# Patient Record
Sex: FEMALE | Race: WHITE | Employment: FULL TIME | URBAN - NONMETROPOLITAN AREA
[De-identification: names, ages, dates, MRNs, and addresses within clinical notes are randomized per-mention and may not be internally consistent; named-entity substitution may affect disease eponyms.]

---

## 2019-10-10 ENCOUNTER — TELEPHONE (OUTPATIENT)
Dept: VASCULAR SURGERY | Age: 79
End: 2019-10-10

## 2019-10-11 DIAGNOSIS — M79.672 LEFT FOOT PAIN: ICD-10-CM

## 2019-10-11 DIAGNOSIS — M19.90 CHRONIC OSTEOARTHRITIS: ICD-10-CM

## 2019-10-11 DIAGNOSIS — R09.89 DECREASED PEDAL PULSES: ICD-10-CM

## 2019-10-11 DIAGNOSIS — H92.02 LEFT EAR PAIN: ICD-10-CM

## 2019-10-11 RX ORDER — GABAPENTIN 300 MG/1
CAPSULE ORAL
COMMUNITY
Start: 2019-09-27 | End: 2019-11-27

## 2019-10-11 RX ORDER — NEOMYCIN SULFATE, POLYMYXIN B SULFATE AND HYDROCORTISONE 10; 3.5; 1 MG/ML; MG/ML; [USP'U]/ML
3 SUSPENSION/ DROPS AURICULAR (OTIC) PRN
COMMUNITY
Start: 2019-09-27

## 2019-10-11 RX ORDER — ATORVASTATIN CALCIUM 20 MG/1
20 TABLET, FILM COATED ORAL NIGHTLY
COMMUNITY
Start: 2019-09-06

## 2019-10-11 RX ORDER — LISINOPRIL 20 MG/1
20 TABLET ORAL DAILY
COMMUNITY
Start: 2019-09-06

## 2019-10-11 RX ORDER — MELOXICAM 15 MG/1
15 TABLET ORAL DAILY
COMMUNITY
Start: 2019-09-06

## 2019-10-11 RX ORDER — HYDROCODONE BITARTRATE AND ACETAMINOPHEN 10; 325 MG/1; MG/1
1 TABLET ORAL 2 TIMES DAILY
Status: ON HOLD | COMMUNITY
Start: 2019-09-27 | End: 2019-12-09 | Stop reason: HOSPADM

## 2019-10-15 ENCOUNTER — OFFICE VISIT (OUTPATIENT)
Dept: VASCULAR SURGERY | Age: 79
End: 2019-10-15
Payer: COMMERCIAL

## 2019-10-15 ENCOUNTER — HOSPITAL ENCOUNTER (OUTPATIENT)
Dept: VASCULAR LAB | Age: 79
Discharge: HOME | End: 2019-10-15
Payer: COMMERCIAL

## 2019-10-15 VITALS
DIASTOLIC BLOOD PRESSURE: 70 MMHG | SYSTOLIC BLOOD PRESSURE: 185 MMHG | HEART RATE: 74 BPM | OXYGEN SATURATION: 97 % | RESPIRATION RATE: 18 BRPM | TEMPERATURE: 97 F

## 2019-10-15 DIAGNOSIS — Z01.818 PRE-OP TESTING: Primary | ICD-10-CM

## 2019-10-15 DIAGNOSIS — I70.213 ATHEROSCLEROSIS OF NATIVE ARTERY OF BOTH LOWER EXTREMITIES WITH INTERMITTENT CLAUDICATION (HCC): ICD-10-CM

## 2019-10-15 DIAGNOSIS — R09.89 BILATERAL CAROTID BRUITS: ICD-10-CM

## 2019-10-15 DIAGNOSIS — R30.0 DYSURIA: ICD-10-CM

## 2019-10-15 DIAGNOSIS — Z01.818 PRE-OP TESTING: ICD-10-CM

## 2019-10-15 DIAGNOSIS — R09.89 BILATERAL CAROTID BRUITS: Primary | ICD-10-CM

## 2019-10-15 LAB
BACTERIA: ABNORMAL /HPF
BILIRUBIN URINE: NEGATIVE
BLOOD, URINE: ABNORMAL
CLARITY: ABNORMAL
COLOR: YELLOW
EPITHELIAL CELLS, UA: 0 /HPF (ref 0–5)
GLUCOSE URINE: NEGATIVE MG/DL
HCT VFR BLD CALC: 32.2 % (ref 37–47)
HEMOGLOBIN: 9.8 G/DL (ref 12–16)
HYALINE CASTS: 1 /HPF (ref 0–8)
KETONES, URINE: NEGATIVE MG/DL
LEUKOCYTE ESTERASE, URINE: ABNORMAL
MCH RBC QN AUTO: 31.3 PG (ref 27–31)
MCHC RBC AUTO-ENTMCNC: 30.4 G/DL (ref 33–37)
MCV RBC AUTO: 102.9 FL (ref 81–99)
NITRITE, URINE: POSITIVE
PDW BLD-RTO: 13.7 % (ref 11.5–14.5)
PH UA: 6 (ref 5–8)
PLATELET # BLD: 373 K/UL (ref 130–400)
PMV BLD AUTO: 9.8 FL (ref 9.4–12.3)
PROTEIN UA: ABNORMAL MG/DL
RBC # BLD: 3.13 M/UL (ref 4.2–5.4)
RBC UA: 33 /HPF (ref 0–4)
REASON FOR REJECTION: NORMAL
REJECTED TEST: NORMAL
SPECIFIC GRAVITY UA: 1.03 (ref 1–1.03)
UROBILINOGEN, URINE: 0.2 E.U./DL
WBC # BLD: 7.6 K/UL (ref 4.8–10.8)
WBC UA: 515 /HPF (ref 0–5)

## 2019-10-15 PROCEDURE — 99203 OFFICE O/P NEW LOW 30 MIN: CPT | Performed by: NURSE PRACTITIONER

## 2019-10-15 PROCEDURE — 93923 UPR/LXTR ART STDY 3+ LVLS: CPT

## 2019-10-15 PROCEDURE — 93880 EXTRACRANIAL BILAT STUDY: CPT

## 2019-10-17 ENCOUNTER — TELEPHONE (OUTPATIENT)
Dept: VASCULAR SURGERY | Age: 79
End: 2019-10-17

## 2019-10-17 LAB
ORGANISM: ABNORMAL
URINE CULTURE, ROUTINE: ABNORMAL
URINE CULTURE, ROUTINE: ABNORMAL

## 2019-10-31 ENCOUNTER — TELEPHONE (OUTPATIENT)
Dept: VASCULAR SURGERY | Age: 79
End: 2019-10-31

## 2019-11-04 ENCOUNTER — TELEPHONE (OUTPATIENT)
Dept: VASCULAR SURGERY | Age: 79
End: 2019-11-04

## 2019-11-07 ENCOUNTER — TELEPHONE (OUTPATIENT)
Dept: VASCULAR SURGERY | Age: 79
End: 2019-11-07

## 2019-11-08 ENCOUNTER — TELEPHONE (OUTPATIENT)
Dept: VASCULAR SURGERY | Age: 79
End: 2019-11-08

## 2019-11-08 DIAGNOSIS — Z01.818 PRE-OP TESTING: Primary | ICD-10-CM

## 2019-11-08 DIAGNOSIS — I70.213 ATHEROSCLEROSIS OF NATIVE ARTERY OF BOTH LOWER EXTREMITIES WITH INTERMITTENT CLAUDICATION (HCC): ICD-10-CM

## 2019-11-13 ENCOUNTER — TELEPHONE (OUTPATIENT)
Dept: VASCULAR SURGERY | Age: 79
End: 2019-11-13

## 2019-11-14 ENCOUNTER — PREP FOR PROCEDURE (OUTPATIENT)
Dept: VASCULAR SURGERY | Age: 79
End: 2019-11-14

## 2019-11-14 RX ORDER — SODIUM CHLORIDE 9 MG/ML
INJECTION, SOLUTION INTRAVENOUS CONTINUOUS
Status: CANCELLED | OUTPATIENT
Start: 2019-11-14

## 2019-11-14 RX ORDER — CLONIDINE HYDROCHLORIDE 0.1 MG/1
0.1 TABLET ORAL PRN
Status: CANCELLED | OUTPATIENT
Start: 2019-11-14

## 2019-11-14 RX ORDER — ASPIRIN 81 MG/1
81 TABLET ORAL ONCE
Status: CANCELLED | OUTPATIENT
Start: 2019-11-14 | End: 2019-11-14

## 2019-11-14 RX ORDER — SODIUM CHLORIDE 0.9 % (FLUSH) 0.9 %
10 SYRINGE (ML) INJECTION PRN
Status: CANCELLED | OUTPATIENT
Start: 2019-11-14

## 2019-11-15 ENCOUNTER — HOSPITAL ENCOUNTER (OUTPATIENT)
Dept: INTERVENTIONAL RADIOLOGY/VASCULAR | Age: 79
Discharge: HOME | End: 2019-11-15
Payer: COMMERCIAL

## 2019-11-15 VITALS
DIASTOLIC BLOOD PRESSURE: 67 MMHG | HEIGHT: 61 IN | WEIGHT: 152 LBS | OXYGEN SATURATION: 100 % | TEMPERATURE: 97.5 F | BODY MASS INDEX: 28.7 KG/M2 | SYSTOLIC BLOOD PRESSURE: 193 MMHG | HEART RATE: 77 BPM | RESPIRATION RATE: 17 BRPM

## 2019-11-15 DIAGNOSIS — I70.213 ATHEROSCLEROSIS OF NATIVE ARTERY OF BOTH LOWER EXTREMITIES WITH INTERMITTENT CLAUDICATION (HCC): ICD-10-CM

## 2019-11-15 PROCEDURE — 6360000002 HC RX W HCPCS: Performed by: SURGERY

## 2019-11-15 PROCEDURE — 37224 PR REVSC OPN/PRG FEM/POP W/ANGIOPLASTY UNI: CPT | Performed by: SURGERY

## 2019-11-15 PROCEDURE — 2500000003 HC RX 250 WO HCPCS: Performed by: SURGERY

## 2019-11-15 PROCEDURE — 37224 HC PLASTY UNI FEMPOP: CPT

## 2019-11-15 PROCEDURE — 6370000000 HC RX 637 (ALT 250 FOR IP): Performed by: NURSE PRACTITIONER

## 2019-11-15 PROCEDURE — 99152 MOD SED SAME PHYS/QHP 5/>YRS: CPT

## 2019-11-15 PROCEDURE — 6370000000 HC RX 637 (ALT 250 FOR IP): Performed by: SURGERY

## 2019-11-15 PROCEDURE — 6360000004 HC RX CONTRAST MEDICATION: Performed by: SURGERY

## 2019-11-15 PROCEDURE — C1894 INTRO/SHEATH, NON-LASER: HCPCS

## 2019-11-15 PROCEDURE — 75716 ARTERY X-RAYS ARMS/LEGS: CPT

## 2019-11-15 PROCEDURE — 6360000002 HC RX W HCPCS: Performed by: NURSE PRACTITIONER

## 2019-11-15 PROCEDURE — 2580000003 HC RX 258: Performed by: NURSE PRACTITIONER

## 2019-11-15 PROCEDURE — 99153 MOD SED SAME PHYS/QHP EA: CPT

## 2019-11-15 PROCEDURE — 75630 X-RAY AORTA LEG ARTERIES: CPT | Performed by: SURGERY

## 2019-11-15 PROCEDURE — 75625 CONTRAST EXAM ABDOMINL AORTA: CPT

## 2019-11-15 RX ORDER — CLONIDINE HYDROCHLORIDE 0.1 MG/1
0.1 TABLET ORAL PRN
Status: DISCONTINUED | OUTPATIENT
Start: 2019-11-15 | End: 2019-11-17 | Stop reason: HOSPADM

## 2019-11-15 RX ORDER — ONDANSETRON 2 MG/ML
4 INJECTION INTRAMUSCULAR; INTRAVENOUS EVERY 6 HOURS PRN
Status: DISCONTINUED | OUTPATIENT
Start: 2019-11-15 | End: 2019-11-17 | Stop reason: HOSPADM

## 2019-11-15 RX ORDER — ASPIRIN 81 MG/1
81 TABLET ORAL ONCE
Status: COMPLETED | OUTPATIENT
Start: 2019-11-15 | End: 2019-11-15

## 2019-11-15 RX ORDER — MIDAZOLAM HYDROCHLORIDE 1 MG/ML
INJECTION INTRAMUSCULAR; INTRAVENOUS
Status: COMPLETED | OUTPATIENT
Start: 2019-11-15 | End: 2019-11-15

## 2019-11-15 RX ORDER — CLONIDINE HYDROCHLORIDE 0.1 MG/1
0.1 TABLET ORAL
Status: DISCONTINUED | OUTPATIENT
Start: 2019-11-15 | End: 2019-11-17 | Stop reason: HOSPADM

## 2019-11-15 RX ORDER — FENTANYL CITRATE 50 UG/ML
INJECTION, SOLUTION INTRAMUSCULAR; INTRAVENOUS
Status: COMPLETED | OUTPATIENT
Start: 2019-11-15 | End: 2019-11-15

## 2019-11-15 RX ORDER — SODIUM CHLORIDE 9 MG/ML
INJECTION, SOLUTION INTRAVENOUS CONTINUOUS
Status: ACTIVE | OUTPATIENT
Start: 2019-11-15 | End: 2019-11-15

## 2019-11-15 RX ORDER — SODIUM CHLORIDE 0.9 % (FLUSH) 0.9 %
10 SYRINGE (ML) INJECTION PRN
Status: DISCONTINUED | OUTPATIENT
Start: 2019-11-15 | End: 2019-11-17 | Stop reason: HOSPADM

## 2019-11-15 RX ORDER — ACETAMINOPHEN 325 MG/1
650 TABLET ORAL EVERY 4 HOURS PRN
Status: DISCONTINUED | OUTPATIENT
Start: 2019-11-15 | End: 2019-11-17 | Stop reason: HOSPADM

## 2019-11-15 RX ORDER — SODIUM CHLORIDE 9 MG/ML
INJECTION, SOLUTION INTRAVENOUS CONTINUOUS
Status: DISCONTINUED | OUTPATIENT
Start: 2019-11-15 | End: 2019-11-17 | Stop reason: HOSPADM

## 2019-11-15 RX ORDER — HEPARIN SODIUM 5000 [USP'U]/ML
INJECTION, SOLUTION INTRAVENOUS; SUBCUTANEOUS
Status: COMPLETED | OUTPATIENT
Start: 2019-11-15 | End: 2019-11-15

## 2019-11-15 RX ORDER — IODIXANOL 320 MG/ML
INJECTION, SOLUTION INTRAVASCULAR
Status: COMPLETED | OUTPATIENT
Start: 2019-11-15 | End: 2019-11-15

## 2019-11-15 RX ORDER — METOPROLOL TARTRATE 50 MG/1
25 TABLET, FILM COATED ORAL EVERY 12 HOURS PRN
Status: DISCONTINUED | OUTPATIENT
Start: 2019-11-15 | End: 2019-11-17 | Stop reason: HOSPADM

## 2019-11-15 RX ORDER — CLOPIDOGREL BISULFATE 75 MG/1
75 TABLET ORAL DAILY
Qty: 30 TABLET | Refills: 5 | Status: SHIPPED | OUTPATIENT
Start: 2019-11-15

## 2019-11-15 RX ORDER — ASPIRIN 81 MG/1
81 TABLET ORAL DAILY
Qty: 90 TABLET | Refills: 1 | Status: SHIPPED | OUTPATIENT
Start: 2019-11-15

## 2019-11-15 RX ORDER — CLOPIDOGREL 300 MG/1
300 TABLET, FILM COATED ORAL ONCE
Status: COMPLETED | OUTPATIENT
Start: 2019-11-15 | End: 2019-11-15

## 2019-11-15 RX ORDER — SODIUM CHLORIDE 0.9 % (FLUSH) 0.9 %
10 SYRINGE (ML) INJECTION EVERY 12 HOURS SCHEDULED
Status: DISCONTINUED | OUTPATIENT
Start: 2019-11-15 | End: 2019-11-17 | Stop reason: HOSPADM

## 2019-11-15 RX ORDER — LIDOCAINE HYDROCHLORIDE 20 MG/ML
INJECTION, SOLUTION INFILTRATION; PERINEURAL
Status: COMPLETED | OUTPATIENT
Start: 2019-11-15 | End: 2019-11-15

## 2019-11-15 RX ADMIN — Medication 2 G: at 07:55

## 2019-11-15 RX ADMIN — MIDAZOLAM 0.5 MG: 1 INJECTION INTRAMUSCULAR; INTRAVENOUS at 08:54

## 2019-11-15 RX ADMIN — LIDOCAINE HYDROCHLORIDE 10 ML: 20 INJECTION, SOLUTION INFILTRATION; PERINEURAL at 08:29

## 2019-11-15 RX ADMIN — ASPIRIN 81 MG: 81 TABLET, COATED ORAL at 07:21

## 2019-11-15 RX ADMIN — MIDAZOLAM 0.5 MG: 1 INJECTION INTRAMUSCULAR; INTRAVENOUS at 09:14

## 2019-11-15 RX ADMIN — FENTANYL CITRATE 25 MCG: 50 INJECTION INTRAMUSCULAR; INTRAVENOUS at 08:39

## 2019-11-15 RX ADMIN — MIDAZOLAM 0.5 MG: 1 INJECTION INTRAMUSCULAR; INTRAVENOUS at 08:39

## 2019-11-15 RX ADMIN — HEPARIN SODIUM 5000 UNITS: 5000 INJECTION, SOLUTION INTRAVENOUS; SUBCUTANEOUS at 08:39

## 2019-11-15 RX ADMIN — MIDAZOLAM 0.5 MG: 1 INJECTION INTRAMUSCULAR; INTRAVENOUS at 08:22

## 2019-11-15 RX ADMIN — CLOPIDOGREL BISULFATE 300 MG: 300 TABLET, FILM COATED ORAL at 11:50

## 2019-11-15 RX ADMIN — FENTANYL CITRATE 25 MCG: 50 INJECTION INTRAMUSCULAR; INTRAVENOUS at 09:14

## 2019-11-15 RX ADMIN — FENTANYL CITRATE 25 MCG: 50 INJECTION INTRAMUSCULAR; INTRAVENOUS at 08:22

## 2019-11-15 RX ADMIN — SODIUM CHLORIDE: 9 INJECTION, SOLUTION INTRAVENOUS at 07:32

## 2019-11-15 RX ADMIN — HEPARIN SODIUM 5000 UNITS: 5000 INJECTION, SOLUTION INTRAVENOUS; SUBCUTANEOUS at 08:19

## 2019-11-15 RX ADMIN — FENTANYL CITRATE 25 MCG: 50 INJECTION INTRAMUSCULAR; INTRAVENOUS at 08:54

## 2019-11-15 RX ADMIN — IODIXANOL 160 ML: 320 INJECTION, SOLUTION INTRAVASCULAR at 09:24

## 2019-11-27 ENCOUNTER — OFFICE VISIT (OUTPATIENT)
Dept: VASCULAR SURGERY | Age: 79
End: 2019-11-27

## 2019-11-27 VITALS
DIASTOLIC BLOOD PRESSURE: 88 MMHG | OXYGEN SATURATION: 98 % | RESPIRATION RATE: 18 BRPM | SYSTOLIC BLOOD PRESSURE: 180 MMHG | HEART RATE: 72 BPM | TEMPERATURE: 98.6 F

## 2019-11-27 DIAGNOSIS — I70.213 ATHEROSCLEROSIS OF NATIVE ARTERY OF BOTH LOWER EXTREMITIES WITH INTERMITTENT CLAUDICATION (HCC): Primary | ICD-10-CM

## 2019-11-27 PROCEDURE — 99024 POSTOP FOLLOW-UP VISIT: CPT | Performed by: PHYSICIAN ASSISTANT

## 2019-11-27 RX ORDER — GABAPENTIN 300 MG/1
300 CAPSULE ORAL 3 TIMES DAILY
Qty: 90 CAPSULE | Refills: 2 | OUTPATIENT
Start: 2019-11-27 | End: 2020-12-16

## 2019-12-03 ENCOUNTER — HOSPITAL ENCOUNTER (OUTPATIENT)
Dept: NON INVASIVE DIAGNOSTICS | Age: 79
Discharge: HOME | End: 2019-12-03
Payer: COMMERCIAL

## 2019-12-03 ENCOUNTER — OFFICE VISIT (OUTPATIENT)
Dept: VASCULAR SURGERY | Age: 79
End: 2019-12-03
Payer: COMMERCIAL

## 2019-12-03 VITALS
OXYGEN SATURATION: 98 % | HEART RATE: 77 BPM | SYSTOLIC BLOOD PRESSURE: 176 MMHG | DIASTOLIC BLOOD PRESSURE: 67 MMHG | RESPIRATION RATE: 18 BRPM | TEMPERATURE: 98 F

## 2019-12-03 DIAGNOSIS — Z09 FOLLOW UP: Primary | ICD-10-CM

## 2019-12-03 DIAGNOSIS — I70.213 ATHEROSCLEROSIS OF NATIVE ARTERY OF BOTH LOWER EXTREMITIES WITH INTERMITTENT CLAUDICATION (HCC): ICD-10-CM

## 2019-12-03 PROCEDURE — 93923 UPR/LXTR ART STDY 3+ LVLS: CPT

## 2019-12-03 PROCEDURE — 99213 OFFICE O/P EST LOW 20 MIN: CPT | Performed by: PHYSICIAN ASSISTANT

## 2019-12-04 ENCOUNTER — PREP FOR PROCEDURE (OUTPATIENT)
Dept: VASCULAR SURGERY | Age: 79
End: 2019-12-04

## 2019-12-04 ENCOUNTER — TELEPHONE (OUTPATIENT)
Dept: VASCULAR SURGERY | Age: 79
End: 2019-12-04

## 2019-12-04 RX ORDER — SODIUM CHLORIDE 0.9 % (FLUSH) 0.9 %
10 SYRINGE (ML) INJECTION PRN
Status: CANCELLED | OUTPATIENT
Start: 2019-12-04

## 2019-12-04 RX ORDER — SODIUM CHLORIDE 9 MG/ML
INJECTION, SOLUTION INTRAVENOUS CONTINUOUS
Status: CANCELLED | OUTPATIENT
Start: 2019-12-04

## 2019-12-04 RX ORDER — ASPIRIN 81 MG/1
81 TABLET ORAL ONCE
Status: CANCELLED | OUTPATIENT
Start: 2019-12-04 | End: 2019-12-04

## 2019-12-05 ENCOUNTER — PREP FOR PROCEDURE (OUTPATIENT)
Dept: VASCULAR SURGERY | Age: 79
End: 2019-12-05

## 2019-12-06 ENCOUNTER — HOSPITAL ENCOUNTER (INPATIENT)
Age: 79
LOS: 3 days | Discharge: HOME | DRG: 501 | End: 2019-12-09
Attending: SURGERY | Admitting: SURGERY
Payer: COMMERCIAL

## 2019-12-06 ENCOUNTER — ANESTHESIA EVENT (OUTPATIENT)
Dept: OPERATING ROOM | Age: 79
DRG: 501 | End: 2019-12-06
Payer: COMMERCIAL

## 2019-12-06 ENCOUNTER — ANESTHESIA (OUTPATIENT)
Dept: OPERATING ROOM | Age: 79
DRG: 501 | End: 2019-12-06
Payer: COMMERCIAL

## 2019-12-06 ENCOUNTER — HOSPITAL ENCOUNTER (OUTPATIENT)
Dept: INTERVENTIONAL RADIOLOGY/VASCULAR | Age: 79
Discharge: HOME | DRG: 501 | End: 2019-12-06
Payer: COMMERCIAL

## 2019-12-06 VITALS
SYSTOLIC BLOOD PRESSURE: 168 MMHG | OXYGEN SATURATION: 99 % | DIASTOLIC BLOOD PRESSURE: 72 MMHG | RESPIRATION RATE: 1 BRPM

## 2019-12-06 VITALS
HEART RATE: 71 BPM | SYSTOLIC BLOOD PRESSURE: 140 MMHG | RESPIRATION RATE: 18 BRPM | OXYGEN SATURATION: 96 % | WEIGHT: 151 LBS | TEMPERATURE: 98.5 F | HEIGHT: 61 IN | DIASTOLIC BLOOD PRESSURE: 50 MMHG | BODY MASS INDEX: 28.51 KG/M2

## 2019-12-06 DIAGNOSIS — T79.A29A COMPARTMENT SYNDROME OF LOWER LEG (HCC): Primary | ICD-10-CM

## 2019-12-06 DIAGNOSIS — I70.213 ATHEROSCLEROSIS OF NATIVE ARTERY OF BOTH LOWER EXTREMITIES WITH INTERMITTENT CLAUDICATION (HCC): ICD-10-CM

## 2019-12-06 LAB
BASE EXCESS VENOUS: -10
CALCIUM IONIZED: 1.25 MMOL/L (ref 1.1–1.3)
CO2: 15 MEQ/L (ref 21–32)
GFR NON-AFRICAN AMERICAN: >60
GLUCOSE BLD-MCNC: 122 MG/DL (ref 70–99)
GLUCOSE BLD-MCNC: 135 MG/DL (ref 70–99)
GLUCOSE BLD-MCNC: 154 MG/DL (ref 70–99)
HCT VFR BLD CALC: 36.5 % (ref 37–47)
HEMOGLOBIN: 10.9 G/DL (ref 12–16)
HEMOGLOBIN: 9.1 GM/DL (ref 12–18)
MCH RBC QN AUTO: 31.2 PG (ref 27–31)
MCHC RBC AUTO-ENTMCNC: 29.9 G/DL (ref 33–37)
MCV RBC AUTO: 104.6 FL (ref 81–99)
O2 SAT, VEN: 99 %
PCO2, VEN: 29 MM HG (ref 40–50)
PDW BLD-RTO: 12.4 % (ref 11.5–14.5)
PERFORMED ON: ABNORMAL
PH VENOUS: 7.32
PLATELET # BLD: 409 K/UL (ref 130–400)
PMV BLD AUTO: 9.5 FL (ref 9.4–12.3)
PO2, VEN: 153.5 MM HG
POC ANION GAP: 11
POC CHLORIDE: 116 MEQ/L (ref 99–110)
POC CREATININE: 0.9 MG/DL (ref 0.3–1.3)
POC HEMATOCRIT: 27 % (ref 37–52)
POC PATIENT TEMP: 37
POC POTASSIUM: 5 MEQ/L (ref 3.5–5.1)
POC SAMPLE TYPE: ABNORMAL
POC SODIUM: 142 MEQ/L (ref 136–145)
RBC # BLD: 3.49 M/UL (ref 4.2–5.4)
REASON FOR REJECTION: NORMAL
REJECTED TEST: NORMAL
TCO2 CALC VENOUS: 16 MMOL/L
WBC # BLD: 8.1 K/UL (ref 4.8–10.8)

## 2019-12-06 PROCEDURE — 82330 ASSAY OF CALCIUM: CPT

## 2019-12-06 PROCEDURE — 82810 BLOOD GASES O2 SAT ONLY: CPT

## 2019-12-06 PROCEDURE — 3700000000 HC ANESTHESIA ATTENDED CARE: Performed by: SURGERY

## 2019-12-06 PROCEDURE — 047M3Z1 DILATION OF RIGHT POPLITEAL ARTERY USING DRUG-COATED BALLOON, PERCUTANEOUS APPROACH: ICD-10-PCS | Performed by: SURGERY

## 2019-12-06 PROCEDURE — 3600000004 HC SURGERY LEVEL 4 BASE: Performed by: SURGERY

## 2019-12-06 PROCEDURE — 99153 MOD SED SAME PHYS/QHP EA: CPT

## 2019-12-06 PROCEDURE — 047K3Z1 DILATION OF RIGHT FEMORAL ARTERY USING DRUG-COATED BALLOON, PERCUTANEOUS APPROACH: ICD-10-PCS | Performed by: SURGERY

## 2019-12-06 PROCEDURE — 82948 REAGENT STRIP/BLOOD GLUCOSE: CPT

## 2019-12-06 PROCEDURE — 36415 COLL VENOUS BLD VENIPUNCTURE: CPT

## 2019-12-06 PROCEDURE — 6370000000 HC RX 637 (ALT 250 FOR IP): Performed by: SURGERY

## 2019-12-06 PROCEDURE — 6360000002 HC RX W HCPCS: Performed by: NURSE ANESTHETIST, CERTIFIED REGISTERED

## 2019-12-06 PROCEDURE — 75716 ARTERY X-RAYS ARMS/LEGS: CPT

## 2019-12-06 PROCEDURE — 2709999900 IR AORTAGRAM ABDOMINAL SERIALOGRAM

## 2019-12-06 PROCEDURE — 6360000002 HC RX W HCPCS: Performed by: NURSE PRACTITIONER

## 2019-12-06 PROCEDURE — B41GYZZ FLUOROSCOPY OF LEFT LOWER EXTREMITY ARTERIES USING OTHER CONTRAST: ICD-10-PCS | Performed by: SURGERY

## 2019-12-06 PROCEDURE — 82565 ASSAY OF CREATININE: CPT

## 2019-12-06 PROCEDURE — 2500000003 HC RX 250 WO HCPCS: Performed by: SURGERY

## 2019-12-06 PROCEDURE — 0KNS0ZZ RELEASE RIGHT LOWER LEG MUSCLE, OPEN APPROACH: ICD-10-PCS | Performed by: SURGERY

## 2019-12-06 PROCEDURE — 3700000001 HC ADD 15 MINUTES (ANESTHESIA): Performed by: SURGERY

## 2019-12-06 PROCEDURE — 82800 BLOOD PH: CPT

## 2019-12-06 PROCEDURE — 6360000002 HC RX W HCPCS: Performed by: SURGERY

## 2019-12-06 PROCEDURE — 2500000003 HC RX 250 WO HCPCS: Performed by: NURSE ANESTHETIST, CERTIFIED REGISTERED

## 2019-12-06 PROCEDURE — 0Y393ZZ CONTROL BLEEDING IN RIGHT LOWER EXTREMITY, PERCUTANEOUS APPROACH: ICD-10-PCS | Performed by: SURGERY

## 2019-12-06 PROCEDURE — 99152 MOD SED SAME PHYS/QHP 5/>YRS: CPT

## 2019-12-06 PROCEDURE — 2580000003 HC RX 258: Performed by: PHYSICIAN ASSISTANT

## 2019-12-06 PROCEDURE — 82435 ASSAY OF BLOOD CHLORIDE: CPT

## 2019-12-06 PROCEDURE — 7100000000 HC PACU RECOVERY - FIRST 15 MIN: Performed by: SURGERY

## 2019-12-06 PROCEDURE — 84295 ASSAY OF SERUM SODIUM: CPT

## 2019-12-06 PROCEDURE — 2500000003 HC RX 250 WO HCPCS

## 2019-12-06 PROCEDURE — 2580000003 HC RX 258: Performed by: NURSE ANESTHETIST, CERTIFIED REGISTERED

## 2019-12-06 PROCEDURE — 85014 HEMATOCRIT: CPT

## 2019-12-06 PROCEDURE — 27601 DECOMPRESSION OF LOWER LEG: CPT | Performed by: SURGERY

## 2019-12-06 PROCEDURE — 7100000001 HC PACU RECOVERY - ADDTL 15 MIN: Performed by: SURGERY

## 2019-12-06 PROCEDURE — 37224 HC PLASTY UNI FEMPOP: CPT

## 2019-12-06 PROCEDURE — 84132 ASSAY OF SERUM POTASSIUM: CPT

## 2019-12-06 PROCEDURE — 93005 ELECTROCARDIOGRAM TRACING: CPT | Performed by: NURSE ANESTHETIST, CERTIFIED REGISTERED

## 2019-12-06 PROCEDURE — B41FYZZ FLUOROSCOPY OF RIGHT LOWER EXTREMITY ARTERIES USING OTHER CONTRAST: ICD-10-PCS | Performed by: SURGERY

## 2019-12-06 PROCEDURE — 99152 MOD SED SAME PHYS/QHP 5/>YRS: CPT | Performed by: SURGERY

## 2019-12-06 PROCEDURE — 2709999900 HC NON-CHARGEABLE SUPPLY: Performed by: SURGERY

## 2019-12-06 PROCEDURE — 37224 PR REVSC OPN/PRG FEM/POP W/ANGIOPLASTY UNI: CPT | Performed by: SURGERY

## 2019-12-06 PROCEDURE — 2580000003 HC RX 258: Performed by: SURGERY

## 2019-12-06 PROCEDURE — 6360000002 HC RX W HCPCS: Performed by: PHYSICIAN ASSISTANT

## 2019-12-06 PROCEDURE — 85027 COMPLETE CBC AUTOMATED: CPT

## 2019-12-06 PROCEDURE — 37228 PR REVSC OPN/PRQ TIB/PERO W/ANGIOPLASTY UNI: CPT | Performed by: SURGERY

## 2019-12-06 PROCEDURE — 3600000014 HC SURGERY LEVEL 4 ADDTL 15MIN: Performed by: SURGERY

## 2019-12-06 PROCEDURE — 75716 ARTERY X-RAYS ARMS/LEGS: CPT | Performed by: SURGERY

## 2019-12-06 PROCEDURE — 37228 HC PLASTY UNI TIB/PER INITIAL: CPT

## 2019-12-06 PROCEDURE — 82374 ASSAY BLOOD CARBON DIOXIDE: CPT

## 2019-12-06 PROCEDURE — 1210000000 HC MED SURG R&B

## 2019-12-06 RX ORDER — FENTANYL CITRATE 50 UG/ML
INJECTION, SOLUTION INTRAMUSCULAR; INTRAVENOUS
Status: COMPLETED | OUTPATIENT
Start: 2019-12-06 | End: 2019-12-06

## 2019-12-06 RX ORDER — HYDROCODONE BITARTRATE AND ACETAMINOPHEN 5; 325 MG/1; MG/1
2 TABLET ORAL EVERY 4 HOURS PRN
Status: DISCONTINUED | OUTPATIENT
Start: 2019-12-06 | End: 2019-12-09 | Stop reason: HOSPADM

## 2019-12-06 RX ORDER — HEPARIN SODIUM 5000 [USP'U]/ML
INJECTION, SOLUTION INTRAVENOUS; SUBCUTANEOUS
Status: COMPLETED | OUTPATIENT
Start: 2019-12-06 | End: 2019-12-06

## 2019-12-06 RX ORDER — SODIUM CHLORIDE 0.9 % (FLUSH) 0.9 %
10 SYRINGE (ML) INJECTION PRN
Status: DISCONTINUED | OUTPATIENT
Start: 2019-12-06 | End: 2019-12-07 | Stop reason: HOSPADM

## 2019-12-06 RX ORDER — GABAPENTIN 300 MG/1
300 CAPSULE ORAL 3 TIMES DAILY
Status: DISCONTINUED | OUTPATIENT
Start: 2019-12-06 | End: 2019-12-09 | Stop reason: HOSPADM

## 2019-12-06 RX ORDER — SODIUM CHLORIDE, SODIUM LACTATE, POTASSIUM CHLORIDE, CALCIUM CHLORIDE 600; 310; 30; 20 MG/100ML; MG/100ML; MG/100ML; MG/100ML
INJECTION, SOLUTION INTRAVENOUS CONTINUOUS PRN
Status: DISCONTINUED | OUTPATIENT
Start: 2019-12-06 | End: 2019-12-06 | Stop reason: SDUPTHER

## 2019-12-06 RX ORDER — LABETALOL HYDROCHLORIDE 5 MG/ML
INJECTION, SOLUTION INTRAVENOUS
Status: COMPLETED | OUTPATIENT
Start: 2019-12-06 | End: 2019-12-06

## 2019-12-06 RX ORDER — ATORVASTATIN CALCIUM 20 MG/1
20 TABLET, FILM COATED ORAL DAILY
Status: DISCONTINUED | OUTPATIENT
Start: 2019-12-06 | End: 2019-12-09 | Stop reason: HOSPADM

## 2019-12-06 RX ORDER — SODIUM CHLORIDE 0.9 % (FLUSH) 0.9 %
10 SYRINGE (ML) INJECTION EVERY 12 HOURS SCHEDULED
Status: DISCONTINUED | OUTPATIENT
Start: 2019-12-06 | End: 2019-12-09 | Stop reason: HOSPADM

## 2019-12-06 RX ORDER — EPHEDRINE SULFATE 50 MG/ML
INJECTION, SOLUTION INTRAVENOUS PRN
Status: DISCONTINUED | OUTPATIENT
Start: 2019-12-06 | End: 2019-12-06 | Stop reason: SDUPTHER

## 2019-12-06 RX ORDER — HYDROCODONE BITARTRATE AND ACETAMINOPHEN 5; 325 MG/1; MG/1
1 TABLET ORAL EVERY 4 HOURS PRN
Status: DISCONTINUED | OUTPATIENT
Start: 2019-12-06 | End: 2019-12-09 | Stop reason: HOSPADM

## 2019-12-06 RX ORDER — LIDOCAINE HYDROCHLORIDE 10 MG/ML
INJECTION, SOLUTION INFILTRATION; PERINEURAL PRN
Status: DISCONTINUED | OUTPATIENT
Start: 2019-12-06 | End: 2019-12-06 | Stop reason: SDUPTHER

## 2019-12-06 RX ORDER — ASPIRIN 81 MG/1
81 TABLET ORAL DAILY
Status: DISCONTINUED | OUTPATIENT
Start: 2019-12-06 | End: 2019-12-09 | Stop reason: HOSPADM

## 2019-12-06 RX ORDER — MIDAZOLAM HYDROCHLORIDE 1 MG/ML
INJECTION INTRAMUSCULAR; INTRAVENOUS
Status: COMPLETED | OUTPATIENT
Start: 2019-12-06 | End: 2019-12-06

## 2019-12-06 RX ORDER — HYDROCODONE BITARTRATE AND ACETAMINOPHEN 10; 325 MG/1; MG/1
1 TABLET ORAL EVERY 4 HOURS PRN
Status: DISCONTINUED | OUTPATIENT
Start: 2019-12-06 | End: 2019-12-09 | Stop reason: HOSPADM

## 2019-12-06 RX ORDER — GLYCOPYRROLATE 0.2 MG/ML
INJECTION INTRAMUSCULAR; INTRAVENOUS PRN
Status: DISCONTINUED | OUTPATIENT
Start: 2019-12-06 | End: 2019-12-06 | Stop reason: SDUPTHER

## 2019-12-06 RX ORDER — PROPOFOL 10 MG/ML
INJECTION, EMULSION INTRAVENOUS PRN
Status: DISCONTINUED | OUTPATIENT
Start: 2019-12-06 | End: 2019-12-06 | Stop reason: SDUPTHER

## 2019-12-06 RX ORDER — LABETALOL 20 MG/4 ML (5 MG/ML) INTRAVENOUS SYRINGE
Status: COMPLETED
Start: 2019-12-06 | End: 2019-12-06

## 2019-12-06 RX ORDER — LIDOCAINE HYDROCHLORIDE 20 MG/ML
INJECTION, SOLUTION INFILTRATION; PERINEURAL
Status: COMPLETED | OUTPATIENT
Start: 2019-12-06 | End: 2019-12-06

## 2019-12-06 RX ORDER — ONDANSETRON 2 MG/ML
4 INJECTION INTRAMUSCULAR; INTRAVENOUS EVERY 6 HOURS PRN
Status: DISCONTINUED | OUTPATIENT
Start: 2019-12-06 | End: 2019-12-09 | Stop reason: HOSPADM

## 2019-12-06 RX ORDER — CLOPIDOGREL BISULFATE 75 MG/1
75 TABLET ORAL DAILY
Status: DISCONTINUED | OUTPATIENT
Start: 2019-12-06 | End: 2019-12-09 | Stop reason: HOSPADM

## 2019-12-06 RX ORDER — MELOXICAM 7.5 MG/1
15 TABLET ORAL DAILY
Status: DISCONTINUED | OUTPATIENT
Start: 2019-12-06 | End: 2019-12-09 | Stop reason: HOSPADM

## 2019-12-06 RX ORDER — ASPIRIN 81 MG/1
81 TABLET ORAL ONCE
Status: DISCONTINUED | OUTPATIENT
Start: 2019-12-06 | End: 2019-12-07 | Stop reason: HOSPADM

## 2019-12-06 RX ORDER — ROCURONIUM BROMIDE 10 MG/ML
INJECTION, SOLUTION INTRAVENOUS PRN
Status: DISCONTINUED | OUTPATIENT
Start: 2019-12-06 | End: 2019-12-06 | Stop reason: SDUPTHER

## 2019-12-06 RX ORDER — SODIUM CHLORIDE 0.9 % (FLUSH) 0.9 %
10 SYRINGE (ML) INJECTION PRN
Status: DISCONTINUED | OUTPATIENT
Start: 2019-12-06 | End: 2019-12-09 | Stop reason: HOSPADM

## 2019-12-06 RX ORDER — LABETALOL 20 MG/4 ML (5 MG/ML) INTRAVENOUS SYRINGE
5 ONCE
Status: COMPLETED | OUTPATIENT
Start: 2019-12-06 | End: 2019-12-06

## 2019-12-06 RX ORDER — SODIUM CHLORIDE 9 MG/ML
INJECTION, SOLUTION INTRAVENOUS CONTINUOUS
Status: DISCONTINUED | OUTPATIENT
Start: 2019-12-06 | End: 2019-12-07 | Stop reason: HOSPADM

## 2019-12-06 RX ORDER — LISINOPRIL 20 MG/1
20 TABLET ORAL DAILY
Status: DISCONTINUED | OUTPATIENT
Start: 2019-12-06 | End: 2019-12-09 | Stop reason: HOSPADM

## 2019-12-06 RX ORDER — FENTANYL CITRATE 50 UG/ML
INJECTION, SOLUTION INTRAMUSCULAR; INTRAVENOUS PRN
Status: DISCONTINUED | OUTPATIENT
Start: 2019-12-06 | End: 2019-12-06 | Stop reason: SDUPTHER

## 2019-12-06 RX ORDER — ACETAMINOPHEN 325 MG/1
650 TABLET ORAL EVERY 4 HOURS PRN
Status: DISCONTINUED | OUTPATIENT
Start: 2019-12-06 | End: 2019-12-09 | Stop reason: HOSPADM

## 2019-12-06 RX ORDER — SODIUM CHLORIDE 9 MG/ML
INJECTION, SOLUTION INTRAVENOUS CONTINUOUS
Status: ACTIVE | OUTPATIENT
Start: 2019-12-06 | End: 2019-12-06

## 2019-12-06 RX ADMIN — ONDANSETRON 4 MG: 2 INJECTION INTRAMUSCULAR; INTRAVENOUS at 15:46

## 2019-12-06 RX ADMIN — FENTANYL CITRATE 50 MCG: 50 INJECTION INTRAMUSCULAR; INTRAVENOUS at 09:45

## 2019-12-06 RX ADMIN — MIDAZOLAM 0.5 MG: 1 INJECTION INTRAMUSCULAR; INTRAVENOUS at 08:59

## 2019-12-06 RX ADMIN — MIDAZOLAM 1 MG: 1 INJECTION INTRAMUSCULAR; INTRAVENOUS at 09:38

## 2019-12-06 RX ADMIN — LISINOPRIL 20 MG: 20 TABLET ORAL at 15:47

## 2019-12-06 RX ADMIN — LABETALOL 20 MG/4 ML (5 MG/ML) INTRAVENOUS SYRINGE 5 MG: at 13:01

## 2019-12-06 RX ADMIN — SODIUM CHLORIDE: 9 INJECTION, SOLUTION INTRAVENOUS at 07:09

## 2019-12-06 RX ADMIN — CLOPIDOGREL BISULFATE 75 MG: 75 TABLET ORAL at 15:46

## 2019-12-06 RX ADMIN — EPHEDRINE SULFATE 5 MG: 50 INJECTION INTRAMUSCULAR; INTRAVENOUS; SUBCUTANEOUS at 12:09

## 2019-12-06 RX ADMIN — LABETALOL HYDROCHLORIDE 10 MG: 5 INJECTION INTRAVENOUS at 09:54

## 2019-12-06 RX ADMIN — HYDROCODONE BITARTRATE AND ACETAMINOPHEN 2 TABLET: 5; 325 TABLET ORAL at 15:46

## 2019-12-06 RX ADMIN — MIDAZOLAM 1 MG: 1 INJECTION INTRAMUSCULAR; INTRAVENOUS at 09:44

## 2019-12-06 RX ADMIN — ASPIRIN 81 MG: 81 TABLET, COATED ORAL at 15:46

## 2019-12-06 RX ADMIN — ATORVASTATIN CALCIUM 20 MG: 20 TABLET, FILM COATED ORAL at 15:47

## 2019-12-06 RX ADMIN — HEPARIN SODIUM 5000 UNITS: 5000 INJECTION, SOLUTION INTRAVENOUS; SUBCUTANEOUS at 09:25

## 2019-12-06 RX ADMIN — ROCURONIUM BROMIDE 30 MG: 10 SOLUTION INTRAVENOUS at 11:42

## 2019-12-06 RX ADMIN — GABAPENTIN 300 MG: 300 CAPSULE ORAL at 21:12

## 2019-12-06 RX ADMIN — LABETALOL HYDROCHLORIDE 10 MG: 5 INJECTION INTRAVENOUS at 09:49

## 2019-12-06 RX ADMIN — GABAPENTIN 300 MG: 300 CAPSULE ORAL at 15:47

## 2019-12-06 RX ADMIN — FENTANYL CITRATE 50 MCG: 50 INJECTION INTRAMUSCULAR; INTRAVENOUS at 09:07

## 2019-12-06 RX ADMIN — FENTANYL CITRATE 50 MCG: 50 INJECTION INTRAMUSCULAR; INTRAVENOUS at 11:42

## 2019-12-06 RX ADMIN — MIDAZOLAM 0.5 MG: 1 INJECTION INTRAMUSCULAR; INTRAVENOUS at 08:51

## 2019-12-06 RX ADMIN — PROPOFOL 80 MG: 10 INJECTION, EMULSION INTRAVENOUS at 11:42

## 2019-12-06 RX ADMIN — GLYCOPYRROLATE 0.2 MG: 1 INJECTION INTRAMUSCULAR; INTRAVENOUS at 12:11

## 2019-12-06 RX ADMIN — SODIUM CHLORIDE, SODIUM LACTATE, POTASSIUM CHLORIDE, AND CALCIUM CHLORIDE: 600; 310; 30; 20 INJECTION, SOLUTION INTRAVENOUS at 11:41

## 2019-12-06 RX ADMIN — HYDROCODONE BITARTRATE AND ACETAMINOPHEN 2 TABLET: 5; 325 TABLET ORAL at 22:50

## 2019-12-06 RX ADMIN — Medication 2 G: at 08:34

## 2019-12-06 RX ADMIN — SUGAMMADEX 150 MG: 100 INJECTION, SOLUTION INTRAVENOUS at 12:13

## 2019-12-06 RX ADMIN — HEPARIN SODIUM 5000 UNITS: 5000 INJECTION, SOLUTION INTRAVENOUS; SUBCUTANEOUS at 08:53

## 2019-12-06 RX ADMIN — FENTANYL CITRATE 25 MCG: 50 INJECTION INTRAMUSCULAR; INTRAVENOUS at 08:51

## 2019-12-06 RX ADMIN — HYDROMORPHONE HYDROCHLORIDE 1 MG: 1 INJECTION, SOLUTION INTRAMUSCULAR; INTRAVENOUS; SUBCUTANEOUS at 13:56

## 2019-12-06 RX ADMIN — FENTANYL CITRATE 25 MCG: 50 INJECTION INTRAMUSCULAR; INTRAVENOUS at 09:38

## 2019-12-06 RX ADMIN — Medication 10 ML: at 21:12

## 2019-12-06 RX ADMIN — LIDOCAINE HYDROCHLORIDE 40 MG: 10 INJECTION, SOLUTION INFILTRATION; PERINEURAL at 11:42

## 2019-12-06 RX ADMIN — LIDOCAINE HYDROCHLORIDE 10 ML: 20 INJECTION, SOLUTION INFILTRATION; PERINEURAL at 08:53

## 2019-12-06 RX ADMIN — MELOXICAM 15 MG: 7.5 TABLET ORAL at 15:47

## 2019-12-06 ASSESSMENT — PAIN DESCRIPTION - PAIN TYPE
TYPE: SURGICAL PAIN
TYPE: SURGICAL PAIN

## 2019-12-06 ASSESSMENT — LIFESTYLE VARIABLES: SMOKING_STATUS: 0

## 2019-12-06 ASSESSMENT — PAIN SCALES - GENERAL
PAINLEVEL_OUTOF10: 0
PAINLEVEL_OUTOF10: 7
PAINLEVEL_OUTOF10: 0
PAINLEVEL_OUTOF10: 7
PAINLEVEL_OUTOF10: 8
PAINLEVEL_OUTOF10: 0

## 2019-12-06 ASSESSMENT — PAIN DESCRIPTION - ORIENTATION
ORIENTATION: RIGHT
ORIENTATION: RIGHT

## 2019-12-06 ASSESSMENT — PAIN - FUNCTIONAL ASSESSMENT
PAIN_FUNCTIONAL_ASSESSMENT: PREVENTS OR INTERFERES SOME ACTIVE ACTIVITIES AND ADLS
PAIN_FUNCTIONAL_ASSESSMENT: PREVENTS OR INTERFERES SOME ACTIVE ACTIVITIES AND ADLS

## 2019-12-06 ASSESSMENT — PAIN DESCRIPTION - PROGRESSION: CLINICAL_PROGRESSION: NOT CHANGED

## 2019-12-06 ASSESSMENT — PAIN DESCRIPTION - FREQUENCY: FREQUENCY: INTERMITTENT

## 2019-12-06 ASSESSMENT — PAIN DESCRIPTION - DESCRIPTORS: DESCRIPTORS: THROBBING

## 2019-12-06 ASSESSMENT — PAIN DESCRIPTION - LOCATION
LOCATION: LEG
LOCATION: LEG

## 2019-12-06 ASSESSMENT — PAIN DESCRIPTION - ONSET: ONSET: SUDDEN

## 2019-12-06 ASSESSMENT — PAIN SCALES - WONG BAKER: WONGBAKER_NUMERICALRESPONSE: 0

## 2019-12-07 LAB
ANION GAP SERPL CALCULATED.3IONS-SCNC: 12 MMOL/L (ref 7–19)
BUN BLDV-MCNC: 21 MG/DL (ref 8–23)
CALCIUM SERPL-MCNC: 8.8 MG/DL (ref 8.8–10.2)
CHLORIDE BLD-SCNC: 108 MMOL/L (ref 98–111)
CO2: 18 MMOL/L (ref 22–29)
CREAT SERPL-MCNC: 0.8 MG/DL (ref 0.5–0.9)
GFR NON-AFRICAN AMERICAN: >60
GLUCOSE BLD-MCNC: 128 MG/DL (ref 74–109)
GLUCOSE BLD-MCNC: 133 MG/DL (ref 70–99)
GLUCOSE BLD-MCNC: 172 MG/DL (ref 70–99)
GLUCOSE BLD-MCNC: 184 MG/DL (ref 70–99)
GLUCOSE BLD-MCNC: 224 MG/DL (ref 70–99)
HCT VFR BLD CALC: 29.4 % (ref 37–47)
HEMOGLOBIN: 8.8 G/DL (ref 12–16)
MCH RBC QN AUTO: 31.2 PG (ref 27–31)
MCHC RBC AUTO-ENTMCNC: 29.9 G/DL (ref 33–37)
MCV RBC AUTO: 104.3 FL (ref 81–99)
PDW BLD-RTO: 12.6 % (ref 11.5–14.5)
PERFORMED ON: ABNORMAL
PLATELET # BLD: 331 K/UL (ref 130–400)
PMV BLD AUTO: 9.6 FL (ref 9.4–12.3)
POTASSIUM SERPL-SCNC: 5.4 MMOL/L (ref 3.5–5)
RBC # BLD: 2.82 M/UL (ref 4.2–5.4)
SODIUM BLD-SCNC: 138 MMOL/L (ref 136–145)
WBC # BLD: 8.4 K/UL (ref 4.8–10.8)

## 2019-12-07 PROCEDURE — 82948 REAGENT STRIP/BLOOD GLUCOSE: CPT

## 2019-12-07 PROCEDURE — 99024 POSTOP FOLLOW-UP VISIT: CPT | Performed by: SURGERY

## 2019-12-07 PROCEDURE — 2580000003 HC RX 258: Performed by: SURGERY

## 2019-12-07 PROCEDURE — 6370000000 HC RX 637 (ALT 250 FOR IP): Performed by: SURGERY

## 2019-12-07 PROCEDURE — 6360000002 HC RX W HCPCS: Performed by: NURSE PRACTITIONER

## 2019-12-07 PROCEDURE — 80048 BASIC METABOLIC PNL TOTAL CA: CPT

## 2019-12-07 PROCEDURE — 85027 COMPLETE CBC AUTOMATED: CPT

## 2019-12-07 PROCEDURE — 1210000000 HC MED SURG R&B

## 2019-12-07 PROCEDURE — 6360000002 HC RX W HCPCS: Performed by: SURGERY

## 2019-12-07 PROCEDURE — 36415 COLL VENOUS BLD VENIPUNCTURE: CPT

## 2019-12-07 RX ADMIN — MELOXICAM 15 MG: 7.5 TABLET ORAL at 08:19

## 2019-12-07 RX ADMIN — Medication 10 ML: at 08:19

## 2019-12-07 RX ADMIN — ATORVASTATIN CALCIUM 20 MG: 20 TABLET, FILM COATED ORAL at 08:19

## 2019-12-07 RX ADMIN — Medication 10 ML: at 21:17

## 2019-12-07 RX ADMIN — GABAPENTIN 300 MG: 300 CAPSULE ORAL at 21:17

## 2019-12-07 RX ADMIN — HYDROMORPHONE HYDROCHLORIDE 1 MG: 1 INJECTION, SOLUTION INTRAMUSCULAR; INTRAVENOUS; SUBCUTANEOUS at 02:33

## 2019-12-07 RX ADMIN — ENOXAPARIN SODIUM 40 MG: 40 INJECTION SUBCUTANEOUS at 14:43

## 2019-12-07 RX ADMIN — GABAPENTIN 300 MG: 300 CAPSULE ORAL at 08:19

## 2019-12-07 RX ADMIN — HYDROCODONE BITARTRATE AND ACETAMINOPHEN 2 TABLET: 5; 325 TABLET ORAL at 06:48

## 2019-12-07 RX ADMIN — HYDROCODONE BITARTRATE AND ACETAMINOPHEN 2 TABLET: 5; 325 TABLET ORAL at 21:21

## 2019-12-07 RX ADMIN — ASPIRIN 81 MG: 81 TABLET, COATED ORAL at 08:19

## 2019-12-07 RX ADMIN — CLOPIDOGREL BISULFATE 75 MG: 75 TABLET ORAL at 08:19

## 2019-12-07 RX ADMIN — LISINOPRIL 20 MG: 20 TABLET ORAL at 08:19

## 2019-12-07 RX ADMIN — GABAPENTIN 300 MG: 300 CAPSULE ORAL at 14:44

## 2019-12-07 ASSESSMENT — PAIN SCALES - GENERAL
PAINLEVEL_OUTOF10: 7
PAINLEVEL_OUTOF10: 8
PAINLEVEL_OUTOF10: 0
PAINLEVEL_OUTOF10: 10
PAINLEVEL_OUTOF10: 10
PAINLEVEL_OUTOF10: 0
PAINLEVEL_OUTOF10: 4
PAINLEVEL_OUTOF10: 2

## 2019-12-07 ASSESSMENT — PAIN DESCRIPTION - ONSET
ONSET: OTHER (COMMENT)
ONSET: OTHER (COMMENT)

## 2019-12-07 ASSESSMENT — PAIN DESCRIPTION - FREQUENCY
FREQUENCY: CONTINUOUS
FREQUENCY: CONTINUOUS

## 2019-12-07 ASSESSMENT — PAIN DESCRIPTION - LOCATION
LOCATION: LEG;FOOT
LOCATION: LEG

## 2019-12-07 ASSESSMENT — PAIN DESCRIPTION - PROGRESSION
CLINICAL_PROGRESSION: NOT CHANGED
CLINICAL_PROGRESSION: NOT CHANGED

## 2019-12-07 ASSESSMENT — PAIN DESCRIPTION - PAIN TYPE: TYPE: SURGICAL PAIN

## 2019-12-07 ASSESSMENT — PAIN DESCRIPTION - ORIENTATION
ORIENTATION: RIGHT
ORIENTATION: RIGHT

## 2019-12-07 ASSESSMENT — PAIN DESCRIPTION - DESCRIPTORS: DESCRIPTORS: THROBBING

## 2019-12-08 LAB
ANION GAP SERPL CALCULATED.3IONS-SCNC: 12 MMOL/L (ref 7–19)
BUN BLDV-MCNC: 21 MG/DL (ref 8–23)
CALCIUM SERPL-MCNC: 8.7 MG/DL (ref 8.8–10.2)
CHLORIDE BLD-SCNC: 107 MMOL/L (ref 98–111)
CO2: 16 MMOL/L (ref 22–29)
CREAT SERPL-MCNC: 0.8 MG/DL (ref 0.5–0.9)
EKG P AXIS: 33 DEGREES
EKG P-R INTERVAL: 164 MS
EKG Q-T INTERVAL: 388 MS
EKG QRS DURATION: 92 MS
EKG QTC CALCULATION (BAZETT): 414 MS
EKG T AXIS: 50 DEGREES
GFR NON-AFRICAN AMERICAN: >60
GLUCOSE BLD-MCNC: 137 MG/DL (ref 70–99)
GLUCOSE BLD-MCNC: 143 MG/DL (ref 70–99)
GLUCOSE BLD-MCNC: 153 MG/DL (ref 70–99)
GLUCOSE BLD-MCNC: 156 MG/DL (ref 74–109)
GLUCOSE BLD-MCNC: 165 MG/DL (ref 70–99)
HCT VFR BLD CALC: 25.9 % (ref 37–47)
HEMOGLOBIN: 7.8 G/DL (ref 12–16)
MCH RBC QN AUTO: 31.3 PG (ref 27–31)
MCHC RBC AUTO-ENTMCNC: 30.1 G/DL (ref 33–37)
MCV RBC AUTO: 104 FL (ref 81–99)
PDW BLD-RTO: 12.2 % (ref 11.5–14.5)
PERFORMED ON: ABNORMAL
PLATELET # BLD: 289 K/UL (ref 130–400)
PMV BLD AUTO: 10 FL (ref 9.4–12.3)
POTASSIUM SERPL-SCNC: 5.3 MMOL/L (ref 3.5–5)
RBC # BLD: 2.49 M/UL (ref 4.2–5.4)
SODIUM BLD-SCNC: 135 MMOL/L (ref 136–145)
WBC # BLD: 7.8 K/UL (ref 4.8–10.8)

## 2019-12-08 PROCEDURE — 85027 COMPLETE CBC AUTOMATED: CPT

## 2019-12-08 PROCEDURE — 6360000002 HC RX W HCPCS: Performed by: SURGERY

## 2019-12-08 PROCEDURE — 36415 COLL VENOUS BLD VENIPUNCTURE: CPT

## 2019-12-08 PROCEDURE — 1210000000 HC MED SURG R&B

## 2019-12-08 PROCEDURE — 6370000000 HC RX 637 (ALT 250 FOR IP): Performed by: SURGERY

## 2019-12-08 PROCEDURE — 99024 POSTOP FOLLOW-UP VISIT: CPT | Performed by: SURGERY

## 2019-12-08 PROCEDURE — 6360000002 HC RX W HCPCS: Performed by: NURSE PRACTITIONER

## 2019-12-08 PROCEDURE — 2580000003 HC RX 258: Performed by: SURGERY

## 2019-12-08 PROCEDURE — 82948 REAGENT STRIP/BLOOD GLUCOSE: CPT

## 2019-12-08 PROCEDURE — 80048 BASIC METABOLIC PNL TOTAL CA: CPT

## 2019-12-08 PROCEDURE — 93010 ELECTROCARDIOGRAM REPORT: CPT | Performed by: INTERNAL MEDICINE

## 2019-12-08 RX ADMIN — ASPIRIN 81 MG: 81 TABLET, COATED ORAL at 09:10

## 2019-12-08 RX ADMIN — GABAPENTIN 300 MG: 300 CAPSULE ORAL at 14:41

## 2019-12-08 RX ADMIN — Medication 10 ML: at 09:14

## 2019-12-08 RX ADMIN — HYDROCODONE BITARTRATE AND ACETAMINOPHEN 1 TABLET: 5; 325 TABLET ORAL at 14:44

## 2019-12-08 RX ADMIN — ENOXAPARIN SODIUM 40 MG: 40 INJECTION SUBCUTANEOUS at 09:10

## 2019-12-08 RX ADMIN — ONDANSETRON 4 MG: 2 INJECTION INTRAMUSCULAR; INTRAVENOUS at 09:14

## 2019-12-08 RX ADMIN — GABAPENTIN 300 MG: 300 CAPSULE ORAL at 21:19

## 2019-12-08 RX ADMIN — LISINOPRIL 20 MG: 20 TABLET ORAL at 09:10

## 2019-12-08 RX ADMIN — CLOPIDOGREL BISULFATE 75 MG: 75 TABLET ORAL at 09:10

## 2019-12-08 RX ADMIN — HYDROCODONE BITARTRATE AND ACETAMINOPHEN 2 TABLET: 5; 325 TABLET ORAL at 19:15

## 2019-12-08 RX ADMIN — Medication 10 ML: at 21:19

## 2019-12-08 RX ADMIN — GABAPENTIN 300 MG: 300 CAPSULE ORAL at 09:10

## 2019-12-08 RX ADMIN — HYDROMORPHONE HYDROCHLORIDE 1 MG: 1 INJECTION, SOLUTION INTRAMUSCULAR; INTRAVENOUS; SUBCUTANEOUS at 09:14

## 2019-12-08 RX ADMIN — MELOXICAM 15 MG: 7.5 TABLET ORAL at 09:10

## 2019-12-08 RX ADMIN — ATORVASTATIN CALCIUM 20 MG: 20 TABLET, FILM COATED ORAL at 09:10

## 2019-12-08 ASSESSMENT — PAIN DESCRIPTION - PROGRESSION
CLINICAL_PROGRESSION: NOT CHANGED

## 2019-12-08 ASSESSMENT — PAIN SCALES - GENERAL
PAINLEVEL_OUTOF10: 4
PAINLEVEL_OUTOF10: 8
PAINLEVEL_OUTOF10: 4
PAINLEVEL_OUTOF10: 5
PAINLEVEL_OUTOF10: 8
PAINLEVEL_OUTOF10: 8
PAINLEVEL_OUTOF10: 4

## 2019-12-08 ASSESSMENT — PAIN SCALES - WONG BAKER
WONGBAKER_NUMERICALRESPONSE: 0

## 2019-12-08 ASSESSMENT — PAIN DESCRIPTION - ORIENTATION
ORIENTATION: RIGHT

## 2019-12-08 ASSESSMENT — PAIN DESCRIPTION - FREQUENCY
FREQUENCY: CONTINUOUS

## 2019-12-08 ASSESSMENT — PAIN DESCRIPTION - ONSET
ONSET: ON-GOING

## 2019-12-08 ASSESSMENT — PAIN DESCRIPTION - LOCATION
LOCATION: FOOT;LEG
LOCATION: LEG;FOOT
LOCATION: FOOT;LEG
LOCATION: FOOT

## 2019-12-08 ASSESSMENT — PAIN DESCRIPTION - DESCRIPTORS
DESCRIPTORS: THROBBING

## 2019-12-08 ASSESSMENT — PAIN DESCRIPTION - PAIN TYPE
TYPE: SURGICAL PAIN

## 2019-12-09 VITALS
TEMPERATURE: 98 F | WEIGHT: 154.38 LBS | DIASTOLIC BLOOD PRESSURE: 61 MMHG | BODY MASS INDEX: 29.15 KG/M2 | HEIGHT: 61 IN | SYSTOLIC BLOOD PRESSURE: 152 MMHG | OXYGEN SATURATION: 96 % | RESPIRATION RATE: 18 BRPM | HEART RATE: 85 BPM

## 2019-12-09 PROBLEM — I70.213 ATHEROSCLEROSIS OF NATIVE ARTERY OF BOTH LOWER EXTREMITIES WITH INTERMITTENT CLAUDICATION (HCC): Chronic | Status: ACTIVE | Noted: 2019-10-15

## 2019-12-09 LAB
GLUCOSE BLD-MCNC: 176 MG/DL (ref 70–99)
GLUCOSE BLD-MCNC: 348 MG/DL (ref 70–99)
HCT VFR BLD CALC: 25.6 % (ref 37–47)
HEMOGLOBIN: 7.7 G/DL (ref 12–16)
MCH RBC QN AUTO: 31 PG (ref 27–31)
MCHC RBC AUTO-ENTMCNC: 30.1 G/DL (ref 33–37)
MCV RBC AUTO: 103.2 FL (ref 81–99)
PDW BLD-RTO: 12.1 % (ref 11.5–14.5)
PERFORMED ON: ABNORMAL
PERFORMED ON: ABNORMAL
PLATELET # BLD: 257 K/UL (ref 130–400)
PMV BLD AUTO: 9.6 FL (ref 9.4–12.3)
RBC # BLD: 2.48 M/UL (ref 4.2–5.4)
WBC # BLD: 7.5 K/UL (ref 4.8–10.8)

## 2019-12-09 PROCEDURE — 6370000000 HC RX 637 (ALT 250 FOR IP): Performed by: SURGERY

## 2019-12-09 PROCEDURE — 85027 COMPLETE CBC AUTOMATED: CPT

## 2019-12-09 PROCEDURE — 0JQN0ZZ REPAIR RIGHT LOWER LEG SUBCUTANEOUS TISSUE AND FASCIA, OPEN APPROACH: ICD-10-PCS | Performed by: SURGERY

## 2019-12-09 PROCEDURE — 2500000003 HC RX 250 WO HCPCS: Performed by: SURGERY

## 2019-12-09 PROCEDURE — 6360000002 HC RX W HCPCS: Performed by: SURGERY

## 2019-12-09 PROCEDURE — 13160 SEC CLSR SURG WND/DEHSN XTN: CPT | Performed by: SURGERY

## 2019-12-09 PROCEDURE — 99152 MOD SED SAME PHYS/QHP 5/>YRS: CPT | Performed by: SURGERY

## 2019-12-09 PROCEDURE — 82948 REAGENT STRIP/BLOOD GLUCOSE: CPT

## 2019-12-09 PROCEDURE — 2580000003 HC RX 258: Performed by: SURGERY

## 2019-12-09 PROCEDURE — 36415 COLL VENOUS BLD VENIPUNCTURE: CPT

## 2019-12-09 RX ORDER — HYDROCODONE BITARTRATE AND ACETAMINOPHEN 10; 325 MG/1; MG/1
1 TABLET ORAL EVERY 8 HOURS PRN
Qty: 3 TABLET | Refills: 0 | Status: SHIPPED | OUTPATIENT
Start: 2019-12-09 | End: 2019-12-12

## 2019-12-09 RX ORDER — FENTANYL CITRATE 50 UG/ML
INJECTION, SOLUTION INTRAMUSCULAR; INTRAVENOUS
Status: COMPLETED | OUTPATIENT
Start: 2019-12-09 | End: 2019-12-09

## 2019-12-09 RX ORDER — MIDAZOLAM HYDROCHLORIDE 1 MG/ML
INJECTION INTRAMUSCULAR; INTRAVENOUS
Status: COMPLETED | OUTPATIENT
Start: 2019-12-09 | End: 2019-12-09

## 2019-12-09 RX ORDER — LIDOCAINE HYDROCHLORIDE 20 MG/ML
INJECTION, SOLUTION INFILTRATION; PERINEURAL
Status: COMPLETED | OUTPATIENT
Start: 2019-12-09 | End: 2019-12-09

## 2019-12-09 RX ADMIN — CLOPIDOGREL BISULFATE 75 MG: 75 TABLET ORAL at 08:35

## 2019-12-09 RX ADMIN — ASPIRIN 81 MG: 81 TABLET, COATED ORAL at 08:35

## 2019-12-09 RX ADMIN — FENTANYL CITRATE 25 MCG: 50 INJECTION INTRAMUSCULAR; INTRAVENOUS at 07:23

## 2019-12-09 RX ADMIN — GABAPENTIN 300 MG: 300 CAPSULE ORAL at 08:35

## 2019-12-09 RX ADMIN — LIDOCAINE HYDROCHLORIDE 20 ML: 20 INJECTION, SOLUTION INFILTRATION; PERINEURAL at 07:17

## 2019-12-09 RX ADMIN — VANCOMYCIN HYDROCHLORIDE 1000 MG: 10 INJECTION, POWDER, LYOPHILIZED, FOR SOLUTION INTRAVENOUS at 12:40

## 2019-12-09 RX ADMIN — FENTANYL CITRATE 25 MCG: 50 INJECTION INTRAMUSCULAR; INTRAVENOUS at 07:08

## 2019-12-09 RX ADMIN — ATORVASTATIN CALCIUM 20 MG: 20 TABLET, FILM COATED ORAL at 08:35

## 2019-12-09 RX ADMIN — MIDAZOLAM 0.5 MG: 1 INJECTION INTRAMUSCULAR; INTRAVENOUS at 07:23

## 2019-12-09 RX ADMIN — MIDAZOLAM 0.5 MG: 1 INJECTION INTRAMUSCULAR; INTRAVENOUS at 07:08

## 2019-12-09 RX ADMIN — LISINOPRIL 20 MG: 20 TABLET ORAL at 08:35

## 2019-12-09 RX ADMIN — MIDAZOLAM 0.5 MG: 1 INJECTION INTRAMUSCULAR; INTRAVENOUS at 07:18

## 2019-12-09 RX ADMIN — MELOXICAM 15 MG: 7.5 TABLET ORAL at 08:34

## 2019-12-09 RX ADMIN — FENTANYL CITRATE 25 MCG: 50 INJECTION INTRAMUSCULAR; INTRAVENOUS at 07:18

## 2019-12-09 RX ADMIN — Medication 10 ML: at 08:35

## 2019-12-09 ASSESSMENT — PAIN SCALES - GENERAL: PAINLEVEL_OUTOF10: 2

## 2019-12-19 ENCOUNTER — OFFICE VISIT (OUTPATIENT)
Dept: VASCULAR SURGERY | Age: 79
End: 2019-12-19

## 2019-12-19 VITALS
SYSTOLIC BLOOD PRESSURE: 160 MMHG | TEMPERATURE: 98.6 F | DIASTOLIC BLOOD PRESSURE: 70 MMHG | RESPIRATION RATE: 18 BRPM | HEART RATE: 79 BPM

## 2019-12-19 DIAGNOSIS — Z09 POSTOP CHECK: Primary | ICD-10-CM

## 2019-12-19 PROCEDURE — 99024 POSTOP FOLLOW-UP VISIT: CPT | Performed by: PHYSICIAN ASSISTANT

## 2019-12-26 ENCOUNTER — OFFICE VISIT (OUTPATIENT)
Dept: VASCULAR SURGERY | Age: 79
End: 2019-12-26

## 2019-12-26 ENCOUNTER — HOSPITAL ENCOUNTER (OUTPATIENT)
Dept: VASCULAR LAB | Age: 79
Discharge: HOME | End: 2019-12-26
Payer: COMMERCIAL

## 2019-12-26 VITALS — SYSTOLIC BLOOD PRESSURE: 144 MMHG | HEART RATE: 76 BPM | DIASTOLIC BLOOD PRESSURE: 62 MMHG | TEMPERATURE: 98.4 F

## 2019-12-26 DIAGNOSIS — I70.213 ATHEROSCLEROSIS OF NATIVE ARTERY OF BOTH LOWER EXTREMITIES WITH INTERMITTENT CLAUDICATION (HCC): ICD-10-CM

## 2019-12-26 DIAGNOSIS — I70.213 ATHEROSCLEROSIS OF NATIVE ARTERY OF BOTH LOWER EXTREMITIES WITH INTERMITTENT CLAUDICATION (HCC): Primary | ICD-10-CM

## 2019-12-26 PROCEDURE — 99024 POSTOP FOLLOW-UP VISIT: CPT | Performed by: NURSE PRACTITIONER

## 2019-12-26 PROCEDURE — 93922 UPR/L XTREMITY ART 2 LEVELS: CPT

## 2019-12-26 RX ORDER — HYDROCODONE BITARTRATE AND ACETAMINOPHEN 10; 325 MG/1; MG/1
1 TABLET ORAL EVERY 6 HOURS PRN
COMMUNITY

## 2019-12-27 ENCOUNTER — TELEPHONE (OUTPATIENT)
Dept: VASCULAR SURGERY | Age: 79
End: 2019-12-27

## 2020-01-15 ENCOUNTER — OFFICE VISIT (OUTPATIENT)
Dept: VASCULAR SURGERY | Age: 80
End: 2020-01-15

## 2020-01-15 VITALS
OXYGEN SATURATION: 98 % | HEART RATE: 75 BPM | DIASTOLIC BLOOD PRESSURE: 70 MMHG | RESPIRATION RATE: 18 BRPM | TEMPERATURE: 98 F | SYSTOLIC BLOOD PRESSURE: 170 MMHG

## 2020-01-15 PROCEDURE — 99024 POSTOP FOLLOW-UP VISIT: CPT | Performed by: PHYSICIAN ASSISTANT

## 2020-01-15 RX ORDER — CILOSTAZOL 50 MG/1
50 TABLET ORAL 2 TIMES DAILY
Qty: 60 TABLET | Refills: 3 | Status: SHIPPED | OUTPATIENT
Start: 2020-01-15 | End: 2020-08-17 | Stop reason: SDUPTHER

## 2020-01-16 ENCOUNTER — HOSPITAL ENCOUNTER (OUTPATIENT)
Dept: WOUND CARE | Age: 80
Discharge: HOME | End: 2020-01-16
Payer: COMMERCIAL

## 2020-01-16 VITALS
DIASTOLIC BLOOD PRESSURE: 63 MMHG | TEMPERATURE: 98.8 F | WEIGHT: 152 LBS | SYSTOLIC BLOOD PRESSURE: 182 MMHG | RESPIRATION RATE: 18 BRPM | HEART RATE: 88 BPM | HEIGHT: 61 IN | BODY MASS INDEX: 28.7 KG/M2

## 2020-01-16 PROBLEM — L97.912 NONHEALING ULCER OF RIGHT LOWER LEG WITH FAT LAYER EXPOSED (HCC): Status: ACTIVE | Noted: 2020-01-16

## 2020-01-16 PROCEDURE — 97597 DBRDMT OPN WND 1ST 20 CM/<: CPT

## 2020-01-16 PROCEDURE — 99214 OFFICE O/P EST MOD 30 MIN: CPT

## 2020-01-16 PROCEDURE — 97597 DBRDMT OPN WND 1ST 20 CM/<: CPT | Performed by: SURGERY

## 2020-01-16 ASSESSMENT — PAIN DESCRIPTION - ONSET: ONSET: ON-GOING

## 2020-01-16 ASSESSMENT — PAIN DESCRIPTION - PROGRESSION: CLINICAL_PROGRESSION: NOT CHANGED

## 2020-01-16 ASSESSMENT — PAIN DESCRIPTION - FREQUENCY: FREQUENCY: INTERMITTENT

## 2020-01-16 ASSESSMENT — PAIN DESCRIPTION - PAIN TYPE: TYPE: ACUTE PAIN

## 2020-01-16 ASSESSMENT — PAIN DESCRIPTION - LOCATION: LOCATION: LEG

## 2020-01-16 ASSESSMENT — PAIN SCALES - GENERAL: PAINLEVEL_OUTOF10: 8

## 2020-01-16 ASSESSMENT — PAIN DESCRIPTION - ORIENTATION: ORIENTATION: RIGHT

## 2020-01-16 NOTE — PLAN OF CARE
01/16/20 0914   Right Leg Edema Point of Measurement   Great toe to forefoot 10 cm   Heel to ankle 10 cm   Heel to calf 32   Leg circumference 36.2 cm   Ankle circumference 21.4 cm   Foot circumference 21 cm   Left Leg Edema Point of Measurement   Great toe to forefoot 10 cm   Heel to ankle 10 cm   Heel to calf 32   Leg circumference 36 cm   Ankle circumference 21 cm   Foot circumference 21 cm   Peripheral Vascular   Peripheral Vascular (WDL) X   Edema Right lower extremity   RLE Edema Trace   Sensation RLE Decreased   Sensation LLE Decreased   RLE Neurovascular Assessment   Capillary Refill Less than/equal to 3 seconds   Color Appropriate for ethnicity   Temperature Warm   R Popliteal Pulse Doppler   R Post Tibial Pulse Doppler   R Pedal Pulse Doppler   R Calf Tenderness  Negative   Claudication Assessment None   LLE Neurovascular Assessment   Capillary Refill Less than/equal to 3 seconds   Color Appropriate for ethnicity   Temperature Warm   L Popliteal Pulse Doppler   L Post Tibial Pulse Doppler   L Pedal Pulse Doppler   L Calf Tenderness Negative   Claudication Assessment None   Foot Assessment   Foot Assessment X   Left Foot Assessment   Other Deformity N   Prior Foot Ulcer N   Charcot Joint N   Prior Amputation N   Right Foot Assessment   Other Deformity N   Prior Foot Ulcer N   Charcot Joint N   Prior Amputation N   Toe Nail Assessment   Toe Nail Assessment X   Left Nail Assessment   Thick Y   Discolored Y   Deformed Y   Improper Length and Hygiene Y   Right Nail Assessment   Thick Y   Discolored Y   Deformed Y   Improper Length and Hygiene Y   Wound 01/16/20 Leg Right; Lower;Medial Wound 1. Right lower medial leg   Date First Assessed/Time First Assessed: 01/16/20 0922   Present on Hospital Admission: Yes  Wound Approximate Age at First Assessment (Weeks): 1 weeks  Primary Wound Type: Surgical Type  Location: Leg  Wound Location Orientation: Right; Lower;Medial  ...    Wound Image    Wound Non-Healing Surgical   Dressing Status Old drainage   Wound Cleansed Rinsed/Irrigated with saline   Wound Length (cm) 11.5 cm   Wound Width (cm) 0.5 cm   Wound Depth (cm) 0.8 cm   Wound Surface Area (cm^2) 5.75 cm^2   Wound Volume (cm^3) 4.6 cm^3   Wound Assessment Slough;Tan;Yellow   Drainage Amount Moderate   Drainage Description Serous; Yellow; Tan   Odor Mild   Yellow%Wound Bed 100

## 2020-01-16 NOTE — PROGRESS NOTES
Patient Care Team:  Mamta Boston as PCP - General ([address] [place] [address])  Melrose Felty, DO as Consulting Physician (Vascular Surgery)    TODAY'S DATE:  1/16/2020     HISTORY of PRESENT ILLNESS HPI   Ratna Mcallister is a 70 y.o. female who presents today for wound evaluation. Wound Type:non-healing surgical  Wound Location:right leg medial ulcer at the previous fasciotomy incision  Modifying factors:shear force and arterial insufficiency    Patient Active Problem List   Diagnosis Code    Left foot pain M79.672    Decreased pedal pulses R09.89    Chronic osteoarthritis M19.90    Left ear pain H92.02    Atherosclerosis of native artery of both lower extremities with intermittent claudication ([facility]) I70.213    Compartment syndrome of lower leg ([facility]) T79. A29A    Nonhealing ulcer of right lower leg with fat layer exposed ([address] L97.912       She reports she developed a wound on right leg. Patient had fasciotomy and then closure. She was healing well until recently noticed ulcerations. She believes this is not healing. She has been applying nothing. She has not had  fever or chills. She has a history of peripheral vascular disease. Ratna Mcallisetr is a 70 y.o. female with the following history reviewed and [facility] in [facility]:  Patient Active Problem List    Diagnosis Date Noted    Nonhealing ulcer of right lower leg with fat layer exposed ([address] 01/16/2020    Compartment syndrome of lower leg ([address] 12/06/2019    Atherosclerosis of native artery of both lower extremities with intermittent claudication ([address] 10/15/2019    Left foot pain 10/11/2019    Decreased pedal pulses 10/11/2019    Chronic osteoarthritis 10/11/2019    Left ear pain 10/11/2019     Current Outpatient Medications   Medication Sig Dispense Refill    collagenase (SANTYL) 250 UNIT/GM ointment Apply topically twice daily.  1 Tube 3    cilostazol (PLETAL) 50 MG tablet Take 1 tablet by mouth 2 times daily 60 tablet 3    HYDROcodone-acetaminophen (NORCO)  MG per tablet Take 1 tablet by mouth every 6 hours as needed for Pain.  gabapentin (NEURONTIN) 300 MG capsule Take 1 capsule by mouth 3 times daily for 30 days. Verbal order given to lashanda Carroll with pharmacist [facility]. OB 90 capsule 2    aspirin EC 81 MG EC tablet Take 1 tablet by mouth daily 90 tablet 1    clopidogrel (PLAVIX) 75 MG tablet Take 1 tablet by mouth daily 30 tablet 5    atorvastatin (LIPITOR) 20 MG tablet Take 20 mg by mouth nightly       lisinopril (PRINIVIL;ZESTRIL) 20 MG tablet Take 20 mg by mouth daily       meloxicam (MOBIC) 15 MG tablet Take 15 mg by mouth daily       metFORMIN (GLUCOPHAGE) 500 MG tablet daily (with breakfast)       neomycin-polymyxin-hydrocortisone (CORTISPORIN) 3.5-02492-2 otic suspension Place 3 drops into the left ear as needed       nitroglycerin (NITRO-BID) 2 % ointment Use 0.5 in daily to R dorsal foot 1 each 1     Current Facility-Administered Medications   Medication Dose Route Frequency Provider Last Rate Last Dose    lidocaine (XYLOCAINE) 2 % jelly 1 Dose  1 Dose Topical Once Nieves Becerril DO         Allergies: Codeine  Past Medical History:   Diagnosis Date    Atherosclerosis of both lower extremities with intermittent claudication (HCC)     Chronic osteoarthritis     DM2 (diabetes mellitus, type 2) (HCC)     Hyperlipidemia     Hypertension     Left ear pain     Left foot pain     Peripheral vascular disease (HCC)        Past Surgical History:   Procedure Laterality Date    ANTERIOR COMPARTMENT DECOMPRESSION N/A 12/6/2019    FASCIOTOMY LEG performed by Nieves Becerril DO at [facility] Bilateral     CHOLECYSTECTOMY      KNEE SURGERY Left     Age 8   [de-identified] CUFF REPAIR Right     VASCULAR SURGERY  11/15/2019    VI. Right common femoral us guided access, aortogram with bilateral lower extremity runoff. Left SFA angioplasty.     VASCULAR SURGERY  12/09/2019 external ear canal appears normal.  Left external ear canal appears normal.  Septum appears midline. Eyes - conjunctiva normal.  EOMS normal.  No exudate. No icterus. Neck- ROM appears normal, no tracheal deviation. Cardiovascular - Regular rate and rhythm. Heart sounds are normal.  No murmur, rub, or gallop. Carotid pulses are 2+ to palpation bilaterally without bruit. Extremities - Radial and brachial pulses are 2+ to palpation bilaterally. No cyanosis, clubbing, or significant edema. No signs atheroembolic event. Right foot sensitive to palpation. Pulmonary - effort appears normal.  No respiratory distress. Lungs - Breath sounds normal. No wheezes or rales. GI - Abdomen - soft, non tender, bowel sounds X 4 quadrants. No guarding or rebound tenderness. No distension or palpable mass. Genitourinary - deferred. Musculoskeletal - ROM appears normal.  No significant edema. Neurologic - alert and oriented X 3. Physiologic. Psychiatric - mood, affect, and behavior appear normal.  Judgment and thought processes appear normal.  Skin - medial calf ulcer    Incision 12/06/19 Pretibial Right; Inner (Active)   Number of days: 41     Post Debridement Measurements and Assessment:  Wound 01/16/20 Leg Right; Lower;Medial Wound 1.   Right lower medial leg (Active)   Wound Image   1/16/2020  9:14 AM   Wound Non-Healing Surgical 1/16/2020  9:14 AM   Dressing Status Old drainage 1/16/2020  9:14 AM   Dressing Changed Changed/New 1/16/2020 10:30 AM   Wound Cleansed Rinsed/Irrigated with saline 1/16/2020  9:14 AM   Wound Length (cm) 11.5 cm 1/16/2020  9:14 AM   Wound Width (cm) 0.5 cm 1/16/2020  9:14 AM   Wound Depth (cm) 0.8 cm 1/16/2020  9:14 AM   Wound Surface Area (cm^2) 5.75 cm^2 1/16/2020  9:14 AM   Wound Volume (cm^3) 4.6 cm^3 1/16/2020  9:14 AM   Post-Procedure Length (cm) 11.5 cm 1/16/2020  9:50 AM   Post-Procedure Width (cm) 0.5 cm 1/16/2020  9:50 AM   Post-Procedure Depth (cm) 0.8 cm 1/16/2020  9:50 AM follow-up in the wound care and vascular office.

## 2020-01-23 ENCOUNTER — HOSPITAL ENCOUNTER (OUTPATIENT)
Dept: WOUND CARE | Age: 80
Discharge: HOME | End: 2020-01-23
Payer: COMMERCIAL

## 2020-01-23 VITALS
HEIGHT: 61 IN | DIASTOLIC BLOOD PRESSURE: 70 MMHG | SYSTOLIC BLOOD PRESSURE: 196 MMHG | BODY MASS INDEX: 28.7 KG/M2 | WEIGHT: 152 LBS | RESPIRATION RATE: 16 BRPM | TEMPERATURE: 97.9 F | HEART RATE: 82 BPM

## 2020-01-23 PROCEDURE — 97597 DBRDMT OPN WND 1ST 20 CM/<: CPT | Performed by: SURGERY

## 2020-01-23 PROCEDURE — 97597 DBRDMT OPN WND 1ST 20 CM/<: CPT

## 2020-01-23 ASSESSMENT — PAIN DESCRIPTION - LOCATION: LOCATION: LEG

## 2020-01-23 ASSESSMENT — PAIN DESCRIPTION - DESCRIPTORS: DESCRIPTORS: SQUEEZING;TINGLING

## 2020-01-23 ASSESSMENT — PAIN SCALES - GENERAL: PAINLEVEL_OUTOF10: 10

## 2020-01-23 ASSESSMENT — PAIN DESCRIPTION - PAIN TYPE: TYPE: ACUTE PAIN

## 2020-01-23 ASSESSMENT — PAIN DESCRIPTION - PROGRESSION: CLINICAL_PROGRESSION: NOT CHANGED

## 2020-01-23 ASSESSMENT — PAIN DESCRIPTION - ORIENTATION: ORIENTATION: RIGHT

## 2020-01-23 ASSESSMENT — PAIN DESCRIPTION - ONSET: ONSET: ON-GOING

## 2020-01-23 ASSESSMENT — PAIN DESCRIPTION - FREQUENCY: FREQUENCY: INTERMITTENT

## 2020-01-23 NOTE — PLAN OF CARE
Problem: Wound:  Goal: Will show signs of wound healing; wound closure and no evidence of infection  Description  Will show signs of wound healing; wound closure and no evidence of infection  Outcome: Ongoing     Problem: Arterial:  Goal: Optimize blood flow for wound healing  Description  Optimize blood flow for wound healing  Outcome: Ongoing

## 2020-01-30 NOTE — PROGRESS NOTES
[address] [address]   Progress Note and Procedure Note      Zacarias Fitch  AGE: 70 y.o. GENDER: female  : 1940  TODAY'S DATE:  2020    Subjective:        HISTORY of PRESENT ILLNESS HPI   Zacarias Fitch is a 70 y.o. female who presents today for wound evaluation. Wound Type:non-healing surgical  Wound Location:right leg  Modifying factors:shear force    Patient Active Problem List   Diagnosis Code    Left foot pain M79.672    Decreased pedal pulses R09.89    Chronic osteoarthritis M19.90    Left ear pain H92.02    Atherosclerosis of native artery of both lower extremities with intermittent claudication (HCC) I70.213    Compartment syndrome of lower leg (HCC) T79. A29A    Nonhealing ulcer of right lower leg with fat layer exposed ([facility] L97.912       ALLERGIES    Allergies   Allergen Reactions    Codeine        Incision 19 Pretibial Right; Inner (Active)   Number of days: 55       Objective:      BP (!) 196/70   Pulse 82   Temp 97.9 °F (36.6 °C) (Temporal)   Resp 16   Ht 5' 1\" (1.549 m)   Wt 152 lb (68.9 kg)   BMI 28.72 kg/m²     Post Debridement Measurements and Assessment:    Wound 20 Leg Right; Lower;Medial Wound 1.   Right lower medial leg (Active)   Wound Image   2020 11:02 AM   Wound Non-Healing Surgical 2020 11:02 AM   Dressing Status Old drainage 2020 11:02 AM   Dressing Changed Changed/New 2020 11:30 AM   Wound Cleansed Rinsed/Irrigated with saline 2020 11:02 AM   Wound Length (cm) 11 cm 2020 11:02 AM   Wound Width (cm) 0.4 cm 2020 11:02 AM   Wound Depth (cm) 0.8 cm 2020 11:02 AM   Wound Surface Area (cm^2) 4.4 cm^2 2020 11:02 AM   Change in Wound Size % (l*w) 23.48 2020 11:02 AM   Wound Volume (cm^3) 3.52 cm^3 2020 11:02 AM   Wound Healing % 23 2020 11:02 AM   Post-Procedure Length (cm) 11.5 cm 2020  9:50 AM   Post-Procedure Width (cm) 0.5 cm 2020  9:50 AM   Post-Procedure Depth (cm) 0.8 cm 1/16/2020  9:50 AM   Post-Procedure Surface Area (cm^2) 5.75 cm^2 1/16/2020  9:50 AM   Post-Procedure Volume (cm^3) 4.6 cm^3 1/16/2020  9:50 AM   Distance Tunneling (cm) 0 cm 1/23/2020 11:02 AM   Tunneling Position ___ O'Clock 0 1/23/2020 11:02 AM   Undermining Starts ___ O'Clock 0 1/23/2020 11:02 AM   Undermining Ends___ O'Clock 0 1/23/2020 11:02 AM   Undermining Maxium Distance (cm) 0 1/23/2020 11:02 AM   Wound Assessment Yellow;Red;Slough; Swelling 1/23/2020 11:02 AM   Drainage Amount Small 1/23/2020 11:02 AM   Drainage Description Serous; Yellow; Tan 1/23/2020 11:02 AM   Odor None 1/23/2020 11:02 AM   Margins Undefined edges 1/23/2020 11:02 AM   Marielle-wound Assessment Pink 1/23/2020 11:02 AM   Non-staged Wound Description Full thickness 1/23/2020 11:02 AM   [place]%Wound Bed 0 1/23/2020 11:02 AM   Red%Wound Bed 0 1/23/2020 11:02 AM   Yellow%Wound Bed 100 1/23/2020 11:02 AM   Black%Wound Bed 0 1/23/2020 11:02 AM   Purple%Wound Bed 0 1/23/2020 11:02 AM   Other%Wound Bed 0 1/23/2020 11:02 AM   Number of days: 14           The patients pain isPain Level: 10 Pain Type: Acute pain. Wound(s) has improved. Please refer to nursing measurements and assessment regarding wound pre and post debridement. Procedure Note:    Wound #: 1     Debridement: Non-excisional Debridement    Anesthetic:    Using curette the wound was sharply debrided    down through and including the removal of fibrin. Total Surface Area Debrided:  4.4 sq cm   Devitalized Tissue Debrided:  fibrin    Percent of Wound Debrided: 100%      Bleeding: Minimal    Hemostasis:   not needed      Response to treatment:  Well tolerated by patient.       Assessment:      Patient Active Problem List   Diagnosis    Left foot pain    Decreased pedal pulses    Chronic osteoarthritis    Left ear pain    Atherosclerosis of native artery of both lower extremities with intermittent claudication (HCC)    Compartment syndrome of lower leg (HCC)    Nonhealing

## 2020-02-03 NOTE — PROGRESS NOTES
Patient Care Team:  Lit Pedroza as PCP - General (Family Medicine)  [facility],  as Consulting Physician (Vascular Surgery)    Ms. Argelia Astudillo is a 70 y.o. female who presents for post op evaluation. Patient had a right common femoral US guided access. Aortogram with bilateral lower extremity runoff. Left SFA angioplasty on 11/15/19, Left common femoral ultrasound-guided access. Bilateral lower extremity runoff. Right common femoral angioplasty with 5 x 150 balloon and followed by 6 x 220 drug-coated balloon. Balloon angioplasty of TPT trunk and posterior tibial extravasation site with 3 mm x 100 mm Austyn balloon. Balloon angioplasty of popliteal artery with 4 x 80 mm drug-coated balloon 12/6/19, fasciotomy closure measurement length 14x 3 width x 1 on 12/9/19 by Dr. Gabriel Silva. She has some mild right LE swelling. She reports that this is essentially unchanged. She reports that her right foot hurts, feels like it's asleep and tingles. She denies any problems with the left foot or leg. On evaluation today, incision is RLE is intact with several small scattered areas of scabbing noted. There is a small amount of serosanginous drainage noted from the  area. Bilateral DP, PT and peroneal with 2+ DS. Patient has sensation and mobility noted in feet. Her right toes are cooler than the left, remaing feet warm bilaterally. (patient was seen and evaluated by Dr. Gabriel Silva)    Today we have recommended she wash incision daily with soap and water and cover with dry gauze, we will have her use ace wrap with moderation compression. We have recommended continued ASA 81 mg po qd, clopidogrel 75 mg po qd daily. We will start Pletal 50 mg BID. We will have her follow up in Dr. Gabriel Silva wound care clinic tomorrow. This should bring you up to date on Brandi Bellog  As always we want to thank you for allowing us to participate in the care of your patients.     Sincerely,    Henry Reyes PA-C

## 2020-02-06 ENCOUNTER — HOSPITAL ENCOUNTER (OUTPATIENT)
Dept: WOUND CARE | Age: 80
Discharge: HOME | End: 2020-02-06
Payer: COMMERCIAL

## 2020-02-06 VITALS
TEMPERATURE: 98.2 F | RESPIRATION RATE: 20 BRPM | HEART RATE: 93 BPM | DIASTOLIC BLOOD PRESSURE: 69 MMHG | WEIGHT: 151 LBS | BODY MASS INDEX: 28.51 KG/M2 | HEIGHT: 61 IN | SYSTOLIC BLOOD PRESSURE: 183 MMHG

## 2020-02-06 PROCEDURE — 97597 DBRDMT OPN WND 1ST 20 CM/<: CPT | Performed by: SURGERY

## 2020-02-06 PROCEDURE — 97597 DBRDMT OPN WND 1ST 20 CM/<: CPT

## 2020-02-06 RX ORDER — ACETAMINOPHEN 325 MG/1
650 TABLET ORAL EVERY 6 HOURS PRN
COMMUNITY

## 2020-02-06 ASSESSMENT — PAIN DESCRIPTION - ORIENTATION: ORIENTATION: RIGHT;LEFT

## 2020-02-06 ASSESSMENT — PAIN DESCRIPTION - LOCATION: LOCATION: FOOT

## 2020-02-06 ASSESSMENT — PAIN DESCRIPTION - FREQUENCY: FREQUENCY: CONTINUOUS

## 2020-02-06 ASSESSMENT — PAIN DESCRIPTION - PAIN TYPE: TYPE: CHRONIC PAIN

## 2020-02-06 ASSESSMENT — PAIN DESCRIPTION - DESCRIPTORS: DESCRIPTORS: TINGLING;BURNING

## 2020-02-06 ASSESSMENT — PAIN SCALES - GENERAL: PAINLEVEL_OUTOF10: 6

## 2020-02-06 NOTE — PLAN OF CARE
Problem: Pain:  Goal: Pain level will decrease  Description  Pain level will decrease  Outcome: Ongoing  Goal: Control of acute pain  Description  Control of acute pain  Outcome: Ongoing  Goal: Control of chronic pain  Description  Control of chronic pain  Outcome: Ongoing     Problem: Wound:  Goal: Will show signs of wound healing; wound closure and no evidence of infection  Description  Will show signs of wound healing; wound closure and no evidence of infection  Outcome: Ongoing     Problem: Arterial:  Goal: Optimize blood flow for wound healing  Description  Optimize blood flow for wound healing  Outcome: Ongoing

## 2020-02-06 NOTE — PROGRESS NOTES
[facility] [address]   Progress Note and Procedure Note      Jarrett Rojas  AGE: 70 y.o. GENDER: female  : 1940  TODAY'S DATE:  2020    Subjective:        HISTORY of PRESENT ILLNESS HPI   Jarrett Rojas is a 70 y.o. female who presents today for wound evaluation. Wound Type:non-healing surgical  Wound Location:right leg  Modifying factors:lymph leak    Patient Active Problem List   Diagnosis Code    Left foot pain M79.672    Decreased pedal pulses R09.89    Chronic osteoarthritis M19.90    Left ear pain H92.02    Atherosclerosis of native artery of both lower extremities with intermittent claudication (HCC) I70.213    Compartment syndrome of lower leg (HCC) T79. A29A    Nonhealing ulcer of right lower leg with fat layer exposed ([facility] L97.912       ALLERGIES    Allergies   Allergen Reactions    Codeine        Incision 19 Pretibial Right; Inner (Active)   Number of days: 62       Objective:      BP (!) 183/69   Pulse 93   Temp 98.2 °F (36.8 °C) (Temporal)   Resp 20   Ht 5' 1\" (1.549 m)   Wt 151 lb (68.5 kg)   BMI 28.53 kg/m²     Post Debridement Measurements and Assessment:    Wound 20 Leg Right; Lower;Medial Wound 1.   Right lower medial leg (Active)   Wound Image    2020 11:06 AM   Wound Non-Healing Surgical 2020 11:06 AM   Dressing Status Old drainage 2020 11:06 AM   Dressing Changed Changed/New 2020 11:30 AM   Wound Cleansed Soap and water 2020 11:06 AM   Wound Length (cm) 6 cm 2020 11:06 AM   Wound Width (cm) 0.4 cm 2020 11:06 AM   Wound Depth (cm) 0.7 cm 2020 11:06 AM   Wound Surface Area (cm^2) 2.4 cm^2 2020 11:06 AM   Change in Wound Size % (l*w) 58.26 2020 11:06 AM   Wound Volume (cm^3) 1.68 cm^3 2020 11:06 AM   Wound Healing % 63 2020 11:06 AM   Post-Procedure Length (cm) 6 cm 2020 12:16 PM   Post-Procedure Width (cm) 0.4 cm 2020 12:16 PM   Post-Procedure Depth (cm) 0.7 cm 2020 12:16 PM Post-Procedure Surface Area (cm^2) 2.4 cm^2 2/6/2020 12:16 PM   Post-Procedure Volume (cm^3) 1.68 cm^3 2/6/2020 12:16 PM   Distance Tunneling (cm) 0 cm 2/6/2020 11:06 AM   Tunneling Position ___ O'Clock 0 2/6/2020 11:06 AM   Undermining Starts ___ O'Clock 0 2/6/2020 11:06 AM   Undermining Ends___ O'Clock 0 2/6/2020 11:06 AM   Undermining Maxium Distance (cm) 0 2/6/2020 11:06 AM   Wound Assessment Slough; Yellow;Pink;Drainage 2/6/2020 11:06 AM   Drainage Amount Small 2/6/2020 11:06 AM   Drainage Description Yellow 2/6/2020 11:06 AM   Odor None 2/6/2020 11:06 AM   Margins Attached edges 2/6/2020 11:06 AM   Marielle-wound Assessment Pink 2/6/2020 11:06 AM   Non-staged Wound Description Full thickness 2/6/2020 11:06 AM   Pink%Wound Bed 10 2/6/2020 11:06 AM   Red%Wound Bed 0 2/6/2020 11:06 AM   Yellow%Wound Bed 90 2/6/2020 11:06 AM   Black%Wound Bed 0 2/6/2020 11:06 AM   Purple%Wound Bed 0 2/6/2020 11:06 AM   Other%Wound Bed 0 2/6/2020 11:06 AM   Number of days: 21           The patients pain isPain Level: 6 Pain Type: Chronic pain. Wound(s) has improved. Please refer to nursing measurements and assessment regarding wound pre and post debridement. Procedure Note:    Wound #: 1     Debridement: Non-excisional Debridement    Anesthetic: Anesthetic: 2% Lidocaine Gel Topical  Using curette the wound was sharply debrided    down through and including the removal of fibrin. Total Surface Area Debrided:  2.4 sq cm   Devitalized Tissue Debrided:  fibrin    Percent of Wound Debrided: 90%      Bleeding: Minimal    Hemostasis:   not needed      Response to treatment:  Well tolerated by patient.       Assessment:      Patient Active Problem List   Diagnosis    Left foot pain    Decreased pedal pulses    Chronic osteoarthritis    Left ear pain    Atherosclerosis of native artery of both lower extremities with intermittent claudication (HCC)    Compartment syndrome of lower leg (HCC)    Nonhealing ulcer of right lower leg with fat layer exposed ([facility]          Plan:          Plan for wound - Dress per physician order  Treatment:     Compression : No   Offloading : No   Dressing : Santyl   Additional Therapy : none     1. Discussed appropriate home care of this wound. Wound redressed. 2. Patient instructions were given. 3. Follow up: 2 week(s).                            Electronically signed by aMya Mayfield DO on 2/6/2020 at 1:37 PM

## 2020-02-20 ENCOUNTER — TELEPHONE (OUTPATIENT)
Dept: VASCULAR SURGERY | Age: 80
End: 2020-02-20

## 2020-02-20 ENCOUNTER — HOSPITAL ENCOUNTER (OUTPATIENT)
Dept: WOUND CARE | Age: 80
Discharge: HOME | End: 2020-02-20
Payer: COMMERCIAL

## 2020-02-20 VITALS
WEIGHT: 151 LBS | RESPIRATION RATE: 18 BRPM | TEMPERATURE: 98.4 F | BODY MASS INDEX: 28.51 KG/M2 | DIASTOLIC BLOOD PRESSURE: 65 MMHG | SYSTOLIC BLOOD PRESSURE: 148 MMHG | HEART RATE: 96 BPM | HEIGHT: 61 IN

## 2020-02-20 PROBLEM — I70.219 ATHEROSCLEROSIS OF ARTERY OF EXTREMITY WITH INTERMITTENT CLAUDICATION (HCC): Chronic | Status: ACTIVE | Noted: 2019-10-15

## 2020-02-20 PROCEDURE — 97597 DBRDMT OPN WND 1ST 20 CM/<: CPT | Performed by: SURGERY

## 2020-02-20 PROCEDURE — 97597 DBRDMT OPN WND 1ST 20 CM/<: CPT

## 2020-02-20 ASSESSMENT — PAIN DESCRIPTION - ONSET: ONSET: ON-GOING

## 2020-02-20 ASSESSMENT — PAIN DESCRIPTION - FREQUENCY: FREQUENCY: CONTINUOUS

## 2020-02-20 ASSESSMENT — PAIN DESCRIPTION - ORIENTATION: ORIENTATION: RIGHT;LEFT

## 2020-02-20 ASSESSMENT — PAIN SCALES - GENERAL: PAINLEVEL_OUTOF10: 6

## 2020-02-20 ASSESSMENT — PAIN DESCRIPTION - LOCATION: LOCATION: FOOT

## 2020-02-20 ASSESSMENT — PAIN DESCRIPTION - DESCRIPTORS: DESCRIPTORS: ACHING;BURNING

## 2020-02-20 ASSESSMENT — PAIN DESCRIPTION - PAIN TYPE: TYPE: CHRONIC PAIN

## 2020-02-20 NOTE — PROGRESS NOTES
[facility] [address]   Progress Note and Procedure Note      Mehnza Conde  AGE: 70 y.o. GENDER: female  : 1940  TODAY'S DATE:  2020    Subjective:        HISTORY of PRESENT ILLNESS HPI   Mehnaz Conde is a 70 y.o. female who presents today for wound evaluation. Wound Type:non-healing surgical  Wound Location:right calf  Modifying factors:edema    Patient Active Problem List   Diagnosis Code    Left foot pain M79.672    Decreased pedal pulses R09.89    Chronic osteoarthritis M19.90    Left ear pain H92.02    Atherosclerosis of native artery of both lower extremities with intermittent claudication (HCC) I70.213    Compartment syndrome of lower leg (HCC) T79. A29A    Nonhealing ulcer of right lower leg with fat layer exposed ([facility] [address] L97.912       ALLERGIES    Allergies   Allergen Reactions    Codeine        Incision 19 Pretibial Right; Inner (Active)   Number of days: 76       Objective:      BP (!) 148/65   Pulse 96   Temp 98.4 °F (36.9 °C) (Temporal)   Resp 18   Ht 5' 1\" (1.549 m)   Wt 151 lb (68.5 kg)   BMI 28.53 kg/m²     Post Debridement Measurements and Assessment:    Wound 20 Leg Right; Lower;Medial Wound 1.   Right lower medial leg (Active)   Wound Image   2020 11:08 AM   Wound Non-Healing Surgical 2020 11:08 AM   Dressing Status Old drainage 2020 11:08 AM   Dressing Changed Changed/New 2020 11:30 AM   Wound Cleansed Soap and water 2020 11:08 AM   Wound Length (cm) 3.6 cm 2020 11:08 AM   Wound Width (cm) 0.3 cm 2020 11:08 AM   Wound Depth (cm) 0.2 cm 2020 11:08 AM   Wound Surface Area (cm^2) 1.08 cm^2 2020 11:08 AM   Change in Wound Size % (l*w) 81.22 2020 11:08 AM   Wound Volume (cm^3) 0.22 cm^3 2020 11:08 AM   Wound Healing % 95 2020 11:08 AM   Post-Procedure Length (cm) 3.6 cm 2020 11:33 AM   Post-Procedure Width (cm) 0.3 cm 2020 11:33 AM   Post-Procedure Depth (cm) 0.2 cm 2020

## 2020-03-05 ENCOUNTER — HOSPITAL ENCOUNTER (OUTPATIENT)
Dept: WOUND CARE | Age: 80
Discharge: HOME | End: 2020-03-05
Payer: COMMERCIAL

## 2020-03-05 ENCOUNTER — HOSPITAL ENCOUNTER (OUTPATIENT)
Dept: VASCULAR LAB | Age: 80
Discharge: HOME | End: 2020-03-05

## 2020-03-05 VITALS
HEART RATE: 75 BPM | HEIGHT: 61 IN | RESPIRATION RATE: 16 BRPM | SYSTOLIC BLOOD PRESSURE: 161 MMHG | WEIGHT: 151 LBS | BODY MASS INDEX: 28.51 KG/M2 | DIASTOLIC BLOOD PRESSURE: 66 MMHG | TEMPERATURE: 97 F

## 2020-03-05 PROCEDURE — 93923 UPR/LXTR ART STDY 3+ LVLS: CPT

## 2020-03-05 PROCEDURE — 97597 DBRDMT OPN WND 1ST 20 CM/<: CPT | Performed by: SURGERY

## 2020-03-05 PROCEDURE — 97597 DBRDMT OPN WND 1ST 20 CM/<: CPT

## 2020-03-05 ASSESSMENT — PAIN DESCRIPTION - FREQUENCY: FREQUENCY: CONTINUOUS

## 2020-03-05 ASSESSMENT — PAIN DESCRIPTION - DESCRIPTORS: DESCRIPTORS: ACHING;BURNING

## 2020-03-05 ASSESSMENT — PAIN DESCRIPTION - ORIENTATION: ORIENTATION: RIGHT;LEFT

## 2020-03-05 ASSESSMENT — PAIN DESCRIPTION - PAIN TYPE: TYPE: CHRONIC PAIN

## 2020-03-05 ASSESSMENT — PAIN DESCRIPTION - ONSET: ONSET: ON-GOING

## 2020-03-05 ASSESSMENT — PAIN SCALES - GENERAL: PAINLEVEL_OUTOF10: 5

## 2020-03-05 ASSESSMENT — PAIN DESCRIPTION - LOCATION: LOCATION: FOOT

## 2020-03-19 ENCOUNTER — HOSPITAL ENCOUNTER (OUTPATIENT)
Dept: WOUND CARE | Age: 80
Discharge: HOME | End: 2020-03-19
Payer: COMMERCIAL

## 2020-04-02 ENCOUNTER — HOSPITAL ENCOUNTER (OUTPATIENT)
Dept: WOUND CARE | Age: 80
Discharge: HOME | End: 2020-04-02
Payer: MEDICARE

## 2020-04-02 VITALS
HEART RATE: 70 BPM | WEIGHT: 151 LBS | RESPIRATION RATE: 18 BRPM | TEMPERATURE: 98.1 F | BODY MASS INDEX: 28.51 KG/M2 | HEIGHT: 61 IN

## 2020-04-02 PROCEDURE — 97597 DBRDMT OPN WND 1ST 20 CM/<: CPT | Performed by: SURGERY

## 2020-04-02 PROCEDURE — 97597 DBRDMT OPN WND 1ST 20 CM/<: CPT

## 2020-04-02 NOTE — PLAN OF CARE
Problem: Wound:  Goal: Will show signs of wound healing; wound closure and no evidence of infection  Description: Will show signs of wound healing; wound closure and no evidence of infection  Outcome: Ongoing     Problem: Arterial:  Goal: Optimize blood flow for wound healing  Description: Optimize blood flow for wound healing  Outcome: Ongoing

## 2020-04-02 NOTE — PROGRESS NOTES
[address]   Progress Note and Procedure Note      Dominique Fajardo  AGE: 70 y.o. GENDER: female  : 1940  TODAY'S DATE:  2020    Subjective:        HISTORY of PRESENT ILLNESS HPI   Dominique Fajardo is a 70 y.o. female who presents today for wound evaluation. Wound Type:non-healing surgical  Wound Location:right leg  Modifying factors:shear force    Patient Active Problem List   Diagnosis Code    Left foot pain M79.672    Decreased pedal pulses R09.89    Chronic osteoarthritis M19.90    Left ear pain H92.02    Atherosclerosis of artery of extremity with intermittent claudication (HCC) I70.219    Compartment syndrome of lower leg (HCC) T79. A29A    Nonhealing ulcer of right lower leg with fat layer exposed ([address] L97.912       ALLERGIES    Allergies   Allergen Reactions    Codeine        Incision 19 Pretibial Right; Inner (Active)   Number of days: 118       Objective:      Pulse 70   Temp 98.1 °F (36.7 °C) (Temporal)   Resp 18   Ht 5' 1\" (1.549 m)   Wt 151 lb (68.5 kg)   BMI 28.53 kg/m²     Post Debridement Measurements and Assessment:    Wound 20 Leg Right; Lower;Medial Wound 1.   Right lower medial leg (Active)   Wound Image   2020 10:50 AM   Wound Non-Healing Surgical 2020 10:50 AM   Dressing Status Old drainage 2020 10:50 AM   Dressing Changed Changed/New 3/5/2020 12:35 PM   Dressing/Treatment Moisten with saline;Moist to moist;4x4;Roll gauze;Medipore 3/5/2020 12:35 PM   Wound Cleansed Soap and water 2020 10:50 AM   Wound Length (cm) 1.2 cm 2020 10:50 AM   Wound Width (cm) 0.3 cm 2020 10:50 AM   Wound Depth (cm) 0.2 cm 2020 10:50 AM   Wound Surface Area (cm^2) 0.36 cm^2 2020 10:50 AM   Change in Wound Size % (l*w) 93.74 2020 10:50 AM   Wound Volume (cm^3) 0.07 cm^3 2020 10:50 AM   Wound Healing % 98 2020 10:50 AM   Post-Procedure Length (cm) 3.6 cm 3/5/2020 12:15 PM   Post-Procedure Width (cm) 0.3 cm 3/5/2020 12:15 PM   Post-Procedure Depth (cm) 0.3 cm 3/5/2020 12:15 PM   Post-Procedure Surface Area (cm^2) 1.08 cm^2 3/5/2020 12:15 PM   Post-Procedure Volume (cm^3) 0.32 cm^3 3/5/2020 12:15 PM   Distance Tunneling (cm) 0 cm 4/2/2020 10:50 AM   Tunneling Position ___ O'Clock 0 4/2/2020 10:50 AM   Undermining Starts ___ O'Clock 0 4/2/2020 10:50 AM   Undermining Ends___ O'Clock 0 4/2/2020 10:50 AM   Undermining Maxium Distance (cm) 0 4/2/2020 10:50 AM   Wound Assessment Slough; Yellow;Pink;Drainage 4/2/2020 10:50 AM   Drainage Amount Small 4/2/2020 10:50 AM   Drainage Description Yellow 4/2/2020 10:50 AM   Odor None 4/2/2020 10:50 AM   Margins Attached edges 4/2/2020 10:50 AM   Marielle-wound Assessment Pink 4/2/2020 10:50 AM   Non-staged Wound Description Full thickness 4/2/2020 10:50 AM   Pink%Wound Bed 0 4/2/2020 10:50 AM   Red%Wound Bed 0 4/2/2020 10:50 AM   Yellow%Wound Bed 100 4/2/2020 10:50 AM   Black%Wound Bed 0 4/2/2020 10:50 AM   Purple%Wound Bed 0 4/2/2020 10:50 AM   Other%Wound Bed 0 4/2/2020 10:50 AM   Number of days: 77           The patients pain isPain Level: 0  . Wound(s) has improved. Please refer to nursing measurements and assessment regarding wound pre and post debridement. Procedure Note:    Wound #: 1     Debridement: Non-excisional Debridement    Anesthetic:    Using curette the wound was sharply debrided    down through and including the removal of slough. Total Surface Area Debrided:  0.36 sq cm   Devitalized Tissue Debrided:  slough    Percent of Wound Debrided: 100%      Bleeding: Minimal    Hemostasis:   not needed      Response to treatment:  Well tolerated by patient.       Assessment:      Patient Active Problem List   Diagnosis    Left foot pain    Decreased pedal pulses    Chronic osteoarthritis    Left ear pain    Atherosclerosis of artery of extremity with intermittent claudication (HCC)    Compartment syndrome of lower leg (HCC)    Nonhealing ulcer of right lower leg with fat

## 2020-04-02 NOTE — LETTER
Wound Clinic Discharge Instructions  [facility] TIMOTHY HAMILTON  [address] [facility], [address] [place] [address]  Thomas Eric                                    Work Release    NAME:  [address] [place] [address] Road:  1940  MEDICAL RECORD NUMBER:  [id number]  DATE:  4/2/2020    Ms. Curry was seen in the [address] [place] [address] Road on 4/2/2020     Full weight bearing    [x] May return to work with no restrictions  [] May return to work with the following restrictions ___________________________  [] Must elevate lower extremity/extremities  [] Other     Please call Telephone: [phone]  or  [phone] 8:30 am-4:30 pm and Friday 8:30 am-12 pm with any question     Electronically signed by Jill Cohn MD 4/2/20 @11:23

## 2020-04-23 ENCOUNTER — HOSPITAL ENCOUNTER (OUTPATIENT)
Dept: WOUND CARE | Age: 80
Discharge: HOME | End: 2020-04-23
Payer: MEDICARE

## 2020-04-23 VITALS
HEIGHT: 61 IN | RESPIRATION RATE: 18 BRPM | WEIGHT: 151 LBS | BODY MASS INDEX: 28.51 KG/M2 | TEMPERATURE: 97.5 F | SYSTOLIC BLOOD PRESSURE: 177 MMHG | DIASTOLIC BLOOD PRESSURE: 76 MMHG | HEART RATE: 74 BPM

## 2020-04-23 PROCEDURE — 99212 OFFICE O/P EST SF 10 MIN: CPT | Performed by: SURGERY

## 2020-04-23 PROCEDURE — 99212 OFFICE O/P EST SF 10 MIN: CPT

## 2020-04-23 NOTE — PROGRESS NOTES
Width (cm) 0.3 cm 3/5/2020 12:15 PM   Post-Procedure Depth (cm) 0.3 cm 3/5/2020 12:15 PM   Post-Procedure Surface Area (cm^2) 1.08 cm^2 3/5/2020 12:15 PM   Post-Procedure Volume (cm^3) 0.32 cm^3 3/5/2020 12:15 PM   Distance Tunneling (cm) 0 cm 4/23/2020  8:45 AM   Tunneling Position ___ O'Clock 0 4/23/2020  8:45 AM   Undermining Starts ___ O'Clock 0 4/23/2020  8:45 AM   Undermining Ends___ O'Clock 0 4/23/2020  8:45 AM   Undermining Maxium Distance (cm) 0 4/23/2020  8:45 AM   Wound Assessment Slough;Blanding 4/23/2020  8:45 AM   Drainage Amount Small 4/23/2020  8:45 AM   Drainage Description Yellow 4/23/2020  8:45 AM   Odor None 4/23/2020  8:45 AM   Margins Attached edges 4/23/2020  8:45 AM   Marielle-wound Assessment Pink 4/23/2020  8:45 AM   Non-staged Wound Description Full thickness 4/23/2020  8:45 AM   Blanding%Wound Bed 0 4/23/2020  8:45 AM   Red%Wound Bed 0 4/23/2020  8:45 AM   Yellow%Wound Bed 100 4/23/2020  8:45 AM   Black%Wound Bed 0 4/23/2020  8:45 AM   Purple%Wound Bed 0 4/23/2020  8:45 AM   Other%Wound Bed 0 4/23/2020  8:45 AM   Number of days: 97           The patients pain isPain Level: 0  . Wound(s) healed. Please refer to nursing measurements and assessment regarding wound pre and post debridement. Assessment:      Patient Active Problem List   Diagnosis    Left foot pain    Decreased pedal pulses    Chronic osteoarthritis    Left ear pain    Atherosclerosis of artery of extremity with intermittent claudication (HCC)    Compartment syndrome of lower leg (HCC)    Nonhealing ulcer of right lower leg with fat layer exposed ([address]          Plan:          Plan for wound - Dress per physician order  Treatment:     Compression : No   Offloading : No   Dressing : none   Additional Therapy : f/u arterial flow in vascular office     1. Discussed appropriate home care of this wound. Wound redressed. 2. Patient instructions were given. 3. Follow up: prn.                            Electronically signed by Niranjan Wilkins,  on 4/23/2020 at 9:17 AM

## 2020-05-22 RX ORDER — CILOSTAZOL 50 MG/1
50 TABLET ORAL 2 TIMES DAILY
Qty: 60 TABLET | Refills: 3 | Status: SHIPPED | OUTPATIENT
Start: 2020-05-22 | End: 2020-08-17 | Stop reason: SDUPTHER

## 2020-05-22 RX ORDER — CLOPIDOGREL BISULFATE 75 MG/1
75 TABLET ORAL DAILY
Qty: 30 TABLET | Refills: 5 | Status: SHIPPED | OUTPATIENT
Start: 2020-05-22 | End: 2020-11-02

## 2020-08-17 RX ORDER — CILOSTAZOL 50 MG/1
50 TABLET ORAL 2 TIMES DAILY
Qty: 180 TABLET | Refills: 0 | Status: SHIPPED | OUTPATIENT
Start: 2020-08-17 | End: 2020-11-02

## 2020-11-02 RX ORDER — CILOSTAZOL 50 MG/1
TABLET ORAL
Qty: 180 TABLET | Refills: 0 | Status: SHIPPED | OUTPATIENT
Start: 2020-11-02

## 2020-11-02 RX ORDER — CLOPIDOGREL BISULFATE 75 MG/1
TABLET ORAL
Qty: 90 TABLET | Refills: 0 | Status: SHIPPED | OUTPATIENT
Start: 2020-11-02

## 2020-12-09 ENCOUNTER — TELEPHONE (OUTPATIENT)
Dept: VASCULAR SURGERY | Facility: CLINIC | Age: 80
End: 2020-12-09

## 2020-12-09 NOTE — TELEPHONE ENCOUNTER
Tried calling to remind Ms Ojeda of her appointment for Thursday, December 10th, 2020 at 10 am with Dr Owens. Tried calling to advise Ms Ojeda to please bring disc with her as well as if she has any questions, concerns, or needs to rescheduled please call the office at [phone].

## 2020-12-10 ENCOUNTER — OFFICE VISIT (OUTPATIENT)
Dept: VASCULAR SURGERY | Facility: CLINIC | Age: 80
End: 2020-12-10

## 2020-12-10 VITALS
OXYGEN SATURATION: 96 % | HEIGHT: 61 IN | SYSTOLIC BLOOD PRESSURE: 130 MMHG | WEIGHT: 175 LBS | DIASTOLIC BLOOD PRESSURE: 68 MMHG | HEART RATE: 85 BPM | BODY MASS INDEX: 33.04 KG/M2

## 2020-12-10 DIAGNOSIS — I73.9 PAD (PERIPHERAL ARTERY DISEASE) (HCC): ICD-10-CM

## 2020-12-10 DIAGNOSIS — I10 ESSENTIAL HYPERTENSION: ICD-10-CM

## 2020-12-10 DIAGNOSIS — I65.23 BILATERAL CAROTID ARTERY STENOSIS: ICD-10-CM

## 2020-12-10 DIAGNOSIS — E78.2 MIXED HYPERLIPIDEMIA: ICD-10-CM

## 2020-12-10 DIAGNOSIS — I82.4Z1 ACUTE DEEP VEIN THROMBOSIS (DVT) OF DISTAL VEIN OF RIGHT LOWER EXTREMITY (HCC): Primary | ICD-10-CM

## 2020-12-10 PROCEDURE — 99204 OFFICE O/P NEW MOD 45 MIN: CPT | Performed by: SURGERY

## 2020-12-10 RX ORDER — GABAPENTIN 600 MG/1
600 TABLET ORAL 3 TIMES DAILY
Status: ON HOLD | COMMUNITY
Start: 2020-12-03 | End: 2022-01-01 | Stop reason: SDUPTHER

## 2020-12-10 RX ORDER — ASPIRIN 81 MG/1
81 TABLET ORAL DAILY
COMMUNITY
End: 2022-01-01 | Stop reason: HOSPADM

## 2020-12-10 RX ORDER — LISINOPRIL 20 MG/1
20 TABLET ORAL DAILY
Status: ON HOLD | COMMUNITY
Start: 2020-12-03 | End: 2022-01-01 | Stop reason: SDUPTHER

## 2020-12-10 RX ORDER — ATORVASTATIN CALCIUM 20 MG/1
20 TABLET, FILM COATED ORAL DAILY
COMMUNITY
Start: 2020-12-03 | End: 2022-01-01 | Stop reason: HOSPADM

## 2020-12-10 RX ORDER — HYDROCODONE BITARTRATE AND ACETAMINOPHEN 10; 325 MG/1; MG/1
1 TABLET ORAL 2 TIMES DAILY
COMMUNITY
Start: 2020-12-03 | End: 2021-01-10 | Stop reason: HOSPADM

## 2020-12-10 RX ORDER — ACETAMINOPHEN 325 MG/1
650 TABLET ORAL AS NEEDED
COMMUNITY
End: 2021-01-10 | Stop reason: HOSPADM

## 2020-12-10 NOTE — PROGRESS NOTES
12/10/2020      Roberto Garcia MD  [address] [facility]  [place] [address]    Evelyn Ojeda  1940    Chief Complaint   Patient presents with   • Establish Care     Referred over by Dr Garcia Right Leg DVT. Patient denies any stroke like symptoms.    • other     Patient having problems with Right Ankle and Toes. Patient has Vascular Surgery 1-2 years ago over at [facility].    • Non-Smoker     Patient is a Non-Smoker   • Med Management     Verified medications from list patient brought in.        Dear Roberto Garcia,*    HPI  I had the pleasure of seeing your patient Evelyn Ojeda in the office today.  Thank you kindly for this consultation.  As you recall, Evelyn Ojeda is a 80 y.o.  female who you are currently following for routine health maintenance.  She was being referred here for a right leg DVT and right foot pain.  She was placed on Lovenox by Dr. Garcia.  She is established with [facility] vascular, who has performed surgery in the past.  I did carefully review her records from [facility].      History reviewed. No pertinent past medical history.    History reviewed. No pertinent surgical history.    History reviewed. No pertinent family history.    Social History     Socioeconomic History   • Marital status: Unknown     Spouse name: Not on file   • Number of children: Not on file   • Years of education: Not on file   • Highest education level: Not on file   Tobacco Use   • Smoking status: Never Smoker   • Smokeless tobacco: Never Used   Substance and Sexual Activity   • Alcohol use: Never     Frequency: Never   • Drug use: Never   • Sexual activity: Defer       Allergies   Allergen Reactions   • Codeine Other (See Comments)         Current Outpatient Medications:   •  acetaminophen (TYLENOL) 325 MG tablet, Take 650 mg by mouth As Needed., Disp: , Rfl:   •  aspirin 81 MG EC tablet, Take 81 mg by mouth Daily., Disp: , Rfl:   •  atorvastatin (LIPITOR) 20 MG tablet, Take 20 mg by mouth Daily.,  "Disp: , Rfl:   •  enoxaparin (LOVENOX) 80 MG/0.8ML solution syringe, Inject 80 mg under the skin into the appropriate area as directed Every 12 (Twelve) Hours., Disp: , Rfl:   •  gabapentin (NEURONTIN) 600 MG tablet, Take 600 mg by mouth Take As Directed., Disp: , Rfl:   •  HYDROcodone-acetaminophen (NORCO)  MG per tablet, Take 1 tablet by mouth 1 (One) Time Per Week., Disp: , Rfl:   •  lisinopril (PRINIVIL,ZESTRIL) 20 MG tablet, Take 20 mg by mouth Take As Directed., Disp: , Rfl:   •  metFORMIN (GLUCOPHAGE) 500 MG tablet, Take 500 mg by mouth Take As Directed., Disp: , Rfl:   •  Rivaroxaban (XARELTO) tablet therapy pack starter pack, Take one 15 mg tablet twice daily with food for 21 days.  Followed by one 20 mg tablet by mouth once daily with food. Take as directed, Disp: 1 each, Rfl: 0    Review of Systems   Constitutional: Negative.    HENT: Negative.    Eyes: Negative.    Respiratory: Negative.    Cardiovascular: Negative.    Gastrointestinal: Negative.    Endocrine: Negative.    Genitourinary: Negative.    Musculoskeletal: Negative.    Skin: Positive for color change.   Allergic/Immunologic: Negative.    Neurological: Negative.    Hematological: Negative.    Psychiatric/Behavioral: Negative.    All other systems reviewed and are negative.    /68 (BP Location: Left arm, Patient Position: Sitting, Cuff Size: Adult)   Pulse 85   Ht 154.9 cm (61\")   Wt 79.4 kg (175 lb)   SpO2 96%   BMI 33.07 kg/m²     Physical Exam  Vitals signs and nursing note reviewed.   Constitutional:       Appearance: Normal appearance. She is well-developed. She is obese.   HENT:      Head: Normocephalic and atraumatic.   Eyes:      General: No scleral icterus.     Pupils: Pupils are equal, round, and reactive to light.   Neck:      Musculoskeletal: Neck supple.      Thyroid: No thyromegaly.      Vascular: No carotid bruit or JVD.   Cardiovascular:      Rate and Rhythm: Normal rate and regular rhythm.      Pulses:           " Carotid pulses are 2+ on the right side and 2+ on the left side.       Femoral pulses are 2+ on the right side and 2+ on the left side.       Popliteal pulses are 0 on the right side and 0 on the left side.        Dorsalis pedis pulses are detected w/ Doppler on the right side and detected w/ Doppler on the left side.        Posterior tibial pulses are detected w/ Doppler on the right side and detected w/ Doppler on the left side.      Heart sounds: Normal heart sounds.      Comments: Right: doppler DP/PT/peroneal  Pulmonary:      Effort: Pulmonary effort is normal.      Breath sounds: Normal breath sounds.   Abdominal:      General: Bowel sounds are normal. There is no distension or abdominal bruit.      Palpations: Abdomen is soft. There is no mass.      Tenderness: There is no abdominal tenderness.   Musculoskeletal: Normal range of motion.   Lymphadenopathy:      Cervical: No cervical adenopathy.   Skin:     General: Skin is warm and dry.   Neurological:      Mental Status: She is alert and oriented to person, place, and time.      Cranial Nerves: No cranial nerve deficit.      Sensory: No sensory deficit.   Psychiatric:         Mood and Affect: Mood normal.         Behavior: Behavior normal.         Thought Content: Thought content normal.         Judgment: Judgment normal.     Diagnostic data:  Extremities Arteries:Lower Arterial Plethysmography, VASC LOWER EXTREMITY   ART SEGMENTAL PRESSURES W PPG.    Indications for Study:F/U PVD.    Risk Factors      - The patient's risk factor(s) include: diabetes mellitus, dyslipidemia      and arterial hypertension.     Impression     The patient has noncompressible bilateral tibial arteries. This would be   consistent with a history of diabetes and calcified tibial vessels.   Based on waveforms and toe pressures there is a mild to moderate decrease   in arterial blood flow to right leg, and a mild limitation in flow to the   left foot.    Patient Active Problem List    Diagnosis   • PAD (peripheral artery disease) (CMS/[facility])   • Essential hypertension   • Mixed hyperlipidemia   • Bilateral carotid artery stenosis   • Acute deep vein thrombosis (DVT) of distal vein of right lower extremity (CMS/[facility])        Diagnosis Plan   1. Acute deep vein thrombosis (DVT) of distal vein of right lower extremity (CMS/HCC)  Rivaroxaban (XARELTO) tablet therapy pack starter pack   2. PAD (peripheral artery disease) (CMS/[facility])     3. Essential hypertension     4. Mixed hyperlipidemia     5. Bilateral carotid artery stenosis         Plan: After thoroughly evaluating Evelyn Ojeda, I believe the best course of action is to remain conservative from a vascular surgery standpoint.  She does have right lower extremity DVT in the tibial veins.  We will stop the Lovenox and start her on Xarelto daily for 1 month.  With regards to her lower extremity PAD I will see her back in 3 months for repeat noninvasive testing which will include an arterial duplex of both legs and a carotid duplex for surveillance.  She no longer wishes to return to [facility] for vascular care.  I did discuss vascular risk factors as they pertain to the progression of vascular disease including controlling hypertension and hyperlipidemia.  These risk factors are currently stable.  The patient is to continue taking their medications as previously discussed.   This was all discussed in full with complete understanding.  Thank you for allowing me to participate in the care of your patient.  Please do not hesitate to call with any questions or concerns.  We will keep you aware of any further encounters with Evelyn Ojeda.        Sincerely yours,         DO Radha Hensley Jonathan Patmor, MD

## 2020-12-17 ENCOUNTER — TELEPHONE (OUTPATIENT)
Dept: VASCULAR SURGERY | Facility: CLINIC | Age: 80
End: 2020-12-17

## 2020-12-17 NOTE — TELEPHONE ENCOUNTER
Spoke with Ms Ojeda letting her know that I had discussed with Jossie HUA and Dr Owens that the swelling is not unexpected due to tibial vein DVT. Dr Owens recommends keeping it elevated, compression and continue on Xarelto. Ms Ojeda verbalized understanding.       ----- Message -----  From: Krupa Champagne  Sent: 12/17/2020   3:26 PM CST  To: JAVID Richard    Mrs Ojeda's daughter states they were seen last week in our office and her mother's foot is Red, Swollen and painful and asked if you could please call them or should they get an appt for tomorrow

## 2020-12-26 ENCOUNTER — APPOINTMENT (OUTPATIENT)
Dept: ULTRASOUND IMAGING | Facility: HOSPITAL | Age: 80
End: 2020-12-26

## 2020-12-26 ENCOUNTER — APPOINTMENT (OUTPATIENT)
Dept: GENERAL RADIOLOGY | Facility: HOSPITAL | Age: 80
End: 2020-12-26

## 2020-12-26 ENCOUNTER — HOSPITAL ENCOUNTER (EMERGENCY)
Facility: HOSPITAL | Age: 80
Discharge: HOME | End: 2020-12-26
Admitting: EMERGENCY MEDICINE

## 2020-12-26 VITALS
RESPIRATION RATE: 18 BRPM | HEART RATE: 85 BPM | HEIGHT: 61 IN | OXYGEN SATURATION: 99 % | TEMPERATURE: 98.2 F | SYSTOLIC BLOOD PRESSURE: 135 MMHG | WEIGHT: 175 LBS | BODY MASS INDEX: 33.04 KG/M2 | DIASTOLIC BLOOD PRESSURE: 70 MMHG

## 2020-12-26 DIAGNOSIS — I73.9 PAD (PERIPHERAL ARTERY DISEASE) (HCC): ICD-10-CM

## 2020-12-26 DIAGNOSIS — M79.671 RIGHT FOOT PAIN: Primary | ICD-10-CM

## 2020-12-26 LAB
ALBUMIN SERPL-MCNC: 3.5 G/DL (ref 3.5–5.2)
ALBUMIN/GLOB SERPL: 1 G/DL
ALP SERPL-CCNC: 104 U/L (ref 39–117)
ALT SERPL W P-5'-P-CCNC: 10 U/L (ref 1–33)
ANION GAP SERPL CALCULATED.3IONS-SCNC: 8 MMOL/L (ref 5–15)
APTT PPP: 44 SECONDS (ref 24.1–35)
AST SERPL-CCNC: 16 U/L (ref 1–32)
BASOPHILS # BLD AUTO: 0.05 10*3/MM3 (ref 0–0.2)
BASOPHILS NFR BLD AUTO: 0.6 % (ref 0–1.5)
BILIRUB SERPL-MCNC: 0.2 MG/DL (ref 0–1.2)
BUN SERPL-MCNC: 12 MG/DL (ref 8–23)
BUN/CREAT SERPL: 12.8 (ref 7–25)
CALCIUM SPEC-SCNC: 9.2 MG/DL (ref 8.6–10.5)
CHLORIDE SERPL-SCNC: 105 MMOL/L (ref 98–107)
CO2 SERPL-SCNC: 23 MMOL/L (ref 22–29)
CREAT SERPL-MCNC: 0.94 MG/DL (ref 0.57–1)
D-LACTATE SERPL-SCNC: 1.8 MMOL/L (ref 0.5–2)
DEPRECATED RDW RBC AUTO: 43.5 FL (ref 37–54)
EOSINOPHIL # BLD AUTO: 0.21 10*3/MM3 (ref 0–0.4)
EOSINOPHIL NFR BLD AUTO: 2.4 % (ref 0.3–6.2)
ERYTHROCYTE [DISTWIDTH] IN BLOOD BY AUTOMATED COUNT: 13.2 % (ref 12.3–15.4)
GFR SERPL CREATININE-BSD FRML MDRD: 58 ML/MIN/1.73
GLOBULIN UR ELPH-MCNC: 3.5 GM/DL
GLUCOSE SERPL-MCNC: 191 MG/DL (ref 65–99)
HCT VFR BLD AUTO: 28.8 % (ref 34–46.6)
HGB BLD-MCNC: 9.4 G/DL (ref 12–15.9)
IMM GRANULOCYTES # BLD AUTO: 0.05 10*3/MM3 (ref 0–0.05)
IMM GRANULOCYTES NFR BLD AUTO: 0.6 % (ref 0–0.5)
INR PPP: 1.95 (ref 0.91–1.09)
LYMPHOCYTES # BLD AUTO: 2.43 10*3/MM3 (ref 0.7–3.1)
LYMPHOCYTES NFR BLD AUTO: 28.2 % (ref 19.6–45.3)
MCH RBC QN AUTO: 29.6 PG (ref 26.6–33)
MCHC RBC AUTO-ENTMCNC: 32.6 G/DL (ref 31.5–35.7)
MCV RBC AUTO: 90.6 FL (ref 79–97)
MONOCYTES # BLD AUTO: 0.74 10*3/MM3 (ref 0.1–0.9)
MONOCYTES NFR BLD AUTO: 8.6 % (ref 5–12)
NEUTROPHILS NFR BLD AUTO: 5.15 10*3/MM3 (ref 1.7–7)
NEUTROPHILS NFR BLD AUTO: 59.6 % (ref 42.7–76)
NRBC BLD AUTO-RTO: 0 /100 WBC (ref 0–0.2)
PLATELET # BLD AUTO: 397 10*3/MM3 (ref 140–450)
PMV BLD AUTO: 9.7 FL (ref 6–12)
POTASSIUM SERPL-SCNC: 4.8 MMOL/L (ref 3.5–5.2)
PROT SERPL-MCNC: 7 G/DL (ref 6–8.5)
PROTHROMBIN TIME: 22 SECONDS (ref 11.9–14.6)
RBC # BLD AUTO: 3.18 10*6/MM3 (ref 3.77–5.28)
SODIUM SERPL-SCNC: 136 MMOL/L (ref 136–145)
WBC # BLD AUTO: 8.63 10*3/MM3 (ref 3.4–10.8)

## 2020-12-26 PROCEDURE — 85730 THROMBOPLASTIN TIME PARTIAL: CPT | Performed by: NURSE PRACTITIONER

## 2020-12-26 PROCEDURE — 25010000002 ONDANSETRON PER 1 MG: Performed by: NURSE PRACTITIONER

## 2020-12-26 PROCEDURE — 85610 PROTHROMBIN TIME: CPT | Performed by: NURSE PRACTITIONER

## 2020-12-26 PROCEDURE — 83605 ASSAY OF LACTIC ACID: CPT | Performed by: NURSE PRACTITIONER

## 2020-12-26 PROCEDURE — 96374 THER/PROPH/DIAG INJ IV PUSH: CPT

## 2020-12-26 PROCEDURE — 99283 EMERGENCY DEPT VISIT LOW MDM: CPT

## 2020-12-26 PROCEDURE — 93926 LOWER EXTREMITY STUDY: CPT

## 2020-12-26 PROCEDURE — 80053 COMPREHEN METABOLIC PANEL: CPT | Performed by: NURSE PRACTITIONER

## 2020-12-26 PROCEDURE — 36415 COLL VENOUS BLD VENIPUNCTURE: CPT

## 2020-12-26 PROCEDURE — 73630 X-RAY EXAM OF FOOT: CPT

## 2020-12-26 PROCEDURE — 85025 COMPLETE CBC W/AUTO DIFF WBC: CPT | Performed by: NURSE PRACTITIONER

## 2020-12-26 PROCEDURE — 25010000002 MORPHINE SULFATE (PF) 2 MG/ML SOLUTION: Performed by: NURSE PRACTITIONER

## 2020-12-26 PROCEDURE — 93926 LOWER EXTREMITY STUDY: CPT | Performed by: SURGERY

## 2020-12-26 PROCEDURE — 96375 TX/PRO/DX INJ NEW DRUG ADDON: CPT

## 2020-12-26 RX ORDER — SODIUM CHLORIDE 0.9 % (FLUSH) 0.9 %
10 SYRINGE (ML) INJECTION AS NEEDED
Status: DISCONTINUED | OUTPATIENT
Start: 2020-12-26 | End: 2020-12-26 | Stop reason: HOSPADM

## 2020-12-26 RX ORDER — ONDANSETRON 2 MG/ML
4 INJECTION INTRAMUSCULAR; INTRAVENOUS ONCE
Status: COMPLETED | OUTPATIENT
Start: 2020-12-26 | End: 2020-12-26

## 2020-12-26 RX ORDER — MORPHINE SULFATE 2 MG/ML
2 INJECTION, SOLUTION INTRAMUSCULAR; INTRAVENOUS ONCE
Status: COMPLETED | OUTPATIENT
Start: 2020-12-26 | End: 2020-12-26

## 2020-12-26 RX ADMIN — MORPHINE SULFATE 2 MG: 2 INJECTION, SOLUTION INTRAMUSCULAR; INTRAVENOUS at 12:07

## 2020-12-26 RX ADMIN — ONDANSETRON HYDROCHLORIDE 4 MG: 2 SOLUTION INTRAMUSCULAR; INTRAVENOUS at 12:07

## 2021-01-04 ENCOUNTER — HOSPITAL ENCOUNTER (INPATIENT)
Facility: HOSPITAL | Age: 81
LOS: 5 days | Discharge: HOME | End: 2021-01-10
Attending: EMERGENCY MEDICINE | Admitting: INTERNAL MEDICINE

## 2021-01-04 ENCOUNTER — APPOINTMENT (OUTPATIENT)
Dept: GENERAL RADIOLOGY | Facility: HOSPITAL | Age: 81
End: 2021-01-04

## 2021-01-04 ENCOUNTER — APPOINTMENT (OUTPATIENT)
Dept: ULTRASOUND IMAGING | Facility: HOSPITAL | Age: 81
End: 2021-01-04

## 2021-01-04 DIAGNOSIS — R26.9 GAIT ABNORMALITY: ICD-10-CM

## 2021-01-04 DIAGNOSIS — L03.90 CELLULITIS, UNSPECIFIED CELLULITIS SITE: ICD-10-CM

## 2021-01-04 DIAGNOSIS — I73.9 PAD (PERIPHERAL ARTERY DISEASE) (HCC): Primary | ICD-10-CM

## 2021-01-04 LAB
ANION GAP SERPL CALCULATED.3IONS-SCNC: 10 MMOL/L (ref 5–15)
APTT PPP: 44.4 SECONDS (ref 24.1–35)
BASOPHILS # BLD AUTO: 0.05 10*3/MM3 (ref 0–0.2)
BASOPHILS NFR BLD AUTO: 0.5 % (ref 0–1.5)
BUN SERPL-MCNC: 22 MG/DL (ref 8–23)
BUN/CREAT SERPL: 14.3 (ref 7–25)
CALCIUM SPEC-SCNC: 9.7 MG/DL (ref 8.6–10.5)
CHLORIDE SERPL-SCNC: 103 MMOL/L (ref 98–107)
CO2 SERPL-SCNC: 23 MMOL/L (ref 22–29)
CREAT SERPL-MCNC: 1.54 MG/DL (ref 0.57–1)
CRP SERPL-MCNC: 0.99 MG/DL (ref 0–0.5)
D-LACTATE SERPL-SCNC: 1.2 MMOL/L (ref 0.5–2)
DEPRECATED RDW RBC AUTO: 43.7 FL (ref 37–54)
EOSINOPHIL # BLD AUTO: 0.21 10*3/MM3 (ref 0–0.4)
EOSINOPHIL NFR BLD AUTO: 2.2 % (ref 0.3–6.2)
ERYTHROCYTE [DISTWIDTH] IN BLOOD BY AUTOMATED COUNT: 13.4 % (ref 12.3–15.4)
ERYTHROCYTE [SEDIMENTATION RATE] IN BLOOD: 53 MM/HR (ref 0–20)
GFR SERPL CREATININE-BSD FRML MDRD: 33 ML/MIN/1.73
GLUCOSE SERPL-MCNC: 184 MG/DL (ref 65–99)
HCT VFR BLD AUTO: 31.5 % (ref 34–46.6)
HGB BLD-MCNC: 10.3 G/DL (ref 12–15.9)
IMM GRANULOCYTES # BLD AUTO: 0.08 10*3/MM3 (ref 0–0.05)
IMM GRANULOCYTES NFR BLD AUTO: 0.9 % (ref 0–0.5)
INR PPP: 2.09 (ref 0.91–1.09)
LYMPHOCYTES # BLD AUTO: 2.43 10*3/MM3 (ref 0.7–3.1)
LYMPHOCYTES NFR BLD AUTO: 26 % (ref 19.6–45.3)
MCH RBC QN AUTO: 29.4 PG (ref 26.6–33)
MCHC RBC AUTO-ENTMCNC: 32.7 G/DL (ref 31.5–35.7)
MCV RBC AUTO: 90 FL (ref 79–97)
MONOCYTES # BLD AUTO: 0.81 10*3/MM3 (ref 0.1–0.9)
MONOCYTES NFR BLD AUTO: 8.7 % (ref 5–12)
NEUTROPHILS NFR BLD AUTO: 5.77 10*3/MM3 (ref 1.7–7)
NEUTROPHILS NFR BLD AUTO: 61.7 % (ref 42.7–76)
NRBC BLD AUTO-RTO: 0 /100 WBC (ref 0–0.2)
PLATELET # BLD AUTO: 434 10*3/MM3 (ref 140–450)
PMV BLD AUTO: 9.7 FL (ref 6–12)
POTASSIUM SERPL-SCNC: 4.8 MMOL/L (ref 3.5–5.2)
PROTHROMBIN TIME: 23.3 SECONDS (ref 11.9–14.6)
RBC # BLD AUTO: 3.5 10*6/MM3 (ref 3.77–5.28)
SARS-COV-2 RNA PNL SPEC NAA+PROBE: NOT DETECTED
SODIUM SERPL-SCNC: 136 MMOL/L (ref 136–145)
WBC # BLD AUTO: 9.35 10*3/MM3 (ref 3.4–10.8)

## 2021-01-04 PROCEDURE — 93926 LOWER EXTREMITY STUDY: CPT

## 2021-01-04 PROCEDURE — 93971 EXTREMITY STUDY: CPT | Performed by: SURGERY

## 2021-01-04 PROCEDURE — 83735 ASSAY OF MAGNESIUM: CPT | Performed by: INTERNAL MEDICINE

## 2021-01-04 PROCEDURE — 83605 ASSAY OF LACTIC ACID: CPT | Performed by: EMERGENCY MEDICINE

## 2021-01-04 PROCEDURE — 85730 THROMBOPLASTIN TIME PARTIAL: CPT | Performed by: EMERGENCY MEDICINE

## 2021-01-04 PROCEDURE — 25010000002 MORPHINE PER 10 MG: Performed by: EMERGENCY MEDICINE

## 2021-01-04 PROCEDURE — 93926 LOWER EXTREMITY STUDY: CPT | Performed by: SURGERY

## 2021-01-04 PROCEDURE — 80048 BASIC METABOLIC PNL TOTAL CA: CPT | Performed by: EMERGENCY MEDICINE

## 2021-01-04 PROCEDURE — 36415 COLL VENOUS BLD VENIPUNCTURE: CPT

## 2021-01-04 PROCEDURE — 73630 X-RAY EXAM OF FOOT: CPT

## 2021-01-04 PROCEDURE — 25010000002 ONDANSETRON PER 1 MG: Performed by: EMERGENCY MEDICINE

## 2021-01-04 PROCEDURE — 99284 EMERGENCY DEPT VISIT MOD MDM: CPT

## 2021-01-04 PROCEDURE — 84145 PROCALCITONIN (PCT): CPT | Performed by: INTERNAL MEDICINE

## 2021-01-04 PROCEDURE — 93971 EXTREMITY STUDY: CPT

## 2021-01-04 PROCEDURE — 87635 SARS-COV-2 COVID-19 AMP PRB: CPT | Performed by: EMERGENCY MEDICINE

## 2021-01-04 PROCEDURE — 25010000002 HEPARIN (PORCINE) PER 1000 UNITS: Performed by: EMERGENCY MEDICINE

## 2021-01-04 PROCEDURE — 85651 RBC SED RATE NONAUTOMATED: CPT | Performed by: EMERGENCY MEDICINE

## 2021-01-04 PROCEDURE — 85610 PROTHROMBIN TIME: CPT | Performed by: EMERGENCY MEDICINE

## 2021-01-04 PROCEDURE — 86140 C-REACTIVE PROTEIN: CPT | Performed by: EMERGENCY MEDICINE

## 2021-01-04 PROCEDURE — 87040 BLOOD CULTURE FOR BACTERIA: CPT | Performed by: EMERGENCY MEDICINE

## 2021-01-04 PROCEDURE — 85025 COMPLETE CBC W/AUTO DIFF WBC: CPT | Performed by: EMERGENCY MEDICINE

## 2021-01-04 PROCEDURE — 25010000002 VANCOMYCIN 10 G RECONSTITUTED SOLUTION: Performed by: EMERGENCY MEDICINE

## 2021-01-04 PROCEDURE — 25010000002 PIPERACILLIN SOD-TAZOBACTAM PER 1 G: Performed by: EMERGENCY MEDICINE

## 2021-01-04 PROCEDURE — 84439 ASSAY OF FREE THYROXINE: CPT | Performed by: INTERNAL MEDICINE

## 2021-01-04 RX ORDER — MORPHINE SULFATE 10 MG/ML
8 INJECTION INTRAMUSCULAR; INTRAVENOUS; SUBCUTANEOUS ONCE
Status: COMPLETED | OUTPATIENT
Start: 2021-01-04 | End: 2021-01-04

## 2021-01-04 RX ORDER — HEPARIN SODIUM 1000 [USP'U]/ML
5000 INJECTION, SOLUTION INTRAVENOUS; SUBCUTANEOUS ONCE
Status: COMPLETED | OUTPATIENT
Start: 2021-01-04 | End: 2021-01-04

## 2021-01-04 RX ORDER — ONDANSETRON 2 MG/ML
4 INJECTION INTRAMUSCULAR; INTRAVENOUS ONCE
Status: COMPLETED | OUTPATIENT
Start: 2021-01-04 | End: 2021-01-04

## 2021-01-04 RX ADMIN — PIPERACILLIN SODIUM AND TAZOBACTAM SODIUM 3.38 G: 3; .375 INJECTION, POWDER, LYOPHILIZED, FOR SOLUTION INTRAVENOUS at 21:50

## 2021-01-04 RX ADMIN — MORPHINE SULFATE 8 MG: 10 INJECTION, SOLUTION INTRAMUSCULAR; INTRAVENOUS at 21:31

## 2021-01-04 RX ADMIN — VANCOMYCIN HYDROCHLORIDE 1750 MG: 10 INJECTION, POWDER, LYOPHILIZED, FOR SOLUTION INTRAVENOUS at 23:00

## 2021-01-04 RX ADMIN — HEPARIN SODIUM 5000 UNITS: 1000 INJECTION, SOLUTION INTRAVENOUS; SUBCUTANEOUS at 21:48

## 2021-01-04 RX ADMIN — ONDANSETRON HYDROCHLORIDE 4 MG: 2 SOLUTION INTRAMUSCULAR; INTRAVENOUS at 21:32

## 2021-01-05 PROBLEM — R73.9 HYPERGLYCEMIA: Status: ACTIVE | Noted: 2021-01-05

## 2021-01-05 PROBLEM — I70.219 ATHEROSCLEROSIS OF ARTERY OF EXTREMITY WITH INTERMITTENT CLAUDICATION (HCC): Status: ACTIVE | Noted: 2019-10-15

## 2021-01-05 PROBLEM — L97.912 NONHEALING ULCER OF RIGHT LOWER LEG WITH FAT LAYER EXPOSED: Status: ACTIVE | Noted: 2020-01-16

## 2021-01-05 PROBLEM — R09.89 DECREASED PEDAL PULSES: Status: ACTIVE | Noted: 2019-10-11

## 2021-01-05 PROBLEM — M19.90 CHRONIC OSTEOARTHRITIS: Status: ACTIVE | Noted: 2019-10-11

## 2021-01-05 LAB
ABO GROUP BLD: NORMAL
APTT PPP: 29.2 SECONDS (ref 24.1–35)
APTT PPP: 36 SECONDS (ref 24.1–35)
APTT PPP: 47.9 SECONDS (ref 24.1–35)
APTT PPP: >200 SECONDS (ref 24.1–35)
BASOPHILS # BLD AUTO: 0.05 10*3/MM3 (ref 0–0.2)
BASOPHILS # BLD AUTO: 0.06 10*3/MM3 (ref 0–0.2)
BASOPHILS NFR BLD AUTO: 0.6 % (ref 0–1.5)
BASOPHILS NFR BLD AUTO: 0.8 % (ref 0–1.5)
BLD GP AB SCN SERPL QL: NEGATIVE
DEPRECATED RDW RBC AUTO: 44.3 FL (ref 37–54)
DEPRECATED RDW RBC AUTO: 45.3 FL (ref 37–54)
EOSINOPHIL # BLD AUTO: 0.16 10*3/MM3 (ref 0–0.4)
EOSINOPHIL # BLD AUTO: 0.22 10*3/MM3 (ref 0–0.4)
EOSINOPHIL NFR BLD AUTO: 2.2 % (ref 0.3–6.2)
EOSINOPHIL NFR BLD AUTO: 2.6 % (ref 0.3–6.2)
ERYTHROCYTE [DISTWIDTH] IN BLOOD BY AUTOMATED COUNT: 13.4 % (ref 12.3–15.4)
ERYTHROCYTE [DISTWIDTH] IN BLOOD BY AUTOMATED COUNT: 13.5 % (ref 12.3–15.4)
GLUCOSE BLDC GLUCOMTR-MCNC: 121 MG/DL (ref 70–130)
GLUCOSE BLDC GLUCOMTR-MCNC: 185 MG/DL (ref 70–130)
GLUCOSE BLDC GLUCOMTR-MCNC: 248 MG/DL (ref 70–130)
GLUCOSE BLDC GLUCOMTR-MCNC: 300 MG/DL (ref 70–130)
HBA1C MFR BLD: 8.1 % (ref 4.8–5.6)
HCT VFR BLD AUTO: 26.6 % (ref 34–46.6)
HCT VFR BLD AUTO: 27.6 % (ref 34–46.6)
HGB BLD-MCNC: 8.7 G/DL (ref 12–15.9)
HGB BLD-MCNC: 8.9 G/DL (ref 12–15.9)
IMM GRANULOCYTES # BLD AUTO: 0.07 10*3/MM3 (ref 0–0.05)
IMM GRANULOCYTES # BLD AUTO: 0.12 10*3/MM3 (ref 0–0.05)
IMM GRANULOCYTES NFR BLD AUTO: 1 % (ref 0–0.5)
IMM GRANULOCYTES NFR BLD AUTO: 1.4 % (ref 0–0.5)
INR PPP: 1.48 (ref 0.91–1.09)
LYMPHOCYTES # BLD AUTO: 2.31 10*3/MM3 (ref 0.7–3.1)
LYMPHOCYTES # BLD AUTO: 2.62 10*3/MM3 (ref 0.7–3.1)
LYMPHOCYTES NFR BLD AUTO: 31.5 % (ref 19.6–45.3)
LYMPHOCYTES NFR BLD AUTO: 32.4 % (ref 19.6–45.3)
MAGNESIUM SERPL-MCNC: 1.5 MG/DL (ref 1.6–2.4)
MCH RBC QN AUTO: 29.6 PG (ref 26.6–33)
MCH RBC QN AUTO: 29.7 PG (ref 26.6–33)
MCHC RBC AUTO-ENTMCNC: 32.2 G/DL (ref 31.5–35.7)
MCHC RBC AUTO-ENTMCNC: 32.7 G/DL (ref 31.5–35.7)
MCV RBC AUTO: 90.8 FL (ref 79–97)
MCV RBC AUTO: 91.7 FL (ref 79–97)
MONOCYTES # BLD AUTO: 0.74 10*3/MM3 (ref 0.1–0.9)
MONOCYTES # BLD AUTO: 0.86 10*3/MM3 (ref 0.1–0.9)
MONOCYTES NFR BLD AUTO: 10.3 % (ref 5–12)
MONOCYTES NFR BLD AUTO: 10.4 % (ref 5–12)
NEUTROPHILS NFR BLD AUTO: 3.8 10*3/MM3 (ref 1.7–7)
NEUTROPHILS NFR BLD AUTO: 4.44 10*3/MM3 (ref 1.7–7)
NEUTROPHILS NFR BLD AUTO: 53.2 % (ref 42.7–76)
NEUTROPHILS NFR BLD AUTO: 53.6 % (ref 42.7–76)
NRBC BLD AUTO-RTO: 0 /100 WBC (ref 0–0.2)
NRBC BLD AUTO-RTO: 0 /100 WBC (ref 0–0.2)
PLATELET # BLD AUTO: 339 10*3/MM3 (ref 140–450)
PLATELET # BLD AUTO: 348 10*3/MM3 (ref 140–450)
PMV BLD AUTO: 9.9 FL (ref 6–12)
PMV BLD AUTO: 9.9 FL (ref 6–12)
PROCALCITONIN SERPL-MCNC: 0.07 NG/ML (ref 0–0.25)
PROTHROMBIN TIME: 17.6 SECONDS (ref 11.9–14.6)
RBC # BLD AUTO: 2.93 10*6/MM3 (ref 3.77–5.28)
RBC # BLD AUTO: 3.01 10*6/MM3 (ref 3.77–5.28)
RH BLD: POSITIVE
T&S EXPIRATION DATE: NORMAL
T4 FREE SERPL-MCNC: 1.05 NG/DL (ref 0.93–1.7)
WBC # BLD AUTO: 7.14 10*3/MM3 (ref 3.4–10.8)
WBC # BLD AUTO: 8.31 10*3/MM3 (ref 3.4–10.8)

## 2021-01-05 PROCEDURE — 63710000001 INSULIN LISPRO (HUMAN) PER 5 UNITS: Performed by: INTERNAL MEDICINE

## 2021-01-05 PROCEDURE — 85730 THROMBOPLASTIN TIME PARTIAL: CPT | Performed by: INTERNAL MEDICINE

## 2021-01-05 PROCEDURE — 25010000002 PIPERACILLIN SOD-TAZOBACTAM PER 1 G: Performed by: INTERNAL MEDICINE

## 2021-01-05 PROCEDURE — 83036 HEMOGLOBIN GLYCOSYLATED A1C: CPT | Performed by: INTERNAL MEDICINE

## 2021-01-05 PROCEDURE — 82962 GLUCOSE BLOOD TEST: CPT

## 2021-01-05 PROCEDURE — 047M3Z1 DILATION OF RIGHT POPLITEAL ARTERY USING DRUG-COATED BALLOON, PERCUTANEOUS APPROACH: ICD-10-PCS | Performed by: SURGERY

## 2021-01-05 PROCEDURE — 86850 RBC ANTIBODY SCREEN: CPT | Performed by: NURSE PRACTITIONER

## 2021-01-05 PROCEDURE — 85610 PROTHROMBIN TIME: CPT | Performed by: INTERNAL MEDICINE

## 2021-01-05 PROCEDURE — 25010000002 VANCOMYCIN PER 500 MG: Performed by: INTERNAL MEDICINE

## 2021-01-05 PROCEDURE — 25010000003 PHYTONADIONE 10 MG/ML SOLUTION 1 ML AMPULE: Performed by: INTERNAL MEDICINE

## 2021-01-05 PROCEDURE — 25010000002 HEPARIN (PORCINE) PER 1000 UNITS: Performed by: INTERNAL MEDICINE

## 2021-01-05 PROCEDURE — 86901 BLOOD TYPING SEROLOGIC RH(D): CPT | Performed by: NURSE PRACTITIONER

## 2021-01-05 PROCEDURE — B41F1ZZ FLUOROSCOPY OF RIGHT LOWER EXTREMITY ARTERIES USING LOW OSMOLAR CONTRAST: ICD-10-PCS | Performed by: SURGERY

## 2021-01-05 PROCEDURE — 047M3ZZ DILATION OF RIGHT POPLITEAL ARTERY, PERCUTANEOUS APPROACH: ICD-10-PCS | Performed by: SURGERY

## 2021-01-05 PROCEDURE — 047V3ZZ DILATION OF RIGHT FOOT ARTERY, PERCUTANEOUS APPROACH: ICD-10-PCS | Performed by: SURGERY

## 2021-01-05 PROCEDURE — B41C1ZZ FLUOROSCOPY OF PELVIC ARTERIES USING LOW OSMOLAR CONTRAST: ICD-10-PCS | Performed by: SURGERY

## 2021-01-05 PROCEDURE — 99222 1ST HOSP IP/OBS MODERATE 55: CPT | Performed by: SURGERY

## 2021-01-05 PROCEDURE — 85025 COMPLETE CBC W/AUTO DIFF WBC: CPT | Performed by: INTERNAL MEDICINE

## 2021-01-05 PROCEDURE — 047K3Z1 DILATION OF RIGHT FEMORAL ARTERY USING DRUG-COATED BALLOON, PERCUTANEOUS APPROACH: ICD-10-PCS | Performed by: SURGERY

## 2021-01-05 PROCEDURE — B4101ZZ FLUOROSCOPY OF ABDOMINAL AORTA USING LOW OSMOLAR CONTRAST: ICD-10-PCS | Performed by: SURGERY

## 2021-01-05 PROCEDURE — 047P3ZZ DILATION OF RIGHT ANTERIOR TIBIAL ARTERY, PERCUTANEOUS APPROACH: ICD-10-PCS | Performed by: SURGERY

## 2021-01-05 PROCEDURE — 047T3ZZ DILATION OF RIGHT PERONEAL ARTERY, PERCUTANEOUS APPROACH: ICD-10-PCS | Performed by: SURGERY

## 2021-01-05 PROCEDURE — 25010000002 MORPHINE PER 10 MG: Performed by: INTERNAL MEDICINE

## 2021-01-05 PROCEDURE — 85025 COMPLETE CBC W/AUTO DIFF WBC: CPT | Performed by: EMERGENCY MEDICINE

## 2021-01-05 PROCEDURE — 86900 BLOOD TYPING SEROLOGIC ABO: CPT | Performed by: NURSE PRACTITIONER

## 2021-01-05 PROCEDURE — 04CM3ZZ EXTIRPATION OF MATTER FROM RIGHT POPLITEAL ARTERY, PERCUTANEOUS APPROACH: ICD-10-PCS | Performed by: SURGERY

## 2021-01-05 PROCEDURE — 04CK3ZZ EXTIRPATION OF MATTER FROM RIGHT FEMORAL ARTERY, PERCUTANEOUS APPROACH: ICD-10-PCS | Performed by: SURGERY

## 2021-01-05 PROCEDURE — 36415 COLL VENOUS BLD VENIPUNCTURE: CPT | Performed by: EMERGENCY MEDICINE

## 2021-01-05 RX ORDER — HEPARIN SODIUM 10000 [USP'U]/100ML
12 INJECTION, SOLUTION INTRAVENOUS
Status: DISCONTINUED | OUTPATIENT
Start: 2021-01-05 | End: 2021-01-07

## 2021-01-05 RX ORDER — ACETAMINOPHEN 650 MG/1
650 SUPPOSITORY RECTAL EVERY 4 HOURS PRN
Status: DISCONTINUED | OUTPATIENT
Start: 2021-01-05 | End: 2021-01-08

## 2021-01-05 RX ORDER — NALOXONE HCL 0.4 MG/ML
0.4 VIAL (ML) INJECTION
Status: DISCONTINUED | OUTPATIENT
Start: 2021-01-05 | End: 2021-01-08

## 2021-01-05 RX ORDER — VANCOMYCIN HYDROCHLORIDE 1 G/200ML
1000 INJECTION, SOLUTION INTRAVENOUS EVERY 24 HOURS
Status: DISCONTINUED | OUTPATIENT
Start: 2021-01-05 | End: 2021-01-06

## 2021-01-05 RX ORDER — ASPIRIN 81 MG/1
81 TABLET ORAL DAILY
Status: DISCONTINUED | OUTPATIENT
Start: 2021-01-05 | End: 2021-01-10 | Stop reason: HOSPADM

## 2021-01-05 RX ORDER — NICOTINE POLACRILEX 4 MG
15 LOZENGE BUCCAL
Status: DISCONTINUED | OUTPATIENT
Start: 2021-01-05 | End: 2021-01-10 | Stop reason: HOSPADM

## 2021-01-05 RX ORDER — SODIUM CHLORIDE 9 MG/ML
85 INJECTION, SOLUTION INTRAVENOUS CONTINUOUS
Status: DISCONTINUED | OUTPATIENT
Start: 2021-01-05 | End: 2021-01-07

## 2021-01-05 RX ORDER — SODIUM CHLORIDE 0.9 % (FLUSH) 0.9 %
10 SYRINGE (ML) INJECTION AS NEEDED
Status: DISCONTINUED | OUTPATIENT
Start: 2021-01-05 | End: 2021-01-10 | Stop reason: HOSPADM

## 2021-01-05 RX ORDER — HEPARIN SODIUM 1000 [USP'U]/ML
4000 INJECTION, SOLUTION INTRAVENOUS; SUBCUTANEOUS ONCE
Status: COMPLETED | OUTPATIENT
Start: 2021-01-05 | End: 2021-01-05

## 2021-01-05 RX ORDER — HYDROCODONE BITARTRATE AND ACETAMINOPHEN 10; 325 MG/1; MG/1
1 TABLET ORAL EVERY 6 HOURS PRN
Status: DISCONTINUED | OUTPATIENT
Start: 2021-01-05 | End: 2021-01-10 | Stop reason: HOSPADM

## 2021-01-05 RX ORDER — HEPARIN SODIUM 1000 [USP'U]/ML
4000 INJECTION, SOLUTION INTRAVENOUS; SUBCUTANEOUS AS NEEDED
Status: DISCONTINUED | OUTPATIENT
Start: 2021-01-05 | End: 2021-01-07

## 2021-01-05 RX ORDER — ACETAMINOPHEN 160 MG/5ML
650 SOLUTION ORAL EVERY 4 HOURS PRN
Status: DISCONTINUED | OUTPATIENT
Start: 2021-01-05 | End: 2021-01-08

## 2021-01-05 RX ORDER — MORPHINE SULFATE 10 MG/ML
6 INJECTION INTRAMUSCULAR; INTRAVENOUS; SUBCUTANEOUS EVERY 4 HOURS PRN
Status: DISCONTINUED | OUTPATIENT
Start: 2021-01-05 | End: 2021-01-08

## 2021-01-05 RX ORDER — HEPARIN SODIUM 1000 [USP'U]/ML
2000 INJECTION, SOLUTION INTRAVENOUS; SUBCUTANEOUS AS NEEDED
Status: DISCONTINUED | OUTPATIENT
Start: 2021-01-05 | End: 2021-01-07

## 2021-01-05 RX ORDER — LISINOPRIL 20 MG/1
20 TABLET ORAL TAKE AS DIRECTED
Status: DISCONTINUED | OUTPATIENT
Start: 2021-01-05 | End: 2021-01-06

## 2021-01-05 RX ORDER — HYDROCODONE BITARTRATE AND ACETAMINOPHEN 10; 325 MG/1; MG/1
1 TABLET ORAL WEEKLY
Status: DISCONTINUED | OUTPATIENT
Start: 2021-01-05 | End: 2021-01-05

## 2021-01-05 RX ORDER — ONDANSETRON 4 MG/1
4 TABLET, FILM COATED ORAL EVERY 6 HOURS PRN
Status: DISCONTINUED | OUTPATIENT
Start: 2021-01-05 | End: 2021-01-06 | Stop reason: SDUPTHER

## 2021-01-05 RX ORDER — ACETAMINOPHEN 325 MG/1
650 TABLET ORAL EVERY 4 HOURS PRN
Status: DISCONTINUED | OUTPATIENT
Start: 2021-01-05 | End: 2021-01-10 | Stop reason: HOSPADM

## 2021-01-05 RX ORDER — ONDANSETRON 2 MG/ML
4 INJECTION INTRAMUSCULAR; INTRAVENOUS EVERY 6 HOURS PRN
Status: DISCONTINUED | OUTPATIENT
Start: 2021-01-05 | End: 2021-01-06 | Stop reason: SDUPTHER

## 2021-01-05 RX ORDER — ACETAMINOPHEN 325 MG/1
650 TABLET ORAL AS NEEDED
Status: DISCONTINUED | OUTPATIENT
Start: 2021-01-05 | End: 2021-01-05 | Stop reason: SDUPTHER

## 2021-01-05 RX ORDER — ATORVASTATIN CALCIUM 10 MG/1
20 TABLET, FILM COATED ORAL DAILY
Status: DISCONTINUED | OUTPATIENT
Start: 2021-01-05 | End: 2021-01-10 | Stop reason: HOSPADM

## 2021-01-05 RX ORDER — DEXTROSE MONOHYDRATE 25 G/50ML
25 INJECTION, SOLUTION INTRAVENOUS
Status: DISCONTINUED | OUTPATIENT
Start: 2021-01-05 | End: 2021-01-10 | Stop reason: HOSPADM

## 2021-01-05 RX ORDER — GABAPENTIN 300 MG/1
600 CAPSULE ORAL EVERY 8 HOURS SCHEDULED
Status: DISCONTINUED | OUTPATIENT
Start: 2021-01-05 | End: 2021-01-10 | Stop reason: HOSPADM

## 2021-01-05 RX ORDER — SODIUM CHLORIDE 0.9 % (FLUSH) 0.9 %
10 SYRINGE (ML) INJECTION EVERY 12 HOURS SCHEDULED
Status: DISCONTINUED | OUTPATIENT
Start: 2021-01-05 | End: 2021-01-10 | Stop reason: HOSPADM

## 2021-01-05 RX ADMIN — VANCOMYCIN HYDROCHLORIDE 1000 MG: 1 INJECTION, SOLUTION INTRAVENOUS at 22:43

## 2021-01-05 RX ADMIN — TAZOBACTAM SODIUM AND PIPERACILLIN SODIUM 3.38 G: 375; 3 INJECTION, SOLUTION INTRAVENOUS at 10:47

## 2021-01-05 RX ADMIN — TAZOBACTAM SODIUM AND PIPERACILLIN SODIUM 3.38 G: 375; 3 INJECTION, SOLUTION INTRAVENOUS at 14:39

## 2021-01-05 RX ADMIN — HYDROCODONE BITARTRATE AND ACETAMINOPHEN 1 TABLET: 10; 325 TABLET ORAL at 20:21

## 2021-01-05 RX ADMIN — ATORVASTATIN CALCIUM 20 MG: 10 TABLET, FILM COATED ORAL at 10:14

## 2021-01-05 RX ADMIN — PHYTONADIONE 0.5 MG: 10 INJECTION, EMULSION INTRAMUSCULAR; INTRAVENOUS; SUBCUTANEOUS at 10:02

## 2021-01-05 RX ADMIN — ASPIRIN 81 MG: 81 TABLET, FILM COATED ORAL at 10:14

## 2021-01-05 RX ADMIN — INSULIN LISPRO 5 UNITS: 100 INJECTION, SOLUTION INTRAVENOUS; SUBCUTANEOUS at 13:06

## 2021-01-05 RX ADMIN — TAZOBACTAM SODIUM AND PIPERACILLIN SODIUM 3.38 G: 375; 3 INJECTION, SOLUTION INTRAVENOUS at 22:43

## 2021-01-05 RX ADMIN — GABAPENTIN 600 MG: 300 CAPSULE ORAL at 22:43

## 2021-01-05 RX ADMIN — SODIUM CHLORIDE 85 ML/HR: 9 INJECTION, SOLUTION INTRAVENOUS at 10:29

## 2021-01-05 RX ADMIN — HEPARIN SODIUM 12 UNITS/KG/HR: 10000 INJECTION, SOLUTION INTRAVENOUS at 10:31

## 2021-01-05 RX ADMIN — GABAPENTIN 600 MG: 300 CAPSULE ORAL at 14:39

## 2021-01-05 RX ADMIN — MORPHINE SULFATE 6 MG: 10 INJECTION, SOLUTION INTRAMUSCULAR; INTRAVENOUS at 09:16

## 2021-01-05 RX ADMIN — MORPHINE SULFATE 6 MG: 10 INJECTION, SOLUTION INTRAMUSCULAR; INTRAVENOUS at 15:50

## 2021-01-05 RX ADMIN — HEPARIN SODIUM 4000 UNITS: 1000 INJECTION, SOLUTION INTRAVENOUS; SUBCUTANEOUS at 10:01

## 2021-01-05 RX ADMIN — HYDROCODONE BITARTRATE AND ACETAMINOPHEN 1 TABLET: 10; 325 TABLET ORAL at 10:46

## 2021-01-05 NOTE — PAYOR COMM NOTE
"Evelyn Dukes (80 y.o. Female) [id number]   Admit  1/4   [facility]    leigh phone   Fax        Date of Birth Social Security Number Address Home Phone MRN    [id number]  [id number]  [place] [address] [phone] [id number]    [facility] Mar[facility] Status          Other Unknown       Admission Date Admission Type Admitting Provider Attending Provider Department, Room/Bed    1/4/21 Emergency Jorge Riley MD Thompson, Benjamin H, MD [facility] 3C, 390/1    Discharge Date Discharge Disposition Discharge Destination                       Attending Provider: Jorge Riley MD    Allergies: Codeine    Isolation: None   Infection: None   Code Status: No CPR    Ht: 154.9 cm (61\")   Wt: 80.6 kg (177 lb 12.8 oz)    Admission Cmt: None   Principal Problem: None                Active Insurance as of 1/4/2021     Primary Coverage     Payor Plan Insurance Group Employer/Plan Group    HUMANA MEDICARE REPLACEMENT HUMANA MEDICARE REPLACEMENT [id number]     Payor Plan Address Payor Plan Phone Number Payor Plan Fax Number Effective Dates    [address] [address] [phone]  9/1/2020 - None Entered    [facility] [id number]       Subscriber Name Subscriber Birth Date Member ID       EVELYN DUKES 1940 [id number]                 Emergency Contacts      (Rel.) Home Phone Work Phone Mobile Phone    Pauline Raya (Daughter) -- -- [phone]               History & Physical      Abner Castro MD at 01/05/21 0020                [facility] Medicine Services  HISTORY AND PHYSICAL    Date of Admission: 1/4/2021  Primary Care Physician: Roberto Garcia MD    Subjective     Chief Complaint: Leg pain    History of Present Illness  Patient is a 80-year-old white female past medical history of peripheral vascular disease as well as DVT on chronic anticoagulation therapy.  She has a 1 month history of having " problems with her right leg hurting and with some erythema and now some darkness.  She had surgery performed on at [facility] approximately a year ago.  She seen Dr. Owens recently was placed on anticoagulation.  She had been in the hospital for it at another facility last week by her primary care doctor and was discharged she was given antibiotics at that time treated for cellulitis.  It has not gotten any better she has had several rounds of antibiotics.  The foot is cool to touch obviously cyanotic and exquisitely painful to touch on her toes.  She has had ABIs that were read by vascular is unchanged from previous with official read is pending.  Shows evidence of a significant stenosis in the proximal mid SFA diminished flow in the mid SFA to distal PTA venous Dopplers were obtained that showed no evidence of DVT.  Her white count is not elevated she does not have a left shift however she does have a sed rate of 53.  No evidence of osteomyelitis on x-rays.  We have been asked to admit for further evaluation and management of what appears to me to be an ischemic limb.        Review of Systems   14 point review of systems negative except for as per HPI    Otherwise complete ROS reviewed and negative except as mentioned in the HPI.    Past Medical History:   Past Medical History:   Diagnosis Date   • Diabetes mellitus (CMS/HCC)    • DVT (deep venous thrombosis) (CMS/HCC)     RLL   • Hypertension    • PVD (peripheral vascular disease) (CMS/[facility])      Past Surgical History:  Past Surgical History:   Procedure Laterality Date   • CHOLECYSTECTOMY     • KNEE SURGERY     • LEG SURGERY       Social History:  reports that she has never smoked. She has never used smokeless tobacco. She reports that she does not drink alcohol or use drugs.    Family History: family history includes Cancer in her mother and sister.       Allergies:  Allergies   Allergen Reactions   • Codeine Other (See Comments)     Medications:  Prior to  "Admission medications    Medication Sig Start Date End Date Taking? Authorizing Provider   acetaminophen (TYLENOL) 325 MG tablet Take 650 mg by mouth As Needed.    Ana Arce MD   aspirin 81 MG EC tablet Take 81 mg by mouth Daily.    Ana Arce MD   atorvastatin (LIPITOR) 20 MG tablet Take 20 mg by mouth Daily. 12/3/20   Ana Arce MD   enoxaparin (LOVENOX) 80 MG/0.8ML solution syringe Inject 80 mg under the skin into the appropriate area as directed Every 12 (Twelve) Hours.    Ana Arce MD   gabapentin (NEURONTIN) 600 MG tablet Take 600 mg by mouth Take As Directed. 12/3/20   Ana Arce MD   HYDROcodone-acetaminophen (NORCO)  MG per tablet Take 1 tablet by mouth 1 (One) Time Per Week. 12/3/20   Ana Arce MD   lisinopril (PRINIVIL,ZESTRIL) 20 MG tablet Take 20 mg by mouth Take As Directed. 12/3/20   Ana Arce MD   metFORMIN (GLUCOPHAGE) 500 MG tablet Take 500 mg by mouth Take As Directed. 12/3/20   Ana Arce MD   Rivaroxaban (XARELTO) tablet therapy pack starter pack Take one 15 mg tablet twice daily with food for 21 days.  Followed by one 20 mg tablet by mouth once daily with food. Take as directed 12/10/20   Morgan Owens, DO     Objective     Vital Signs: /47 (BP Location: Right arm, Patient Position: Lying)   Pulse 80   Temp 98.2 °F (36.8 °C) (Oral)   Resp 18   Ht 154.9 cm (61\")   Wt 80.6 kg (177 lb 12.8 oz)   SpO2 96%   BMI 33.60 kg/m²   Physical Exam   Gen.:  Well-nourished well-developed white female in no acute distress  HEENT: Atraumatic, normocephalic.  Pupils equal, round, and reactive to light. Extraocular movements intact.  Sclerae anicteric.  External ears negative.  Mucous membranes moist.  Neck is supple without lymphadenopathy.  No JVD is noted.  No carotid bruits are auscultated.  Oropharynx is without erythema or exudate.   Chest: Clear to auscultation and percussion.  CV: Regular " rate and rhythm.  Normal S1-S2.  No gallops, murmurs. or rubs.  Abdomen: Soft, nontender, nondistended.  Active bowel sounds,  No hepatosplenomegaly.  No masses.  No hernias.  Extremities: No clubbing, edema, or cyanosis.  In left leg or arms however right leg is extremely cyanotic dusky cold to the touch pulses are not able to be palpated or dopplered capillary refill is normal.  Is also extremely sensitive to touch she has an eschar on the tip of the right great toe and evidence of almost what looks like necrosis in the pinky toe pulses are 2+ and symmetric at radial and dorsalis pedis.  Posterior tibial pulses are intact and 2+ palpable.  In left extremity  Neuro: Patient is awake, alert, and oriented ×3.  Cranial nerves II through XII are grossly intact.  Motor is 5 out of 5 bilaterally.  DTRs are 2+ and symmetric bilaterally. Sensory exam is nonfocal  Skin: Warm, dry, and intact.  No evidence of breakdown.  Sensorium: Normal thought and affect    Nursing notes and vital signs reviewed        Results Reviewed:  Lab Results (last 24 hours)     Procedure Component Value Units Date/Time    aPTT [[id number]]  (Abnormal) Collected: 01/05/21 0308    Specimen: Blood Updated: 01/05/21 0326     PTT 47.9 seconds     CBC & Differential [[id number]]  (Abnormal) Collected: 01/05/21 0308    Specimen: Blood Updated: 01/05/21 0323    Narrative:      The following orders were created for panel order CBC & Differential.  Procedure                               Abnormality         Status                     ---------                               -----------         ------                     CBC Auto Differential[[id number]]        Abnormal            Final result                 Please view results for these tests on the individual orders.    CBC Auto Differential [[id number]]  (Abnormal) Collected: 01/05/21 0308    Specimen: Blood Updated: 01/05/21 0323     WBC 8.31 10*3/mm3      RBC 2.93 10*6/mm3      Hemoglobin 8.7 g/dL       Hematocrit 26.6 %      MCV 90.8 fL      MCH 29.7 pg      MCHC 32.7 g/dL      RDW 13.4 %      RDW-SD 44.3 fl      MPV 9.9 fL      Platelets 348 10*3/mm3      Neutrophil % 53.6 %      Lymphocyte % 31.5 %      Monocyte % 10.3 %      Eosinophil % 2.6 %      Basophil % 0.6 %      Immature Grans % 1.4 %      Neutrophils, Absolute 4.44 10*3/mm3      Lymphocytes, Absolute 2.62 10*3/mm3      Monocytes, Absolute 0.86 10*3/mm3      Eosinophils, Absolute 0.22 10*3/mm3      Basophils, Absolute 0.05 10*3/mm3      Immature Grans, Absolute 0.12 10*3/mm3      nRBC 0.0 /100 WBC     COVID PRE-OP / PRE-PROCEDURE SCREENING ORDER (NO ISOLATION) - Swab, Nasal Cavity [[id number]]  (Normal) Collected: 01/04/21 2123    Specimen: Swab from Nasal Cavity Updated: 01/04/21 2242    Narrative:      The following orders were created for panel order COVID PRE-OP / PRE-PROCEDURE SCREENING ORDER (NO ISOLATION) - Swab, Nasal Cavity.  Procedure                               Abnormality         Status                     ---------                               -----------         ------                     COVID-19,[facility] IN-VASHTI...[[id number]]  Normal              Final result                 Please view results for these tests on the individual orders.    COVID-19,[facility] IN-HOUSE,Nasal Swab No Transport Media 3-4 HR TAT - Swab, Nasal Cavity [[id number]]  (Normal) Collected: 01/04/21 2123    Specimen: Swab from Nasal Cavity Updated: 01/04/21 2242     COVID19 Not Detected    Narrative:      Fact sheet for providers: https://www.fda.gov/media/315923/download     Fact sheet for patients: https://www.fda.gov/media/949266/download    Test performed by PCR.    Blood Culture - Blood, Blood, Venous Line [[id number]] Collected: 01/04/21 2122    Specimen: Blood, Venous Line Updated: 01/04/21 2136    Basic Metabolic Panel [[id number]]  (Abnormal) Collected: 01/04/21 2003    Specimen: Blood Updated: 01/04/21 2035     Glucose 184 mg/dL      BUN 22 mg/dL       Creatinine 1.54 mg/dL      Sodium 136 mmol/L      Potassium 4.8 mmol/L      Chloride 103 mmol/L      CO2 23.0 mmol/L      Calcium 9.7 mg/dL      eGFR Non African Amer 33 mL/min/1.73      BUN/Creatinine Ratio 14.3     Anion Gap 10.0 mmol/L     Narrative:      GFR Normal >60  Chronic Kidney Disease <60  Kidney Failure <15      C-reactive Protein [[id number]]  (Abnormal) Collected: 01/04/21 2003    Specimen: Blood Updated: 01/04/21 2033     C-Reactive Protein 0.99 mg/dL     Lactic Acid, Plasma [[id number]]  (Normal) Collected: 01/04/21 2003    Specimen: Blood Updated: 01/04/21 2032     Lactate 1.2 mmol/L     aPTT [[id number]]  (Abnormal) Collected: 01/04/21 2004    Specimen: Blood Updated: 01/04/21 2020     PTT 44.4 seconds     Protime-INR [[id number]]  (Abnormal) Collected: 01/04/21 2004    Specimen: Blood Updated: 01/04/21 2020     Protime 23.3 Seconds      INR 2.09    Sedimentation Rate [[id number]]  (Abnormal) Collected: 01/04/21 2003    Specimen: Blood Updated: 01/04/21 2019     Sed Rate 53 mm/hr     CBC & Differential [[id number]]  (Abnormal) Collected: 01/04/21 2003    Specimen: Blood Updated: 01/04/21 2013    Narrative:      The following orders were created for panel order CBC & Differential.  Procedure                               Abnormality         Status                     ---------                               -----------         ------                     CBC Auto Differential[[id number]]        Abnormal            Final result                 Please view results for these tests on the individual orders.    CBC Auto Differential [[id number]]  (Abnormal) Collected: 01/04/21 2003    Specimen: Blood Updated: 01/04/21 2013     WBC 9.35 10*3/mm3      RBC 3.50 10*6/mm3      Hemoglobin 10.3 g/dL      Hematocrit 31.5 %      MCV 90.0 fL      MCH 29.4 pg      MCHC 32.7 g/dL      RDW 13.4 %      RDW-SD 43.7 fl      MPV 9.7 fL      Platelets 434 10*3/mm3      Neutrophil % 61.7 %      Lymphocyte % 26.0 %      Monocyte %  8.7 %      Eosinophil % 2.2 %      Basophil % 0.5 %      Immature Grans % 0.9 %      Neutrophils, Absolute 5.77 10*3/mm3      Lymphocytes, Absolute 2.43 10*3/mm3      Monocytes, Absolute 0.81 10*3/mm3      Eosinophils, Absolute 0.21 10*3/mm3      Basophils, Absolute 0.05 10*3/mm3      Immature Grans, Absolute 0.08 10*3/mm3      nRBC 0.0 /100 WBC     Blood Culture - Blood, Arm, Left [[id number]] Collected: 01/04/21 2004    Specimen: Blood from Arm, Left Updated: 01/04/21 2011        Imaging Results (Last 24 Hours)     Procedure Component Value Units Date/Time    US Arterial Doppler Lower Extremity Right [[id number]] Resulted: 01/04/21 2140     Updated: 01/04/21 2231    US Venous Doppler Lower Extremity Right (duplex) [[id number]] Resulted: 01/04/21 2140     Updated: 01/04/21 2225    XR Foot 3+ View Right [[id number]] Collected: 01/04/21 2201     Updated: 01/04/21 2206    Narrative:      RIGHT FOOT 3 views 1/4/2021 9:42 PM CST     HISTORY: foot sores     COMPARISON: Radiographs dated 12/26/2020      FINDINGS:   Frontal, lateral and oblique radiographs of the right foot were provided  for review.      The bones are diffusely osteopenic. No acute fracture is seen. No joint  subluxation. Interphalangeal joint flexion deformities which are  degenerative. Additional moderate degenerative change at the first  metatarsophalangeal joint as well as diffusely throughout the midfoot.  No destructive osseous changes are seen. Atheromatous vascular  calcification identified. No capsular distention at the ankle joint. The  subtalar joint is unremarkable. Small plantar calcaneal spur. Quite  prominent Achilles enthesophyte.       Impression:      1. Osteopenia with advanced osteoarthritis changes. No visualized acute  fracture or destructive osseous change to suggest osteomyelitis.  This report was finalized on 01/04/2021 22:03 by Dr Alfa Wilson, .        I have personally reviewed and interpreted the radiology studies and ECG  "obtained at time of admission.     Assessment / Plan     Assessment:   Active Hospital Problems    Diagnosis   • Hyperglycemia   • PAD (peripheral artery disease) (CMS/HCC)   • Essential hypertension   • Nonhealing ulcer of right lower leg with fat layer exposed (CMS/HCC)   • Atherosclerosis of artery of extremity with intermittent claudication (CMS/HCC)   1.  Ulceration of the right leg with possible ischemic limb plans to admit the patient heparin drip will be continued it was started in the ER.  Pain medication will be given.  Consult vascular surgery.  We will give her empiric antibiotics on the offhand chance that she does have an infection there as well.  Culture blood monitor labs.  2.  Peripheral vascular is as above.  3.  Hyperglycemia check A1c Accu-Chek sliding scale insulin as needed.  4.  Hypertension stable continue current meds monitor BP.      Patient seen after midnight         Code Status: DNR/DNI     I discussed the patient's findings and my recommendations with the patient.  She designates her daughter as her surrogate healthcare decision maker.    Estimated length of stay greater than 2 nights.    Patient seen and examined by me on January 5, 2021 at at 12:20 AM.    Electronically signed by Abner Castro MD, 01/05/21, 08:33 CST.                Electronically signed by Abner Castro MD at 01/05/21 0839          Emergency Department Notes      Dominic Madsen MD at 01/04/21 2131          Subjective   81 y/o female with history of PAD who has had surgery at [facility] around 1 year ago who has also seen Dr. Owens placed on anticoagulation arrives for evaluation of increasing right foot pain. She has been seen by her PMD for evaluation of right foot pain with possible cellulitis. She has been treated with multiple rounds of abx and not getting better. She was seen again today and transferred for the above. Per patient her foot \"always hurts\" but has hurt more today thus prompting her " "visit to her PMD stating \"it has never hurt this much in the past.\" Of note, she was in our lobby for over 4 hours secondary to excessive patient and EMS volume/lack of beds/staff.           Review of Systems   All other systems reviewed and are negative.      Past Medical History:   Diagnosis Date   • Diabetes mellitus (CMS/HCC)    • DVT (deep venous thrombosis) (CMS/HCC)     RLL   • Hypertension        Allergies   Allergen Reactions   • Codeine Other (See Comments)       Past Surgical History:   Procedure Laterality Date   • CHOLECYSTECTOMY     • KNEE SURGERY     • LEG SURGERY         No family history on file.    Social History     Socioeconomic History   • Marital status: Unknown     Spouse name: Not on file   • Number of children: Not on file   • Years of education: Not on file   • Highest education level: Not on file   Tobacco Use   • Smoking status: Never Smoker   • Smokeless tobacco: Never Used   Substance and Sexual Activity   • Alcohol use: Never     Frequency: Never   • Drug use: Never   • Sexual activity: Defer           Objective   Physical Exam  Vitals signs and nursing note reviewed.   Constitutional:       Appearance: Normal appearance. She is normal weight.   HENT:      Head: Normocephalic and atraumatic.      Right Ear: External ear normal.      Left Ear: External ear normal.      Nose: Nose normal.      Mouth/Throat:      Mouth: Mucous membranes are moist.      Pharynx: Oropharynx is clear.   Eyes:      Conjunctiva/sclera: Conjunctivae normal.      Pupils: Pupils are equal, round, and reactive to light.   Neck:      Musculoskeletal: Normal range of motion and neck supple.   Cardiovascular:      Rate and Rhythm: Regular rhythm. Tachycardia present.      Pulses: Normal pulses.      Heart sounds: Normal heart sounds.   Pulmonary:      Effort: Pulmonary effort is normal.      Breath sounds: Normal breath sounds.   Musculoskeletal:         General: Tenderness present. No deformity or signs of injury. "      Comments: Right foot is cool from the ankle down, I cannot feel nor doppler a pulse, she has intact sensation and motor function.    Skin:     General: Skin is warm.      Capillary Refill: Capillary refill takes less than 2 seconds.   Neurological:      General: No focal deficit present.      Mental Status: She is alert and oriented to person, place, and time.   Psychiatric:         Mood and Affect: Mood normal.         Behavior: Behavior normal.         Thought Content: Thought content normal.         Procedures          ED Course  ED Course as of Jan 04 2310 Mon Jan 04, 2021 2118 Dr. Owens aware okay with CTA and wishes for heprain.     []   2302 RIGHT lower extremity arterial velocities:   - CFA: 149/13.2 cm/s (Triphasic)  - Prof A: 123/13 cm/s (Mono)  - Prox FA: 47.5/8.21 cm/s (Mono)  - Mid FA: 31/13 cm/s (Mono)  - Dist FA: 44.4/13 cm/s (Mono)  - Pop A: 28.5/11.9 cm/s (Mono)  - CHRISTINE Prox: 14.3/4.95 cm/s (Mono)  - PTA Dist: 12.4/5 cm/s (Mono)    ** Evidence of significant stenosis in prox - mid SFA. Diminished flow in mid SFA to Dist PTA.**      []   2310 Dr. Owens did review the US and feels she does not need any emergent vascular procedure tonight. We will admit for IV abx given possible reccurent infection.     []      ED Course User Index  [] Dominic Madsen MD            XR Foot 3+ View Right   Final Result   1. Osteopenia with advanced osteoarthritis changes. No visualized acute   fracture or destructive osseous change to suggest osteomyelitis.   This report was finalized on 01/04/2021 22:03 by Dr Alfa Wilson, .      US Venous Doppler Lower Extremity Right (duplex)    (Results Pending)   US Arterial Doppler Lower Extremity Right    (Results Pending)     Labs Reviewed   BASIC METABOLIC PANEL - Abnormal; Notable for the following components:       Result Value    Glucose 184 (*)     Creatinine 1.54 (*)     eGFR Non  Amer 33 (*)     All other components within normal limits     Narrative:     GFR Normal >60  Chronic Kidney Disease <60  Kidney Failure <15     PROTIME-INR - Abnormal; Notable for the following components:    Protime 23.3 (*)     INR 2.09 (*)     All other components within normal limits   APTT - Abnormal; Notable for the following components:    PTT 44.4 (*)     All other components within normal limits   C-REACTIVE PROTEIN - Abnormal; Notable for the following components:    C-Reactive Protein 0.99 (*)     All other components within normal limits   SEDIMENTATION RATE - Abnormal; Notable for the following components:    Sed Rate 53 (*)     All other components within normal limits   CBC WITH AUTO DIFFERENTIAL - Abnormal; Notable for the following components:    RBC 3.50 (*)     Hemoglobin 10.3 (*)     Hematocrit 31.5 (*)     Immature Grans % 0.9 (*)     Immature Grans, Absolute 0.08 (*)     All other components within normal limits   COVID-19,[facility] IN-HOUSE,NASAL SWAB NO TRANSPORT MEDIA 2 HR TAT - Normal    Narrative:     Fact sheet for providers: https://www.fda.gov/media/478630/download     Fact sheet for patients: https://www.fda.gov/media/662433/download    Test performed by PCR.   LACTIC ACID, PLASMA - Normal   COVID PRE-OP / PRE-PROCEDURE SCREENING ORDER (NO ISOLATION)    Narrative:     The following orders were created for panel order COVID PRE-OP / PRE-PROCEDURE SCREENING ORDER (NO ISOLATION) - Swab, Nasal Cavity.  Procedure                               Abnormality         Status                     ---------                               -----------         ------                     COVID-19,[facility] IN-VASHTI...[[id number]]  Normal              Final result                 Please view results for these tests on the individual orders.   BLOOD CULTURE   BLOOD CULTURE   CBC AND DIFFERENTIAL    Narrative:     The following orders were created for panel order CBC & Differential.  Procedure                               Abnormality         Status                      ---------                               -----------         ------                     CBC Auto Differential[[id number]]        Abnormal            Final result                 Please view results for these tests on the individual orders.                                       MDM    Final diagnoses:   PAD (peripheral artery disease) (CMS/[facility])   Cellulitis, unspecified cellulitis site            Dominic Madsen MD  01/04/21 2310      Electronically signed by Dominic Madsen MD at 01/04/21 2310     Ruthie Bridges RN at 01/05/21 0054        Called report to [facility]. Labs, medications, and all other results reviewed. Made them aware that IV pump will be needed in the room. Will take pt up via stretcher in stable condition to room 390.      Ruthie Bridges RN  01/05/21 0129       Ruthie Bridges RN  01/05/21 0411      Electronically signed by Ruthie Bridges RN at 01/05/21 0411     Ruthie Bridges RN at 01/05/21 0219        Lupe (pt's granddaughter) [phone]  Lashell (pt's daughter) [phone]     Ruthie Bridges RN  01/05/21 0219      Electronically signed by Ruthie Bridges RN at 01/05/21 0219         Current Facility-Administered Medications   Medication Dose Route Frequency Provider Last Rate Last Admin   • acetaminophen (TYLENOL) tablet 650 mg  650 mg Oral Q4H PRN Abner Castro MD        Or   • acetaminophen (TYLENOL) 160 MG/5ML solution 650 mg  650 mg Oral Q4H PRN Abner Castro MD        Or   • acetaminophen (TYLENOL) suppository 650 mg  650 mg Rectal Q4H PRN Abner Castro MD       • aspirin EC tablet 81 mg  81 mg Oral Daily Abner Castro MD   81 mg at 01/05/21 1014   • atorvastatin (LIPITOR) tablet 20 mg  20 mg Oral Daily Abner Castro MD   20 mg at 01/05/21 1014   • dextrose (D50W) 25 g/ 50mL Intravenous Solution 25 g  25 g Intravenous Q15 Min PRN Abner Castro MD       • dextrose (GLUTOSE) oral gel 15 g  15 g Oral Q15 Min PRN Abner Castro  MD       • gabapentin (NEURONTIN) capsule 600 mg  600 mg Oral Q8H Abner Castro MD       • glucagon (human recombinant) (GLUCAGEN DIAGNOSTIC) injection 1 mg  1 mg Subcutaneous Q15 Min PRN Abner Castro MD       • heparin (porcine) injection 2,000 Units  2,000 Units Intravenous PRN Abner Castro MD       • heparin (porcine) injection 4,000 Units  4,000 Units Intravenous PRN Abner Castro MD       • heparin 27014 units/250 mL (100 units/mL) in 0.45 % NaCl infusion  12 Units/kg/hr Intravenous Titrated Abner Castro MD 9.68 mL/hr at 01/05/21 1031 12 Units/kg/hr at 01/05/21 1031   • HYDROcodone-acetaminophen (NORCO)  MG per tablet 1 tablet  1 tablet Oral Q6H PRN Jorge Riley MD   1 tablet at 01/05/21 1046   • insulin lispro (humaLOG, ADMELOG) injection 2-7 Units  2-7 Units Subcutaneous TID AC Abner Castro MD       • lisinopril (PRINIVIL,ZESTRIL) tablet 20 mg  20 mg Oral Take As Directed Abner Castro MD       • Morphine injection 6 mg  6 mg Intravenous Q4H PRN Abner Castro MD   6 mg at 01/05/21 0916    And   • naloxone (NARCAN) injection 0.4 mg  0.4 mg Intravenous Q5 Min PRN Abner Castro MD       • ondansetron (ZOFRAN) tablet 4 mg  4 mg Oral Q6H PRN Abner Castro MD        Or   • ondansetron (ZOFRAN) injection 4 mg  4 mg Intravenous Q6H PRN Abner Castro MD       • piperacillin-tazobactam (ZOSYN) 3.375 g in iso-osmotic dextrose 50 ml (premix)  3.375 g Intravenous Once Abner Castro MD   3.375 g at 01/05/21 1047   • piperacillin-tazobactam (ZOSYN) 3.375 g in iso-osmotic dextrose 50 ml (premix)  3.375 g Intravenous Q8H Abner Castro MD       • sodium chloride 0.9 % flush 10 mL  10 mL Intravenous Q12H Abner Castro MD       • sodium chloride 0.9 % flush 10 mL  10 mL Intravenous PRN Abner Castro MD       • sodium chloride 0.9 % infusion  85 mL/hr Intravenous Continuous Abner Castro MD 85 mL/hr at 01/05/21 1029 85 mL/hr at  01/05/21 1029   • vancomycin (VANCOCIN) in iso-osmotic dextrose IVPB 1 g (premix) 200 mL  1,000 mg Intravenous Q24H Jorge Riley MD               Nurse note 1/4  Pt admitted from ED; wounds to Right foot, pics on chart; no pulses heard by dopplar in right foot, DP or PT; foot cold and mottled; up with standby assist to BSC; voiding; IID; IV abx

## 2021-01-05 NOTE — CONSULTS
Evelyn Ojeda  [id number]  [id number]  390/1  Jorge Riley MD  1/4/2021    Chief Complaint   Patient presents with   • Foot Pain   • foot wounds       HPI: Patient is a 80-year-old white female past medical history of peripheral vascular disease as well as DVT on chronic anticoagulation therapy. She had surgery performed at [facility] approximately a year ago.  I saw her recently in the office and switched her anticoagulation from Lovenox to Xarelto. She had been in the hospital for it at another facility last week by her primary care doctor and was discharged she was given antibiotics at that time treated for cellulitis.  It has not gotten any better she has had several rounds of antibiotics.  She has full motor and sensation of her foot currently and there are Doppler signals present.  This exam is unchanged from when I saw her in the office a few weeks ago.    Past Medical History:   Diagnosis Date   • Diabetes mellitus (CMS/HCC)    • DVT (deep venous thrombosis) (CMS/HCC)     RLL   • Hypertension    • PVD (peripheral vascular disease) (CMS/[facility])        Past Surgical History:   Procedure Laterality Date   • CHOLECYSTECTOMY     • KNEE SURGERY     • LEG SURGERY         Family History   Problem Relation Age of Onset   • Cancer Mother    • Cancer Sister        Social History     Socioeconomic History   • Marital status: Unknown     Spouse name: Not on file   • Number of children: Not on file   • Years of education: Not on file   • Highest education level: Not on file   Tobacco Use   • Smoking status: Never Smoker   • Smokeless tobacco: Never Used   Substance and Sexual Activity   • Alcohol use: Never     Frequency: Never   • Drug use: Never   • Sexual activity: Defer       Allergies   Allergen Reactions   • Codeine Other (See Comments)       Hospital Medications (active)       Dose Frequency Start End    acetaminophen (TYLENOL) 160 MG/5ML solution 650 mg 650 mg Every 4 Hours PRN 1/5/2021     Admin Instructions:  "Do not exceed 4 grams of acetaminophen in a 24 hr period. Max dose of 2gm for AST/ALT greater than 120 units/L<BR><BR><BR>If given for pain, use the following pain scale: <BR>Mild Pain = Pain Score of 1-3, CPOT 1-2<BR>Moderate Pain = Pain Score of 4-6, CPOT 3-4<BR>Severe Pain = Pain Score of 7-10, CPOT 5-8    Route: Oral    Linked Group 1: \"Or\" Linked Group Details        acetaminophen (TYLENOL) suppository 650 mg 650 mg Every 4 Hours PRN 1/5/2021     Admin Instructions: Do not exceed 4 grams of acetaminophen in a 24 hr period. Max dose of 2gm for AST/ALT greater than 120 units/L<BR><BR><BR>If given for pain, use the following pain scale: <BR>Mild Pain = Pain Score of 1-3, CPOT 1-2<BR>Moderate Pain = Pain Score of 4-6, CPOT 3-4<BR>Severe Pain = Pain Score of 7-10, CPOT 5-8    Route: Rectal    Linked Group 1: \"Or\" Linked Group Details        acetaminophen (TYLENOL) tablet 650 mg 650 mg Every 4 Hours PRN 1/5/2021     Admin Instructions: Do not exceed 4 grams of acetaminophen in a 24 hr period. Max dose of 2gm for AST/ALT greater than 120 units/L<BR><BR><BR>If given for pain, use the following pain scale: <BR>Mild Pain = Pain Score of 1-3, CPOT 1-2<BR>Moderate Pain = Pain Score of 4-6, CPOT 3-4<BR>Severe Pain = Pain Score of 7-10, CPOT 5-8    Route: Oral    Linked Group 1: \"Or\" Linked Group Details        aspirin EC tablet 81 mg 81 mg Daily 1/5/2021     Admin Instructions: Herbal/drug interaction: Avoid use with ginkgo biloba. Do not crush or chew.<BR>Do not exceed 4 grams of aspirin in a 24 hr period.<BR><BR>If given for pain, use the following pain scale: <BR>Mild Pain = Pain Score of 1-3, CPOT 1-2<BR>Moderate Pain = Pain Score of 4-6, CPOT 3-4<BR>Severe Pain = Pain Score of 7-10, CPOT 5-8    Route: Oral    atorvastatin (LIPITOR) tablet 20 mg 20 mg Daily 1/5/2021     Admin Instructions: Avoid grapefruit juice.    Route: Oral    dextrose (D50W) 25 g/ 50mL Intravenous Solution 25 g 25 g Every 15 Minutes PRN 1/5/2021  "    Admin Instructions: Blood sugar less than 70; patient has IV access - Unresponsive, NPO or Unable To Safely Swallow    Route: Intravenous    dextrose (GLUTOSE) oral gel 15 g 15 g Every 15 Minutes PRN 1/5/2021     Admin Instructions: BS<70, Patient Alert, Is not NPO, Can safely swallow.    Route: Oral    gabapentin (NEURONTIN) capsule 600 mg 600 mg Every 8 Hours Scheduled 1/5/2021     Admin Instructions:     Route: Oral    glucagon (human recombinant) (GLUCAGEN DIAGNOSTIC) injection 1 mg 1 mg Every 15 Minutes PRN 1/5/2021     Admin Instructions: Blood Glucose Less Than 70 - Patient Without IV Access - Unresponsive, NPO or Unable To Safely Swallow    Route: Subcutaneous    heparin (porcine) injection 2,000 Units 2,000 Units As Needed 1/5/2021     Admin Instructions: Bolus for aPTT 44-51 seconds.  Maximum dose 2,000 units.    Route: Intravenous    heparin (porcine) injection 4,000 Units 4,000 Units As Needed 1/5/2021     Admin Instructions: Bolus for less than 43 seconds.  Maximum dose 4,000 units.    Route: Intravenous    heparin 45750 units/250 mL (100 units/mL) in 0.45 % NaCl infusion 12 Units/kg/hr × 80.7 kg Titrated 1/5/2021     Admin Instructions: Protocol D:<BR>Weight based heparin nomogram <BR><BR>Maximum initial infusion rate 1000 units/hr. <BR><BR>aPTT < 43 sec: Bolus same as initial bolus. Increase rate by 2 mL/hr (200 units/hr). Next aPTT in 6 hours. <BR>aPTT 44-51 sec: Bolus 1/2 initial bolus. Increase rate by 1 mL/hr (100 units/hr). Next aPTT in 6 hours. <BR>aPTT 52-68 sec: No change. Next aPTT in 6 hours. <BR>aPTT 69-75 sec: Hold infusion 30 minutes. Decrease rate by 1 mL/hr (100 units/hr). Next aPTT in 6 hours. <BR>aPTT 76 and above: Hold infusion 60 minutes. Decrease rate by 2 mL/hr (200 units/hr). Next aPTT in 6 hours.    Route: Intravenous    HYDROcodone-acetaminophen (NORCO)  MG per tablet 1 tablet 1 tablet Every 6 Hours PRN 1/5/2021     Admin Instructions: [DILLON]<BR><BR>Do not exceed 4  "grams of acetaminophen in a 24 hr period. Max dose of 2gm for AST/ALT greater than 120 units/L<BR><BR><BR><BR>If given for pain, use the following pain scale: <BR>Mild Pain = Pain Score of 1-3, CPOT 1-2<BR>Moderate Pain = Pain Score of 4-6, CPOT 3-4<BR>Severe Pain = Pain Score of 7-10, CPOT 5-8    Route: Oral    insulin lispro (humaLOG, ADMELOG) injection 2-7 Units 2-7 Units 3 Times Daily Before Meals 1/5/2021     Admin Instructions: Correction - Low Dose.  Less than 40 units/day total insulin dose or lean, elderly, renal patients<BR><BR>Blood glucose 150-199 mg/dL - 2 units<BR>Blood glucose 200-249 mg/dL - 3 units<BR>Blood glucose 250-299 mg/dL - 4 units<BR>Blood glucose 300-349 mg/dL - 5 units<BR>Blood glucose 350-400 mg/dL - 6 units<BR>Blood glucose greater than 400 mg/dL - 7 units and call provider<BR> Caution: Look alike/sound alike drug alert    Route: Subcutaneous    lisinopril (PRINIVIL,ZESTRIL) tablet 20 mg 20 mg Take As Directed 1/5/2021     Route: Oral    Morphine injection 6 mg 6 mg Every 4 Hours PRN 1/5/2021 1/12/2021    Admin Instructions: If given for pain, use the following pain scale:<BR>Mild Pain = Pain Score of 1-3, CPOT 1-2<BR>Moderate Pain = Pain Score of 4-6, CPOT 3-4<BR>Severe Pain = Pain Score of 7-10, CPOT 5-8    Route: Intravenous    Linked Group 2: \"And\" Linked Group Details        naloxone (NARCAN) injection 0.4 mg 0.4 mg Every 5 Minutes PRN 1/5/2021     Admin Instructions: If respiratory rate is less than 8 breaths/minute or patient is difficult to arouse stop any narcotics and contact physician. <BR>Administer slow IV push. Repeat as ordered until patient's respiratory rate is greater than 12 breaths/minute.    Route: Intravenous    Linked Group 2: \"And\" Linked Group Details        ondansetron (ZOFRAN) injection 4 mg 4 mg Every 6 Hours PRN 1/5/2021     Admin Instructions: If BOTH ondansetron (ZOFRAN) and promethazine (PHENERGAN) are ordered use ondansetron first and THEN promethazine IF " "ondansetron is ineffective.    Route: Intravenous    Linked Group 3: \"Or\" Linked Group Details        ondansetron (ZOFRAN) tablet 4 mg 4 mg Every 6 Hours PRN 1/5/2021     Admin Instructions: If BOTH ondansetron (ZOFRAN) and promethazine (PHENERGAN) are ordered use ondansetron first and THEN promethazine IF ondansetron is ineffective.    Route: Oral    Linked Group 3: \"Or\" Linked Group Details        piperacillin-tazobactam (ZOSYN) 3.375 g in iso-osmotic dextrose 50 ml (premix) 3.375 g Every 8 Hours 1/5/2021 1/12/2021    Admin Instructions: Refrigerate    Route: Intravenous    sodium chloride 0.9 % flush 10 mL 10 mL Every 12 Hours Scheduled 1/5/2021     Route: Intravenous    sodium chloride 0.9 % flush 10 mL 10 mL As Needed 1/5/2021     Route: Intravenous    sodium chloride 0.9 % infusion 85 mL/hr Continuous 1/5/2021     Route: Intravenous    vancomycin (VANCOCIN) in iso-osmotic dextrose IVPB 1 g (premix) 200 mL 1,000 mg Every 24 Hours 1/5/2021 1/11/2021    Route: Intravenous          Review of Systems   Constitutional: Negative.    HENT: Negative.    Eyes: Negative.    Respiratory: Negative.    Cardiovascular: Negative.    Gastrointestinal: Negative.    Endocrine: Negative.    Genitourinary: Negative.    Musculoskeletal: Negative.    Skin: Positive for wound.   Allergic/Immunologic: Negative.    Neurological: Negative.    Hematological: Negative.    Psychiatric/Behavioral: Negative.    All other systems reviewed and are negative.      /47 (BP Location: Right arm, Patient Position: Lying)   Pulse 80   Temp 98.2 °F (36.8 °C) (Oral)   Resp 18   Ht 154.9 cm (61\")   Wt 80.6 kg (177 lb 12.8 oz)   SpO2 96%   BMI 33.60 kg/m²     Physical Exam  Vitals signs and nursing note reviewed.   Constitutional:       Appearance: She is well-developed.   HENT:      Head: Normocephalic and atraumatic.   Eyes:      General: No scleral icterus.     Pupils: Pupils are equal, round, and reactive to light.   Neck:      " Musculoskeletal: Neck supple.      Thyroid: No thyromegaly.      Vascular: No carotid bruit or JVD.   Cardiovascular:      Rate and Rhythm: Normal rate and regular rhythm.      Pulses:           Carotid pulses are 2+ on the right side and 2+ on the left side.       Femoral pulses are 2+ on the right side and 2+ on the left side.       Popliteal pulses are 0 on the right side and 0 on the left side.        Dorsalis pedis pulses are detected w/ Doppler on the right side and detected w/ Doppler on the left side.        Posterior tibial pulses are detected w/ Doppler on the right side and detected w/ Doppler on the left side.      Heart sounds: Normal heart sounds.      Comments: Right: Doppler DP/PT/peroneal  Small eschar noted at the tip of the right great toe and between the fourth and fifth toes  Pulmonary:      Effort: Pulmonary effort is normal.      Breath sounds: Normal breath sounds.   Abdominal:      General: Bowel sounds are normal. There is no distension or abdominal bruit.      Palpations: Abdomen is soft. There is no mass.      Tenderness: There is no abdominal tenderness.   Musculoskeletal: Normal range of motion.   Lymphadenopathy:      Cervical: No cervical adenopathy.   Skin:     General: Skin is warm and dry.   Neurological:      Mental Status: She is alert and oriented to person, place, and time.      Cranial Nerves: No cranial nerve deficit.      Sensory: No sensory deficit.         Laboratory Data:  Results from last 7 days   Lab Units 01/05/21  0848 01/05/21 0308 01/04/21 2003   WBC 10*3/mm3 7.14 8.31 9.35   HEMOGLOBIN g/dL 8.9* 8.7* 10.3*   HEMATOCRIT % 27.6* 26.6* 31.5*   PLATELETS 10*3/mm3 339 348 434       Results from last 7 days   Lab Units 01/04/21 2003   SODIUM mmol/L 136   POTASSIUM mmol/L 4.8   CHLORIDE mmol/L 103   CO2 mmol/L 23.0   BUN mg/dL 22   CREATININE mg/dL 1.54*   CALCIUM mg/dL 9.7   GLUCOSE mg/dL 184*     Results from last 7 days   Lab Units 01/05/21  0848 01/05/21 0308  01/04/21 2004   INR  1.48*  --  2.09*   APTT seconds 29.2 47.9* 44.4*         Diagnostic Data:  Imaging Results (Last 24 Hours)     Procedure Component Value Units Date/Time    US Arterial Doppler Lower Extremity Right [[id number]] Resulted: 01/04/21 2140     Updated: 01/04/21 2231    US Venous Doppler Lower Extremity Right (duplex) [[id number]] Resulted: 01/04/21 2140     Updated: 01/04/21 2225    XR Foot 3+ View Right [[id number]] Collected: 01/04/21 2201     Updated: 01/04/21 2206    Narrative:      RIGHT FOOT 3 views 1/4/2021 9:42 PM CST     HISTORY: foot sores     COMPARISON: Radiographs dated 12/26/2020      FINDINGS:   Frontal, lateral and oblique radiographs of the right foot were provided  for review.      The bones are diffusely osteopenic. No acute fracture is seen. No joint  subluxation. Interphalangeal joint flexion deformities which are  degenerative. Additional moderate degenerative change at the first  metatarsophalangeal joint as well as diffusely throughout the midfoot.  No destructive osseous changes are seen. Atheromatous vascular  calcification identified. No capsular distention at the ankle joint. The  subtalar joint is unremarkable. Small plantar calcaneal spur. Quite  prominent Achilles enthesophyte.       Impression:      1. Osteopenia with advanced osteoarthritis changes. No visualized acute  fracture or destructive osseous change to suggest osteomyelitis.  This report was finalized on 01/04/2021 22:03 by Dr Alfa Wilson, .          Impression:    PAD (peripheral artery disease) (CMS/HCC)    Essential hypertension    Atherosclerosis of artery of extremity with intermittent claudication (CMS/HCC)    Nonhealing ulcer of right lower leg with fat layer exposed (CMS/HCC)    Hyperglycemia      Plan: After thoroughly evaluating Evelyn Walker, I believe the best course of action is to proceed with a right lower extremity angiogram with possible concomitant therapeutic intervention tomorrow afternoon.   Currently she is unchanged from her previous exam about a month ago.  Her testing is also the same.  She does have chronic lower extremity PAD. Risks of angiogram were discussed.  These include, but are not limited to, bleeding, infection, vessel damage, nerve damage, embolus, and loss of limb.  The patient understands these risks and wishes to proceed with procedure.  We will give her IV hydration today, 0.5 mg of vitamin K IV, and will hold her current anticoagulation.  Keeping her in reverse Trendelenburg with a bear hugger on will help as well.  This was all discussed in full with complete understanding.  Thank you for allowing me to see Evelyn Ojeda in consult. Please feel free to reach out with any questions or concerns.    Morgan Owens, DO  1/5/2021  13:00 CST

## 2021-01-05 NOTE — PROGRESS NOTES
"Pharmacy Dosing Service  Pharmacokinetics  Vancomycin Initial Evaluation  Assessment/Action/Plan:  Loading dose: 1750mg  Current Order: Vancomycin 1000 mg IVPB every 24 hours  Current end date:1/10/2021  Levels: no levels ordered at this time  Additional antimicrobial agent(s): Zosyn    Vancomycin dosage initiated based on population pharmacokinetic parameters. Pharmacy will continue to follow daily and adjust dose accordingly.     Subjective:  Evelyn Ojeda is a 80 y.o. female initiated on Vancomycin 1750 mg IV once for the treatment of SSTI by prescriber with corresponding \"Pharmacy to Dose\" consult for the treatment of SSTI, day 2 of 7 of treatment.    AUC Model Data:  Loading dose: 1750mg  Regimen: 1000 mg every 24 hours for 6 doses.  Start time: 23:00 on 01/05/2021  Exposure target: AUC24 (range)400-600 mg/L.hr  AUC24,ss: 542 mg/L.hr  PAUC*: 81 %  Ctrough,ss: 17.8 mg/L  Pconc*: 39 %  Tox.: 14 %    Objective:  Ht: 154.9 cm (61\"); Wt: 80.6 kg (177 lb 12.8 oz)  Estimated Creatinine Clearance: 29 mL/min (A) (by C-G formula based on SCr of 1.54 mg/dL (H)).   Creatinine   Date Value Ref Range Status   01/04/2021 1.54 (H) 0.57 - 1.00 mg/dL Final   12/26/2020 0.94 0.57 - 1.00 mg/dL Final   12/08/2019 0.8 0.5 - 0.9 mg/dL Final   12/07/2019 0.8 0.5 - 0.9 mg/dL Final      Lab Results   Component Value Date    WBC 7.14 01/05/2021    WBC 8.31 01/05/2021    WBC 9.35 01/04/2021      Baseline culture results:  Microbiology Results (last 10 days)       Procedure Component Value - Date/Time    COVID PRE-OP / PRE-PROCEDURE SCREENING ORDER (NO ISOLATION) - Swab, Nasal Cavity [[id number]]  (Normal) Collected: 01/04/21 2123    Lab Status: Final result Specimen: Swab from Nasal Cavity Updated: 01/04/21 2242    Narrative:      The following orders were created for panel order COVID PRE-OP / PRE-PROCEDURE SCREENING ORDER (NO ISOLATION) - Swab, Nasal Cavity.  Procedure                               Abnormality         Status             "         ---------                               -----------         ------                     COVID-19,[facility] IN-VASHT[facility]...[[id number]]  Normal              Final result                 Please view results for these tests on the individual orders.    COVID-19,[facility] IN-HOUSE,Nasal Swab No Transport Media 3-4 HR TAT - Swab, Nasal Cavity [[id number]]  (Normal) Collected: 01/04/21 2123    Lab Status: Final result Specimen: Swab from Nasal Cavity Updated: 01/04/21 2242     COVID19 Not Detected    Narrative:      Fact sheet for providers: https://www.fda.gov/media/967653/download     Fact sheet for patients: https://www.fda.gov/media/351869/download    Test performed by PCR.            Jeyson Mclain, PharmD  01/05/21 09:08 CST

## 2021-01-05 NOTE — PROGRESS NOTES
[facility] Medicine Services  INPATIENT PROGRESS NOTE    Length of Stay: 0  Date of Admission: 1/4/2021  Primary Care Physician: Roberto Garcia MD    Subjective   Chief Complaint: Follow-up right leg pain, foot cool, erythema right foot  HPI   History of peripheral vascular disease.  Followed by Dr. Owens.  Chronic anticoagulation for history of DVT.  She had surgery 1 year ago at [facility].  Dr. Owens recently changed anticoagulation from Lovenox to Xarelto.  Patient had hospitalization outside facility for 1 week was discharged on antibiotics for cellulitis.  Patient reports pain in the right foot with minimal movement.  Right foot cool to touch.  0.5 cm black region by right toenail.  She has ulcer between fourth and fifth toe.  She screams with excruciating pain with any movement of right leg.  She denies nausea or vomiting.  Denies chest pain, palpitations or shortness of breath.  No oxygen in use.  Denies recent sick contact.  Denies fever or chills.  Dr. Owens has seen no plans for right lower extremity arteriogram and therapeutic intervention tomorrow.    Review of Systems   Constitutional: Negative for chills and fever.   HENT: Negative for congestion and trouble swallowing.    Eyes: Negative for photophobia and visual disturbance.   Respiratory: Negative for cough, shortness of breath and wheezing.    Cardiovascular: Positive for leg swelling (right foot). Negative for chest pain and palpitations.   Gastrointestinal: Negative for blood in stool, constipation, diarrhea, nausea and vomiting.   Endocrine: Negative for cold intolerance, heat intolerance and polyuria.   Genitourinary: Negative for dysuria, frequency and urgency.   Musculoskeletal: Positive for gait problem. Negative for back pain.   Skin: Positive for color change (Right foot) and wound (Right foot between fourth and fifth toes.).   Allergic/Immunologic: Negative for immunocompromised state.    Neurological: Positive for weakness.   Hematological: Negative for adenopathy. Does not bruise/bleed easily.   Psychiatric/Behavioral: Negative for agitation, behavioral problems and confusion.      All pertinent negatives and positives are as above. All other systems have been reviewed and are negative unless otherwise stated.     Objective    Temp:  [97.8 °F (36.6 °C)-98.4 °F (36.9 °C)] 97.8 °F (36.6 °C)  Heart Rate:  [] 85  Resp:  [18-20] 18  BP: ()/(41-69) 132/69  Physical Exam  Vitals signs and nursing note reviewed.   Constitutional:       Appearance: She is obese.      Comments: Sitting up in bed.  No oxygen in use.  No family in room.   HENT:      Head: Normocephalic and atraumatic.      Nose: No congestion.      Mouth/Throat:      Pharynx: No oropharyngeal exudate.   Eyes:      Extraocular Movements: Extraocular movements intact.      Pupils: Pupils are equal, round, and reactive to light.   Neck:      Musculoskeletal: Normal range of motion and neck supple.   Cardiovascular:      Rate and Rhythm: Normal rate.      Heart sounds: No murmur.   Pulmonary:      Effort: Pulmonary effort is normal.      Breath sounds: Normal breath sounds. No wheezing, rhonchi or rales.   Abdominal:      Palpations: Abdomen is soft.   Genitourinary:     Comments: Voiding.  Musculoskeletal:         General: Tenderness (Right foot) present.   Skin:     Findings: Erythema (Right foot) present.      Comments: Right foot cool to touch.  Ulcerated lesion between fourth and fifth toe right foot.  0.5 cm black eschar by right toenail.   Neurological:      General: No focal deficit present.      Mental Status: She is alert and oriented to person, place, and time.   Psychiatric:         Mood and Affect: Mood normal.         Behavior: Behavior normal.         Thought Content: Thought content normal.         Judgment: Judgment normal.       Results Review:  I have reviewed the labs, radiology results, and diagnostic  studies.    Laboratory Data:      Results from last 7 days   Lab Units 01/05/21  0848 01/05/21  0308 01/04/21 2003   WBC 10*3/mm3 7.14 8.31 9.35   HEMOGLOBIN g/dL 8.9* 8.7* 10.3*   HEMATOCRIT % 27.6* 26.6* 31.5*   PLATELETS 10*3/mm3 339 348 434        Results from last 7 days   Lab Units 01/04/21 2003   SODIUM mmol/L 136   POTASSIUM mmol/L 4.8   CHLORIDE mmol/L 103   CO2 mmol/L 23.0   BUN mg/dL 22   CREATININE mg/dL 1.54*   GLUCOSE mg/dL 184*   CALCIUM mg/dL 9.7     Imaging Results (All)     Procedure Component Value Units Date/Time    US Arterial Doppler Lower Extremity Right [[id number]] Resulted: 01/04/21 2140     Updated: 01/04/21 2231    US Venous Doppler Lower Extremity Right (duplex) [[id number]] Resulted: 01/04/21 2140     Updated: 01/04/21 2225    XR Foot 3+ View Right [[id number]] Collected: 01/04/21 2201     Updated: 01/04/21 2206    Narrative:      RIGHT FOOT 3 views 1/4/2021 9:42 PM CST     HISTORY: foot sores     COMPARISON: Radiographs dated 12/26/2020      FINDINGS:   Frontal, lateral and oblique radiographs of the right foot were provided  for review.      The bones are diffusely osteopenic. No acute fracture is seen. No joint  subluxation. Interphalangeal joint flexion deformities which are  degenerative. Additional moderate degenerative change at the first  metatarsophalangeal joint as well as diffusely throughout the midfoot.  No destructive osseous changes are seen. Atheromatous vascular  calcification identified. No capsular distention at the ankle joint. The  subtalar joint is unremarkable. Small plantar calcaneal spur. Quite  prominent Achilles enthesophyte.       Impression:      1. Osteopenia with advanced osteoarthritis changes. No visualized acute  fracture or destructive osseous change to suggest osteomyelitis.  This report was finalized on 01/04/2021 22:03 by Dr Alfa Wilson, .        Intake/Output    Intake/Output Summary (Last 24 hours) at 1/5/2021 1505  Last data filed at 1/5/2021  1424  Gross per 24 hour   Intake 460 ml   Output 300 ml   Net 160 ml       Scheduled Meds  aspirin, 81 mg, Oral, Daily  atorvastatin, 20 mg, Oral, Daily  gabapentin, 600 mg, Oral, Q8H  insulin lispro, 2-7 Units, Subcutaneous, TID AC  lisinopril, 20 mg, Oral, Take As Directed  piperacillin-tazobactam, 3.375 g, Intravenous, Q8H  sodium chloride, 10 mL, Intravenous, Q12H  vancomycin, 1,000 mg, Intravenous, Q24H      I have reviewed the patient current medications.     Assessment/Plan     Active Hospital Problems    Diagnosis   • Hyperglycemia   • PAD (peripheral artery disease) (CMS/[facility])   • Essential hypertension   • Mixed hyperlipidemia   • Nonhealing ulcer of right lower leg with fat layer exposed (CMS/[facility])   • Atherosclerosis of artery of extremity with intermittent claudication (CMS/[facility])     Plan:  1.  To ER 1/4 with right leg pain.  History of peripheral vascular disease followed by Dr. Owens.  Chronic anticoagulation for DVT.  Dr. Owens recently changed Lovenox to Xarelto.  Patient had recent hospitalization at outside facility for cellulitis right foot and was discharged on antibiotics.  Continued to have right foot pain with minimal improvement. CRP 0 .99, INR 2. 09, WBC 9.35.  Vancomycin, Zosyn heparin given in ER.  2.  Vascular surgery consulted.  Dr. Owens plans for right lower extremity arteriogram with possible therapeutic intervention tomorrow afternoon.  Right lower extremity with chronic lower extremity edema peripheral vascular disease unchanged over the past month.  3.  Lovenox changed to Xarelto recently by Dr. Owens.  Vitamin K 5 mg IV.  Hold anticoagulation per Dr. Owens for surgery tomorrow.  4.  Keep right leg reverse Trendelenburg with bear hugger.  5.  Right foot with ulcerated lesion between fourth and fifth toe, 0.5 eschar near right toenail.  6.  Vancomycin and  Zosyn started in ER.  Continued.  7.  Heparin drip ordered on admission.  Dr. Owens has discontinued, surgery  tomorrow.  8.  Blood cultures in progress.  Will follow.  9.  Glucoses 300, 248. Hemoglobin A1c 8.1.  Sliding scale insulin coverage.  Hold Metformin.  10.  Home medications reviewed    Her 2 daughters Lashell and Virginia will assist with decision-making if she is unable to make decisions for self  The above documentation resulted from a face-to-face encounter by me Sharla HUA, [facility].    Discharge Planning: I expect patient to be discharged to [facility].    Electronically signed by JAVID Iniguez, 1/5/2021, 15:05 CST.

## 2021-01-05 NOTE — PLAN OF CARE
Goal Outcome Evaluation:  Plan of Care Reviewed With: patient  Progress: no change   Pain controlled to a tolerable level with current interventions.Consent signed for angiogram in the AM.VSS, Safety maintained. Cont to monitor.

## 2021-01-05 NOTE — ED PROVIDER NOTES
"Subjective   79 y/o female with history of PAD who has had surgery at [facility] around 1 year ago who has also seen Dr. Owens placed on anticoagulation arrives for evaluation of increasing right foot pain. She has been seen by her PMD for evaluation of right foot pain with possible cellulitis. She has been treated with multiple rounds of abx and not getting better. She was seen again today and transferred for the above. Per patient her foot \"always hurts\" but has hurt more today thus prompting her visit to her PMD stating \"it has never hurt this much in the past.\" Of note, she was in our lobby for over 4 hours secondary to excessive patient and EMS volume/lack of beds/staff.           Review of Systems   All other systems reviewed and are negative.      Past Medical History:   Diagnosis Date   • Diabetes mellitus (CMS/[facility])    • DVT (deep venous thrombosis) (CMS/[facility])     RLL   • Hypertension        Allergies   Allergen Reactions   • Codeine Other (See Comments)       Past Surgical History:   Procedure Laterality Date   • CHOLECYSTECTOMY     • KNEE SURGERY     • LEG SURGERY         No family history on file.    Social History     Socioeconomic History   • Marital status: Unknown     Spouse name: Not on file   • Number of children: Not on file   • Years of education: Not on file   • Highest education level: Not on file   Tobacco Use   • Smoking status: Never Smoker   • Smokeless tobacco: Never Used   Substance and Sexual Activity   • Alcohol use: Never     Frequency: Never   • Drug use: Never   • Sexual activity: Defer           Objective   Physical Exam  Vitals signs and nursing note reviewed.   Constitutional:       Appearance: Normal appearance. She is normal weight.   HENT:      Head: Normocephalic and atraumatic.      Right Ear: External ear normal.      Left Ear: External ear normal.      Nose: Nose normal.      Mouth/Throat:      Mouth: Mucous membranes are moist.      Pharynx: Oropharynx is clear.   Eyes:      " Conjunctiva/sclera: Conjunctivae normal.      Pupils: Pupils are equal, round, and reactive to light.   Neck:      Musculoskeletal: Normal range of motion and neck supple.   Cardiovascular:      Rate and Rhythm: Regular rhythm. Tachycardia present.      Pulses: Normal pulses.      Heart sounds: Normal heart sounds.   Pulmonary:      Effort: Pulmonary effort is normal.      Breath sounds: Normal breath sounds.   Musculoskeletal:         General: Tenderness present. No deformity or signs of injury.      Comments: Right foot is cool from the ankle down, I cannot feel nor doppler a pulse, she has intact sensation and motor function.    Skin:     General: Skin is warm.      Capillary Refill: Capillary refill takes less than 2 seconds.   Neurological:      General: No focal deficit present.      Mental Status: She is alert and oriented to person, place, and time.   Psychiatric:         Mood and Affect: Mood normal.         Behavior: Behavior normal.         Thought Content: Thought content normal.         Procedures           ED Course  ED Course as of Jan 04 2310 Mon Jan 04, 2021 2118 Dr. Owens aware okay with CTA and wishes for heprain.     []   2302 RIGHT lower extremity arterial velocities:   - CFA: 149/13.2 cm/s (Triphasic)  - Prof A: 123/13 cm/s (Mono)  - Prox FA: 47.5/8.21 cm/s (Mono)  - Mid FA: 31/13 cm/s (Mono)  - Dist FA: 44.4/13 cm/s (Mono)  - Pop A: 28.5/11.9 cm/s (Mono)  - CHRISTINE Prox: 14.3/4.95 cm/s (Mono)  - PTA Dist: 12.4/5 cm/s (Mono)    ** Evidence of significant stenosis in prox - mid SFA. Diminished flow in mid SFA to Dist PTA.**      []   2310 Dr. Owens did review the US and feels she does not need any emergent vascular procedure tonight. We will admit for IV abx given possible reccurent infection.     []      ED Course User Index  [] Dominic Madsen MD            XR Foot 3+ View Right   Final Result   1. Osteopenia with advanced osteoarthritis changes. No visualized acute    fracture or destructive osseous change to suggest osteomyelitis.   This report was finalized on 01/04/2021 22:03 by Dr Alfa Wilson, .      US Venous Doppler Lower Extremity Right (duplex)    (Results Pending)   US Arterial Doppler Lower Extremity Right    (Results Pending)     Labs Reviewed   BASIC METABOLIC PANEL - Abnormal; Notable for the following components:       Result Value    Glucose 184 (*)     Creatinine 1.54 (*)     eGFR Non  Amer 33 (*)     All other components within normal limits    Narrative:     GFR Normal >60  Chronic Kidney Disease <60  Kidney Failure <15     PROTIME-INR - Abnormal; Notable for the following components:    Protime 23.3 (*)     INR 2.09 (*)     All other components within normal limits   APTT - Abnormal; Notable for the following components:    PTT 44.4 (*)     All other components within normal limits   C-REACTIVE PROTEIN - Abnormal; Notable for the following components:    C-Reactive Protein 0.99 (*)     All other components within normal limits   SEDIMENTATION RATE - Abnormal; Notable for the following components:    Sed Rate 53 (*)     All other components within normal limits   CBC WITH AUTO DIFFERENTIAL - Abnormal; Notable for the following components:    RBC 3.50 (*)     Hemoglobin 10.3 (*)     Hematocrit 31.5 (*)     Immature Grans % 0.9 (*)     Immature Grans, Absolute 0.08 (*)     All other components within normal limits   COVID-19,[facility] IN-HOUSE,NASAL SWAB NO TRANSPORT MEDIA 2 HR TAT - Normal    Narrative:     Fact sheet for providers: https://www.fda.gov/media/310983/download     Fact sheet for patients: https://www.fda.gov/media/237752/download    Test performed by PCR.   LACTIC ACID, PLASMA - Normal   COVID PRE-OP / PRE-PROCEDURE SCREENING ORDER (NO ISOLATION)    Narrative:     The following orders were created for panel order COVID PRE-OP / PRE-PROCEDURE SCREENING ORDER (NO ISOLATION) - Swab, Nasal Cavity.  Procedure                                Abnormality         Status                     ---------                               -----------         ------                     COVID-19,[employer] IN-VASHTI...[[id number]]  Normal              Final result                 Please view results for these tests on the individual orders.   BLOOD CULTURE   BLOOD CULTURE   CBC AND DIFFERENTIAL    Narrative:     The following orders were created for panel order CBC & Differential.  Procedure                               Abnormality         Status                     ---------                               -----------         ------                     CBC Auto Differential[[id number]]        Abnormal            Final result                 Please view results for these tests on the individual orders.                                       MDM    Final diagnoses:   PAD (peripheral artery disease) (CMS/[facility])   Cellulitis, unspecified cellulitis site            Dominic Madsen MD  01/04/21 5611

## 2021-01-05 NOTE — PAYOR COMM NOTE
"Evelyn Dukes (80 y.o. Female) [id number]  Additional clinical /  Consult notes   [facility] phone    Fax        Date of Birth Social Security Number Address Home Phone MRN    1940  [id number]  [place] [address] [phone] [id number]    Christianity Marital Status          Other Unknown       Admission Date Admission Type Admitting Provider Attending Provider Department, Room/Bed    1/4/21 Emergency Jorge Riley MD Thompson, Benjamin H, MD [facility] 3C, 390/1    Discharge Date Discharge Disposition Discharge Destination                       Attending Provider: Jorge Riley MD    Allergies: Codeine    Isolation: None   Infection: None   Code Status: No CPR    Ht: 154.9 cm (61\")   Wt: 80.6 kg (177 lb 12.8 oz)    Admission Cmt: None   Principal Problem: None                Active Insurance as of 1/4/2021     Primary Coverage     Payor Plan Insurance Group Employer/Plan Group    HUMANA MEDICARE REPLACEMENT HUMANA MEDICARE REPLACEMENT [id number]     Payor Plan Address Payor Plan Phone Number Payor Plan Fax Number Effective Dates    [address] [address] [phone]  9/1/2020 - None Entered    [facility] [id number]       Subscriber Name Subscriber Birth Date Member ID       EVELYN DUKES 1940 [id number]                 Emergency Contacts      (Rel.) Home Phone Work Phone Mobile Phone    DaughterLashell -- -- [phone]    GranddaughterLupe -- -- [phone]    DaughterPauline -- -- [phone]               Consult Notes (last 24 hours) (Notes from 01/04/21 1408 through 01/05/21 1408)      Morgan Owens DO at 01/05/21 1300      Consult Orders    1. Inpatient Vascular Surgery Consult [[id number]] ordered by Abner Castro MD at 01/05/21 0832               Evelyn Dukes  [id number]  [id number]  390/1  Jorge Riley MD  1/4/2021    Chief Complaint   Patient presents with   • Foot Pain   • " foot wounds       HPI: Patient is a 80-year-old white female past medical history of peripheral vascular disease as well as DVT on chronic anticoagulation therapy. She had surgery performed at [facility] approximately a year ago.  I saw her recently in the office and switched her anticoagulation from Lovenox to Xarelto. She had been in the hospital for it at another facility last week by her primary care doctor and was discharged she was given antibiotics at that time treated for cellulitis.  It has not gotten any better she has had several rounds of antibiotics.  She has full motor and sensation of her foot currently and there are Doppler signals present.  This exam is unchanged from when I saw her in the office a few weeks ago.    Past Medical History:   Diagnosis Date   • Diabetes mellitus (CMS/HCC)    • DVT (deep venous thrombosis) (CMS/HCC)     RLL   • Hypertension    • PVD (peripheral vascular disease) (CMS/HCC)        Past Surgical History:   Procedure Laterality Date   • CHOLECYSTECTOMY     • KNEE SURGERY     • LEG SURGERY         Family History   Problem Relation Age of Onset   • Cancer Mother    • Cancer Sister        Social History     Socioeconomic History   • Marital status: Unknown     Spouse name: Not on file   • Number of children: Not on file   • Years of education: Not on file   • Highest education level: Not on file   Tobacco Use   • Smoking status: Never Smoker   • Smokeless tobacco: Never Used   Substance and Sexual Activity   • Alcohol use: Never     Frequency: Never   • Drug use: Never   • Sexual activity: Defer       Allergies   Allergen Reactions   • Codeine Other (See Comments)       Hospital Medications (active)       Dose Frequency Start End    acetaminophen (TYLENOL) 160 MG/5ML solution 650 mg 650 mg Every 4 Hours PRN 1/5/2021     Admin Instructions: Do not exceed 4 grams of acetaminophen in a 24 hr period. Max dose of 2gm for AST/ALT greater than 120 units/L<BR><BR><BR>If given for pain,  "use the following pain scale: <BR>Mild Pain = Pain Score of 1-3, CPOT 1-2<BR>Moderate Pain = Pain Score of 4-6, CPOT 3-4<BR>Severe Pain = Pain Score of 7-10, CPOT 5-8    Route: Oral    Linked Group 1: \"Or\" Linked Group Details        acetaminophen (TYLENOL) suppository 650 mg 650 mg Every 4 Hours PRN 1/5/2021     Admin Instructions: Do not exceed 4 grams of acetaminophen in a 24 hr period. Max dose of 2gm for AST/ALT greater than 120 units/L<BR><BR><BR>If given for pain, use the following pain scale: <BR>Mild Pain = Pain Score of 1-3, CPOT 1-2<BR>Moderate Pain = Pain Score of 4-6, CPOT 3-4<BR>Severe Pain = Pain Score of 7-10, CPOT 5-8    Route: Rectal    Linked Group 1: \"Or\" Linked Group Details        acetaminophen (TYLENOL) tablet 650 mg 650 mg Every 4 Hours PRN 1/5/2021     Admin Instructions: Do not exceed 4 grams of acetaminophen in a 24 hr period. Max dose of 2gm for AST/ALT greater than 120 units/L<BR><BR><BR>If given for pain, use the following pain scale: <BR>Mild Pain = Pain Score of 1-3, CPOT 1-2<BR>Moderate Pain = Pain Score of 4-6, CPOT 3-4<BR>Severe Pain = Pain Score of 7-10, CPOT 5-8    Route: Oral    Linked Group 1: \"Or\" Linked Group Details        aspirin EC tablet 81 mg 81 mg Daily 1/5/2021     Admin Instructions: Herbal/drug interaction: Avoid use with ginkgo biloba. Do not crush or chew.<BR>Do not exceed 4 grams of aspirin in a 24 hr period.<BR><BR>If given for pain, use the following pain scale: <BR>Mild Pain = Pain Score of 1-3, CPOT 1-2<BR>Moderate Pain = Pain Score of 4-6, CPOT 3-4<BR>Severe Pain = Pain Score of 7-10, CPOT 5-8    Route: Oral    atorvastatin (LIPITOR) tablet 20 mg 20 mg Daily 1/5/2021     Admin Instructions: Avoid grapefruit juice.    Route: Oral    dextrose (D50W) 25 g/ 50mL Intravenous Solution 25 g 25 g Every 15 Minutes PRN 1/5/2021     Admin Instructions: Blood sugar less than 70; patient has IV access - Unresponsive, NPO or Unable To Safely Swallow    Route: Intravenous "    dextrose (GLUTOSE) oral gel 15 g 15 g Every 15 Minutes PRN 1/5/2021     Admin Instructions: BS<70, Patient Alert, Is not NPO, Can safely swallow.    Route: Oral    gabapentin (NEURONTIN) capsule 600 mg 600 mg Every 8 Hours Scheduled 1/5/2021     Admin Instructions:     Route: Oral    glucagon (human recombinant) (GLUCAGEN DIAGNOSTIC) injection 1 mg 1 mg Every 15 Minutes PRN 1/5/2021     Admin Instructions: Blood Glucose Less Than 70 - Patient Without IV Access - Unresponsive, NPO or Unable To Safely Swallow    Route: Subcutaneous    heparin (porcine) injection 2,000 Units 2,000 Units As Needed 1/5/2021     Admin Instructions: Bolus for aPTT 44-51 seconds.  Maximum dose 2,000 units.    Route: Intravenous    heparin (porcine) injection 4,000 Units 4,000 Units As Needed 1/5/2021     Admin Instructions: Bolus for less than 43 seconds.  Maximum dose 4,000 units.    Route: Intravenous    heparin 62506 units/250 mL (100 units/mL) in 0.45 % NaCl infusion 12 Units/kg/hr × 80.7 kg Titrated 1/5/2021     Admin Instructions: Protocol D:<BR>Weight based heparin nomogram <BR><BR>Maximum initial infusion rate 1000 units/hr. <BR><BR>aPTT < 43 sec: Bolus same as initial bolus. Increase rate by 2 mL/hr (200 units/hr). Next aPTT in 6 hours. <BR>aPTT 44-51 sec: Bolus 1/2 initial bolus. Increase rate by 1 mL/hr (100 units/hr). Next aPTT in 6 hours. <BR>aPTT 52-68 sec: No change. Next aPTT in 6 hours. <BR>aPTT 69-75 sec: Hold infusion 30 minutes. Decrease rate by 1 mL/hr (100 units/hr). Next aPTT in 6 hours. <BR>aPTT 76 and above: Hold infusion 60 minutes. Decrease rate by 2 mL/hr (200 units/hr). Next aPTT in 6 hours.    Route: Intravenous    HYDROcodone-acetaminophen (NORCO)  MG per tablet 1 tablet 1 tablet Every 6 Hours PRN 1/5/2021     Admin Instructions: [DILLON]<BR><BR>Do not exceed 4 grams of acetaminophen in a 24 hr period. Max dose of 2gm for AST/ALT greater than 120 units/L<BR><BR><BR><BR>If given for pain, use the  "following pain scale: <BR>Mild Pain = Pain Score of 1-3, CPOT 1-2<BR>Moderate Pain = Pain Score of 4-6, CPOT 3-4<BR>Severe Pain = Pain Score of 7-10, CPOT 5-8    Route: Oral    insulin lispro (humaLOG, ADMELOG) injection 2-7 Units 2-7 Units 3 Times Daily Before Meals 1/5/2021     Admin Instructions: Correction - Low Dose.  Less than 40 units/day total insulin dose or lean, elderly, renal patients<BR><BR>Blood glucose 150-199 mg/dL - 2 units<BR>Blood glucose 200-249 mg/dL - 3 units<BR>Blood glucose 250-299 mg/dL - 4 units<BR>Blood glucose 300-349 mg/dL - 5 units<BR>Blood glucose 350-400 mg/dL - 6 units<BR>Blood glucose greater than 400 mg/dL - 7 units and call provider<BR> Caution: Look alike/sound alike drug alert    Route: Subcutaneous    lisinopril (PRINIVIL,ZESTRIL) tablet 20 mg 20 mg Take As Directed 1/5/2021     Route: Oral    Morphine injection 6 mg 6 mg Every 4 Hours PRN 1/5/2021 1/12/2021    Admin Instructions: If given for pain, use the following pain scale:<BR>Mild Pain = Pain Score of 1-3, CPOT 1-2<BR>Moderate Pain = Pain Score of 4-6, CPOT 3-4<BR>Severe Pain = Pain Score of 7-10, CPOT 5-8    Route: Intravenous    Linked Group 2: \"And\" Linked Group Details        naloxone (NARCAN) injection 0.4 mg 0.4 mg Every 5 Minutes PRN 1/5/2021     Admin Instructions: If respiratory rate is less than 8 breaths/minute or patient is difficult to arouse stop any narcotics and contact physician. <BR>Administer slow IV push. Repeat as ordered until patient's respiratory rate is greater than 12 breaths/minute.    Route: Intravenous    Linked Group 2: \"And\" Linked Group Details        ondansetron (ZOFRAN) injection 4 mg 4 mg Every 6 Hours PRN 1/5/2021     Admin Instructions: If BOTH ondansetron (ZOFRAN) and promethazine (PHENERGAN) are ordered use ondansetron first and THEN promethazine IF ondansetron is ineffective.    Route: Intravenous    Linked Group 3: \"Or\" Linked Group Details        ondansetron (ZOFRAN) tablet 4 mg 4 " "mg Every 6 Hours PRN 1/5/2021     Admin Instructions: If BOTH ondansetron (ZOFRAN) and promethazine (PHENERGAN) are ordered use ondansetron first and THEN promethazine IF ondansetron is ineffective.    Route: Oral    Linked Group 3: \"Or\" Linked Group Details        piperacillin-tazobactam (ZOSYN) 3.375 g in iso-osmotic dextrose 50 ml (premix) 3.375 g Every 8 Hours 1/5/2021 1/12/2021    Admin Instructions: Refrigerate    Route: Intravenous    sodium chloride 0.9 % flush 10 mL 10 mL Every 12 Hours Scheduled 1/5/2021     Route: Intravenous    sodium chloride 0.9 % flush 10 mL 10 mL As Needed 1/5/2021     Route: Intravenous    sodium chloride 0.9 % infusion 85 mL/hr Continuous 1/5/2021     Route: Intravenous    vancomycin (VANCOCIN) in iso-osmotic dextrose IVPB 1 g (premix) 200 mL 1,000 mg Every 24 Hours 1/5/2021 1/11/2021    Route: Intravenous          Review of Systems   Constitutional: Negative.    HENT: Negative.    Eyes: Negative.    Respiratory: Negative.    Cardiovascular: Negative.    Gastrointestinal: Negative.    Endocrine: Negative.    Genitourinary: Negative.    Musculoskeletal: Negative.    Skin: Positive for wound.   Allergic/Immunologic: Negative.    Neurological: Negative.    Hematological: Negative.    Psychiatric/Behavioral: Negative.    All other systems reviewed and are negative.      /47 (BP Location: Right arm, Patient Position: Lying)   Pulse 80   Temp 98.2 °F (36.8 °C) (Oral)   Resp 18   Ht 154.9 cm (61\")   Wt 80.6 kg (177 lb 12.8 oz)   SpO2 96%   BMI 33.60 kg/m²     Physical Exam  Vitals signs and nursing note reviewed.   Constitutional:       Appearance: She is well-developed.   HENT:      Head: Normocephalic and atraumatic.   Eyes:      General: No scleral icterus.     Pupils: Pupils are equal, round, and reactive to light.   Neck:      Musculoskeletal: Neck supple.      Thyroid: No thyromegaly.      Vascular: No carotid bruit or JVD.   Cardiovascular:      Rate and Rhythm: Normal " rate and regular rhythm.      Pulses:           Carotid pulses are 2+ on the right side and 2+ on the left side.       Femoral pulses are 2+ on the right side and 2+ on the left side.       Popliteal pulses are 0 on the right side and 0 on the left side.        Dorsalis pedis pulses are detected w/ Doppler on the right side and detected w/ Doppler on the left side.        Posterior tibial pulses are detected w/ Doppler on the right side and detected w/ Doppler on the left side.      Heart sounds: Normal heart sounds.      Comments: Right: Doppler DP/PT/peroneal  Small eschar noted at the tip of the right great toe and between the fourth and fifth toes  Pulmonary:      Effort: Pulmonary effort is normal.      Breath sounds: Normal breath sounds.   Abdominal:      General: Bowel sounds are normal. There is no distension or abdominal bruit.      Palpations: Abdomen is soft. There is no mass.      Tenderness: There is no abdominal tenderness.   Musculoskeletal: Normal range of motion.   Lymphadenopathy:      Cervical: No cervical adenopathy.   Skin:     General: Skin is warm and dry.   Neurological:      Mental Status: She is alert and oriented to person, place, and time.      Cranial Nerves: No cranial nerve deficit.      Sensory: No sensory deficit.         Laboratory Data:  Results from last 7 days   Lab Units 01/05/21  0848 01/05/21 0308 01/04/21 2003   WBC 10*3/mm3 7.14 8.31 9.35   HEMOGLOBIN g/dL 8.9* 8.7* 10.3*   HEMATOCRIT % 27.6* 26.6* 31.5*   PLATELETS 10*3/mm3 339 348 434       Results from last 7 days   Lab Units 01/04/21 2003   SODIUM mmol/L 136   POTASSIUM mmol/L 4.8   CHLORIDE mmol/L 103   CO2 mmol/L 23.0   BUN mg/dL 22   CREATININE mg/dL 1.54*   CALCIUM mg/dL 9.7   GLUCOSE mg/dL 184*     Results from last 7 days   Lab Units 01/05/21  0848 01/05/21 0308 01/04/21 2004   INR  1.48*  --  2.09*   APTT seconds 29.2 47.9* 44.4*         Diagnostic Data:  Imaging Results (Last 24 Hours)     Procedure  Component Value Units Date/Time    US Arterial Doppler Lower Extremity Right [[id number]] Resulted: 01/04/21 2140     Updated: 01/04/21 2231    US Venous Doppler Lower Extremity Right (duplex) [[id number]] Resulted: 01/04/21 2140     Updated: 01/04/21 2225    XR Foot 3+ View Right [[id number]] Collected: 01/04/21 2201     Updated: 01/04/21 2206    Narrative:      RIGHT FOOT 3 views 1/4/2021 9:42 PM CST     HISTORY: foot sores     COMPARISON: Radiographs dated 12/26/2020      FINDINGS:   Frontal, lateral and oblique radiographs of the right foot were provided  for review.      The bones are diffusely osteopenic. No acute fracture is seen. No joint  subluxation. Interphalangeal joint flexion deformities which are  degenerative. Additional moderate degenerative change at the first  metatarsophalangeal joint as well as diffusely throughout the midfoot.  No destructive osseous changes are seen. Atheromatous vascular  calcification identified. No capsular distention at the ankle joint. The  subtalar joint is unremarkable. Small plantar calcaneal spur. Quite  prominent Achilles enthesophyte.       Impression:      1. Osteopenia with advanced osteoarthritis changes. No visualized acute  fracture or destructive osseous change to suggest osteomyelitis.  This report was finalized on 01/04/2021 22:03 by Dr Alfa Wilson, .          Impression:    PAD (peripheral artery disease) (CMS/HCC)    Essential hypertension    Atherosclerosis of artery of extremity with intermittent claudication (CMS/HCC)    Nonhealing ulcer of right lower leg with fat layer exposed (CMS/HCC)    Hyperglycemia      Plan: After thoroughly evaluating Evelyn Ojeda, I believe the best course of action is to proceed with a right lower extremity angiogram with possible concomitant therapeutic intervention tomorrow afternoon.  Currently she is unchanged from her previous exam about a month ago.  Her testing is also the same.  She does have chronic lower extremity  PAD. Risks of angiogram were discussed.  These include, but are not limited to, bleeding, infection, vessel damage, nerve damage, embolus, and loss of limb.  The patient understands these risks and wishes to proceed with procedure.  We will give her IV hydration today, 0.5 mg of vitamin K IV, and will hold her current anticoagulation.  Keeping her in reverse Trendelenburg with a bear hugger on will help as well.  This was all discussed in full with complete understanding.  Thank you for allowing me to see Evelyn Ojeda in consult. Please feel free to reach out with any questions or concerns.    Morgan Owens DO  1/5/2021  13:00 CST       Electronically signed by Morgan Owens DO at 01/05/21 0469

## 2021-01-05 NOTE — PLAN OF CARE
Goal Outcome Evaluation:  Plan of Care Reviewed With: patient  Progress: no change  Outcome Summary: Pt admitted from ED; wounds to Right foot, pics on chart; no pulses heard by dopplar in right foot, DP or PT; foot cold and mottled; up with standby assist to BSC; voiding; IID; IV abx; waiting further orders; will monitor.

## 2021-01-05 NOTE — ED NOTES
Lupe (pt's granddaughter) [phone]  Lashell (pt's daughter) [phone]     Ruthie Bridges RN  01/05/21 0215

## 2021-01-05 NOTE — PROGRESS NOTES
"Discharge Planning Assessment  [facility]     Patient Name: Evelyn Ojeda  MRN: [id number]  Today's Date: 1/5/2021    Admit Date: 1/4/2021    Discharge Needs Assessment     Row Name 01/05/21 1329       Living Environment    Lives With  child(anselmo), adult    Name(s) of Who Lives With Patient  Juan Pablo    Current Living Arrangements  home/apartment/condo    Primary Care Provided by  self    Provides Primary Care For  no one    Family Caregiver if Needed  child(anselmo), adult    Quality of Family Relationships  involved       Resource/Environmental Concerns    Resource/Environmental Concerns  other (see comments)       Transition Planning    Patient/Family Anticipates Transition to  home with family    Patient/Family Anticipated Services at Transition  none    Transportation Anticipated  family or friend will provide       Discharge Needs Assessment    Readmission Within the Last 30 Days  no previous admission in last 30 days    Equipment Currently Used at Home  glucometer;walker, rolling;cane, straight    Concerns to be Addressed  adjustment to diagnosis/illness;care coordination/care conferences;discharge planning    Discharge Coordination/Progress  Spoke with pt to discuss d/c needs. She said she lives at home with her two daughters and plans to return home at d/c. She said she does not want home health and denies needs. She does have rx coverage. Noted that pt's family has concerns about pt not being able to take care of herself so left a mesage for her daughter, Lashell [phone], to discuss. Also, called Pauline [phone] but her phone was not accepting calls.    Addendum 1430: Rec'd a call from Lashell [phone], one of the daughter's who lives with pt. She said plan is for pt to return home. Mentioned to her that someone in the family is concerned about pt taking care of herself and Lashell stated, \"that would be my niece Lupe. She lives in [place] and is bossy and doesn't know what she is talking " "about\". Lashell says that someone is at home with pt at all times.           Discharge Plan    No documentation.       Continued Care and Services - Admitted Since 1/4/2021    Coordination has not been started for this encounter.         Demographic Summary    No documentation.       Functional Status    No documentation.       Psychosocial    No documentation.       Abuse/Neglect    No documentation.       Legal    No documentation.       Substance Abuse    No documentation.       Patient Forms    No documentation.           SUSHANT Guerra    "

## 2021-01-05 NOTE — H&P
[facility] Medicine Services  HISTORY AND PHYSICAL    Date of Admission: 1/4/2021  Primary Care Physician: Roberto Garcia MD    Subjective     Chief Complaint: Leg pain    History of Present Illness  Patient is a 80-year-old white female past medical history of peripheral vascular disease as well as DVT on chronic anticoagulation therapy.  She has a 1 month history of having problems with her right leg hurting and with some erythema and now some darkness.  She had surgery performed on at [facility] approximately a year ago.  She seen Dr. Owens recently was placed on anticoagulation.  She had been in the hospital for it at another facility last week by her primary care doctor and was discharged she was given antibiotics at that time treated for cellulitis.  It has not gotten any better she has had several rounds of antibiotics.  The foot is cool to touch obviously cyanotic and exquisitely painful to touch on her toes.  She has had ABIs that were read by vascular is unchanged from previous with official read is pending.  Shows evidence of a significant stenosis in the proximal mid SFA diminished flow in the mid SFA to distal PTA venous Dopplers were obtained that showed no evidence of DVT.  Her white count is not elevated she does not have a left shift however she does have a sed rate of 53.  No evidence of osteomyelitis on x-rays.  We have been asked to admit for further evaluation and management of what appears to me to be an ischemic limb.        Review of Systems   14 point review of systems negative except for as per HPI    Otherwise complete ROS reviewed and negative except as mentioned in the HPI.    Past Medical History:   Past Medical History:   Diagnosis Date   • Diabetes mellitus (CMS/[facility])    • DVT (deep venous thrombosis) (CMS/[facility])     RLL   • Hypertension    • PVD (peripheral vascular disease) (CMS/[facility])      Past Surgical History:  Past Surgical History:  "  Procedure Laterality Date   • CHOLECYSTECTOMY     • KNEE SURGERY     • LEG SURGERY       Social History:  reports that she has never smoked. She has never used smokeless tobacco. She reports that she does not drink alcohol or use drugs.    Family History: family history includes Cancer in her mother and sister.       Allergies:  Allergies   Allergen Reactions   • Codeine Other (See Comments)     Medications:  Prior to Admission medications    Medication Sig Start Date End Date Taking? Authorizing Provider   acetaminophen (TYLENOL) 325 MG tablet Take 650 mg by mouth As Needed.    Ana Arce MD   aspirin 81 MG EC tablet Take 81 mg by mouth Daily.    Ana Arce MD   atorvastatin (LIPITOR) 20 MG tablet Take 20 mg by mouth Daily. 12/3/20   Ana Arce MD   enoxaparin (LOVENOX) 80 MG/0.8ML solution syringe Inject 80 mg under the skin into the appropriate area as directed Every 12 (Twelve) Hours.    Ana Arce MD   gabapentin (NEURONTIN) 600 MG tablet Take 600 mg by mouth Take As Directed. 12/3/20   Ana Arce MD   HYDROcodone-acetaminophen (NORCO)  MG per tablet Take 1 tablet by mouth 1 (One) Time Per Week. 12/3/20   Ana Arce MD   lisinopril (PRINIVIL,ZESTRIL) 20 MG tablet Take 20 mg by mouth Take As Directed. 12/3/20   Ana Arce MD   metFORMIN (GLUCOPHAGE) 500 MG tablet Take 500 mg by mouth Take As Directed. 12/3/20   Ana Arce MD   Rivaroxaban (XARELTO) tablet therapy pack starter pack Take one 15 mg tablet twice daily with food for 21 days.  Followed by one 20 mg tablet by mouth once daily with food. Take as directed 12/10/20   Morgan Owens,      Objective     Vital Signs: /47 (BP Location: Right arm, Patient Position: Lying)   Pulse 80   Temp 98.2 °F (36.8 °C) (Oral)   Resp 18   Ht 154.9 cm (61\")   Wt 80.6 kg (177 lb 12.8 oz)   SpO2 96%   BMI 33.60 kg/m²   Physical Exam   Gen.:  Well-nourished " well-developed white female in no acute distress  HEENT: Atraumatic, normocephalic.  Pupils equal, round, and reactive to light. Extraocular movements intact.  Sclerae anicteric.  External ears negative.  Mucous membranes moist.  Neck is supple without lymphadenopathy.  No JVD is noted.  No carotid bruits are auscultated.  Oropharynx is without erythema or exudate.   Chest: Clear to auscultation and percussion.  CV: Regular rate and rhythm.  Normal S1-S2.  No gallops, murmurs. or rubs.  Abdomen: Soft, nontender, nondistended.  Active bowel sounds,  No hepatosplenomegaly.  No masses.  No hernias.  Extremities: No clubbing, edema, or cyanosis.  In left leg or arms however right leg is extremely cyanotic dusky cold to the touch pulses are not able to be palpated or dopplered capillary refill is normal.  Is also extremely sensitive to touch she has an eschar on the tip of the right great toe and evidence of almost what looks like necrosis in the pinky toe pulses are 2+ and symmetric at radial and dorsalis pedis.  Posterior tibial pulses are intact and 2+ palpable.  In left extremity  Neuro: Patient is awake, alert, and oriented ×3.  Cranial nerves II through XII are grossly intact.  Motor is 5 out of 5 bilaterally.  DTRs are 2+ and symmetric bilaterally. Sensory exam is nonfocal  Skin: Warm, dry, and intact.  No evidence of breakdown.  Sensorium: Normal thought and affect    Nursing notes and vital signs reviewed        Results Reviewed:  Lab Results (last 24 hours)     Procedure Component Value Units Date/Time    aPTT [[id number]]  (Abnormal) Collected: 01/05/21 0308    Specimen: Blood Updated: 01/05/21 0326     PTT 47.9 seconds     CBC & Differential [[id number]]  (Abnormal) Collected: 01/05/21 0308    Specimen: Blood Updated: 01/05/21 0323    Narrative:      The following orders were created for panel order CBC & Differential.  Procedure                               Abnormality         Status                      ---------                               -----------         ------                     CBC Auto Differential[[id number]]        Abnormal            Final result                 Please view results for these tests on the individual orders.    CBC Auto Differential [[id number]]  (Abnormal) Collected: 01/05/21 0308    Specimen: Blood Updated: 01/05/21 0323     WBC 8.31 10*3/mm3      RBC 2.93 10*6/mm3      Hemoglobin 8.7 g/dL      Hematocrit 26.6 %      MCV 90.8 fL      MCH 29.7 pg      MCHC 32.7 g/dL      RDW 13.4 %      RDW-SD 44.3 fl      MPV 9.9 fL      Platelets 348 10*3/mm3      Neutrophil % 53.6 %      Lymphocyte % 31.5 %      Monocyte % 10.3 %      Eosinophil % 2.6 %      Basophil % 0.6 %      Immature Grans % 1.4 %      Neutrophils, Absolute 4.44 10*3/mm3      Lymphocytes, Absolute 2.62 10*3/mm3      Monocytes, Absolute 0.86 10*3/mm3      Eosinophils, Absolute 0.22 10*3/mm3      Basophils, Absolute 0.05 10*3/mm3      Immature Grans, Absolute 0.12 10*3/mm3      nRBC 0.0 /100 WBC     COVID PRE-OP / PRE-PROCEDURE SCREENING ORDER (NO ISOLATION) - Swab, Nasal Cavity [[id number]]  (Normal) Collected: 01/04/21 2123    Specimen: Swab from Nasal Cavity Updated: 01/04/21 2242    Narrative:      The following orders were created for panel order COVID PRE-OP / PRE-PROCEDURE SCREENING ORDER (NO ISOLATION) - Swab, Nasal Cavity.  Procedure                               Abnormality         Status                     ---------                               -----------         ------                     COVID-19,[employer] IN-VASHTI...[[id number]]  Normal              Final result                 Please view results for these tests on the individual orders.    COVID-19,[employer] IN-HOUSE,Nasal Swab No Transport Media 3-4 HR TAT - Swab, Nasal Cavity [[id number]]  (Normal) Collected: 01/04/21 2123    Specimen: Swab from Nasal Cavity Updated: 01/04/21 2242     COVID19 Not Detected    Narrative:      Fact sheet for providers:  https://www.fda.gov/media/626204/download     Fact sheet for patients: https://www.fda.gov/media/859239/download    Test performed by PCR.    Blood Culture - Blood, Blood, Venous Line [[id number]] Collected: 01/04/21 2122    Specimen: Blood, Venous Line Updated: 01/04/21 2136    Basic Metabolic Panel [[id number]]  (Abnormal) Collected: 01/04/21 2003    Specimen: Blood Updated: 01/04/21 2035     Glucose 184 mg/dL      BUN 22 mg/dL      Creatinine 1.54 mg/dL      Sodium 136 mmol/L      Potassium 4.8 mmol/L      Chloride 103 mmol/L      CO2 23.0 mmol/L      Calcium 9.7 mg/dL      eGFR Non African Amer 33 mL/min/1.73      BUN/Creatinine Ratio 14.3     Anion Gap 10.0 mmol/L     Narrative:      GFR Normal >60  Chronic Kidney Disease <60  Kidney Failure <15      C-reactive Protein [[id number]]  (Abnormal) Collected: 01/04/21 2003    Specimen: Blood Updated: 01/04/21 2033     C-Reactive Protein 0.99 mg/dL     Lactic Acid, Plasma [[id number]]  (Normal) Collected: 01/04/21 2003    Specimen: Blood Updated: 01/04/21 2032     Lactate 1.2 mmol/L     aPTT [[id number]]  (Abnormal) Collected: 01/04/21 2004    Specimen: Blood Updated: 01/04/21 2020     PTT 44.4 seconds     Protime-INR [[id number]]  (Abnormal) Collected: 01/04/21 2004    Specimen: Blood Updated: 01/04/21 2020     Protime 23.3 Seconds      INR 2.09    Sedimentation Rate [[id number]]  (Abnormal) Collected: 01/04/21 2003    Specimen: Blood Updated: 01/04/21 2019     Sed Rate 53 mm/hr     CBC & Differential [[id number]]  (Abnormal) Collected: 01/04/21 2003    Specimen: Blood Updated: 01/04/21 2013    Narrative:      The following orders were created for panel order CBC & Differential.  Procedure                               Abnormality         Status                     ---------                               -----------         ------                     CBC Auto Differential[[id number]]        Abnormal            Final result                 Please view results for these tests  on the individual orders.    CBC Auto Differential [[id number]]  (Abnormal) Collected: 01/04/21 2003    Specimen: Blood Updated: 01/04/21 2013     WBC 9.35 10*3/mm3      RBC 3.50 10*6/mm3      Hemoglobin 10.3 g/dL      Hematocrit 31.5 %      MCV 90.0 fL      MCH 29.4 pg      MCHC 32.7 g/dL      RDW 13.4 %      RDW-SD 43.7 fl      MPV 9.7 fL      Platelets 434 10*3/mm3      Neutrophil % 61.7 %      Lymphocyte % 26.0 %      Monocyte % 8.7 %      Eosinophil % 2.2 %      Basophil % 0.5 %      Immature Grans % 0.9 %      Neutrophils, Absolute 5.77 10*3/mm3      Lymphocytes, Absolute 2.43 10*3/mm3      Monocytes, Absolute 0.81 10*3/mm3      Eosinophils, Absolute 0.21 10*3/mm3      Basophils, Absolute 0.05 10*3/mm3      Immature Grans, Absolute 0.08 10*3/mm3      nRBC 0.0 /100 WBC     Blood Culture - Blood, Arm, Left [[id number]] Collected: 01/04/21 2004    Specimen: Blood from Arm, Left Updated: 01/04/21 2011        Imaging Results (Last 24 Hours)     Procedure Component Value Units Date/Time    US Arterial Doppler Lower Extremity Right [[id number]] Resulted: 01/04/21 2140     Updated: 01/04/21 2231    US Venous Doppler Lower Extremity Right (duplex) [[id number]] Resulted: 01/04/21 2140     Updated: 01/04/21 2225    XR Foot 3+ View Right [[id number]] Collected: 01/04/21 2201     Updated: 01/04/21 2206    Narrative:      RIGHT FOOT 3 views 1/4/2021 9:42 PM CST     HISTORY: foot sores     COMPARISON: Radiographs dated 12/26/2020      FINDINGS:   Frontal, lateral and oblique radiographs of the right foot were provided  for review.      The bones are diffusely osteopenic. No acute fracture is seen. No joint  subluxation. Interphalangeal joint flexion deformities which are  degenerative. Additional moderate degenerative change at the first  metatarsophalangeal joint as well as diffusely throughout the midfoot.  No destructive osseous changes are seen. Atheromatous vascular  calcification identified. No capsular distention at the  ankle joint. The  subtalar joint is unremarkable. Small plantar calcaneal spur. Quite  prominent Achilles enthesophyte.       Impression:      1. Osteopenia with advanced osteoarthritis changes. No visualized acute  fracture or destructive osseous change to suggest osteomyelitis.  This report was finalized on 01/04/2021 22:03 by Dr Alfa Wilson, .        I have personally reviewed and interpreted the radiology studies and ECG obtained at time of admission.     Assessment / Plan     Assessment:   Active Hospital Problems    Diagnosis   • Hyperglycemia   • PAD (peripheral artery disease) (CMS/HCC)   • Essential hypertension   • Nonhealing ulcer of right lower leg with fat layer exposed (CMS/HCC)   • Atherosclerosis of artery of extremity with intermittent claudication (CMS/HCC)   1.  Ulceration of the right leg with possible ischemic limb plans to admit the patient heparin drip will be continued it was started in the ER.  Pain medication will be given.  Consult vascular surgery.  We will give her empiric antibiotics on the offhand chance that she does have an infection there as well.  Culture blood monitor labs.  2.  Peripheral vascular is as above.  3.  Hyperglycemia check A1c Accu-Chek sliding scale insulin as needed.  4.  Hypertension stable continue current meds monitor BP.      Patient seen after midnight         Code Status: DNR/DNI     I discussed the patient's findings and my recommendations with the patient.  She designates her daughter as her surrogate healthcare decision maker.    Estimated length of stay greater than 2 nights.    Patient seen and examined by me on January 5, 2021 at at 12:20 AM.    Electronically signed by Abner Castro MD, 01/05/21, 08:33 CST.

## 2021-01-05 NOTE — ED NOTES
Called report to [facility]. Labs, medications, and all other results reviewed. Made them aware that IV pump will be needed in the room. Will take pt up via stretcher in stable condition to room 390.      Ruthie Bridges, RN  01/05/21 0129       Ruthie Bridges RN  01/05/21 0419

## 2021-01-06 ENCOUNTER — APPOINTMENT (OUTPATIENT)
Dept: INTERVENTIONAL RADIOLOGY/VASCULAR | Facility: HOSPITAL | Age: 81
End: 2021-01-06

## 2021-01-06 ENCOUNTER — ANESTHESIA (OUTPATIENT)
Dept: PERIOP | Facility: HOSPITAL | Age: 81
End: 2021-01-06

## 2021-01-06 ENCOUNTER — ANESTHESIA EVENT (OUTPATIENT)
Dept: PERIOP | Facility: HOSPITAL | Age: 81
End: 2021-01-06

## 2021-01-06 LAB
ALBUMIN SERPL-MCNC: 3.4 G/DL (ref 3.5–5.2)
ALBUMIN/GLOB SERPL: 1 G/DL
ALP SERPL-CCNC: 107 U/L (ref 39–117)
ALT SERPL W P-5'-P-CCNC: 28 U/L (ref 1–33)
ANION GAP SERPL CALCULATED.3IONS-SCNC: 7 MMOL/L (ref 5–15)
APTT PPP: 36 SECONDS (ref 24.1–35)
APTT PPP: 37 SECONDS (ref 24.1–35)
APTT PPP: 37.1 SECONDS (ref 24.1–35)
APTT PPP: 61.8 SECONDS (ref 24.1–35)
AST SERPL-CCNC: 48 U/L (ref 1–32)
BACTERIA UR QL AUTO: ABNORMAL /HPF
BASOPHILS # BLD AUTO: 0.06 10*3/MM3 (ref 0–0.2)
BASOPHILS NFR BLD AUTO: 0.8 % (ref 0–1.5)
BILIRUB SERPL-MCNC: 0.3 MG/DL (ref 0–1.2)
BILIRUB UR QL STRIP: NEGATIVE
BUN SERPL-MCNC: 21 MG/DL (ref 8–23)
BUN/CREAT SERPL: 18.4 (ref 7–25)
CALCIUM SPEC-SCNC: 8.7 MG/DL (ref 8.6–10.5)
CHLORIDE SERPL-SCNC: 106 MMOL/L (ref 98–107)
CLARITY UR: ABNORMAL
CO2 SERPL-SCNC: 23 MMOL/L (ref 22–29)
COLOR UR: ABNORMAL
CREAT SERPL-MCNC: 1.14 MG/DL (ref 0.57–1)
DEPRECATED RDW RBC AUTO: 45.4 FL (ref 37–54)
EOSINOPHIL # BLD AUTO: 0.24 10*3/MM3 (ref 0–0.4)
EOSINOPHIL NFR BLD AUTO: 3.2 % (ref 0.3–6.2)
ERYTHROCYTE [DISTWIDTH] IN BLOOD BY AUTOMATED COUNT: 13.4 % (ref 12.3–15.4)
GFR SERPL CREATININE-BSD FRML MDRD: 46 ML/MIN/1.73
GLOBULIN UR ELPH-MCNC: 3.4 GM/DL
GLUCOSE BLDC GLUCOMTR-MCNC: 156 MG/DL (ref 70–130)
GLUCOSE BLDC GLUCOMTR-MCNC: 157 MG/DL (ref 70–130)
GLUCOSE BLDC GLUCOMTR-MCNC: 169 MG/DL (ref 70–130)
GLUCOSE SERPL-MCNC: 162 MG/DL (ref 65–99)
GLUCOSE UR STRIP-MCNC: ABNORMAL MG/DL
HCT VFR BLD AUTO: 28.8 % (ref 34–46.6)
HGB BLD-MCNC: 9 G/DL (ref 12–15.9)
HGB UR QL STRIP.AUTO: ABNORMAL
HYALINE CASTS UR QL AUTO: ABNORMAL /LPF
IMM GRANULOCYTES # BLD AUTO: 0.05 10*3/MM3 (ref 0–0.05)
IMM GRANULOCYTES NFR BLD AUTO: 0.7 % (ref 0–0.5)
KETONES UR QL STRIP: ABNORMAL
LEUKOCYTE ESTERASE UR QL STRIP.AUTO: ABNORMAL
LYMPHOCYTES # BLD AUTO: 2.2 10*3/MM3 (ref 0.7–3.1)
LYMPHOCYTES NFR BLD AUTO: 29.1 % (ref 19.6–45.3)
MCH RBC QN AUTO: 29 PG (ref 26.6–33)
MCHC RBC AUTO-ENTMCNC: 31.3 G/DL (ref 31.5–35.7)
MCV RBC AUTO: 92.9 FL (ref 79–97)
MONOCYTES # BLD AUTO: 0.72 10*3/MM3 (ref 0.1–0.9)
MONOCYTES NFR BLD AUTO: 9.5 % (ref 5–12)
NEUTROPHILS NFR BLD AUTO: 4.29 10*3/MM3 (ref 1.7–7)
NEUTROPHILS NFR BLD AUTO: 56.7 % (ref 42.7–76)
NITRITE UR QL STRIP: POSITIVE
NRBC BLD AUTO-RTO: 0 /100 WBC (ref 0–0.2)
PH UR STRIP.AUTO: 7 [PH] (ref 5–8)
PLATELET # BLD AUTO: 378 10*3/MM3 (ref 140–450)
PMV BLD AUTO: 9.9 FL (ref 6–12)
POTASSIUM SERPL-SCNC: 4.7 MMOL/L (ref 3.5–5.2)
PROT SERPL-MCNC: 6.8 G/DL (ref 6–8.5)
PROT UR QL STRIP: ABNORMAL
RBC # BLD AUTO: 3.1 10*6/MM3 (ref 3.77–5.28)
RBC # UR: ABNORMAL /HPF
REF LAB TEST METHOD: ABNORMAL
SODIUM SERPL-SCNC: 136 MMOL/L (ref 136–145)
SP GR UR STRIP: 1.02 (ref 1–1.03)
SQUAMOUS #/AREA URNS HPF: ABNORMAL /HPF
UROBILINOGEN UR QL STRIP: ABNORMAL
WBC # BLD AUTO: 7.56 10*3/MM3 (ref 3.4–10.8)
WBC UR QL AUTO: ABNORMAL /HPF

## 2021-01-06 PROCEDURE — C1894 INTRO/SHEATH, NON-LASER: HCPCS | Performed by: SURGERY

## 2021-01-06 PROCEDURE — 97530 THERAPEUTIC ACTIVITIES: CPT

## 2021-01-06 PROCEDURE — C1714 CATH, TRANS ATHERECTOMY, DIR: HCPCS | Performed by: SURGERY

## 2021-01-06 PROCEDURE — C1887 CATHETER, GUIDING: HCPCS | Performed by: SURGERY

## 2021-01-06 PROCEDURE — 80053 COMPREHEN METABOLIC PANEL: CPT | Performed by: INTERNAL MEDICINE

## 2021-01-06 PROCEDURE — 97161 PT EVAL LOW COMPLEX 20 MIN: CPT

## 2021-01-06 PROCEDURE — 75625 CONTRAST EXAM ABDOMINL AORTA: CPT

## 2021-01-06 PROCEDURE — C1769 GUIDE WIRE: HCPCS | Performed by: SURGERY

## 2021-01-06 PROCEDURE — 85730 THROMBOPLASTIN TIME PARTIAL: CPT | Performed by: INTERNAL MEDICINE

## 2021-01-06 PROCEDURE — C1760 CLOSURE DEV, VASC: HCPCS | Performed by: SURGERY

## 2021-01-06 PROCEDURE — 25010000002 FENTANYL CITRATE (PF) 100 MCG/2ML SOLUTION: Performed by: NURSE ANESTHETIST, CERTIFIED REGISTERED

## 2021-01-06 PROCEDURE — 82962 GLUCOSE BLOOD TEST: CPT

## 2021-01-06 PROCEDURE — 75710 ARTERY X-RAYS ARM/LEG: CPT | Performed by: SURGERY

## 2021-01-06 PROCEDURE — 25010000002 HEPARIN (PORCINE) PER 1000 UNITS: Performed by: NURSE ANESTHETIST, CERTIFIED REGISTERED

## 2021-01-06 PROCEDURE — C1725 CATH, TRANSLUMIN NON-LASER: HCPCS | Performed by: SURGERY

## 2021-01-06 PROCEDURE — 25010000002 CEFAZOLIN PER 500 MG: Performed by: NURSE PRACTITIONER

## 2021-01-06 PROCEDURE — 85730 THROMBOPLASTIN TIME PARTIAL: CPT | Performed by: SURGERY

## 2021-01-06 PROCEDURE — 25010000002 PIPERACILLIN SOD-TAZOBACTAM PER 1 G: Performed by: SURGERY

## 2021-01-06 PROCEDURE — 94799 UNLISTED PULMONARY SVC/PX: CPT

## 2021-01-06 PROCEDURE — 25010000002 IOPAMIDOL 61 % SOLUTION: Performed by: SURGERY

## 2021-01-06 PROCEDURE — 25010000002 MORPHINE PER 10 MG: Performed by: INTERNAL MEDICINE

## 2021-01-06 PROCEDURE — 25010000002 HEPARIN (PORCINE) PER 1000 UNITS: Performed by: SURGERY

## 2021-01-06 PROCEDURE — 25010000002 MIDAZOLAM PER 1 MG: Performed by: NURSE ANESTHETIST, CERTIFIED REGISTERED

## 2021-01-06 PROCEDURE — C2623 CATH, TRANSLUMIN, DRUG-COAT: HCPCS | Performed by: SURGERY

## 2021-01-06 PROCEDURE — C1884 EMBOLIZATION PROTECT SYST: HCPCS | Performed by: SURGERY

## 2021-01-06 PROCEDURE — 81001 URINALYSIS AUTO W/SCOPE: CPT | Performed by: INTERNAL MEDICINE

## 2021-01-06 PROCEDURE — 76000 FLUOROSCOPY <1 HR PHYS/QHP: CPT

## 2021-01-06 PROCEDURE — 37232 PR REVSC OPN/PRQ TIB/PERO W/ANGIOPLASTY UNI EA VSL: CPT | Performed by: SURGERY

## 2021-01-06 PROCEDURE — 63710000001 INSULIN LISPRO (HUMAN) PER 5 UNITS: Performed by: INTERNAL MEDICINE

## 2021-01-06 PROCEDURE — 75710 ARTERY X-RAYS ARM/LEG: CPT

## 2021-01-06 PROCEDURE — 25010000002 PROPOFOL 10 MG/ML EMULSION: Performed by: NURSE ANESTHETIST, CERTIFIED REGISTERED

## 2021-01-06 PROCEDURE — 25010000002 VANCOMYCIN 10 G RECONSTITUTED SOLUTION: Performed by: SURGERY

## 2021-01-06 PROCEDURE — 37228 PR REVSC OPN/PRQ TIB/PERO W/ANGIOPLASTY UNI: CPT | Performed by: SURGERY

## 2021-01-06 PROCEDURE — 25010000002 MORPHINE PER 10 MG: Performed by: SURGERY

## 2021-01-06 PROCEDURE — 25010000002 FENTANYL CITRATE (PF) 100 MCG/2ML SOLUTION: Performed by: ANESTHESIOLOGY

## 2021-01-06 PROCEDURE — 85025 COMPLETE CBC W/AUTO DIFF WBC: CPT | Performed by: INTERNAL MEDICINE

## 2021-01-06 PROCEDURE — 25010000002 PIPERACILLIN SOD-TAZOBACTAM PER 1 G: Performed by: INTERNAL MEDICINE

## 2021-01-06 PROCEDURE — 75625 CONTRAST EXAM ABDOMINL AORTA: CPT | Performed by: SURGERY

## 2021-01-06 PROCEDURE — 37225 PR REVSC OPN/PRQ FEM/POP W/ATHRC/ANGIOP SM VSL: CPT | Performed by: SURGERY

## 2021-01-06 RX ORDER — ACETAMINOPHEN 325 MG/1
650 TABLET ORAL EVERY 4 HOURS PRN
Status: DISCONTINUED | OUTPATIENT
Start: 2021-01-06 | End: 2021-01-06 | Stop reason: SDUPTHER

## 2021-01-06 RX ORDER — HEPARIN SODIUM 1000 [USP'U]/ML
INJECTION, SOLUTION INTRAVENOUS; SUBCUTANEOUS AS NEEDED
Status: DISCONTINUED | OUTPATIENT
Start: 2021-01-06 | End: 2021-01-06 | Stop reason: SURG

## 2021-01-06 RX ORDER — OXYCODONE AND ACETAMINOPHEN 7.5; 325 MG/1; MG/1
1 TABLET ORAL EVERY 4 HOURS PRN
Status: CANCELLED | OUTPATIENT
Start: 2021-01-06 | End: 2021-01-16

## 2021-01-06 RX ORDER — FENTANYL CITRATE 50 UG/ML
25 INJECTION, SOLUTION INTRAMUSCULAR; INTRAVENOUS
Status: DISCONTINUED | OUTPATIENT
Start: 2021-01-06 | End: 2021-01-06 | Stop reason: HOSPADM

## 2021-01-06 RX ORDER — SODIUM CHLORIDE 0.9 % (FLUSH) 0.9 %
3 SYRINGE (ML) INJECTION EVERY 12 HOURS SCHEDULED
Status: DISCONTINUED | OUTPATIENT
Start: 2021-01-06 | End: 2021-01-10 | Stop reason: HOSPADM

## 2021-01-06 RX ORDER — SODIUM CHLORIDE 0.9 % (FLUSH) 0.9 %
10 SYRINGE (ML) INJECTION AS NEEDED
Status: DISCONTINUED | OUTPATIENT
Start: 2021-01-06 | End: 2021-01-10 | Stop reason: HOSPADM

## 2021-01-06 RX ORDER — MIDAZOLAM HYDROCHLORIDE 1 MG/ML
1 INJECTION INTRAMUSCULAR; INTRAVENOUS
Status: COMPLETED | OUTPATIENT
Start: 2021-01-06 | End: 2021-01-06

## 2021-01-06 RX ORDER — SODIUM CHLORIDE 0.9 % (FLUSH) 0.9 %
3-10 SYRINGE (ML) INJECTION AS NEEDED
Status: DISCONTINUED | OUTPATIENT
Start: 2021-01-06 | End: 2021-01-06 | Stop reason: HOSPADM

## 2021-01-06 RX ORDER — LISINOPRIL 20 MG/1
20 TABLET ORAL
Status: DISCONTINUED | OUTPATIENT
Start: 2021-01-06 | End: 2021-01-08

## 2021-01-06 RX ORDER — BUPIVACAINE HCL/0.9 % NACL/PF 0.1 %
2 PLASTIC BAG, INJECTION (ML) EPIDURAL ONCE
Status: COMPLETED | OUTPATIENT
Start: 2021-01-06 | End: 2021-01-06

## 2021-01-06 RX ORDER — LABETALOL HYDROCHLORIDE 5 MG/ML
INJECTION, SOLUTION INTRAVENOUS AS NEEDED
Status: DISCONTINUED | OUTPATIENT
Start: 2021-01-06 | End: 2021-01-06 | Stop reason: SURG

## 2021-01-06 RX ORDER — SODIUM CHLORIDE, SODIUM LACTATE, POTASSIUM CHLORIDE, CALCIUM CHLORIDE 600; 310; 30; 20 MG/100ML; MG/100ML; MG/100ML; MG/100ML
100 INJECTION, SOLUTION INTRAVENOUS CONTINUOUS
Status: DISCONTINUED | OUTPATIENT
Start: 2021-01-06 | End: 2021-01-06

## 2021-01-06 RX ORDER — PROPOFOL 10 MG/ML
VIAL (ML) INTRAVENOUS AS NEEDED
Status: DISCONTINUED | OUTPATIENT
Start: 2021-01-06 | End: 2021-01-06 | Stop reason: SURG

## 2021-01-06 RX ORDER — FAMOTIDINE 10 MG/ML
20 INJECTION, SOLUTION INTRAVENOUS
Status: COMPLETED | OUTPATIENT
Start: 2021-01-06 | End: 2021-01-06

## 2021-01-06 RX ORDER — OXYCODONE HYDROCHLORIDE AND ACETAMINOPHEN 5; 325 MG/1; MG/1
1 TABLET ORAL ONCE AS NEEDED
Status: DISCONTINUED | OUTPATIENT
Start: 2021-01-06 | End: 2021-01-06 | Stop reason: HOSPADM

## 2021-01-06 RX ORDER — IBUPROFEN 600 MG/1
600 TABLET ORAL ONCE AS NEEDED
Status: DISCONTINUED | OUTPATIENT
Start: 2021-01-06 | End: 2021-01-06 | Stop reason: HOSPADM

## 2021-01-06 RX ORDER — LABETALOL HYDROCHLORIDE 5 MG/ML
5 INJECTION, SOLUTION INTRAVENOUS
Status: DISCONTINUED | OUTPATIENT
Start: 2021-01-06 | End: 2021-01-06 | Stop reason: HOSPADM

## 2021-01-06 RX ORDER — LABETALOL HYDROCHLORIDE 5 MG/ML
20 INJECTION, SOLUTION INTRAVENOUS
Status: DISCONTINUED | OUTPATIENT
Start: 2021-01-06 | End: 2021-01-10 | Stop reason: HOSPADM

## 2021-01-06 RX ORDER — ONDANSETRON 4 MG/1
4 TABLET, FILM COATED ORAL EVERY 6 HOURS PRN
Status: DISCONTINUED | OUTPATIENT
Start: 2021-01-06 | End: 2021-01-10 | Stop reason: HOSPADM

## 2021-01-06 RX ORDER — FLUMAZENIL 0.1 MG/ML
0.2 INJECTION INTRAVENOUS AS NEEDED
Status: DISCONTINUED | OUTPATIENT
Start: 2021-01-06 | End: 2021-01-06 | Stop reason: HOSPADM

## 2021-01-06 RX ORDER — NALOXONE HCL 0.4 MG/ML
0.4 VIAL (ML) INJECTION AS NEEDED
Status: DISCONTINUED | OUTPATIENT
Start: 2021-01-06 | End: 2021-01-06 | Stop reason: HOSPADM

## 2021-01-06 RX ORDER — BUPIVACAINE HYDROCHLORIDE 5 MG/ML
INJECTION, SOLUTION EPIDURAL; INTRACAUDAL AS NEEDED
Status: DISCONTINUED | OUTPATIENT
Start: 2021-01-06 | End: 2021-01-06 | Stop reason: HOSPADM

## 2021-01-06 RX ORDER — ZIPRASIDONE MESYLATE 20 MG/ML
10 INJECTION, POWDER, LYOPHILIZED, FOR SOLUTION INTRAMUSCULAR ONCE
Status: DISCONTINUED | OUTPATIENT
Start: 2021-01-06 | End: 2021-01-10 | Stop reason: HOSPADM

## 2021-01-06 RX ORDER — METOPROLOL TARTRATE 5 MG/5ML
INJECTION INTRAVENOUS AS NEEDED
Status: DISCONTINUED | OUTPATIENT
Start: 2021-01-06 | End: 2021-01-06 | Stop reason: SURG

## 2021-01-06 RX ORDER — ONDANSETRON 2 MG/ML
4 INJECTION INTRAMUSCULAR; INTRAVENOUS ONCE AS NEEDED
Status: DISCONTINUED | OUTPATIENT
Start: 2021-01-06 | End: 2021-01-06 | Stop reason: HOSPADM

## 2021-01-06 RX ORDER — FENTANYL CITRATE 50 UG/ML
25 INJECTION, SOLUTION INTRAMUSCULAR; INTRAVENOUS
Status: COMPLETED | OUTPATIENT
Start: 2021-01-06 | End: 2021-01-06

## 2021-01-06 RX ORDER — CLOPIDOGREL BISULFATE 75 MG/1
75 TABLET ORAL DAILY
Status: DISCONTINUED | OUTPATIENT
Start: 2021-01-06 | End: 2021-01-10 | Stop reason: HOSPADM

## 2021-01-06 RX ORDER — LIDOCAINE HYDROCHLORIDE 10 MG/ML
0.5 INJECTION, SOLUTION EPIDURAL; INFILTRATION; INTRACAUDAL; PERINEURAL ONCE AS NEEDED
Status: DISCONTINUED | OUTPATIENT
Start: 2021-01-06 | End: 2021-01-06 | Stop reason: HOSPADM

## 2021-01-06 RX ORDER — FENTANYL CITRATE 50 UG/ML
50 INJECTION, SOLUTION INTRAMUSCULAR; INTRAVENOUS
Status: DISCONTINUED | OUTPATIENT
Start: 2021-01-06 | End: 2021-01-06

## 2021-01-06 RX ORDER — SODIUM CHLORIDE 0.9 % (FLUSH) 0.9 %
3 SYRINGE (ML) INJECTION EVERY 12 HOURS SCHEDULED
Status: DISCONTINUED | OUTPATIENT
Start: 2021-01-06 | End: 2021-01-06 | Stop reason: HOSPADM

## 2021-01-06 RX ORDER — ONDANSETRON 2 MG/ML
4 INJECTION INTRAMUSCULAR; INTRAVENOUS EVERY 6 HOURS PRN
Status: DISCONTINUED | OUTPATIENT
Start: 2021-01-06 | End: 2021-01-10 | Stop reason: HOSPADM

## 2021-01-06 RX ORDER — FENTANYL CITRATE 50 UG/ML
INJECTION, SOLUTION INTRAMUSCULAR; INTRAVENOUS AS NEEDED
Status: DISCONTINUED | OUTPATIENT
Start: 2021-01-06 | End: 2021-01-06 | Stop reason: SURG

## 2021-01-06 RX ADMIN — MORPHINE SULFATE 6 MG: 10 INJECTION, SOLUTION INTRAMUSCULAR; INTRAVENOUS at 08:21

## 2021-01-06 RX ADMIN — FENTANYL CITRATE 50 MCG: 50 INJECTION, SOLUTION INTRAMUSCULAR; INTRAVENOUS at 17:50

## 2021-01-06 RX ADMIN — FENTANYL CITRATE 25 MCG: 50 INJECTION, SOLUTION INTRAMUSCULAR; INTRAVENOUS at 13:44

## 2021-01-06 RX ADMIN — HEPARIN SODIUM 1000 UNITS: 1000 INJECTION, SOLUTION INTRAVENOUS; SUBCUTANEOUS at 15:16

## 2021-01-06 RX ADMIN — FENTANYL CITRATE 25 MCG: 50 INJECTION, SOLUTION INTRAMUSCULAR; INTRAVENOUS at 13:49

## 2021-01-06 RX ADMIN — CLOPIDOGREL 75 MG: 75 TABLET, FILM COATED ORAL at 21:44

## 2021-01-06 RX ADMIN — METOPROLOL TARTRATE 2 MG: 5 INJECTION INTRAVENOUS at 16:01

## 2021-01-06 RX ADMIN — FENTANYL CITRATE 100 MCG: 50 INJECTION, SOLUTION INTRAMUSCULAR; INTRAVENOUS at 15:06

## 2021-01-06 RX ADMIN — MIDAZOLAM HYDROCHLORIDE 1 MG: 2 INJECTION, SOLUTION INTRAMUSCULAR; INTRAVENOUS at 17:51

## 2021-01-06 RX ADMIN — SODIUM CHLORIDE, PRESERVATIVE FREE 3 ML: 5 INJECTION INTRAVENOUS at 20:32

## 2021-01-06 RX ADMIN — LABETALOL 20 MG/4 ML (5 MG/ML) INTRAVENOUS SYRINGE 15 MG: at 16:29

## 2021-01-06 RX ADMIN — METOPROLOL TARTRATE 2 MG: 5 INJECTION INTRAVENOUS at 16:05

## 2021-01-06 RX ADMIN — LABETALOL 20 MG/4 ML (5 MG/ML) INTRAVENOUS SYRINGE 15 MG: at 16:07

## 2021-01-06 RX ADMIN — FENTANYL CITRATE 25 MCG: 50 INJECTION, SOLUTION INTRAMUSCULAR; INTRAVENOUS at 17:10

## 2021-01-06 RX ADMIN — GABAPENTIN 600 MG: 300 CAPSULE ORAL at 06:07

## 2021-01-06 RX ADMIN — PROPOFOL 100 MCG/KG/MIN: 10 INJECTION, EMULSION INTRAVENOUS at 15:06

## 2021-01-06 RX ADMIN — MIDAZOLAM HYDROCHLORIDE 1 MG: 2 INJECTION, SOLUTION INTRAMUSCULAR; INTRAVENOUS at 18:00

## 2021-01-06 RX ADMIN — HYDROCODONE BITARTRATE AND ACETAMINOPHEN 1 TABLET: 10; 325 TABLET ORAL at 23:14

## 2021-01-06 RX ADMIN — TAZOBACTAM SODIUM AND PIPERACILLIN SODIUM 3.38 G: 375; 3 INJECTION, SOLUTION INTRAVENOUS at 20:32

## 2021-01-06 RX ADMIN — SODIUM CHLORIDE, PRESERVATIVE FREE 10 ML: 5 INJECTION INTRAVENOUS at 20:32

## 2021-01-06 RX ADMIN — INSULIN LISPRO 2 UNITS: 100 INJECTION, SOLUTION INTRAVENOUS; SUBCUTANEOUS at 08:21

## 2021-01-06 RX ADMIN — FENTANYL CITRATE 25 MCG: 50 INJECTION, SOLUTION INTRAMUSCULAR; INTRAVENOUS at 13:54

## 2021-01-06 RX ADMIN — PROPOFOL 50 MG: 10 INJECTION, EMULSION INTRAVENOUS at 15:06

## 2021-01-06 RX ADMIN — Medication 2 G: at 15:06

## 2021-01-06 RX ADMIN — HEPARIN SODIUM 4000 UNITS: 1000 INJECTION, SOLUTION INTRAVENOUS; SUBCUTANEOUS at 15:23

## 2021-01-06 RX ADMIN — FENTANYL CITRATE 25 MCG: 50 INJECTION, SOLUTION INTRAMUSCULAR; INTRAVENOUS at 17:15

## 2021-01-06 RX ADMIN — TAZOBACTAM SODIUM AND PIPERACILLIN SODIUM 3.38 G: 375; 3 INJECTION, SOLUTION INTRAVENOUS at 08:22

## 2021-01-06 RX ADMIN — FENTANYL CITRATE 25 MCG: 50 INJECTION, SOLUTION INTRAMUSCULAR; INTRAVENOUS at 17:20

## 2021-01-06 RX ADMIN — VANCOMYCIN HYDROCHLORIDE 1250 MG: 10 INJECTION, POWDER, LYOPHILIZED, FOR SOLUTION INTRAVENOUS at 22:12

## 2021-01-06 RX ADMIN — FAMOTIDINE 20 MG: 10 INJECTION INTRAVENOUS at 13:44

## 2021-01-06 RX ADMIN — SODIUM CHLORIDE, POTASSIUM CHLORIDE, SODIUM LACTATE AND CALCIUM CHLORIDE 100 ML/HR: 600; 310; 30; 20 INJECTION, SOLUTION INTRAVENOUS at 13:43

## 2021-01-06 RX ADMIN — LABETALOL 20 MG/4 ML (5 MG/ML) INTRAVENOUS SYRINGE 15 MG: at 16:21

## 2021-01-06 RX ADMIN — GABAPENTIN 600 MG: 300 CAPSULE ORAL at 21:44

## 2021-01-06 RX ADMIN — FENTANYL CITRATE 25 MCG: 50 INJECTION, SOLUTION INTRAMUSCULAR; INTRAVENOUS at 17:05

## 2021-01-06 RX ADMIN — SODIUM CHLORIDE 85 ML/HR: 9 INJECTION, SOLUTION INTRAVENOUS at 06:07

## 2021-01-06 RX ADMIN — MORPHINE SULFATE 6 MG: 10 INJECTION, SOLUTION INTRAMUSCULAR; INTRAVENOUS at 21:45

## 2021-01-06 NOTE — THERAPY EVALUATION
Patient Name: Evelyn Ojeda  : 1940    MRN: [id number]                              Today's Date: 2021       Admit Date: 2021    Visit Dx:     ICD-10-CM ICD-9-CM   1. PAD (peripheral artery disease) (CMS/[facility])  I73.9 443.9   2. Cellulitis, unspecified cellulitis site  L03.90 682.9   3. Gait abnormality  R26.9 781.2     Patient Active Problem List   Diagnosis   • PAD (peripheral artery disease) (CMS/[facility])   • Essential hypertension   • Mixed hyperlipidemia   • Bilateral carotid artery stenosis   • Acute deep vein thrombosis (DVT) of distal vein of right lower extremity (CMS/[facility])   • Atherosclerosis of artery of extremity with intermittent claudication (CMS/[facility])   • Chronic osteoarthritis   • Decreased pedal pulses   • Nonhealing ulcer of right lower leg with fat layer exposed (CMS/[facility])   • Hyperglycemia   • Type 2 diabetes mellitus with hyperglycemia, without long-term current use of insulin (CMS/[facility])     Past Medical History:   Diagnosis Date   • Diabetes mellitus (CMS/[facility])    • DVT (deep venous thrombosis) (CMS/[facility])     RLL   • Hypertension    • PVD (peripheral vascular disease) (CMS/[facility])      Past Surgical History:   Procedure Laterality Date   • CHOLECYSTECTOMY     • KNEE SURGERY     • LEG SURGERY       General Information     Row Name 21 1006          Physical Therapy Time and Intention    Document Type  evaluation;other (see comments) see MAR  -JE     Mode of Treatment  physical therapy  -     Row Name 21 1006          General Information    Patient Profile Reviewed  yes  -JE     Prior Level of Function  independent:;all household mobility;community mobility;ADL's;home management;driving;shopping;using stairs;yard work  -     Existing Precautions/Restrictions  fall;other (see comments) black eschar at tip of R great toe and between 4th and 5th toe on R  -JE     Barriers to Rehab  physical barrier;medically complex  -JE     Row Name 21 1006          Living Environment    Lives  With  child(anselmo), adult;other (see comments) lives w/ 2 dtrs; one more dtr lives on her property  -     Row Name 01/06/21 1006          Home Main Entrance    Number of Stairs, Main Entrance  other (see comments) has a ramp to enter her home  -     Row Name 01/06/21 1006          Stairs Within Home, Primary    Number of Stairs, Within Home, Primary  none  -     Row Name 01/06/21 1006          Cognition    Orientation Status (Cognition)  oriented x 4  -     Row Name 01/06/21 1006          Safety Issues, Functional Mobility    Safety Issues Affecting Function (Mobility)  safety precaution awareness  -     Impairments Affecting Function (Mobility)  balance;endurance/activity tolerance;pain;other (see comments) R foot swelling, erythema, pain, and w/ eschar R distal great toe and btwn 4th and 5th digits  -     Row Name 01/06/21 1006          Relationship/Environment    Name(s) of Who Lives With Patient  2 dtrs, Ana Lobo  -       User Key  (r) = Recorded By, (t) = Taken By, (c) = Cosigned By    Initials Name Provider Type    Nereida Orozco, PT Physical Therapist        Mobility     Row Name 01/06/21 1006          Bed Mobility    Bed Mobility  sit-supine;scooting/bridging  -     Scooting/Bridging [place] (Bed Mobility)  independent  -     Sit-Supine [place] (Bed Mobility)  independent  -     Row Name 01/06/21 1006          Transfers    Comment (Transfers)  tfer bed to/from BSC w/ SBA to CGA; tends to keep wt on R heel w/ tfer  -     Row Name 01/06/21 1006          Sit-Stand Transfer    Sit-Stand [place] (Transfers)  contact guard;verbal cues  -     Assistive Device (Sit-Stand Transfers)  walker, front-wheeled  -     Row Name 01/06/21 1006          Gait/Stairs (Locomotion)    Distance in Feet (Gait)  2 steps at bedside w/ rwx; tends to bear wt on her R heel; discussed reducing wt bearing R LE utilizing rwx, however pt unable to return demo at this time  -NATALY      Assistive Device (Stairs)  walker, front-wheeled  -       User Key  (r) = Recorded By, (t) = Taken By, (c) = Cosigned By    Initials Name Provider Type    Nereida Orozco, PT Physical Therapist        Obj/Interventions     Row Name 01/06/21 1006          Range of Motion Comprehensive    Comment, General Range of Motion  AROM all 4 extremities WFLs  -[facility] Name 01/06/21 1006          Strength Comprehensive (MMT)    Comment, General Manual Muscle Testing (MMT) Assessment  B shld flexors 4-/5, B elbow flex/extensors 4+/5, B LEs grossly 4+/5 except B hip flexors 4-/5 and R ankle not formally assessed however pt able to move R foot against gravity w/out difficulty  -[facility] Name 01/06/21 1006          Balance    Balance Assessment  sitting static balance;standing static balance  -     Static Sitting Balance  WNL  -     Static Standing Balance  mild impairment;supported;standing compensated standing due to R foot pain/swelling  -[facility] Name 01/06/21 1006          Sensory Assessment (Somatosensory)    Sensory Assessment (Somatosensory)  other (see comments) reports the toes feel heavy; they tingle  -       User Key  (r) = Recorded By, (t) = Taken By, (c) = Cosigned By    Initials Name Provider Type    Nereida Orozco, PT Physical Therapist        Goals/Plan     [facility] Name 01/06/21 1006          Bed Mobility Goal 1 (PT)    Activity/Assistive Device (Bed Mobility Goal 1, PT)  bed mobility activities, all  -     [place] Level/Cues Needed (Bed Mobility Goal 1, PT)  independent  -     Time Frame (Bed Mobility Goal 1, PT)  long term goal (LTG);10 days  -     Progress/Outcomes (Bed Mobility Goal 1, PT)  goal ongoing  -[facility] Name 01/06/21 1006          Transfer Goal 1 (PT)    Activity/Assistive Device (Transfer Goal 1, PT)  sit-to-stand/stand-to-sit;bed-to-chair/chair-to-bed;walker, rolling  -     [place] Level/Cues Needed (Transfer Goal 1, PT)  independent  -     Time Frame  (Transfer Goal 1, PT)  long term goal (LTG);10 days  -JE     Strategies/Barriers (Transfers Goal 1, PT)  wt bearing R LE per MD  -JE     Progress/Outcome (Transfer Goal 1, PT)  goal ongoing  -     Row Name 01/06/21 1006          Gait Training Goal 1 (PT)    Activity/Assistive Device (Gait Training Goal 1, PT)  gait (walking locomotion);assistive device use;decrease fall risk;improve balance and speed;increase endurance/gait distance;maintain weight-bearing status;walker, rolling  -JE     [place] Level (Gait Training Goal 1, PT)  standby assist  -JE     Distance (Gait Training Goal 1, PT)  10-15 ft maintaining wt bearing as MD prescribes  -JE     Time Frame (Gait Training Goal 1, PT)  long term goal (LTG);10 days  -JE     Progress/Outcome (Gait Training Goal 1, PT)  goal ongoing  -     Row Name 01/06/21 1006          Patient Education Goal (PT)    Activity (Patient Education Goal, PT)  HEP for strengthening/ROM  -JE     [place]/Cues/Accuracy (Memory Goal 2, PT)  demonstrates adequately;independent  -JE     Time Frame (Patient Education Goal, PT)  long term goal (LTG);10 days  -JE     Progress/Outcome (Patient Education Goal, PT)  goal ongoing  -       User Key  (r) = Recorded By, (t) = Taken By, (c) = Cosigned By    Initials Name Provider Type    Nereida Orozco, PT Physical Therapist        Clinical Impression     Row Name 01/06/21 1006          Pain    Additional Documentation  Pain Scale: Numbers Pre/Post-Treatment (Group)  -     Row Name 01/06/21 1006          Pain Scale: Numbers Pre/Post-Treatment    Pretreatment Pain Rating  0/10 - no pain  -     Posttreatment Pain Rating  0/10 - no pain when pt returned to bed w/ R LE elevated, pain 10/10; prior to leaving room, pt back up on side of bed - pain decreasing to 7/10 and then on down to 0/10 w/ only occassional sharp pains  -     Pain Location - Side  Right  -     Pain Location - Orientation  lower  -     Pain Location  extremity  -      Pre/Posttreatment Pain Comment  no pain as long as she is moving her R foot, but if she lays down and gets still, then the pain is horrible; reports the pain comes/goes - described as real sharp  -     Pain Intervention(s)  Medication (See MAR);Repositioned;Rest  -     Row Name 01/06/21 1006          Plan of Care Review    Plan of Care Reviewed With  patient  -NATALY     Progress  no change  -     Outcome Summary  PT reggie completed this am.  Pt motivated and eager to improve.  Pt continues w/ foot pain that improves w/ it placed in a dependent position.  Pt w/ noted R foot swelling, erythema and black necrosis at distal R great toe and btwn 4th and 5th digits.  Pt is I w/ bed mobility, tfers w/ CGA and took 2 steps at bedside primarily keeping wt on her R heel.  Pt also performed bed to/from [facility] tfer w/ CGA to SBA as well.  Pt would benefit from offloading shoe and use of rwx for functional mobility.  Education for use of rwx w/ increase UE support when attempting to take steps.  Pt willl benefit from continued PT services to improve overall strength, activity tolerance, balance, safety awareness, and I w/ functional mobility.  Recommend home w/ assist at discharge.  Feel she would also benefit from HH to ensure safey upon return home and further progress activity safely.  -     Row Name 01/06/21 1006          Therapy Assessment/Plan (PT)    Patient/Family Therapy Goals Statement (PT)  decrease pain R LE; return to prior level of function  -     Rehab Potential (PT)  good, to achieve stated therapy goals  -     Criteria for Skilled Interventions Met (PT)  yes;meets criteria;skilled treatment is necessary  -     Predicted Duration of Therapy Intervention (PT)  until discharge or goals achieved  -     Row Name 01/06/21 1006          Vital Signs    O2 Delivery Pre Treatment  room air  -JE     O2 Delivery Intra Treatment  room air  -JE     O2 Delivery Post Treatment  room air  -JE     Pre Patient  Position  Sitting  -JE     Intra Patient Position  Sitting  -JE     Post Patient Position  Sitting  -JE     Row Name 01/06/21 1006          Positioning and Restraints    Pre-Treatment Position  in bed  -JE     Post Treatment Position  bed  -JE     In Bed  sitting EOB;call light within reach;encouraged to call for assist;side rails up x2  -       User Key  (r) = Recorded By, (t) = Taken By, (c) = Cosigned By    Initials Name Provider Type    Nereida Orozco, PT Physical Therapist        Outcome Measures     Row Name 01/06/21 1006          How much help from another person do you currently need...    Turning from your back to your side while in flat bed without using bedrails?  4  -JE     Moving from lying on back to sitting on the side of a flat bed without bedrails?  4  -JE     Moving to and from a bed to a chair (including a wheelchair)?  3  -JE     Standing up from a chair using your arms (e.g., wheelchair, bedside chair)?  3  -JE     Climbing 3-5 steps with a railing?  2  -JE     To walk in hospital room?  2  -JE     AM-PAC 6 Clicks Score (PT)  18  -     Row Name 01/06/21 1006          Functional Assessment    Outcome Measure Options  AM-PAC 6 Clicks Basic Mobility (PT)  -       User Key  (r) = Recorded By, (t) = Taken By, (c) = Cosigned By    Initials Name Provider Type    Nereida Orozco, PT Physical Therapist        Physical Therapy Education                 Title: PT OT SLP Therapies (In Progress)     Topic: Physical Therapy (In Progress)     Point: Mobility training (Done)     Learning Progress Summary           Patient Acceptance, E,TB,D, VU,DU,NR by  at 1/6/2021 1104    Comment: Education re: purpose of PT/importance of activity; education for safety/falls prevention w/ functional mobility; education for improved tech w/ tfers and w/ use of rwx to assist w/ decreasing wt bearing R LE                   Point: Home exercise program (Not Started)     Learner Progress:  Not documented in this  visit.          Point: Precautions (Done)     Learning Progress Summary           Patient Acceptance, E,TB,D, VU,DU,NR by NATALY at 1/6/2021 1104    Comment: Education re: purpose of PT/importance of activity; education for safety/falls prevention w/ functional mobility; education for improved tech w/ tfers and w/ use of rwx to assist w/ decreasing wt bearing R LE                               User Key     Initials Effective Dates Name Provider Type Discipline     08/02/18 -  Nereiad Faust, PT Physical Therapist PT              PT Recommendation and Plan  Planned Therapy Interventions (PT): balance training, bed mobility training, gait training, home exercise program, patient/family education, ROM (range of motion), strengthening, transfer training, other (see comments)(safety/falls prevention; skin protection, pressure relief; edema/pain mgmt)  Plan of Care Reviewed With: patient  Progress: no change  Outcome Summary: PT reggie completed this am.  Pt motivated and eager to improve.  Pt continues w/ foot pain that improves w/ it placed in a dependent position.  Pt w/ noted R foot swelling, erythema and black necrosis at distal R great toe and btwn 4th and 5th digits.  Pt is I w/ bed mobility, tfers w/ CGA and took 2 steps at bedside primarily keeping wt on her R heel.  Pt also performed bed to/from BSC tfer w/ CGA to SBA as well.  Pt would benefit from offloading shoe and use of rwx for functional mobility.  Education for use of rwx w/ increase UE support when attempting to take steps.  Pt willl benefit from continued PT services to improve overall strength, activity tolerance, balance, safety awareness, and I w/ functional mobility.  Recommend home w/ assist at discharge.  Feel she would also benefit from HH to ensure safey upon return home and further progress activity safely.     Time Calculation:   PT Charges     Row Name 01/06/21 1115             Time Calculation    Start Time  1006  -NATALY      Stop Time  1115   -NATALY      Time Calculation (min)  69 min  -NATALY      PT Received On  01/06/21  -NATALY      PT Goal Re-Cert Due Date  01/16/21  -NATALY        User Key  (r) = Recorded By, (t) = Taken By, (c) = Cosigned By    Initials Name Provider Type    Nereida Orozco, PT Physical Therapist        Therapy Charges for Today     Code Description Service Date Service Provider Modifiers Qty    90025985945 HC PT EVAL LOW COMPLEXITY 4 1/6/2021 Nereida Faust, PT GP 1    [id number]  PT THERAPEUTIC ACT EA 15 MIN 1/6/2021 Nereida Faust, PT GP 1          PT G-Codes  Outcome Measure Options: AM-PAC 6 Clicks Basic Mobility (PT)  AM-PAC 6 Clicks Score (PT): 18    Nereida Faust, PT  1/6/2021

## 2021-01-06 NOTE — ANESTHESIA PREPROCEDURE EVALUATION
Anesthesia Evaluation     Patient summary reviewed and Nursing notes reviewed   no history of anesthetic complications:  NPO Solid Status: > 8 hours  NPO Liquid Status: > 8 hours           Airway   Mallampati: I  Neck ROM: full  No difficulty expected  Dental      Pulmonary    (-) not a smoker  Cardiovascular   Exercise tolerance: poor (<4 METS)    (+) hypertension, PVD, DVT, hyperlipidemia,  carotid artery disease carotid bilateral      Neuro/Psych  GI/Hepatic/Renal/Endo    (+) obesity,   diabetes mellitus type 2,     Musculoskeletal     Abdominal    Substance History      OB/GYN          Other   blood dyscrasia anemia,                     Anesthesia Plan    ASA 3     MAC   (CPR wishes discussed with patient, she agrees to proceed with CPR intraop)  intravenous induction     Anesthetic plan, all risks, benefits, and alternatives have been provided, discussed and informed consent has been obtained with: patient.

## 2021-01-06 NOTE — PROGRESS NOTES
[facility] Medicine Services  INPATIENT PROGRESS NOTE    Length of Stay: 1  Date of Admission: 1/4/2021  Primary Care Physician: Roberto Garcia MD    Subjective   Chief Complaint: Follow-up right leg pain, erythema  HPI   Lying in bed.  Dr. Owens plans for right leg arteriogram and possible therapeutic intervention later today.  Patient has chronic lower extremity edema.  Bare hugger to lower extremities to keep feet warm.  Keep lower extremities dependent per Dr. Owens.  Patient denies chest pain, palpitations, shortness of breath.  Denies nausea or vomiting.  She reports extreme pain right foot when touching.  Black eschar 0.5 cm by right toenail.  Ulcerated region between fourth and fifth toe.  Wound care to ulcerated region and recommendations per Dr. Owens after intervention today.    Review of Systems   Constitutional: Negative for chills and fever.   HENT: Negative for congestion and trouble swallowing.    Eyes: Negative for photophobia and visual disturbance.   Respiratory: Negative for cough, shortness of breath and wheezing.    Cardiovascular: Positive for leg swelling (right foot). Negative for chest pain and palpitations.   Gastrointestinal: Negative for blood in stool, constipation, diarrhea, nausea and vomiting.   Endocrine: Negative for cold intolerance, heat intolerance and polyuria.   Genitourinary: Negative for dysuria, frequency and urgency.   Musculoskeletal: Positive for gait problem. Negative for back pain.   Skin: Positive for color change (Right foot) and wound (Right foot between fourth and fifth toes.).   Allergic/Immunologic: Negative for immunocompromised state.   Neurological: Positive for weakness.   Hematological: Negative for adenopathy. Does not bruise/bleed easily.   Psychiatric/Behavioral: Negative for agitation, behavioral problems and confusion.      All pertinent negatives and positives are as above. All other systems have  been reviewed and are negative unless otherwise stated.     Objective    Temp:  [97.2 °F (36.2 °C)-98.7 °F (37.1 °C)] 98.5 °F (36.9 °C)  Heart Rate:  [75-85] 79  Resp:  [18] 18  BP: (132-177)/(44-69) 176/44  Physical Exam  Vitals signs and nursing note reviewed.   Constitutional:       Appearance: She is obese.      Comments: Sitting up in bed.  No oxygen in use.  No family in room.   HENT:      Head: Normocephalic and atraumatic.      Nose: No congestion.      Mouth/Throat:      Pharynx: No oropharyngeal exudate.   Eyes:      Extraocular Movements: Extraocular movements intact.      Pupils: Pupils are equal, round, and reactive to light.   Neck:      Musculoskeletal: Normal range of motion and neck supple.   Cardiovascular:      Rate and Rhythm: Normal rate.      Heart sounds: No murmur.   Pulmonary:      Effort: Pulmonary effort is normal.      Breath sounds: Normal breath sounds. No wheezing, rhonchi or rales.   Abdominal:      Palpations: Abdomen is soft.   Genitourinary:     Comments: Voiding.  Musculoskeletal:         General: Tenderness (Right foot) present.   Skin:     Findings: Erythema (Right foot) present.      Comments: Right foot cool to touch.  Ulcerated lesion between fourth and fifth toe right foot.  0.5 cm black eschar by right toenail.   Neurological:      General: No focal deficit present.      Mental Status: She is alert and oriented to person, place, and time.   Psychiatric:         Mood and Affect: Mood normal.         Behavior: Behavior normal.         Thought Content: Thought content normal.         Judgment: Judgment normal.      Results Review:  I have reviewed the labs, radiology results, and diagnostic studies.    Laboratory Data:      Results from last 7 days   Lab Units 01/06/21  0614 01/05/21  0848 01/05/21  0308 01/04/21 2003   WBC 10*3/mm3 7.56 7.14 8.31 9.35   HEMOGLOBIN g/dL 9.0* 8.9* 8.7* 10.3*   HEMATOCRIT % 28.8* 27.6* 26.6* 31.5*   PLATELETS 10*3/mm3 378 339 348 434         Results from last 7 days   Lab Units 01/06/21  0614 01/04/21 2003   SODIUM mmol/L 136 136   POTASSIUM mmol/L 4.7 4.8   CHLORIDE mmol/L 106 103   CO2 mmol/L 23.0 23.0   BUN mg/dL 21 22   CREATININE mg/dL 1.14* 1.54*   GLUCOSE mg/dL 162* 184*   CALCIUM mg/dL 8.7 9.7   ALT (SGPT) U/L 28  --          Culture Data:      Blood Culture   Date Value Ref Range Status   01/04/2021 No growth at 24 hours  Preliminary   01/04/2021 No growth at 24 hours  Preliminary     Imaging Results (All)     Procedure Component Value Units Date/Time    US Venous Doppler Lower Extremity Right (duplex) [[id number]] Collected: 01/05/21 1630     Updated: 01/05/21 1634    Narrative:      History: Pain and swelling       Impression:      Impression: There is no evidence of deep venous thrombosis or  superficial thrombophlebitis of the right lower extremity.     Comments: Right lower extremity venous duplex exam was performed using  color Doppler flow, Doppler wave form analysis, and grayscale imaging,  with and without compression. There is no evidence of deep venous  thrombosis of the common femoral, superficial femoral, popliteal,  posterior tibial, and peroneal veins. There is no thrombus identified in  the saphenofemoral junction or the greater saphenous vein.      This report was finalized on 01/05/2021 16:30 by Dr. Morgan Owens MD.    US Arterial Doppler Lower Extremity Right [[id number]] Collected: 01/05/21 1611     Updated: 01/05/21 1615    Narrative:      History: PAD     Comments: Grayscale imaging as well as color flow duplex were used to  evaluate the right lower extremity arterial system.     On the right, the peak systolic velocity in the common femoral artery  measured 149cm/s. In the profunda femoris artery measured 123cm/s. In  the proximal SFA measured 47.5cm/s. The mid SFA measured 31cm/s. In the  distal SFA measured 44.4cm/s. In the popliteal artery measured 28.5cm/s.  In the posterior tibial artery measured 14.3cm/s. In  the anterior tibial  artery measured 12.4cm/s.       Impression:      Patent right lower extremity arterial system with evidence  of mid SFA stenosis and diminished/dampened flow in the tibial vessels.     This report was finalized on 01/05/2021 16:12 by Dr. Morgan Owens MD.    XR Foot 3+ View Right [[id number]] Collected: 01/04/21 2201     Updated: 01/04/21 2206    Narrative:      RIGHT FOOT 3 views 1/4/2021 9:42 PM CST     HISTORY: foot sores     COMPARISON: Radiographs dated 12/26/2020      FINDINGS:   Frontal, lateral and oblique radiographs of the right foot were provided  for review.      The bones are diffusely osteopenic. No acute fracture is seen. No joint  subluxation. Interphalangeal joint flexion deformities which are  degenerative. Additional moderate degenerative change at the first  metatarsophalangeal joint as well as diffusely throughout the midfoot.  No destructive osseous changes are seen. Atheromatous vascular  calcification identified. No capsular distention at the ankle joint. The  subtalar joint is unremarkable. Small plantar calcaneal spur. Quite  prominent Achilles enthesophyte.       Impression:      1. Osteopenia with advanced osteoarthritis changes. No visualized acute  fracture or destructive osseous change to suggest osteomyelitis.  This report was finalized on 01/04/2021 22:03 by Dr Alfa Wilson, .        Intake/Output    Intake/Output Summary (Last 24 hours) at 1/6/2021 1150  Last data filed at 1/6/2021 1131  Gross per 24 hour   Intake 240 ml   Output 2050 ml   Net -1810 ml       Scheduled Meds  aspirin, 81 mg, Oral, Daily  atorvastatin, 20 mg, Oral, Daily  ceFAZolin, 2 g, Intravenous, Once  gabapentin, 600 mg, Oral, Q8H  insulin lispro, 2-7 Units, Subcutaneous, TID AC  lisinopril, 20 mg, Oral, Q24H  piperacillin-tazobactam, 3.375 g, Intravenous, Q8H  sodium chloride, 10 mL, Intravenous, Q12H  vancomycin, 1,000 mg, Intravenous, Q24H        I have reviewed the patient current  medications.     Assessment/Plan     Active Hospital Problems    Diagnosis   • Hyperglycemia   • Type 2 diabetes mellitus with hyperglycemia, without long-term current use of insulin (CMS/[facility])   • PAD (peripheral artery disease) (CMS/[facility])   • Essential hypertension   • Mixed hyperlipidemia   • Nonhealing ulcer of right lower leg with fat layer exposed (CMS/[facility])   • Atherosclerosis of artery of extremity with intermittent claudication (CMS/[facility])     Plan:  1.  To ER 1/4 with right leg pain.  History of peripheral vascular disease followed by Dr. Owens.  Chronic anticoagulation for DVT.  Dr. Owens recently changed Lovenox to Xarelto.  Patient had recent hospitalization at outside facility for cellulitis right foot and was discharged on antibiotics.  Continued to have right foot pain with minimal improvement. CRP 0 .99, INR 2. 09, WBC 9.35.  Vancomycin, Zosyn heparin given in ER.  2.  Vascular surgery consulted.  Dr. Owens plans for right lower extremity arteriogram with possible therapeutic intervention today.  Right lower extremity with chronic lower extremity edema peripheral vascular disease unchanged over the past month.  3.  Lovenox recently changed to Xarelto by Dr. Owens.  Vitamin K x1.  Hold anticoagulation for surgery.  4.  Reverse Trendelenburg and bear hugger to right lower extremity  5.  Arterial Doppler right lower extremity 1/5 patent right lower extremity arterial system with evidence of mid SFA stenosis and diminished dampened flow in the tibial vessels.  6.  Venous Dopplers no evidence of deep vein thrombosis or superficial thrombophlebitis right lower extremity.  7.  X-ray right foot osteopenia with advanced osteoarthritis changes.  No visualized acute fracture or destructive osseous changes to suggest osteomyelitis.  8.  Ulcerated lesion between fourth and fifth toe.  0.5 cm eschar near the right toenail.  Vancomycin and Zosyn started in ER and continued.  9.  Blood cultures no growth at 24  hours.  10.  Hemoglobin A1c 8.1. Glucose 169, 162, 188.  Sliding scale insulin.  Hold Metformin.  11.  Home medications reviewed.  12.  Creatinine 1.54 admission 1.14 today.  BMP tomorrow    Her 2 daughters Lashell and Virginia will assist with decision-making if she is unable to make decisions for self.  The above documentation resulted from a face-to-face encounter by me Sharla HUA, [facility].    Discharge Planning: I expect patient to be discharged to be determined.    Electronically signed by JAVID Iniguez, 1/6/2021, 11:50 CST.

## 2021-01-06 NOTE — PLAN OF CARE
Goal Outcome Evaluation:  Plan of Care Reviewed With: patient  Progress: no change  Outcome Summary: PT reggie completed this am.  Pt motivated and eager to improve.  Pt continues w/ foot pain that improves w/ it placed in a dependent position.  Pt w/ noted R foot swelling, erythema and black necrosis at distal R great toe and btwn 4th and 5th digits.  Pt is I w/ bed mobility, tfers w/ CGA and took 2 steps at bedside primarily keeping wt on her R heel.  Pt also performed bed to/from [facility] tfer w/ CGA to SBA as well.  Pt would benefit from offloading shoe and use of rwx for functional mobility.  Education for use of rwx w/ increase UE support when attempting to take steps.  Pt willl benefit from continued PT services to improve overall strength, activity tolerance, balance, safety awareness, and I w/ functional mobility.  Recommend home w/ assist at discharge.  Feel she would also benefit from HH to ensure safey upon return home and further progress activity safely.

## 2021-01-06 NOTE — PROGRESS NOTES
"Pharmacy Dosing Service  Pharmacokinetics  Vancomycin Follow-up Evaluation    Assessment/Action/Plan:  Current Order: Vancomycin 1000 mg IVPB every 24 hours  Current end date:1/10/2021  Levels: Vancomycin trough ordered before dose due on 1/7/2021 at 2300  Additional antimicrobial agent(s): Zosyn    SCr = 1.14 (down from 1.54).  Vancomycin dose adjusted to 1250mg iv q24h. Pharmacy will continue to follow daily and adjust dose accordingly.     Subjective:  Evelyn Ojeda is a 80 y.o. female currently on Vancomycin for the treatment of SSTI, day 3 of 7 of treatment.    AUC Model Data:  Regimen: 1250 mg every 24 hours for 5 doses.  Start time: 23:00 on 01/06/2021  Exposure target: AUC24 (range)400-600 mg/L.hr  AUC24,ss: 534 mg/L.hr  PAUC*: 79 %  Ctrough,ss: 16.6 mg/L  Pconc*: 34 %  Tox.: 12 %    Objective:  Ht: 154.9 cm (61\"); Wt: 80.6 kg (177 lb 12.8 oz)  Estimated Creatinine Clearance: 39.2 mL/min (A) (by C-G formula based on SCr of 1.14 mg/dL (H)).   Creatinine   Date Value Ref Range Status   01/06/2021 1.14 (H) 0.57 - 1.00 mg/dL Final   01/04/2021 1.54 (H) 0.57 - 1.00 mg/dL Final   12/26/2020 0.94 0.57 - 1.00 mg/dL Final   12/08/2019 0.8 0.5 - 0.9 mg/dL Final   12/07/2019 0.8 0.5 - 0.9 mg/dL Final      Lab Results   Component Value Date    WBC 7.56 01/06/2021    WBC 7.14 01/05/2021    WBC 8.31 01/05/2021       No results found for: VANCOPEAK, VANCOTROUGH, VANCORANDOM    Culture Results:  Microbiology Results (last 10 days)       Procedure Component Value - Date/Time    COVID PRE-OP / PRE-PROCEDURE SCREENING ORDER (NO ISOLATION) - Swab, Nasal Cavity [[id number]]  (Normal) Collected: 01/04/21 2123    Lab Status: Final result Specimen: Swab from Nasal Cavity Updated: 01/04/21 1044    Narrative:      The following orders were created for panel order COVID PRE-OP / PRE-PROCEDURE SCREENING ORDER (NO ISOLATION) - Swab, Nasal Cavity.  Procedure                               Abnormality         Status                   "   ---------                               -----------         ------                     COVID-19,[employer] IN-VASHTI...[[id number]]  Normal              Final result                 Please view results for these tests on the individual orders.    COVID-19,[employer] IN-HOUSE,Nasal Swab No Transport Media 3-4 HR TAT - Swab, Nasal Cavity [[id number]]  (Normal) Collected: 01/04/21 2123    Lab Status: Final result Specimen: Swab from Nasal Cavity Updated: 01/04/21 2242     COVID19 Not Detected    Narrative:      Fact sheet for providers: https://www.fda.gov/media/192348/download     Fact sheet for patients: https://www.fda.gov/media/224194/download    Test performed by PCR.    Blood Culture - Blood, Blood, Venous Line [[id number]] Collected: 01/04/21 2122    Lab Status: Preliminary result Specimen: Blood, Venous Line Updated: 01/05/21 2145     Blood Culture No growth at 24 hours    Blood Culture - Blood, Arm, Left [[id number]] Collected: 01/04/21 2004    Lab Status: Preliminary result Specimen: Blood from Arm, Left Updated: 01/05/21 2015     Blood Culture No growth at 24 hours            Jeyson Mclain, PharmERNAY   01/06/21 12:33 CST

## 2021-01-06 NOTE — OP NOTE
Evelyn Ojeda  1/6/2021     PREOPERATIVE DIAGNOSIS: PAD (peripheral artery disease) (CMS/[facility]) [I73.9]     POSTOPERATIVE DIAGNOSIS: Post-Op Diagnosis Codes:     * PAD (peripheral artery disease) (CMS/[facility]) [I73.9]     PROCEDURE PERFORMED:   1.  Introduction of catheter/sheath into the aorta  2.  Aortoiliac angiogram with right lower extremity runoff  3.  Contralateral cannulation of the right common iliac artery  4.  Placement of a 4 mm spider distal embolic protection device in the below-knee popliteal artery  5.  Directional atherectomy of the proximal SFA and below-knee popliteal artery using the 6 [place]ian [place] Pantheris device  6.  Balloon angioplasty of the proximal SFA using a 5 x 80 mm Medtronic drug-coated balloon.  7.  Balloon angioplasty of the below-knee popliteal artery using a 4 x 60 mm Medtronic drug-coated balloon  8.  Retrieval of the spider distal embolic protection device  9.  Selective cannulation of the tibioperoneal trunk and peroneal artery  10.  Balloon angioplasty of the tibioperoneal trunk and peroneal artery using a 3-2.5 x 210 mm Ange cross balloon  11.  Selective cannulation of the anterior tibial artery and dorsalis pedis artery  12.  Crossing of a distal anterior tibial artery chronic total occlusion  13.  Balloon angioplasty of the entire anterior tibial artery and dorsalis pedis artery using a 3-2.5 by 210 mm Ange cross balloon  14.  Completion right lower extremity angiogram with radiographic supervision and interpretation  15.  Minx closure of the left common femoral artery     SURGEON: Morgan Owens DO      ANESTHESIA: MAC    PREPARATION: Routine.    STAFF: Circulator: Tiffany Howard RN; Lupe Billings RN  Scrub Person: Boogie Dia  Assistant: Diamond Carrillo  Vascular Radiology Technician: Rosalva Wolfe    Estimated Blood Loss: minimal    SPECIMENS: None    COMPLICATIONS: None    INDICATIONS: Evelyn Ojeda is a 80 y.o. female with a past medical history of  peripheral vascular disease as well as DVT on chronic anticoagulation therapy. She had surgery performed at [facility] approximately a year ago.  I saw her recently in the office and switched her anticoagulation from Lovenox to Xarelto. She had been in the hospital for it at another facility last week by her primary care doctor and was discharged she was given antibiotics at that time treated for cellulitis.  It has not gotten any better she has had several rounds of antibiotics.  She has full motor and sensation of her foot currently and there are Doppler signals present.  This exam is unchanged from when I saw her in the office a few weeks ago. The indications, risks, and possible complications of the procedure were explained to the patient, who voiced understanding and wished to proceed with surgery.     PROCEDURE IN DETAIL: The patient was taken to the operating room and placed on the operating table in a supine position. After MAC anesthesia was obtained, the bilateral groins was prepped and draped in a sterile manner.  5 cc of 0.5% Marcaine plain was used to infiltrate the left groin for local anesthesia.  Using a micropuncture technique the left common femoral artery was cannulated and a microsheath was placed.  Advantage Glidewire was advanced into the aorta and a short 6 Hebrew sheath was placed.  The patient was given 1000 units of intravenous heparin.  The Omni Flush catheter was advanced into the aorta and an aortoiliac angiogram was performed.  Findings are as follows:  1.  Patent renal arteries bilaterally without stenosis  2.  Patent aorta without stenosis  3.  Patent iliac systems bilaterally without stenosis    Contralateral cannulation was established of the right common iliac artery.  The catheter was then brought down to the level of the femoral head.  An angiogram with runoff was performed.  Findings are as follows:  1.  Patent common femoral and profunda femoris arteries without stenosis  2.   Patent superficial femoral artery with moderate focal stenosis in the proximal SFA over about 5 cm.   3.  Patent popliteal artery with focal stenosis over 4 to 5 cm in the below-knee popliteal artery  4.  Patent 1-1/2 vessel runoff to the foot via the anterior tibial and peroneal arteries.  The anterior tibial artery occluded in the distal calf but reconstituted by the peroneal artery entering the dorsalis pedis.  The posterior tibial artery was completely occluded from the takeoff without reconstitution.     At this point, the decision was made to try to revascularize all these areas.  A 6 [place] by 45 cm destination sheath was placed down into the proximal common femoral artery.  The patient was given 4000 units of intravenous heparin.  With the help of the Freeman cross catheter the proximal SFA stenosis was traversed.  A 4 mm spider distal embolic protection device was placed all the way down into the below-knee popliteal artery.  Directional atherectomy using the 6 [place] The Momentheris device with ultrasound guidance was used to atherectomize the proximal SFA lesions as well as the below knee popliteal artery lesions.  Multiple passes were made achieving significant luminal gain.  The proximal SFA lesion was balloon angioplastied with a 5 x 80 mm Medtronic drug-coated balloon.  The below-knee popliteal artery lesion was balloon angioplastied with a 4 x 60 mm Medtronic drug-coated balloon.  Completion angiogram was performed which showed rapid flow down through the SFA and popliteal arteries without any residual stenosis, dissection, or occlusion.  The spider was then successfully retrieved.  An 014 command wire was then exchanged.  The wire was placed down into the tibioperoneal trunk and into the peroneal artery.  A 3-2.5 x 210 mm Ange cross balloon was used to balloon angioplasty the tibioperoneal trunk and the entire peroneal artery successfully.  Next, the anterior tibial artery was successfully  cannulated.  With the help of the 014 quick cross catheter the distal anterior tibial artery  was traversed and the wire was placed down into the dorsalis pedis artery.  The same 3-2.5 x 210 mm Ange cross balloon was used to balloon angioplasty the entire anterior tibial artery and dorsalis pedis artery successfully.  200 mcg of nitroglycerin was given in the tibial vessels from the knee down.  Completion angiogram was performed which showed rapid flow down through the anterior tibial and peroneal arteries.  There was excellent flow into the arch of the foot.  At this point, I felt no further intervention was warranted.  The sheath and wire were removed.  A minx was used to seal off the left common femoral artery.  Direct pressure was held for an additional 10 to 15 minutes to help ensure hemostasis.  Sterile dressings were applied. The patient tolerated the procedure well. Sponge and needle counts were correct. The patient was then awakened in the operating room and taken to the recovery room in good condition.    Morgan Owens,   Date: 1/6/2021 Time: 16:39 CST     CC:Roberto Garcia MD

## 2021-01-07 LAB
ANION GAP SERPL CALCULATED.3IONS-SCNC: 7 MMOL/L (ref 5–15)
APTT PPP: 33.2 SECONDS (ref 24.1–35)
APTT PPP: 34.9 SECONDS (ref 24.1–35)
BASOPHILS # BLD AUTO: 0.05 10*3/MM3 (ref 0–0.2)
BASOPHILS NFR BLD AUTO: 0.6 % (ref 0–1.5)
BUN SERPL-MCNC: 14 MG/DL (ref 8–23)
BUN/CREAT SERPL: 17.1 (ref 7–25)
CALCIUM SPEC-SCNC: 8.5 MG/DL (ref 8.6–10.5)
CHLORIDE SERPL-SCNC: 109 MMOL/L (ref 98–107)
CO2 SERPL-SCNC: 22 MMOL/L (ref 22–29)
CREAT SERPL-MCNC: 0.82 MG/DL (ref 0.57–1)
DEPRECATED RDW RBC AUTO: 44.8 FL (ref 37–54)
EOSINOPHIL # BLD AUTO: 0.05 10*3/MM3 (ref 0–0.4)
EOSINOPHIL NFR BLD AUTO: 0.6 % (ref 0.3–6.2)
ERYTHROCYTE [DISTWIDTH] IN BLOOD BY AUTOMATED COUNT: 13.5 % (ref 12.3–15.4)
GFR SERPL CREATININE-BSD FRML MDRD: 67 ML/MIN/1.73
GLUCOSE BLDC GLUCOMTR-MCNC: 168 MG/DL (ref 70–130)
GLUCOSE BLDC GLUCOMTR-MCNC: 180 MG/DL (ref 70–130)
GLUCOSE BLDC GLUCOMTR-MCNC: 224 MG/DL (ref 70–130)
GLUCOSE SERPL-MCNC: 169 MG/DL (ref 65–99)
HCT VFR BLD AUTO: 22.8 % (ref 34–46.6)
HGB BLD-MCNC: 7.5 G/DL (ref 12–15.9)
IMM GRANULOCYTES # BLD AUTO: 0.04 10*3/MM3 (ref 0–0.05)
IMM GRANULOCYTES NFR BLD AUTO: 0.5 % (ref 0–0.5)
LYMPHOCYTES # BLD AUTO: 2.13 10*3/MM3 (ref 0.7–3.1)
LYMPHOCYTES NFR BLD AUTO: 27.5 % (ref 19.6–45.3)
MCH RBC QN AUTO: 29.8 PG (ref 26.6–33)
MCHC RBC AUTO-ENTMCNC: 32.9 G/DL (ref 31.5–35.7)
MCV RBC AUTO: 90.5 FL (ref 79–97)
MONOCYTES # BLD AUTO: 0.7 10*3/MM3 (ref 0.1–0.9)
MONOCYTES NFR BLD AUTO: 9 % (ref 5–12)
NEUTROPHILS NFR BLD AUTO: 4.77 10*3/MM3 (ref 1.7–7)
NEUTROPHILS NFR BLD AUTO: 61.8 % (ref 42.7–76)
NRBC BLD AUTO-RTO: 0 /100 WBC (ref 0–0.2)
PLATELET # BLD AUTO: 308 10*3/MM3 (ref 140–450)
PMV BLD AUTO: 10.1 FL (ref 6–12)
POTASSIUM SERPL-SCNC: 4.2 MMOL/L (ref 3.5–5.2)
RBC # BLD AUTO: 2.52 10*6/MM3 (ref 3.77–5.28)
SODIUM SERPL-SCNC: 138 MMOL/L (ref 136–145)
VANCOMYCIN TROUGH SERPL-MCNC: 11.3 MCG/ML (ref 5–20)
WBC # BLD AUTO: 7.74 10*3/MM3 (ref 3.4–10.8)

## 2021-01-07 PROCEDURE — 63710000001 INSULIN LISPRO (HUMAN) PER 5 UNITS: Performed by: SURGERY

## 2021-01-07 PROCEDURE — 85730 THROMBOPLASTIN TIME PARTIAL: CPT | Performed by: SURGERY

## 2021-01-07 PROCEDURE — 25010000002 MORPHINE PER 10 MG: Performed by: SURGERY

## 2021-01-07 PROCEDURE — 25010000002 VANCOMYCIN 10 G RECONSTITUTED SOLUTION: Performed by: SURGERY

## 2021-01-07 PROCEDURE — 85025 COMPLETE CBC W/AUTO DIFF WBC: CPT | Performed by: SURGERY

## 2021-01-07 PROCEDURE — 80202 ASSAY OF VANCOMYCIN: CPT | Performed by: SURGERY

## 2021-01-07 PROCEDURE — 97116 GAIT TRAINING THERAPY: CPT

## 2021-01-07 PROCEDURE — 82962 GLUCOSE BLOOD TEST: CPT

## 2021-01-07 PROCEDURE — 25010000002 PIPERACILLIN SOD-TAZOBACTAM PER 1 G: Performed by: SURGERY

## 2021-01-07 PROCEDURE — 97110 THERAPEUTIC EXERCISES: CPT

## 2021-01-07 PROCEDURE — 80048 BASIC METABOLIC PNL TOTAL CA: CPT | Performed by: SURGERY

## 2021-01-07 PROCEDURE — 99232 SBSQ HOSP IP/OBS MODERATE 35: CPT | Performed by: NURSE PRACTITIONER

## 2021-01-07 RX ADMIN — HYDROCODONE BITARTRATE AND ACETAMINOPHEN 1 TABLET: 10; 325 TABLET ORAL at 08:21

## 2021-01-07 RX ADMIN — GABAPENTIN 600 MG: 300 CAPSULE ORAL at 05:31

## 2021-01-07 RX ADMIN — TAZOBACTAM SODIUM AND PIPERACILLIN SODIUM 3.38 G: 375; 3 INJECTION, SOLUTION INTRAVENOUS at 05:31

## 2021-01-07 RX ADMIN — ATORVASTATIN CALCIUM 20 MG: 10 TABLET, FILM COATED ORAL at 08:21

## 2021-01-07 RX ADMIN — HYDROCODONE BITARTRATE AND ACETAMINOPHEN 1 TABLET: 10; 325 TABLET ORAL at 15:11

## 2021-01-07 RX ADMIN — TAZOBACTAM SODIUM AND PIPERACILLIN SODIUM 3.38 G: 375; 3 INJECTION, SOLUTION INTRAVENOUS at 12:19

## 2021-01-07 RX ADMIN — INSULIN LISPRO 2 UNITS: 100 INJECTION, SOLUTION INTRAVENOUS; SUBCUTANEOUS at 12:19

## 2021-01-07 RX ADMIN — ASPIRIN 81 MG: 81 TABLET, FILM COATED ORAL at 08:21

## 2021-01-07 RX ADMIN — LISINOPRIL 20 MG: 20 TABLET ORAL at 08:21

## 2021-01-07 RX ADMIN — MORPHINE SULFATE 6 MG: 10 INJECTION, SOLUTION INTRAMUSCULAR; INTRAVENOUS at 10:33

## 2021-01-07 RX ADMIN — INSULIN LISPRO 4 UNITS: 100 INJECTION, SOLUTION INTRAVENOUS; SUBCUTANEOUS at 16:55

## 2021-01-07 RX ADMIN — GABAPENTIN 600 MG: 300 CAPSULE ORAL at 15:10

## 2021-01-07 RX ADMIN — GABAPENTIN 600 MG: 300 CAPSULE ORAL at 20:32

## 2021-01-07 RX ADMIN — TAZOBACTAM SODIUM AND PIPERACILLIN SODIUM 3.38 G: 375; 3 INJECTION, SOLUTION INTRAVENOUS at 20:33

## 2021-01-07 RX ADMIN — INSULIN LISPRO 2 UNITS: 100 INJECTION, SOLUTION INTRAVENOUS; SUBCUTANEOUS at 08:21

## 2021-01-07 RX ADMIN — VANCOMYCIN HYDROCHLORIDE 1250 MG: 10 INJECTION, POWDER, LYOPHILIZED, FOR SOLUTION INTRAVENOUS at 23:24

## 2021-01-07 RX ADMIN — SODIUM CHLORIDE 85 ML/HR: 9 INJECTION, SOLUTION INTRAVENOUS at 04:08

## 2021-01-07 RX ADMIN — MORPHINE SULFATE 6 MG: 10 INJECTION, SOLUTION INTRAMUSCULAR; INTRAVENOUS at 16:56

## 2021-01-07 RX ADMIN — CLOPIDOGREL 75 MG: 75 TABLET, FILM COATED ORAL at 16:56

## 2021-01-07 NOTE — THERAPY TREATMENT NOTE
Acute Care - Physical Therapy Treatment Note  [facility]     Patient Name: Evelyn Ojeda  : 1940  MRN: [id number]  Today's Date: 2021           PT Assessment (last 12 hours)      PT Evaluation and Treatment     Row Name 21 1408 21 0941       Physical Therapy Time and Intention    Subjective Information  complains of;pain  -  complains of;pain  -MF    Document Type  therapy note (daily note)  -  therapy note (daily note)  -    Mode of Treatment  physical therapy  -  physical therapy  -    Row Name 21 0819          Physical Therapy Time and Intention    Session Not Performed  other (see comments)  -     Comment, Session Not Performed  pt eating breakfast. will check back.   -     Row Name 21 1408 21 0941       General Information    Existing Precautions/Restrictions  fall  -  fall necrotic tissue on toes of R foot  -    Row Name 21 1408 21 0941       Pain Scale: Word Pre/Post-Treatment    Pain: Word Scale, Pretreatment  4 - moderate pain  -  4 - moderate pain  -    Posttreatment Pain Rating  4 - moderate pain  -  6 - moderate-severe pain  -    Pain Location - Side  Right  -  Right  -    Pain Location  toe  -  toe  -    Pain Intervention(s)  Repositioned;Ambulation/increased activity  -  Repositioned;Ambulation/increased activity;Medication (See MAR)  -    Row Name 21 1408 21 0941       Bed Mobility    Supine-Sit Ada (Bed Mobility)  independent  -  independent  -    Sit-Supine Ada (Bed Mobility)  independent  -  independent  -    Row Name 21 1408 21 0941       Transfers    Sit-Stand Ada (Transfers)  supervision  -  contact guard  -    Stand-Sit Ada (Transfers)  supervision  -  contact guard  -    Ada Level (Toilet Transfer)  supervision  -  contact guard  -    Assistive Device (Toilet Transfer)  commode;grab bars/safety frame  -   commode;grab bars/safety frame  -    Row Name 01/07/21 1408 01/07/21 0941       Toilet Transfer    Type (Toilet Transfer)  sit-stand;stand-sit  -  sit-stand;stand-sit  -    Row Name 01/07/21 1408 01/07/21 0941       Gait/Stairs (Locomotion)    [place] Level (Gait)  standby assist  -  verbal cues;contact guard  -    Assistive Device (Gait)  --  walker, front-wheeled  -    Distance in Feet (Gait)  300  -MF  125  -    Deviations/Abnormal Patterns (Gait)  antalgic;andrews decreased;stride length decreased  -  antalgic;andrews decreased;stride length decreased  -    Row Name 01/07/21 1408 01/07/21 0941       Hip (Therapeutic Exercise)    Hip (Therapeutic Exercise)  AROM (active range of motion)  -  AROM (active range of motion)  -    Hip AROM (Therapeutic Exercise)  bilateral;flexion;aBduction;aDduction;sitting 15  -MF  bilateral;flexion;sitting;10 repetitions  -    Row Name 01/07/21 1408 01/07/21 0941       Knee (Therapeutic Exercise)    Knee (Therapeutic Exercise)  AROM (active range of motion)  -  AROM (active range of motion)  -    Knee AROM (Therapeutic Exercise)  bilateral;LAQ (long arc quad);sitting 20  -MF  bilateral;LAQ (long arc quad);sitting;10 repetitions  -    Row Name 01/07/21 1408 01/07/21 0941       Ankle (Therapeutic Exercise)    Ankle (Therapeutic Exercise)  AROM (active range of motion)  -  AROM (active range of motion)  -    Ankle AROM (Therapeutic Exercise)  bilateral;dorsiflexion;sitting 20  -MF  sitting;bilateral;dorsiflexion;10 repetitions  -    Row Name             Wound 01/05/21 0213 Right anterior fifth toe    Wound - Properties Group Placement Date: 01/05/21  -DF Placement Time: 0213  -DF Present on Hospital Admission: Y  -DF Side: Right  -DF Orientation: anterior  -DF Location: fifth toe  -DF    Retired Wound - Properties Group Date first assessed: 01/05/21  -DF Time first assessed: 0213  -DF Present on Hospital Admission: Y  -DF Side: Right  -DF  Location: fifth toe  -DF    Row Name             Wound 01/05/21 0214 Right anterior great toe    Wound - Properties Group Placement Date: 01/05/21  -DF Placement Time: 0214  -DF Present on Hospital Admission: Y  -DF Side: Right  -DF Orientation: anterior  -DF Location: great toe  -DF    Retired Wound - Properties Group Date first assessed: 01/05/21  -DF Time first assessed: 0214  -DF Present on Hospital Admission: Y  -DF Side: Right  -DF Location: great toe  -DF    Row Name             Wound 01/05/21 0216 Right anterior    Wound - Properties Group Placement Date: 01/05/21  -DF Placement Time: 0216  -DF Present on Hospital Admission: Y  -DF Side: Right  -DF Orientation: anterior  -DF    Retired Wound - Properties Group Date first assessed: 01/05/21  -DF Time first assessed: 0216  -DF Present on Hospital Admission: Y  -DF Side: Right  -DF    Row Name             Wound 01/06/21 1646 Left groin Incision    Wound - Properties Group Placement Date: 01/06/21  -SHAUNA Placement Time: 1646  -SHAUNA Side: Left  -SHAUNA Location: groin  -SHAUNA Primary Wound Type: Incision  -SHAUNA    Retired Wound - Properties Group Date first assessed: 01/06/21  -SHAUNA Time first assessed: 1646  -SHAUNA Side: Left  -SHAUNA Location: groin  -SHAUNA Primary Wound Type: Incision  -SHAUNA    Row Name 01/07/21 0941          Plan of Care Review    Plan of Care Reviewed With  patient  -MF     Progress  improving  -MF     Outcome Summary  Pt. agreeable to therapy. She reports constant pain in R foot, which increases with weight bearing. Pt. was Independent with bed mobility. she was CGA for transfer and gait, walking 125' with RWX. Pt. reports having good support at home and necessary equipment. Will continue to work on mobility.   -     Row Name 01/07/21 1408 01/07/21 0941       Positioning and Restraints    Pre-Treatment Position  in bed  -  in bed  -MF    Post Treatment Position  bed  -  bed  -MF    In Bed  fowlers;call light within reach;with family/caregiver;side rails up x2   -MF  fowlers;call light within reach;notified nsg;with family/caregiver;side rails up x2;R heel elevated;L heel elevated  -MF      User Key  (r) = Recorded By, (t) = Taken By, (c) = Cosigned By    Initials Name Provider Type    Georgia Ray, RN Registered Nurse    Lupe Atkins RN Registered Nurse    Cate Gamino, SOFIA Physical Therapy Assistant        Physical Therapy Education                 Title: PT OT SLP Therapies (In Progress)     Topic: Physical Therapy (In Progress)     Point: Mobility training (Done)     Learning Progress Summary           Patient Acceptance, E,TB,D, VU,DU,NR by  at 1/6/2021 1104    Comment: Education re: purpose of PT/importance of activity; education for safety/falls prevention w/ functional mobility; education for improved tech w/ tfers and w/ use of rwx to assist w/ decreasing wt bearing R LE                   Point: Home exercise program (Not Started)     Learner Progress:  Not documented in this visit.          Point: Precautions (Done)     Learning Progress Summary           Patient Acceptance, E,TB,D, VU,DU,NR by  at 1/6/2021 1104    Comment: Education re: purpose of PT/importance of activity; education for safety/falls prevention w/ functional mobility; education for improved tech w/ tfers and w/ use of rwx to assist w/ decreasing wt bearing R LE                               User Key     Initials Effective Dates Name Provider Type Discipline     08/02/18 -  Nereida Faust, PT Physical Therapist PT              PT Recommendation and Plan     Plan of Care Reviewed With: patient  Progress: improving  Outcome Summary: Pt. agreeable to therapy. She reports constant pain in R foot, which increases with weight bearing. Pt. was Independent with bed mobility. she was CGA for transfer and gait, walking 125' with RWX. Pt. reports having good support at home and necessary equipment. Will continue to work on mobility.        Time Calculation:   PT Charges      Row Name 01/07/21 1434 01/07/21 1020          Time Calculation    Start Time  1408  -MF  0941  -MF     Stop Time  1431  -MF  1020  -MF     Time Calculation (min)  23 min  -MF  39 min  -MF     PT Received On  01/07/21  -MF  01/07/21  -MF     PT Goal Re-Cert Due Date  01/16/21  -MF  01/16/21  -MF        Time Calculation- PT    Total Timed Code Minutes- PT  23 minute(s)  -MF  39 minute(s)  -MF        Timed Charges    37244 - PT Therapeutic Exercise Minutes  10  -MF  10  -MF     60243 - Gait Training Minutes   13  -MF  29  -MF       User Key  (r) = Recorded By, (t) = Taken By, (c) = Cosigned By    Initials Name Provider Type    Cate Gamino PTA Physical Therapy Assistant        Therapy Charges for Today     Code Description Service Date Service Provider Modifiers Qty    12182976897 HC PT THER PROC EA 15 MIN 1/7/2021 Cate Healy, PTA GP 1    88731929606 HC GAIT TRAINING EA 15 MIN 1/7/2021 Cate Healy, PTA GP 2    31244329339 HC PT THER PROC EA 15 MIN 1/7/2021 Cate Healy, PTA GP 1    31518415995 HC GAIT TRAINING EA 15 MIN 1/7/2021 Cate Healy, SOFIA GP 1          PT G-Codes  Outcome Measure Options: AM-PAC 6 Clicks Basic Mobility (PT)  AM-PAC 6 Clicks Score (PT): 18    Cate Healy PTA  1/7/2021

## 2021-01-07 NOTE — PLAN OF CARE
Goal Outcome Evaluation:  Plan of Care Reviewed With: patient  Progress: no change  Outcome Summary: pt went for angiogram today; disoriented to situation and uncooperative upon return. bedrest until 2130; bed check on; safety maintained; cont to monitor

## 2021-01-07 NOTE — PROGRESS NOTES
[facility] Medicine Services  INPATIENT PROGRESS NOTE    Length of Stay: 2  Date of Admission: 1/4/2021  Primary Care Physician: Roberto Garcia MD    Subjective   Chief Complaint: Follow-up right foot pain  HPI   Lying in bed.  She says right foot feels better today.  Foot is warm to touch.  Reddened but no longer cool.  Dr. Owens performed atherectomy proximal SFA and balloon angioplasty right lower extremity yesterday.  I was able to examine her foot today without much pain.  She has ulcerated lesion between fourth and fifth toes.  Black eschar 2.5 cm right toenail.  Consult wound care for recommendations for foot care.  Patient denies chest pain, palpitations, shortness of breath.  No oxygen in use.  Denies nausea, vomiting or abdominal pain.    Review of Systems   Constitutional: Negative for chills and fever.   HENT: Negative for congestion and trouble swallowing.    Eyes: Negative for photophobia and visual disturbance.   Respiratory: Negative for cough, shortness of breath and wheezing.    Cardiovascular: Positive for leg swelling (right foot). Negative for chest pain and palpitations.   Gastrointestinal: Negative for blood in stool, constipation, diarrhea, nausea and vomiting.   Endocrine: Negative for cold intolerance, heat intolerance and polyuria.   Genitourinary: Negative for dysuria, frequency and urgency.   Musculoskeletal: Positive for gait problem. Negative for back pain.   Skin: Positive for color change (Right foot) and wound (Right foot between fourth and fifth toes.).   Allergic/Immunologic: Negative for immunocompromised state.   Neurological: Positive for weakness.   Hematological: Negative for adenopathy. Does not bruise/bleed easily.   Psychiatric/Behavioral: Negative for agitation, behavioral problems and confusion.        All pertinent negatives and positives are as above. All other systems have been reviewed and are negative unless otherwise  stated.     Objective    Temp:  [97.7 °F (36.5 °C)-99.1 °F (37.3 °C)] 98.4 °F (36.9 °C)  Heart Rate:  [] 73  Resp:  [14-20] 18  BP: (109-210)/() 152/40  Physical Exam  Vitals signs and nursing note reviewed.   Constitutional:       Appearance: She is obese.      Comments: Sitting up in bed.  No oxygen in use.  No family in room.   HENT:      Head: Normocephalic and atraumatic.      Nose: No congestion.      Mouth/Throat:      Pharynx: No oropharyngeal exudate.   Eyes:      Extraocular Movements: Extraocular movements intact.      Pupils: Pupils are equal, round, and reactive to light.   Neck:      Musculoskeletal: Normal range of motion and neck supple.   Cardiovascular:      Rate and Rhythm: Normal rate.      Heart sounds: No murmur.   Pulmonary:      Effort: Pulmonary effort is normal.      Breath sounds: Normal breath sounds. No wheezing, rhonchi or rales.   Abdominal:      Palpations: Abdomen is soft.   Genitourinary:     Comments: Voiding.  Musculoskeletal:         General: Tenderness (Right foot) present.   Skin:     Findings: Erythema (Right foot) present.      Comments: Right foot warm.  Ulcerated lesion between fourth and fifth toe right foot.  0.5 cm black eschar by right toenail.   Neurological:      General: No focal deficit present.      Mental Status: She is alert and oriented to person, place, and time.   Psychiatric:         Mood and Affect: Mood normal.         Behavior: Behavior normal.         Thought Content: Thought content normal.         Judgment: Judgment normal.     Results Review:  I have reviewed the labs, radiology results, and diagnostic studies.    Laboratory Data:      Results from last 7 days   Lab Units 01/07/21  0434 01/06/21  0614 01/05/21  0848 01/05/21  0308 01/04/21 2003   WBC 10*3/mm3 7.74 7.56 7.14 8.31 9.35   HEMOGLOBIN g/dL 7.5* 9.0* 8.9* 8.7* 10.3*   HEMATOCRIT % 22.8* 28.8* 27.6* 26.6* 31.5*   PLATELETS 10*3/mm3 308 378 339 348 434        Results from last 7  days   Lab Units 01/07/21  0434 01/06/21  0614 01/04/21 2003   SODIUM mmol/L 138 136 136   POTASSIUM mmol/L 4.2 4.7 4.8   CHLORIDE mmol/L 109* 106 103   CO2 mmol/L 22.0 23.0 23.0   BUN mg/dL 14 21 22   CREATININE mg/dL 0.82 1.14* 1.54*   GLUCOSE mg/dL 169* 162* 184*   CALCIUM mg/dL 8.5* 8.7 9.7   ALT (SGPT) U/L  --  28  --      Culture Data:      Blood Culture   Date Value Ref Range Status   01/04/2021 No growth at 2 days  Preliminary   01/04/2021 No growth at 2 days  Preliminary     Imaging Results (All)     Procedure Component Value Units Date/Time    IR Angiogram Extremity [[id number]] Collected: 01/07/21 0643     Updated: 01/07/21 1258    Narrative:      Performed by Dr. Owens. Please see procedure note.  This report was finalized on 01/07/2021 12:55 by Dr. Morgan Owens MD.    FL C Arm During Surgery [[id number]] Collected: 01/07/21 0643     Updated: 01/07/21 1258    Narrative:      Performed by Dr. Owens. Please see procedure note.  This report was finalized on 01/07/2021 12:55 by Dr. Morgan Owens MD.    US Venous Doppler Lower Extremity Right (duplex) [[id number]] Collected: 01/05/21 1630     Updated: 01/05/21 1634    Narrative:      History: Pain and swelling       Impression:      Impression: There is no evidence of deep venous thrombosis or  superficial thrombophlebitis of the right lower extremity.     Comments: Right lower extremity venous duplex exam was performed using  color Doppler flow, Doppler wave form analysis, and grayscale imaging,  with and without compression. There is no evidence of deep venous  thrombosis of the common femoral, superficial femoral, popliteal,  posterior tibial, and peroneal veins. There is no thrombus identified in  the saphenofemoral junction or the greater saphenous vein.      This report was finalized on 01/05/2021 16:30 by Dr. Morgan Owens MD.    US Arterial Doppler Lower Extremity Right [[id number]] Collected: 01/05/21 1611     Updated: 01/05/21 1615     Narrative:      History: PAD     Comments: Grayscale imaging as well as color flow duplex were used to  evaluate the right lower extremity arterial system.     On the right, the peak systolic velocity in the common femoral artery  measured 149cm/s. In the profunda femoris artery measured 123cm/s. In  the proximal SFA measured 47.5cm/s. The mid SFA measured 31cm/s. In the  distal SFA measured 44.4cm/s. In the popliteal artery measured 28.5cm/s.  In the posterior tibial artery measured 14.3cm/s. In the anterior tibial  artery measured 12.4cm/s.       Impression:      Patent right lower extremity arterial system with evidence  of mid SFA stenosis and diminished/dampened flow in the tibial vessels.     This report was finalized on 01/05/2021 16:12 by Dr. Morgan Owens MD.    XR Foot 3+ View Right [[id number]] Collected: 01/04/21 2201     Updated: 01/04/21 2206    Narrative:      RIGHT FOOT 3 views 1/4/2021 9:42 PM CST     HISTORY: foot sores     COMPARISON: Radiographs dated 12/26/2020      FINDINGS:   Frontal, lateral and oblique radiographs of the right foot were provided  for review.      The bones are diffusely osteopenic. No acute fracture is seen. No joint  subluxation. Interphalangeal joint flexion deformities which are  degenerative. Additional moderate degenerative change at the first  metatarsophalangeal joint as well as diffusely throughout the midfoot.  No destructive osseous changes are seen. Atheromatous vascular  calcification identified. No capsular distention at the ankle joint. The  subtalar joint is unremarkable. Small plantar calcaneal spur. Quite  prominent Achilles enthesophyte.       Impression:      1. Osteopenia with advanced osteoarthritis changes. No visualized acute  fracture or destructive osseous change to suggest osteomyelitis.  This report was finalized on 01/04/2021 22:03 by Dr Alfa Wilson, .        Intake/Output    Intake/Output Summary (Last 24 hours) at 1/7/2021 1446  Last data  filed at 1/7/2021 1337  Gross per 24 hour   Intake 360 ml   Output 800 ml   Net -440 ml       Scheduled Meds  aspirin, 81 mg, Oral, Daily  atorvastatin, 20 mg, Oral, Daily  clopidogrel, 75 mg, Oral, Daily  gabapentin, 600 mg, Oral, Q8H  insulin lispro, 2-7 Units, Subcutaneous, TID AC  lisinopril, 20 mg, Oral, Q24H  piperacillin-tazobactam, 3.375 g, Intravenous, Q8H  sodium chloride, 10 mL, Intravenous, Q12H  sodium chloride, 3 mL, Intravenous, Q12H  vancomycin, 1,250 mg, Intravenous, Q24H  ziprasidone, 10 mg, Intramuscular, Once      I have reviewed the patient current medications.     Assessment/Plan     Active Hospital Problems    Diagnosis   • **PAD (peripheral artery disease) (CMS/[facility])   • Hyperglycemia   • Type 2 diabetes mellitus with hyperglycemia, without long-term current use of insulin (CMS/[facility])   • Essential hypertension   • Mixed hyperlipidemia   • Nonhealing ulcer of right lower leg with fat layer exposed (CMS/[facility])   • Atherosclerosis of artery of extremity with intermittent claudication (CMS/[facility])     Plan:  1.  To ER 1/4 with right leg pain.  History of peripheral vascular disease followed by Dr. Owens.  Chronic anticoagulation for DVT.  Dr. Owens recently changed Lovenox to Xarelto.  Patient had recent hospitalization at outside facility for cellulitis right foot and was discharged on antibiotics.  Continued to have right foot pain with minimal improvement. CRP 0 .99, INR 2. 09, WBC 9.35.  Vancomycin, Zosyn heparin given in ER.  2.  Dr. Owens consulted.  Right lower extremity with chronic lower extremity edema, peripheral vascular disease unchanged over the past month.  Arteriogram with balloon angioplasty and atherectomy right lower extremity 1/6  3.  Plavix 75 mg orally daily, started per vascular surgery.  Continue aspirin.  4.  Arterial Doppler right lower extremity 1/5 patent right lower extremity arterial system with evidence of mid SFA stenosis and diminished dampened flow in the tibial  vessels.  5.  Venous Dopplers no evidence of deep vein thrombosis or superficial thrombophlebitis right lower extremity.   6.  X-ray right foot osteopenia with advanced osteoarthritis changes.  No visualized acute fracture or destructive osseous changes to suggest osteomyelitis.  7.  Ulcerated lesion between fourth and fifth toe.  0.5 cm eschar near the right toenail.  Vancomycin and Zosyn started in ER and continued.  Consult wound care for recommendations.  8.  Blood cultures no growth at 2 days.  9.  Hemoglobin A1c 8.1.  Sliding scale insulin.  Glucoses 168, 169.  Metformin on hold.  10.  Creatinine 1.54 on admission, 0.82 today.  Discontinue IV fluids.  11.  Hemoglobin 7.5 today.  Hemoglobin 10.3 on admission.  CBC tomorrow.  12.  Home medications reviewed and resumed if appropriate.      Her daughters, Lashell and Virginia will assist with decision-making if she is unable to make decisions for self  The above documentation resulted from a face-to-face encounter by me Sharla HUA, [facility].    Discharge Planning: I expect patient to be discharged to home to be determined.      Electronically signed by JAVID Iniguez, 1/7/2021, 14:46 CST.

## 2021-01-07 NOTE — PLAN OF CARE
Goal Outcome Evaluation:  Plan of Care Reviewed With: patient  Progress: improving  Outcome Summary: Pt. agreeable to therapy. She reports constant pain in R foot, which increases with weight bearing. Pt. was Independent with bed mobility. she was CGA for transfer and gait, walking 125' with RWX. Pt. reports having good support at home and necessary equipment. Will continue to work on mobility.

## 2021-01-07 NOTE — THERAPY TREATMENT NOTE
Acute Care - Physical Therapy Treatment Note  [facility]     Patient Name: Evelyn Ojeda  : 1940  MRN: [id number]  Today's Date: 2021           PT Assessment (last 12 hours)      PT Evaluation and Treatment     Row Name 2141 21       Physical Therapy Time and Intention    Subjective Information  complains of;pain  -MF  --    Document Type  therapy note (daily note)  -  --    Mode of Treatment  physical therapy  -  --    Session Not Performed  --  other (see comments)  -    Comment, Session Not Performed  --  pt eating breakfast. will check back.   -    Row Name 21          General Information    Existing Precautions/Restrictions  fall necrotic tissue on toes of R foot  -     Row Name 21          Pain Scale: Word Pre/Post-Treatment    Pain: Word Scale, Pretreatment  4 - moderate pain  -     Posttreatment Pain Rating  6 - moderate-severe pain  -     Pain Location - Side  Right  -     Pain Location  toe  -     Pain Intervention(s)  Repositioned;Ambulation/increased activity;Medication (See MAR)  -     Row Name 21          Bed Mobility    Supine-Sit Sharkey (Bed Mobility)  independent  -     Sit-Supine Sharkey (Bed Mobility)  independent  -     Row Name 21          Transfers    Sit-Stand Sharkey (Transfers)  contact guard  -     Stand-Sit Sharkey (Transfers)  contact guard  -     Sharkey Level (Toilet Transfer)  contact guard  -     Assistive Device (Toilet Transfer)  commode;grab bars/safety frame  -     Row Name 21          Toilet Transfer    Type (Toilet Transfer)  sit-stand;stand-sit  -     Row Name 21          Gait/Stairs (Locomotion)    Sharkey Level (Gait)  verbal cues;contact guard  -     Assistive Device (Gait)  walker, front-wheeled  -     Distance in Feet (Gait)  125  -MF     Deviations/Abnormal Patterns (Gait)  antalgic;andrews decreased;stride  length decreased  -     Row Name 01/07/21 0941          Hip (Therapeutic Exercise)    Hip (Therapeutic Exercise)  AROM (active range of motion)  -     Hip AROM (Therapeutic Exercise)  bilateral;flexion;sitting;10 repetitions  -     Row Name 01/07/21 0941          Knee (Therapeutic Exercise)    Knee (Therapeutic Exercise)  AROM (active range of motion)  -     Knee AROM (Therapeutic Exercise)  bilateral;LAQ (long arc quad);sitting;10 repetitions  -     Row Name 01/07/21 0941          Ankle (Therapeutic Exercise)    Ankle (Therapeutic Exercise)  AROM (active range of motion)  -     Ankle AROM (Therapeutic Exercise)  sitting;bilateral;dorsiflexion;10 repetitions  -     Row Name             Wound 01/05/21 0213 Right anterior fifth toe    Wound - Properties Group Placement Date: 01/05/21  -DF Placement Time: 0213  -DF Present on Hospital Admission: Y  -DF Side: Right  -DF Orientation: anterior  -DF Location: fifth toe  -DF    Retired Wound - Properties Group Date first assessed: 01/05/21  -DF Time first assessed: 0213  -DF Present on Hospital Admission: Y  -DF Side: Right  -DF Location: fifth toe  -DF    Row Name             Wound 01/05/21 0214 Right anterior great toe    Wound - Properties Group Placement Date: 01/05/21  -DF Placement Time: 0214  -DF Present on Hospital Admission: Y  -DF Side: Right  -DF Orientation: anterior  -DF Location: great toe  -DF    Retired Wound - Properties Group Date first assessed: 01/05/21  -DF Time first assessed: 0214  -DF Present on Hospital Admission: Y  -DF Side: Right  -DF Location: great toe  -DF    Row Name             Wound 01/05/21 0216 Right anterior    Wound - Properties Group Placement Date: 01/05/21  -DF Placement Time: 0216  -DF Present on Hospital Admission: Y  -DF Side: Right  -DF Orientation: anterior  -DF    Retired Wound - Properties Group Date first assessed: 01/05/21  -DF Time first assessed: 0216  -DF Present on Hospital Admission: Y  -DF Side: Right   -DF    Row Name             Wound 01/06/21 1646 Left groin Incision    Wound - Properties Group Placement Date: 01/06/21  -SHAUNA Placement Time: 1646 -SHAUNA Side: Left  -SHAUNA Location: groin  -SHAUNA Primary Wound Type: Incision  -SHAUNA    Retired Wound - Properties Group Date first assessed: 01/06/21  -SHAUNA Time first assessed: 1646 -SHAUNA Side: Left  -SHAUNA Location: groin  -SHAUNA Primary Wound Type: Incision  -SHAUNA    Row Name 01/07/21 0941          Plan of Care Review    Plan of Care Reviewed With  patient  -MF     Progress  improving  -     Outcome Summary  Pt. agreeable to therapy. She reports constant pain in R foot, which increases with weight bearing. Pt. was Independent with bed mobility. she was CGA for transfer and gait, walking 125' with RWX. Pt. reports having good support at home and necessary equipment. Will continue to work on mobility.   -     Row Name 01/07/21 0941          Positioning and Restraints    Pre-Treatment Position  in bed  -     Post Treatment Position  bed  -     In Bed  fowlers;call light within reach;notified nsg;with family/caregiver;side rails up x2;R heel elevated;L heel elevated  -       User Key  (r) = Recorded By, (t) = Taken By, (c) = Cosigned By    Initials Name Provider Type    Georgia Ray, RN Registered Nurse    Lupe Atkins RN Registered Nurse    Cate Gamino, SOFIA Physical Therapy Assistant        Physical Therapy Education                 Title: PT OT SLP Therapies (In Progress)     Topic: Physical Therapy (In Progress)     Point: Mobility training (Done)     Learning Progress Summary           Patient Acceptance, E,TB,D, VU,DU,NR by  at 1/6/2021 1104    Comment: Education re: purpose of PT/importance of activity; education for safety/falls prevention w/ functional mobility; education for improved tech w/ tfers and w/ use of rwx to assist w/ decreasing wt bearing R LE                   Point: Home exercise program (Not Started)     Learner Progress:  Not  documented in this visit.          Point: Precautions (Done)     Learning Progress Summary           Patient Acceptance, E,TB,D, VU,DU,NR by NATALY at 1/6/2021 1104    Comment: Education re: purpose of PT/importance of activity; education for safety/falls prevention w/ functional mobility; education for improved tech w/ tfers and w/ use of rwx to assist w/ decreasing wt bearing R LE                               User Key     Initials Effective Dates Name Provider Type Discipline     08/02/18 -  Nereida Faust, PT Physical Therapist PT              PT Recommendation and Plan     Plan of Care Reviewed With: patient  Progress: improving  Outcome Summary: Pt. agreeable to therapy. She reports constant pain in R foot, which increases with weight bearing. Pt. was Independent with bed mobility. she was CGA for transfer and gait, walking 125' with RWX. Pt. reports having good support at home and necessary equipment. Will continue to work on mobility.        Time Calculation:   PT Charges     Row Name 01/07/21 1020             Time Calculation    Start Time  0941  -      Stop Time  1020  -      Time Calculation (min)  39 min  -      PT Received On  01/07/21  -      PT Goal Re-Cert Due Date  01/16/21  -         Time Calculation- PT    Total Timed Code Minutes- PT  39 minute(s)  -         Timed Charges    94942 - PT Therapeutic Exercise Minutes  10  -MF      52354 - Gait Training Minutes   29  -MF        User Key  (r) = Recorded By, (t) = Taken By, (c) = Cosigned By    Initials Name Provider Type     Cate Healy PTA Physical Therapy Assistant        Therapy Charges for Today     Code Description Service Date Service Provider Modifiers Qty    69600106081 HC PT THER PROC EA 15 MIN 1/7/2021 Cate Healy, SOFIA GP 1    58269785465 HC GAIT TRAINING EA 15 MIN 1/7/2021 Cate Healy PTA GP 2          PT G-Codes  Outcome Measure Options: AM-PAC 6 Clicks Basic Mobility (PT)  AM-PAC 6 Clicks Score  (PT): 18    Cate Healy, PTA  1/7/2021

## 2021-01-07 NOTE — PROGRESS NOTES
LOS: 2 days   Patient Care Team:  Roberto Garcia MD as PCP - General (Family Medicine)    Chief Complaint:  POD #1 right lower extremity angiogram    Subjective     Pt seen/examined.  AVSS. Afebrile. POD #1 right lower extremity angiogram. Drowsy this am.  Right leg feels better per her report.  Right foot nice and warm doppler DP/peroneal.  Hgb 7.5, urine bloody    Objective     Vital Signs  Temp:  [97.2 °F (36.2 °C)-98.9 °F (37.2 °C)] 98.3 °F (36.8 °C)  Heart Rate:  [] 77  Resp:  [14-20] 18  BP: (109-210)/() 149/40    Physical Exam  Vitals signs and nursing note reviewed.   Constitutional:       General: She is not in acute distress.     Appearance: Normal appearance. She is well-developed. She is obese. She is not diaphoretic.   HENT:      Head: Normocephalic and atraumatic.   Eyes:      General: No scleral icterus.     Pupils: Pupils are equal, round, and reactive to light.   Neck:      Musculoskeletal: Normal range of motion and neck supple.      Thyroid: No thyromegaly.      Vascular: No carotid bruit or JVD.   Cardiovascular:      Rate and Rhythm: Normal rate and regular rhythm.      Heart sounds: Normal heart sounds and S2 normal. No murmur. No friction rub. No gallop.       Comments: Right foot nice and warm with doppler DP/peroneal.  Left groin soft without hematoma G/T in place  Pulmonary:      Effort: Pulmonary effort is normal.      Breath sounds: Normal breath sounds.   Abdominal:      General: Bowel sounds are normal.      Palpations: Abdomen is soft.   Musculoskeletal: Normal range of motion.      Right lower leg: Edema present.   Skin:     General: Skin is warm and dry.      Comments: Ulcerated lesion between fourth and fifth toe right foot.  0.5 cm black eschar by right toenail.    Neurological:      Mental Status: She is alert.      Cranial Nerves: No cranial nerve deficit.      Motor: Weakness (Generalized) present.      Comments: Drowsy but arouses   Psychiatric:          Mood and Affect: Mood normal.         Behavior: Behavior normal.         Thought Content: Thought content normal.         Judgment: Judgment normal.         Laboratory Data:   Results from last 7 days   Lab Units 01/07/21  0434 01/06/21 0614 01/05/21  0848   WBC 10*3/mm3 7.74 7.56 7.14   HEMOGLOBIN g/dL 7.5* 9.0* 8.9*   HEMATOCRIT % 22.8* 28.8* 27.6*   PLATELETS 10*3/mm3 308 378 339       Results from last 7 days   Lab Units 01/07/21  0434 01/06/21 0614 01/04/21 2003   SODIUM mmol/L 138 136 136   POTASSIUM mmol/L 4.2 4.7 4.8   CHLORIDE mmol/L 109* 106 103   CO2 mmol/L 22.0 23.0 23.0   BUN mg/dL 14 21 22   CREATININE mg/dL 0.82 1.14* 1.54*   CALCIUM mg/dL 8.5* 8.7 9.7   BILIRUBIN mg/dL  --  0.3  --    ALK PHOS U/L  --  107  --    ALT (SGPT) U/L  --  28  --    AST (SGOT) U/L  --  48*  --    GLUCOSE mg/dL 169* 162* 184*     Results from last 7 days   Lab Units 01/07/21  0434 01/07/21  0011 01/1940  01/05/21  0848  01/04/21 2004   INR   --   --   --   --  1.48*  --  2.09*   APTT seconds 34.9 33.2 61.8*   < > 29.2   < > 44.4*    < > = values in this interval not displayed.         Medication Review: reviewed    Assessment/Plan       PAD (peripheral artery disease) (CMS/[facility])    Essential hypertension    Mixed hyperlipidemia    Atherosclerosis of artery of extremity with intermittent claudication (CMS/[facility])    Nonhealing ulcer of right lower leg with fat layer exposed (CMS/[facility])    Hyperglycemia    Type 2 diabetes mellitus with hyperglycemia, without long-term current use of insulin (CMS/[facility])  POD #1 right lower extremity angiogram    Plan:  Patient is doing well status post right lower extremity angiogram.  Her pain has improved.  Her foot is nice and warm with Doppler DP and peroneal signals.  Dr. Merchant has been consulted for wound care management.  Her groin is soft with no evidence of hematoma.  She is okay to be discharged from a vascular surgery standpoint with follow-up in 2 weeks.    Jossie Medina,  APRN  Vascular Surgery  [phone]  01/07/21  07:19 CST

## 2021-01-08 PROBLEM — N17.9 ACUTE KIDNEY INJURY: Status: ACTIVE | Noted: 2021-01-08

## 2021-01-08 PROBLEM — D62 POSTOPERATIVE ANEMIA DUE TO ACUTE BLOOD LOSS: Status: ACTIVE | Noted: 2021-01-08

## 2021-01-08 LAB
ANION GAP SERPL CALCULATED.3IONS-SCNC: 7 MMOL/L (ref 5–15)
BACTERIA UR QL AUTO: ABNORMAL /HPF
BASOPHILS # BLD AUTO: 0.04 10*3/MM3 (ref 0–0.2)
BASOPHILS NFR BLD AUTO: 0.5 % (ref 0–1.5)
BILIRUB UR QL STRIP: ABNORMAL
BUN SERPL-MCNC: 10 MG/DL (ref 8–23)
BUN/CREAT SERPL: 13 (ref 7–25)
CALCIUM SPEC-SCNC: 8.7 MG/DL (ref 8.6–10.5)
CHLORIDE SERPL-SCNC: 108 MMOL/L (ref 98–107)
CLARITY UR: ABNORMAL
CO2 SERPL-SCNC: 23 MMOL/L (ref 22–29)
COLOR UR: ABNORMAL
CREAT SERPL-MCNC: 0.77 MG/DL (ref 0.57–1)
DEPRECATED RDW RBC AUTO: 45.6 FL (ref 37–54)
EOSINOPHIL # BLD AUTO: 0.2 10*3/MM3 (ref 0–0.4)
EOSINOPHIL NFR BLD AUTO: 2.5 % (ref 0.3–6.2)
ERYTHROCYTE [DISTWIDTH] IN BLOOD BY AUTOMATED COUNT: 13.4 % (ref 12.3–15.4)
GFR SERPL CREATININE-BSD FRML MDRD: 72 ML/MIN/1.73
GLUCOSE BLDC GLUCOMTR-MCNC: 175 MG/DL (ref 70–130)
GLUCOSE SERPL-MCNC: 147 MG/DL (ref 65–99)
GLUCOSE UR STRIP-MCNC: NEGATIVE MG/DL
HCT VFR BLD AUTO: 23.7 % (ref 34–46.6)
HGB BLD-MCNC: 7.3 G/DL (ref 12–15.9)
HGB UR QL STRIP.AUTO: ABNORMAL
HYALINE CASTS UR QL AUTO: ABNORMAL
IMM GRANULOCYTES # BLD AUTO: 0.02 10*3/MM3 (ref 0–0.05)
IMM GRANULOCYTES NFR BLD AUTO: 0.2 % (ref 0–0.5)
KETONES UR QL STRIP: NEGATIVE
LEUKOCYTE ESTERASE UR QL STRIP.AUTO: ABNORMAL
LYMPHOCYTES # BLD AUTO: 1.83 10*3/MM3 (ref 0.7–3.1)
LYMPHOCYTES NFR BLD AUTO: 22.7 % (ref 19.6–45.3)
MCH RBC QN AUTO: 28.4 PG (ref 26.6–33)
MCHC RBC AUTO-ENTMCNC: 30.8 G/DL (ref 31.5–35.7)
MCV RBC AUTO: 92.2 FL (ref 79–97)
MONOCYTES # BLD AUTO: 0.73 10*3/MM3 (ref 0.1–0.9)
MONOCYTES NFR BLD AUTO: 9 % (ref 5–12)
NEUTROPHILS NFR BLD AUTO: 5.25 10*3/MM3 (ref 1.7–7)
NEUTROPHILS NFR BLD AUTO: 65.1 % (ref 42.7–76)
NITRITE UR QL STRIP: POSITIVE
NRBC BLD AUTO-RTO: 0 /100 WBC (ref 0–0.2)
PH UR STRIP.AUTO: 6 [PH] (ref 5–8)
PLATELET # BLD AUTO: 306 10*3/MM3 (ref 140–450)
PMV BLD AUTO: 10.1 FL (ref 6–12)
POTASSIUM SERPL-SCNC: 4.1 MMOL/L (ref 3.5–5.2)
PROT UR QL STRIP: ABNORMAL
RBC # BLD AUTO: 2.57 10*6/MM3 (ref 3.77–5.28)
RBC # UR: ABNORMAL /HPF
REF LAB TEST METHOD: ABNORMAL
SODIUM SERPL-SCNC: 138 MMOL/L (ref 136–145)
SP GR UR STRIP: 1.01 (ref 1–1.03)
SQUAMOUS #/AREA URNS HPF: ABNORMAL /HPF
UROBILINOGEN UR QL STRIP: ABNORMAL
WBC # BLD AUTO: 8.07 10*3/MM3 (ref 3.4–10.8)
WBC UR QL AUTO: ABNORMAL /HPF

## 2021-01-08 PROCEDURE — 81001 URINALYSIS AUTO W/SCOPE: CPT | Performed by: UROLOGY

## 2021-01-08 PROCEDURE — 99221 1ST HOSP IP/OBS SF/LOW 40: CPT | Performed by: PODIATRIST

## 2021-01-08 PROCEDURE — 99222 1ST HOSP IP/OBS MODERATE 55: CPT | Performed by: UROLOGY

## 2021-01-08 PROCEDURE — 97116 GAIT TRAINING THERAPY: CPT

## 2021-01-08 PROCEDURE — 82962 GLUCOSE BLOOD TEST: CPT

## 2021-01-08 PROCEDURE — 25010000002 MORPHINE PER 10 MG: Performed by: SURGERY

## 2021-01-08 PROCEDURE — 97110 THERAPEUTIC EXERCISES: CPT

## 2021-01-08 PROCEDURE — 80048 BASIC METABOLIC PNL TOTAL CA: CPT | Performed by: NURSE PRACTITIONER

## 2021-01-08 PROCEDURE — 25010000002 PIPERACILLIN SOD-TAZOBACTAM PER 1 G: Performed by: SURGERY

## 2021-01-08 PROCEDURE — 87086 URINE CULTURE/COLONY COUNT: CPT | Performed by: UROLOGY

## 2021-01-08 PROCEDURE — 85025 COMPLETE CBC W/AUTO DIFF WBC: CPT | Performed by: NURSE PRACTITIONER

## 2021-01-08 PROCEDURE — 25010000002 ONDANSETRON PER 1 MG: Performed by: SURGERY

## 2021-01-08 RX ORDER — AMOXICILLIN AND CLAVULANATE POTASSIUM 875; 125 MG/1; MG/1
1 TABLET, FILM COATED ORAL EVERY 12 HOURS SCHEDULED
Status: DISCONTINUED | OUTPATIENT
Start: 2021-01-08 | End: 2021-01-10 | Stop reason: HOSPADM

## 2021-01-08 RX ORDER — LISINOPRIL 20 MG/1
20 TABLET ORAL EVERY 12 HOURS SCHEDULED
Status: DISCONTINUED | OUTPATIENT
Start: 2021-01-08 | End: 2021-01-10 | Stop reason: HOSPADM

## 2021-01-08 RX ORDER — DOXYCYCLINE 100 MG/1
100 TABLET ORAL EVERY 12 HOURS SCHEDULED
Status: DISCONTINUED | OUTPATIENT
Start: 2021-01-08 | End: 2021-01-10 | Stop reason: HOSPADM

## 2021-01-08 RX ADMIN — ONDANSETRON HYDROCHLORIDE 4 MG: 2 SOLUTION INTRAMUSCULAR; INTRAVENOUS at 08:35

## 2021-01-08 RX ADMIN — AMOXICILLIN AND CLAVULANATE POTASSIUM 1 TABLET: 875; 125 TABLET, FILM COATED ORAL at 20:25

## 2021-01-08 RX ADMIN — GABAPENTIN 600 MG: 300 CAPSULE ORAL at 06:13

## 2021-01-08 RX ADMIN — LISINOPRIL 20 MG: 20 TABLET ORAL at 11:27

## 2021-01-08 RX ADMIN — HYDROCODONE BITARTRATE AND ACETAMINOPHEN 1 TABLET: 10; 325 TABLET ORAL at 01:30

## 2021-01-08 RX ADMIN — ASPIRIN 81 MG: 81 TABLET, FILM COATED ORAL at 11:27

## 2021-01-08 RX ADMIN — HYDROCODONE BITARTRATE AND ACETAMINOPHEN 1 TABLET: 10; 325 TABLET ORAL at 22:08

## 2021-01-08 RX ADMIN — MORPHINE SULFATE 6 MG: 10 INJECTION, SOLUTION INTRAMUSCULAR; INTRAVENOUS at 12:19

## 2021-01-08 RX ADMIN — MORPHINE SULFATE 6 MG: 10 INJECTION, SOLUTION INTRAMUSCULAR; INTRAVENOUS at 02:22

## 2021-01-08 RX ADMIN — ATORVASTATIN CALCIUM 20 MG: 10 TABLET, FILM COATED ORAL at 11:27

## 2021-01-08 RX ADMIN — HYDROCODONE BITARTRATE AND ACETAMINOPHEN 1 TABLET: 10; 325 TABLET ORAL at 16:01

## 2021-01-08 RX ADMIN — DOXYCYCLINE 100 MG: 100 TABLET, FILM COATED ORAL at 20:25

## 2021-01-08 RX ADMIN — TAZOBACTAM SODIUM AND PIPERACILLIN SODIUM 3.38 G: 375; 3 INJECTION, SOLUTION INTRAVENOUS at 12:19

## 2021-01-08 RX ADMIN — GABAPENTIN 600 MG: 300 CAPSULE ORAL at 15:57

## 2021-01-08 RX ADMIN — CLOPIDOGREL 75 MG: 75 TABLET, FILM COATED ORAL at 17:32

## 2021-01-08 RX ADMIN — SODIUM CHLORIDE, PRESERVATIVE FREE 10 ML: 5 INJECTION INTRAVENOUS at 11:28

## 2021-01-08 RX ADMIN — SODIUM CHLORIDE, PRESERVATIVE FREE 3 ML: 5 INJECTION INTRAVENOUS at 11:28

## 2021-01-08 RX ADMIN — HYDROCODONE BITARTRATE AND ACETAMINOPHEN 1 TABLET: 10; 325 TABLET ORAL at 11:27

## 2021-01-08 RX ADMIN — GABAPENTIN 600 MG: 300 CAPSULE ORAL at 20:26

## 2021-01-08 RX ADMIN — LISINOPRIL 20 MG: 20 TABLET ORAL at 20:52

## 2021-01-08 RX ADMIN — MORPHINE SULFATE 6 MG: 10 INJECTION, SOLUTION INTRAMUSCULAR; INTRAVENOUS at 07:55

## 2021-01-08 RX ADMIN — TAZOBACTAM SODIUM AND PIPERACILLIN SODIUM 3.38 G: 375; 3 INJECTION, SOLUTION INTRAVENOUS at 05:17

## 2021-01-08 NOTE — PROGRESS NOTES
" Pharmacy Dosing Service  Pharmacokinetics  Vancomycin Follow-up Evaluation     Assessment/Action/Plan:  Current Order: Vancomycin 1250 mg IVPB every 24 hours  Current end date:01/10/21  Levels: 11.3  Additional antimicrobial agent(s): Zosyn     SC=0.77(down from 1.14). Creatinine continues to trend down.  Dose increased again to target AUC above 400.  Vancomycin dose adjusted to 1500mg q 24H.   Pharmacy will continue to follow daily and adjust dose accordingly.      Subjective:  Evelyn Ojeda is a 80 y.o. female currently on Vancomycin for the treatment of SSTI, day 5 of 7 of treatment.     AUC Model Data:  Loading dose: N/A  Regimen: 1500 mg every 24 hours for 4 doses.  Start time: 2300 on 01/08/2021  Exposure target: AUC24 (range)400-600 mg/L.hr  AUC24,ss: 453 mg/L.hr  PAUC*: 75 %  Ctrough,ss: 12.2 mg/L  Pconc*: 4 %  Tox.: 7 %        Objective:  Ht: 154.9 cm (61\"); Wt: 80.6 kg (177 lb 12.8 oz)  Estimated Creatinine Clearance: 55.8 mL/min (by C-G formula based on SCr of 0.77 mg/dL).         Creatinine   Date Value Ref Range Status   01/08/2021 0.77 0.57 - 1.00 mg/dL Final   01/07/2021 0.82 0.57 - 1.00 mg/dL Final   01/06/2021 1.14 (H) 0.57 - 1.00 mg/dL Final   12/08/2019 0.8 0.5 - 0.9 mg/dL Final   12/07/2019 0.8 0.5 - 0.9 mg/dL Final            Lab Results   Component Value Date     WBC 8.07 01/08/2021     WBC 7.74 01/07/2021     WBC 7.56 01/06/2021               Lab Results   Component Value Date     VANCOTROUGH 11.30 01/07/2021         Culture Results:           Microbiology Results (last 10 days)      Procedure Component Value - Date/Time     COVID PRE-OP / PRE-PROCEDURE SCREENING ORDER (NO ISOLATION) - Swab, Nasal Cavity [[id number]]  (Normal) Collected: 01/04/21 2123     Lab Status: Final result Specimen: Swab from Nasal Cavity Updated: 01/04/21 2242     Narrative:       The following orders were created for panel order COVID PRE-OP / PRE-PROCEDURE SCREENING ORDER (NO ISOLATION) - Swab, Nasal " Cavity.  Procedure                               Abnormality         Status                     ---------                               -----------         ------                     COVID-19,[employer] IN-VASHTI...[[id number]]  Normal              Final result                  Please view results for these tests on the individual orders.     COVID-19,[employer] IN-HOUSE,Nasal Swab No Transport Media 3-4 HR TAT - Swab, Nasal Cavity [[id number]]  (Normal) Collected: 01/04/21 2123     Lab Status: Final result Specimen: Swab from Nasal Cavity Updated: 01/04/21 2242       COVID19 Not Detected     Narrative:       Fact sheet for providers: https://www.fda.gov/media/018614/download      Fact sheet for patients: https://www.fda.gov/media/100788/download     Test performed by PCR.     Blood Culture - Blood, Blood, Venous Line [[id number]] Collected: 01/04/21 2122     Lab Status: Preliminary result Specimen: Blood, Venous Line Updated: 01/07/21 2145       Blood Culture No growth at 3 days     Blood Culture - Blood, Arm, Left [[id number]] Collected: 01/04/21 2004     Lab Status: Preliminary result Specimen: Blood from Arm, Left Updated: 01/07/21 2015       Blood Culture No growth at 3 days             Maninder Beasley, PharmD   01/08/21 09:15 CST

## 2021-01-08 NOTE — CONSULTS
Referring Provider: Osvaldo  Reason for Consultation: Gross hematuria    Chief complaint gross hematuria    Subjective .     History of present illness: Patient is a 80-year-old white female with new onset gross hematuria during this hospitalization.  She underwent recent vascular surgery.  She is on anticoagulation.  Nurse reports that she is minimally mobilizing.  She has been refusing to use the mode.  Her urine has been managed with an air wick.  She reports a history of UTIs.  The patient denies a family history of kidney or bladder cancer.    Review of Systems  Pertinent items are noted in HPI, all other systems reviewed and negative     History  Past Medical History:   Diagnosis Date   • Diabetes mellitus (CMS/HCC)    • DVT (deep venous thrombosis) (CMS/HCC)     RLL   • Hypertension    • PVD (peripheral vascular disease) (CMS/[facility])        Past Surgical History:   Procedure Laterality Date   • AORTAGRAM N/A 1/6/2021    Procedure: 1.  Introduction of catheter/sheath into the aorta 2.  Aortoiliac angiogram with right lower extremity runoff 3.  Contralateral cannulation of the right common iliac artery 4.  Placement of a 4 mm spider distal embolic protection device in the below-knee popliteal artery 5.  Directional atherectomy of the proximal SFA and below-knee popliteal artery using the 6 [place] [employer] d   • CHOLECYSTECTOMY     • KNEE SURGERY     • LEG SURGERY         Family History   Problem Relation Age of Onset   • Cancer Mother    • Cancer Sister        Social History     Socioeconomic History   • Marital status: Unknown     Spouse name: Not on file   • Number of children: Not on file   • Years of education: Not on file   • Highest education level: Not on file   Tobacco Use   • Smoking status: Never Smoker   • Smokeless tobacco: Never Used   Substance and Sexual Activity   • Alcohol use: Never     Frequency: Never   • Drug use: Never   • Sexual activity: Defer       Current Facility-Administered  Medications   Medication Dose Route Frequency Provider Last Rate Last Admin   • acetaminophen (TYLENOL) tablet 650 mg  650 mg Oral Q4H PRN Morgan Owens DO       • amoxicillin-clavulanate (AUGMENTIN) 875-125 MG per tablet 1 tablet  1 tablet Oral Q12H Jorge Riley MD       • aspirin EC tablet 81 mg  81 mg Oral Daily Morgan Owens DO   81 mg at 01/08/21 1127   • atorvastatin (LIPITOR) tablet 20 mg  20 mg Oral Daily Morgan Owens DO   20 mg at 01/08/21 1127   • clopidogrel (PLAVIX) tablet 75 mg  75 mg Oral Daily BicMorgan lai DO   75 mg at 01/07/21 1656   • dextrose (D50W) 25 g/ 50mL Intravenous Solution 25 g  25 g Intravenous Q15 Min PRN Morgan Owens DO       • dextrose (GLUTOSE) oral gel 15 g  15 g Oral Q15 Min PRN Morgan Owens DO       • doxycycline (ADOXA) tablet 100 mg  100 mg Oral Q12H Jorge Riley MD       • gabapentin (NEURONTIN) capsule 600 mg  600 mg Oral Q8H Morgan Owens DO   600 mg at 01/08/21 1557   • glucagon (human recombinant) (GLUCAGEN DIAGNOSTIC) injection 1 mg  1 mg Subcutaneous Q15 Min PRN Morgan Owens DO       • HYDROcodone-acetaminophen (NORCO)  MG per tablet 1 tablet  1 tablet Oral Q6H PRN Morgan Owens DO   1 tablet at 01/08/21 1601   • insulin lispro (humaLOG, ADMELOG) injection 2-7 Units  2-7 Units Subcutaneous TID AC Morgan Owens DO   4 Units at 01/07/21 1655   • labetalol (NORMODYNE,TRANDATE) injection 20 mg  20 mg Intravenous Q2H PRN Morgan Owens DO       • lisinopril (PRINIVIL,ZESTRIL) tablet 20 mg  20 mg Oral Q12H Jorge Riley MD       • ondansetron (ZOFRAN) tablet 4 mg  4 mg Oral Q6H PRN Morgan Owens DO        Or   • ondansetron (ZOFRAN) injection 4 mg  4 mg Intravenous Q6H PRN Morgan Owens DO   4 mg at 01/08/21 0835   • sodium chloride 0.9 % flush 10 mL  10 mL Intravenous Q12H Morgan Owens DO   10 mL at 01/08/21 1128   • sodium chloride 0.9 % flush 10 mL  10 mL  Intravenous PRN BickingMorgan K, DO       • sodium chloride 0.9 % flush 10 mL  10 mL Intravenous PRN Bicking, Morgan K, DO       • sodium chloride 0.9 % flush 3 mL  3 mL Intravenous Q12H BickingMorgan K, DO   3 mL at 01/08/21 1128   • ziprasidone (GEODON) injection 10 mg  10 mg Intramuscular Once Abner Castro MD            Allergies   Allergen Reactions   • Codeine Other (See Comments)       Objective     Vital Signs   Temp:  [98.3 °F (36.8 °C)-98.7 °F (37.1 °C)] 98.3 °F (36.8 °C)  Heart Rate:  [70-80] 80  Resp:  [18] 18  BP: (159-184)/(50-56) 183/53    Intake/Output Summary (Last 24 hours) at 1/8/2021 1704  Last data filed at 1/8/2021 1300  Gross per 24 hour   Intake 300 ml   Output 600 ml   Net -300 ml       Physical Exam:    General: Alert, cooperative, nontoxic, comfortable  Head:  Normocephalic, without obvious abnormality, atraumatic  Eyes:   Lids and lashes normal, no icterus, no pallor  Ears:  Ears appear intact with no abnormalities noted  Neck:  Supple  Back:  No costovertebral angle tenderness  Lungs: Unlabored respirations  Heart:  Regular rhythm and normal rate  Abdomen: Soft, non-tender, non-distended  :  Deferred  Extremities: Moves all extremities well  Skin:  Warm and dry  Neurologic: Cranial nerves 2 - 12 grossly intact    Results Review:   I reviewed the patient's new clinical results.      @[facility]@    @[facility]@    Imaging Results (Last 24 Hours)     ** No results found for the last 24 hours. **            Assessment/Plan       PAD (peripheral artery disease) (CMS/[facility])    Essential hypertension    Mixed hyperlipidemia    Atherosclerosis of artery of extremity with intermittent claudication (CMS/[facility])    Nonhealing ulcer of right lower leg with fat layer exposed (CMS/[facility])    Hyperglycemia    Type 2 diabetes mellitus with hyperglycemia, without long-term current use of insulin (CMS/[facility])    Acute kidney injury (CMS/[facility])    Postoperative anemia due to acute blood loss      Gross hematuria:  I would recommend a CT of the abdomen and pelvis without and with contrast and with delays (CT urogram) to evaluate the upper tract. I will defer to the primary team to order this exam and will review when the results are available.  I have instructed the nurse to obtain a voided urine specimen and sent for urine culture.  She is currently on Augmentin which would provide good urinary coverage of most UTI organisms.  I will evaluate the culture when available.  There have been no blood clots or suggested clot retention. I will have the nursing staff obtain post-void residuals.  I have encouraged the patient to increase her water intake to further dilute her urine.    Physician: I will continue to follow this patient.    I discussed the patients findings and my recommendations with patient and nursing staff    (Please note that portions of this note were completed with a voice recognition program.)    Cedrick Meredith MD  01/08/21  17:04 CST    Time: Time spent: 40 minutes spent performing evaluation and management, chart review, and discussion with patient, > 50% of time spent in face-to-face encounter

## 2021-01-08 NOTE — PROGRESS NOTES
Continued Stay Note  [facility]     Patient Name: Evelyn Ojeda  MRN: [id number]  Today's Date: 1/8/2021    Admit Date: 1/4/2021    Discharge Plan     Row Name 01/08/21 1143       Plan    Plan Comments  Chart reviewed-- PT OT has evaluated pt and pt does plan on returning home with her children at discharge. No needs identified at this time. SW will follow for possible discharge needs.        Discharge Codes    No documentation.             Talya Begum

## 2021-01-08 NOTE — CONSULTS
[facility] - PODIATRY    Today's Date: 01/08/21    Patient Name: Evelyn Ojeda  MRN: [id number]  CSN: [id number]  PCP: Roberto Garcia MD  Referring Provider: Abner Castro MD  Attending Provider: Jorge Riley MD  Length of Stay: 3    SUBJECTIVE   Chief Complaint: Right foot wounds    HPI: Evelyn Ojeda, a 80 y.o.female, consulted to podiatry for evaluation of right foot ulcerations.  Patient has history of DM2, DVT, HTN, PVD.  Patient was admitted for peripheral arterial changes to her right lower extremity.  She underwent angiogram with intervention with Dr. Owens 2 days ago.  Per vascular note, good perfusion to right foot was reestablished.  Notes several small sores to her right foot including distal great toe, between her fourth and fifth toes, and lateral fifth toe.  Relates mild redness but no drainage.  Relates tenderness to all toes consistent with vascular disease.  Denies constitutional symptoms. No other pedal complaints at this time.    Past Medical History:   Diagnosis Date   • Diabetes mellitus (CMS/HCC)    • DVT (deep venous thrombosis) (CMS/HCC)     RLL   • Hypertension    • PVD (peripheral vascular disease) (CMS/[facility])      Past Surgical History:   Procedure Laterality Date   • AORTAGRAM N/A 1/6/2021    Procedure: 1.  Introduction of catheter/sheath into the aorta 2.  Aortoiliac angiogram with right lower extremity runoff 3.  Contralateral cannulation of the right common iliac artery 4.  Placement of a 4 mm spider distal embolic protection device in the below-knee popliteal artery 5.  Directional atherectomy of the proximal SFA and below-knee popliteal artery using the 6 Amharic McCarr Pantheris d   • CHOLECYSTECTOMY     • KNEE SURGERY     • LEG SURGERY       Family History   Problem Relation Age of Onset   • Cancer Mother    • Cancer Sister      Social History     Socioeconomic History   • Marital status: Unknown     Spouse name: Not on file   • Number of  children: Not on file   • Years of education: Not on file   • Highest education level: Not on file   Tobacco Use   • Smoking status: Never Smoker   • Smokeless tobacco: Never Used   Substance and Sexual Activity   • Alcohol use: Never     Frequency: Never   • Drug use: Never   • Sexual activity: Defer     Allergies   Allergen Reactions   • Codeine Other (See Comments)     Current Facility-Administered Medications   Medication Dose Route Frequency Provider Last Rate Last Admin   • acetaminophen (TYLENOL) tablet 650 mg  650 mg Oral Q4H PRN Morgan Owens, DO        Or   • acetaminophen (TYLENOL) 160 MG/5ML solution 650 mg  650 mg Oral Q4H PRN Morgan Owens, DO        Or   • acetaminophen (TYLENOL) suppository 650 mg  650 mg Rectal Q4H PRN Morgan Owens, DO       • aspirin EC tablet 81 mg  81 mg Oral Daily BicMorgan lai DO   81 mg at 01/08/21 1127   • atorvastatin (LIPITOR) tablet 20 mg  20 mg Oral Daily Morgan Owens DO   20 mg at 01/08/21 1127   • clopidogrel (PLAVIX) tablet 75 mg  75 mg Oral Daily Morgan Owens DO   75 mg at 01/07/21 1656   • dextrose (D50W) 25 g/ 50mL Intravenous Solution 25 g  25 g Intravenous Q15 Min PRN Morgan Owens DO       • dextrose (GLUTOSE) oral gel 15 g  15 g Oral Q15 Min PRN Morgan Owens, DO       • gabapentin (NEURONTIN) capsule 600 mg  600 mg Oral Q8H Morgan Owens DO   600 mg at 01/08/21 1557   • glucagon (human recombinant) (GLUCAGEN DIAGNOSTIC) injection 1 mg  1 mg Subcutaneous Q15 Min PRN Morgan Owens DO       • HYDROcodone-acetaminophen (NORCO)  MG per tablet 1 tablet  1 tablet Oral Q6H PRN Morgan Owens DO   1 tablet at 01/08/21 1601   • insulin lispro (humaLOG, ADMELOG) injection 2-7 Units  2-7 Units Subcutaneous TID AC Morgan Owens DO   4 Units at 01/07/21 1655   • labetalol (NORMODYNE,TRANDATE) injection 20 mg  20 mg Intravenous Q2H PRN Morgan Owens DO       • lisinopril (PRINIVIL,ZESTRIL)  tablet 20 mg  20 mg Oral Q24H Bicking, Morgan K, DO   20 mg at 01/08/21 1127   • Morphine injection 6 mg  6 mg Intravenous Q4H PRN Bicking, Morgan K, DO   6 mg at 01/08/21 1219    And   • naloxone (NARCAN) injection 0.4 mg  0.4 mg Intravenous Q5 Min PRN Bicking, Morgan K, DO       • ondansetron (ZOFRAN) tablet 4 mg  4 mg Oral Q6H PRN Bicking, Morgan K, DO        Or   • ondansetron (ZOFRAN) injection 4 mg  4 mg Intravenous Q6H PRN Bicking, Morgan K, DO   4 mg at 01/08/21 0835   • piperacillin-tazobactam (ZOSYN) 3.375 g in iso-osmotic dextrose 50 ml (premix)  3.375 g Intravenous Q8H Bicking, Morgan K, DO   3.375 g at 01/08/21 1219   • sodium chloride 0.9 % flush 10 mL  10 mL Intravenous Q12H Bicking, Morgan K, DO   10 mL at 01/08/21 1128   • sodium chloride 0.9 % flush 10 mL  10 mL Intravenous PRN Bicking, Morgan K, DO       • sodium chloride 0.9 % flush 10 mL  10 mL Intravenous PRN Bicking, Morgan K, DO       • sodium chloride 0.9 % flush 3 mL  3 mL Intravenous Q12H Bicking, Morgan K, DO   3 mL at 01/08/21 1128   • vancomycin 1500 mg/500 mL 0.9% NS IVPB ([facility])  1,500 mg Intravenous Q24H Jorge Riley MD       • ziprasidone (GEODON) injection 10 mg  10 mg Intramuscular Once Abner Castro MD         Review of Systems   Constitutional: Negative for chills and fever.   HENT: Negative for congestion.    Respiratory: Negative for shortness of breath.    Cardiovascular: Positive for leg swelling. Negative for chest pain.   Gastrointestinal: Negative for constipation, diarrhea, nausea and vomiting.   Musculoskeletal: Positive for arthralgias.        Foot pain   Skin: Positive for color change and wound.   Neurological: Negative for numbness.       OBJECTIVE     Vitals:    01/08/21 0715   BP: (!) 183/53   Pulse: 80   Resp: 18   Temp: 98.3 °F (36.8 °C)   SpO2: 96%       PHYSICAL EXAM  GEN:   A&Ox3, NAD. Pt presents in hospital bed. Accompanied by daughter.     Foot/Ankle Exam:       General:   Appearance:  appears stated age and healthy and elderly    Orientation: AAOx3    Affect: appropriate      VASCULAR      Right Foot Vascularity   Dorsalis pedis:  1+  Posterior tibial:  1+  Skin Temperature: warm    Edema Gradin+ and pitting  CFT:  3  Pedal Hair Growth:  Present  Varicosities: mild varicosities       Left Foot Vascularity   Dorsalis pedis:  1+  Posterior tibial:  1+  Skin Temperature: warm    Edema Grading:  Trace  CFT:  3  Pedal Hair Growth:  Present  Varicosities: mild varicosities        NEUROLOGIC     Right Foot Neurologic   Light touch sensation:  Diminished (Hyperesthetic to toes)  Vibratory sensation:  Normal  Hot/Cold sensation: normal       Left Foot Neurologic   Light touch sensation:  Diminished  Vibratory sensation:  Normal  Hot/cold sensation: normal       MUSCULOSKELETAL      Right Foot Musculoskeletal   Ecchymosis:  None  Tenderness: toe 1, toe 2, toe 3, toe 4 and toe 5    Arch:  Normal     Left Foot Musculoskeletal   Ecchymosis:  None  Tenderness: none    Arch:  Normal     MUSCLE STRENGTH     Right Foot Muscle Strength   Foot dorsiflexion:  4+  Foot plantar flexion:  4+  Foot inversion:  4+  Foot eversion:  4+     Left Foot Muscle Strength   Foot dorsiflexion:  4+  Foot plantar flexion:  4+  Foot inversion:  4+  Foot eversion:  4+     RANGE OF MOTION      Right Foot Range of Motion   Foot and ankle ROM within normal limits       Left Foot Range of Motion   Foot and ankle ROM within normal limits       DERMATOLOGIC     Right Foot Dermatologic   Skin: erythema, skin changes and ulcer    Skin: no right foot cellulitis and no right foot drainage    Nails: onychomycosis, abnormally thick and dystrophic nails       Left Foot Dermatologic   Skin: skin intact    Nails: onychomycosis, abnormally thick and dystrophic nails        Right Foot Additional Comments Small ischemic eschar to distal right hallux medial nail groove.  Well demarcated.  Light surrounding erythema.  No drainage or purulence  noted.    Small ischemic ulceration to dorsal lateral fifth toe.  No significant erythema.  No drainage or purulence.  No significant depth.    Ulceration and crusting to fourth webspace.  Unable to fully examine due to tenderness.  No active drainage.  No fluctuance or malodor.    See photos.                       RADIOLOGY/NUCLEAR:  Xr Foot 3+ View Right    Result Date: 1/4/2021  Narrative: RIGHT FOOT 3 views 1/4/2021 9:42 PM CST  HISTORY: foot sores  COMPARISON: Radiographs dated 12/26/2020  FINDINGS: Frontal, lateral and oblique radiographs of the right foot were provided for review.  The bones are diffusely osteopenic. No acute fracture is seen. No joint subluxation. Interphalangeal joint flexion deformities which are degenerative. Additional moderate degenerative change at the first metatarsophalangeal joint as well as diffusely throughout the midfoot. No destructive osseous changes are seen. Atheromatous vascular calcification identified. No capsular distention at the ankle joint. The subtalar joint is unremarkable. Small plantar calcaneal spur. Quite prominent Achilles enthesophyte.      Impression: 1. Osteopenia with advanced osteoarthritis changes. No visualized acute fracture or destructive osseous change to suggest osteomyelitis. This report was finalized on 01/04/2021 22:03 by Dr Alfa Wilson, .    Xr Foot 3+ View Right    Result Date: 12/26/2020  Narrative:  Exam:   XR FOOT 3+ VW RIGHT-   Date:  12/26/2020  History:  Female, age  80 years;rt foot pain  COMPARISON:  None.  Findings : Severe demineralization of the cysts anatomic limits bony details. There is no acute displaced fracture. No dislocation. No suspicious lytic or blastic lesion. No radiopaque foreign body. Degenerative changes in the foot distally. Plantar dorsal calcaneal spurs.      Impression: Impression:  No acute osseous pathology.  This report was finalized on 12/26/2020 12:35 by Dr. Ly Valiente MD.    Ir Angiogram  Extremity    Result Date: 1/7/2021  Narrative: Performed by Dr. Owens. Please see procedure note. This report was finalized on 01/07/2021 12:55 by Dr. Morgan Owens MD.    Us Arterial Doppler Lower Extremity Right    Result Date: 1/5/2021  Narrative: History: PAD  Comments: Grayscale imaging as well as color flow duplex were used to evaluate the right lower extremity arterial system.  On the right, the peak systolic velocity in the common femoral artery measured 149cm/s. In the profunda femoris artery measured 123cm/s. In the proximal SFA measured 47.5cm/s. The mid SFA measured 31cm/s. In the distal SFA measured 44.4cm/s. In the popliteal artery measured 28.5cm/s. In the posterior tibial artery measured 14.3cm/s. In the anterior tibial artery measured 12.4cm/s.      Impression: Patent right lower extremity arterial system with evidence of mid SFA stenosis and diminished/dampened flow in the tibial vessels.  This report was finalized on 01/05/2021 16:12 by Dr. Morgan Owens MD.    Us Arterial Doppler Lower Extremity Right    Result Date: 12/28/2020  Narrative: History: Right lower extremity pain, swelling  Comments: Right lower extremity arterial duplex was performed using gray scale imaging as well as color flow Doppler.  On the right common femoral artery peak systolic velocity is 158.0 cm/s, the proximal deep femoral artery peak systolic velocity is 156.6 cm/s, the proximal superficial femoral artery peak systolic velocity is 98.9 cm/s, the mid superficial femoral artery peak systolic velocity is 38.7 cm/s, the distal superficial femoral artery peak systolic velocity is 49.7 cm/s, the proximal popliteal artery peak systolic velocity is 60.5 cm/s, the distal posterior tibial artery peak systolic velocity is 27.6 cm/s, and the proximal anterior tibial artery peak systolic velocity is 25.9 cm/s. There is evidence of likely stenosis in the proximal/mid SFA with somewhat dampened waveforms distal to this.       Impression: Impression: 1. Patent right lower extremity arterial system. There is evidence of stenosis in the proximal/mid SFA with somewhat diminished flow and dampened waveforms distal to this.  This report was finalized on 12/28/2020 12:47 by Dr. Jourdan Tavera MD.    Us Venous Doppler Lower Extremity Right (duplex)    Result Date: 1/5/2021  Narrative: History: Pain and swelling      Impression: Impression: There is no evidence of deep venous thrombosis or superficial thrombophlebitis of the right lower extremity.  Comments: Right lower extremity venous duplex exam was performed using color Doppler flow, Doppler wave form analysis, and grayscale imaging, with and without compression. There is no evidence of deep venous thrombosis of the common femoral, superficial femoral, popliteal, posterior tibial, and peroneal veins. There is no thrombus identified in the saphenofemoral junction or the greater saphenous vein.  This report was finalized on 01/05/2021 16:30 by Dr. Morgan Owens MD.    Fl C Arm During Surgery    Result Date: 1/7/2021  Narrative: Performed by Dr. Owens. Please see procedure note. This report was finalized on 01/07/2021 12:55 by Dr. Morgan Owens MD.      LABORATORY/CULTURE RESULTS:  Results from last 7 days   Lab Units 01/08/21  0530 01/07/21  0434 01/06/21  0614   WBC 10*3/mm3 8.07 7.74 7.56   HEMOGLOBIN g/dL 7.3* 7.5* 9.0*   HEMATOCRIT % 23.7* 22.8* 28.8*   PLATELETS 10*3/mm3 306 308 378     Results from last 7 days   Lab Units 01/08/21  0530 01/07/21  0434 01/06/21  0614   SODIUM mmol/L 138 138 136   POTASSIUM mmol/L 4.1 4.2 4.7   CHLORIDE mmol/L 108* 109* 106   CO2 mmol/L 23.0 22.0 23.0   BUN mg/dL 10 14 21   CREATININE mg/dL 0.77 0.82 1.14*   CALCIUM mg/dL 8.7 8.5* 8.7   BILIRUBIN mg/dL  --   --  0.3   ALK PHOS U/L  --   --  107   ALT (SGPT) U/L  --   --  28   AST (SGOT) U/L  --   --  48*   GLUCOSE mg/dL 147* 169* 162*     Results from last 7 days   Lab Units 01/07/21  2403  01/07/21  0011 01/06/21  1942  01/05/21  0848  01/04/21 2004   INR   --   --   --   --  1.48*  --  2.09*   APTT seconds 34.9 33.2 61.8*   < > 29.2   < > 44.4*    < > = values in this interval not displayed.     Microbiology Results (last 10 days)     Procedure Component Value - Date/Time    COVID PRE-OP / PRE-PROCEDURE SCREENING ORDER (NO ISOLATION) - Swab, Nasal Cavity [[id number]]  (Normal) Collected: 01/04/21 2123    Lab Status: Final result Specimen: Swab from Nasal Cavity Updated: 01/04/21 2242    Narrative:      The following orders were created for panel order COVID PRE-OP / PRE-PROCEDURE SCREENING ORDER (NO ISOLATION) - Swab, Nasal Cavity.  Procedure                               Abnormality         Status                     ---------                               -----------         ------                     COVID-19,[employer] IN-VASHTI...[[id number]]  Normal              Final result                 Please view results for these tests on the individual orders.    COVID-19,[employer] IN-HOUSE,Nasal Swab No Transport Media 3-4 HR TAT - Swab, Nasal Cavity [[id number]]  (Normal) Collected: 01/04/21 2123    Lab Status: Final result Specimen: Swab from Nasal Cavity Updated: 01/04/21 2242     COVID19 Not Detected    Narrative:      Fact sheet for providers: https://www.fda.gov/media/601429/download     Fact sheet for patients: https://www.fda.gov/media/778460/download    Test performed by PCR.    Blood Culture - Blood, Blood, Venous Line [[id number]] Collected: 01/04/21 2122    Lab Status: Preliminary result Specimen: Blood, Venous Line Updated: 01/07/21 2145     Blood Culture No growth at 3 days    Blood Culture - Blood, Arm, Left [[id number]] Collected: 01/04/21 2004    Lab Status: Preliminary result Specimen: Blood from Arm, Left Updated: 01/07/21 2015     Blood Culture No growth at 3 days          PATHOLOGY RESULTS:       ASSESSMENT/PLAN   1. Ischemic Ulcerations - Right Foot  2. PVD  3. DM2    Comprehensive lower  extremity examination and evaluation was performed.  Digital examination somewhat limited due to patient's pain.  Discussed findings and treatment plan including risks, benefits, and treatment options with patient in detail. Patient agreed with treatment plan.  No debridement performed     Orders for twice daily application of Betadine with light bandage.  No compression.    Thank you for consult.  From podiatry standpoint, wounds are stable, albeit painful.  No aggressive treatment planned at this time.  Continue with Betadine twice daily.  Upon discharge, patient may follow-up in outpatient wound care center for continued treatment of ulcerations.    Lab Frequency Next Occurrence   US Arterial Doppler Lower Extremity Bilateral Once 06/08/2021   US Carotid Bilateral Once 06/08/2021       This document has been electronically signed by Ovi Merchant DPM on January 8, 2021 16:02 CST

## 2021-01-08 NOTE — THERAPY TREATMENT NOTE
Acute Care - Physical Therapy Treatment Note  [facility]     Patient Name: Evelyn Ojeda  : 1940  MRN: [id number]  Today's Date: 2021           PT Assessment (last 12 hours)      PT Evaluation and Treatment     Row Name 21 1100          Physical Therapy Time and Intention    Subjective Information  complains of;pain  -     Document Type  therapy note (daily note)  -     Mode of Treatment  physical therapy  -     Row Name 21 1100          General Information    Existing Precautions/Restrictions  fall eschar tissue on R toes  -     Row Name 21 1100          Pain Scale: Numbers Pre/Post-Treatment    Pretreatment Pain Rating  10/10  -     Posttreatment Pain Rating  10/10  -     Pain Location - Side  Right  -     Pain Location  toe  -     Row Name 21 1100          Bed Mobility    Supine-Sit [place] (Bed Mobility)  independent  -     Row Name 21 1100          Transfers    Sit-Stand [place] (Transfers)  supervision  -     Stand-Sit [place] (Transfers)  supervision  -     [place] Level (Toilet Transfer)  standby assist  -     Assistive Device (Toilet Transfer)  commode, bedside without drop arms  -     Row Name 21 1100          Toilet Transfer    Type (Toilet Transfer)  stand pivot/stand step  -     Row Name 21 1100          Gait/Stairs (Locomotion)    [place] Level (Gait)  contact guard  -     Assistive Device (Gait)  walker, front-wheeled  -     Distance in Feet (Gait)  125  -     Deviations/Abnormal Patterns (Gait)  antalgic;andrews decreased;stride length decreased  -     Row Name 21 1100          Hip (Therapeutic Exercise)    Hip (Therapeutic Exercise)  AROM (active range of motion)  -     Hip AROM (Therapeutic Exercise)  bilateral;flexion;sitting 15  -     Row Name 21 1100          Knee (Therapeutic Exercise)    Knee (Therapeutic Exercise)  AROM (active range of motion)  -     Knee AROM  (Therapeutic Exercise)  bilateral;LAQ (long arc quad);sitting 20  -MF     Row Name 01/08/21 1100          Ankle (Therapeutic Exercise)    Ankle (Therapeutic Exercise)  AROM (active range of motion)  -MF     Ankle AROM (Therapeutic Exercise)  bilateral;dorsiflexion;sitting 20  -MF     Row Name             Wound 01/05/21 0213 Right anterior fifth toe    Wound - Properties Group Placement Date: 01/05/21  -DF Placement Time: 0213  -DF Present on Hospital Admission: Y  -DF Side: Right  -DF Orientation: anterior  -DF Location: fifth toe  -DF    Retired Wound - Properties Group Date first assessed: 01/05/21  -DF Time first assessed: 0213  -DF Present on Hospital Admission: Y  -DF Side: Right  -DF Location: fifth toe  -DF    Row Name             Wound 01/05/21 0214 Right anterior great toe    Wound - Properties Group Placement Date: 01/05/21  -DF Placement Time: 0214  -DF Present on Hospital Admission: Y  -DF Side: Right  -DF Orientation: anterior  -DF Location: great toe  -DF    Retired Wound - Properties Group Date first assessed: 01/05/21  -DF Time first assessed: 0214  -DF Present on Hospital Admission: Y  -DF Side: Right  -DF Location: great toe  -DF    Row Name             Wound 01/05/21 0216 Right anterior    Wound - Properties Group Placement Date: 01/05/21  -DF Placement Time: 0216  -DF Present on Hospital Admission: Y  -DF Side: Right  -DF Orientation: anterior  -DF    Retired Wound - Properties Group Date first assessed: 01/05/21  -DF Time first assessed: 0216  -DF Present on Hospital Admission: Y  -DF Side: Right  -DF    Row Name             Wound 01/06/21 1646 Left groin Incision    Wound - Properties Group Placement Date: 01/06/21  -SHAUNA Placement Time: 1646  -SHAUNA Side: Left  -SHAUNA Location: groin  -SHAUNA Primary Wound Type: Incision  -SHAUNA    Retired Wound - Properties Group Date first assessed: 01/06/21  -SHAUNA Time first assessed: 1646  -SHAUNA Side: Left  -SHAUNA Location: groin  -SHAUNA Primary Wound Type: Incision  -SHAUNA    Row  Name 01/08/21 1100          Plan of Care Review    Plan of Care Reviewed With  patient  -MF     Progress  declining  -MF     Outcome Summary  Pt. agreeable to therapy, but has increased pain in R foot today. Pt's foot appears more red today and has increased sensitivity to touch. Pt. did ambulate 125' with RWX, but with antalgic gait. Pt. plans to return home with family folllowing discharge.   -     Row Name 01/08/21 1100          Positioning and Restraints    Pre-Treatment Position  in bed  -     Post Treatment Position  bed  -MF     In Bed  sitting EOB;call light within reach;with family/caregiver;with nsg;side rails up x2  -MF       User Key  (r) = Recorded By, (t) = Taken By, (c) = Cosigned By    Initials Name Provider Type    Georgia Ray RN Registered Nurse    Lupe Atkins RN Registered Nurse    Cate Gamino, SOFIA Physical Therapy Assistant        Physical Therapy Education                 Title: PT OT SLP Therapies (In Progress)     Topic: Physical Therapy (In Progress)     Point: Mobility training (Done)     Learning Progress Summary           Patient Acceptance, E,TB,D, BRANDON,MISTY,NR by NATALY at 1/6/2021 1104    Comment: Education re: purpose of PT/importance of activity; education for safety/falls prevention w/ functional mobility; education for improved tech w/ tfers and w/ use of rwx to assist w/ decreasing wt bearing R LE                   Point: Home exercise program (Not Started)     Learner Progress:  Not documented in this visit.          Point: Precautions (Done)     Learning Progress Summary           Patient Acceptance, E,TB,D, BRANDON,MISTY,NR by NATALY at 1/6/2021 1104    Comment: Education re: purpose of PT/importance of activity; education for safety/falls prevention w/ functional mobility; education for improved tech w/ tfers and w/ use of rwx to assist w/ decreasing wt bearing R LE                               User Key     Initials Effective Dates Name Provider Type Discipline     NATALY 08/02/18 -  Nereida Faust, PT Physical Therapist PT              PT Recommendation and Plan     Plan of Care Reviewed With: patient  Progress: declining  Outcome Summary: Pt. agreeable to therapy, but has increased pain in R foot today. Pt's foot appears more red today and has increased sensitivity to touch. Pt. did ambulate 125' with RWX, but with antalgic gait. Pt. plans to return home with family folllowing discharge.        Time Calculation:   PT Charges     Row Name 01/08/21 1141             Time Calculation    Start Time  1100  -      Stop Time  1125  -      Time Calculation (min)  25 min  -      PT Received On  01/08/21  -      PT Goal Re-Cert Due Date  01/16/21  -         Time Calculation- PT    Total Timed Code Minutes- PT  25 minute(s)  -         Timed Charges    48273 - PT Therapeutic Exercise Minutes  10  -MF      56797 - Gait Training Minutes   15  -MF        User Key  (r) = Recorded By, (t) = Taken By, (c) = Cosigned By    Initials Name Provider Type     Cate Healy PTA Physical Therapy Assistant        Therapy Charges for Today     Code Description Service Date Service Provider Modifiers Qty    53478586430 HC PT THER PROC EA 15 MIN 1/7/2021 Cate Healy, PTA GP 1    89108088112 HC GAIT TRAINING EA 15 MIN 1/7/2021 Cate Healy, PTA GP 2    39724006663 HC PT THER PROC EA 15 MIN 1/7/2021 Cate Healy, PTA GP 1    55292878686 HC GAIT TRAINING EA 15 MIN 1/7/2021 Cate Healy, PTA GP 1    83168933992 HC PT THER PROC EA 15 MIN 1/8/2021 Cate Healy, PTA GP 1    99974046139 HC GAIT TRAINING EA 15 MIN 1/8/2021 Cate Healy, PTA GP 1          PT G-Codes  Outcome Measure Options: AM-PAC 6 Clicks Basic Mobility (PT)  AM-PAC 6 Clicks Score (PT): 18    Cate Healy PTA  1/8/2021

## 2021-01-08 NOTE — PROGRESS NOTES
[facility] Medicine Services  INPATIENT PROGRESS NOTE    Length of Stay: 3  Date of Admission: 1/4/2021  Primary Care Physician: Roberto Garcia MD    Subjective   Chief Complaint: Follow-up right foot pain  HPI   Sitting up on side of bed.  She feels terrible today.  Nausea with vomiting this morning while I was in the room.  Right foot warm and tender to touch today.  Reports more pain right foot today.  Atherectomy proximal SFA and balloon angioplasty right lower extremity 1/6.  Ulcerated lesion between fourth and fifth toes.  Also eschar 2.5 cm right toenail.  Awaiting Dr. Merchant's input for wound care.  She denies chest pain, palpitations or shortness of breath.  No oxygen in use.  Hemoglobin 7.3. WIC in place with light pink-tinged urine.  On aspirin and Plavix per vascular.    Review of Systems   Constitutional: Negative for chills and fever.   HENT: Negative for congestion and trouble swallowing.    Eyes: Negative for photophobia and visual disturbance.   Respiratory: Negative for cough, shortness of breath and wheezing.    Cardiovascular: Positive for leg swelling (right foot). Negative for chest pain and palpitations.   Gastrointestinal: Negative for blood in stool, constipation, diarrhea, nausea and vomiting.   Endocrine: Negative for cold intolerance, heat intolerance and polyuria.   Genitourinary: Negative for dysuria, frequency and urgency.   Musculoskeletal: Positive for gait problem. Negative for back pain.   Skin: Positive for color change (Right foot) and wound (Right foot between fourth and fifth toes.).   Allergic/Immunologic: Negative for immunocompromised state.   Neurological: Positive for weakness.   Hematological: Negative for adenopathy. Does not bruise/bleed easily.   Psychiatric/Behavioral: Negative for agitation, behavioral problems and confusion.      All pertinent negatives and positives are as above. All other systems have been reviewed and  are negative unless otherwise stated.     Objective    Temp:  [98.2 °F (36.8 °C)-98.7 °F (37.1 °C)] 98.3 °F (36.8 °C)  Heart Rate:  [68-80] 80  Resp:  [18] 18  BP: (125-184)/(50-77) 183/53  Physical Exam  Vitals signs and nursing note reviewed.   Constitutional:       Appearance: She is obese.      Comments: Sitting up on side of bed.  No oxygen in use.  Daughter in room.   HENT:      Head: Normocephalic and atraumatic.      Nose: No congestion.      Mouth/Throat:      Pharynx: No oropharyngeal exudate.   Eyes:      Extraocular Movements: Extraocular movements intact.      Pupils: Pupils are equal, round, and reactive to light.   Neck:      Musculoskeletal: Normal range of motion and neck supple.   Cardiovascular:      Rate and Rhythm: Normal rate.      Heart sounds: No murmur.   Pulmonary:      Effort: Pulmonary effort is normal.      Breath sounds: Normal breath sounds. No wheezing, rhonchi or rales.   Abdominal:      Palpations: Abdomen is soft.   Genitourinary:     Comments: WIC with light pink urine  Musculoskeletal:         General: Tenderness (Right foot) present.   Skin:     Findings: Erythema (Right foot) present.      Comments: Right foot warm.  Ulcerated lesion between fourth and fifth toe right foot.  0.5 cm black eschar by right toenail.   Neurological:      General: No focal deficit present.      Mental Status: She is alert and oriented to person, place, and time.   Psychiatric:         Mood and Affect: Mood normal.         Behavior: Behavior normal.         Thought Content: Thought content normal.         Judgment: Judgment normal.      Results Review:  I have reviewed the labs, radiology results, and diagnostic studies.    Laboratory Data:      Results from last 7 days   Lab Units 01/08/21  0530 01/07/21  0434 01/06/21  0614 01/05/21  0848 01/05/21  0308 01/04/21 2003   WBC 10*3/mm3 8.07 7.74 7.56 7.14 8.31 9.35   HEMOGLOBIN g/dL 7.3* 7.5* 9.0* 8.9* 8.7* 10.3*   HEMATOCRIT % 23.7* 22.8* 28.8* 27.6*  26.6* 31.5*   PLATELETS 10*3/mm3 306 308 378 339 348 434        Results from last 7 days   Lab Units 01/08/21  0530 01/07/21  0434 01/06/21  0614 01/04/21 2003   SODIUM mmol/L 138 138 136 136   POTASSIUM mmol/L 4.1 4.2 4.7 4.8   CHLORIDE mmol/L 108* 109* 106 103   CO2 mmol/L 23.0 22.0 23.0 23.0   BUN mg/dL 10 14 21 22   CREATININE mg/dL 0.77 0.82 1.14* 1.54*   GLUCOSE mg/dL 147* 169* 162* 184*   CALCIUM mg/dL 8.7 8.5* 8.7 9.7   ALT (SGPT) U/L  --   --  28  --      Culture Data:      Blood Culture   Date Value Ref Range Status   01/04/2021 No growth at 3 days  Preliminary   01/04/2021 No growth at 3 days  Preliminary     Radiology Data:   Imaging Results (Last 7 Days)     Procedure Component Value Units Date/Time    IR Angiogram Extremity [[id number]] Collected: 01/07/21 0643     Updated: 01/07/21 1258    Narrative:      Performed by Dr. Owens. Please see procedure note.  This report was finalized on 01/07/2021 12:55 by Dr. Morgan Owens MD.    FL C Arm During Surgery [[id number]] Collected: 01/07/21 0643     Updated: 01/07/21 1258    Narrative:      Performed by Dr. Owens. Please see procedure note.  This report was finalized on 01/07/2021 12:55 by Dr. Morgan Owens MD.    US Venous Doppler Lower Extremity Right (duplex) [[id number]] Collected: 01/05/21 1630     Updated: 01/05/21 1634    Narrative:      History: Pain and swelling       Impression:      Impression: There is no evidence of deep venous thrombosis or  superficial thrombophlebitis of the right lower extremity.     Comments: Right lower extremity venous duplex exam was performed using  color Doppler flow, Doppler wave form analysis, and grayscale imaging,  with and without compression. There is no evidence of deep venous  thrombosis of the common femoral, superficial femoral, popliteal,  posterior tibial, and peroneal veins. There is no thrombus identified in  the saphenofemoral junction or the greater saphenous vein.      This report was  finalized on 01/05/2021 16:30 by Dr. Morgan Owens MD.    US Arterial Doppler Lower Extremity Right [[id number]] Collected: 01/05/21 1611     Updated: 01/05/21 1615    Narrative:      History: PAD     Comments: Grayscale imaging as well as color flow duplex were used to  evaluate the right lower extremity arterial system.     On the right, the peak systolic velocity in the common femoral artery  measured 149cm/s. In the profunda femoris artery measured 123cm/s. In  the proximal SFA measured 47.5cm/s. The mid SFA measured 31cm/s. In the  distal SFA measured 44.4cm/s. In the popliteal artery measured 28.5cm/s.  In the posterior tibial artery measured 14.3cm/s. In the anterior tibial  artery measured 12.4cm/s.       Impression:      Patent right lower extremity arterial system with evidence  of mid SFA stenosis and diminished/dampened flow in the tibial vessels.     This report was finalized on 01/05/2021 16:12 by Dr. Morgan Owens MD.    XR Foot 3+ View Right [[id number]] Collected: 01/04/21 2201     Updated: 01/04/21 2206    Narrative:      RIGHT FOOT 3 views 1/4/2021 9:42 PM CST     HISTORY: foot sores     COMPARISON: Radiographs dated 12/26/2020      FINDINGS:   Frontal, lateral and oblique radiographs of the right foot were provided  for review.      The bones are diffusely osteopenic. No acute fracture is seen. No joint  subluxation. Interphalangeal joint flexion deformities which are  degenerative. Additional moderate degenerative change at the first  metatarsophalangeal joint as well as diffusely throughout the midfoot.  No destructive osseous changes are seen. Atheromatous vascular  calcification identified. No capsular distention at the ankle joint. The  subtalar joint is unremarkable. Small plantar calcaneal spur. Quite  prominent Achilles enthesophyte.       Impression:      1. Osteopenia with advanced osteoarthritis changes. No visualized acute  fracture or destructive osseous change to suggest  osteomyelitis.  This report was finalized on 01/04/2021 22:03 by Dr Alfa Wilson, .        Intake/Output    Intake/Output Summary (Last 24 hours) at 1/8/2021 1207  Last data filed at 1/8/2021 0842  Gross per 24 hour   Intake 300 ml   Output 600 ml   Net -300 ml     Scheduled Meds  aspirin, 81 mg, Oral, Daily  atorvastatin, 20 mg, Oral, Daily  clopidogrel, 75 mg, Oral, Daily  gabapentin, 600 mg, Oral, Q8H  insulin lispro, 2-7 Units, Subcutaneous, TID AC  lisinopril, 20 mg, Oral, Q24H  piperacillin-tazobactam, 3.375 g, Intravenous, Q8H  sodium chloride, 10 mL, Intravenous, Q12H  sodium chloride, 3 mL, Intravenous, Q12H  vancomycin, 1,500 mg, Intravenous, Q24H  ziprasidone, 10 mg, Intramuscular, Once      I have reviewed the patient current medications.     Assessment/Plan     Active Hospital Problems    Diagnosis   • **PAD (peripheral artery disease) (CMS/[facility])   • Acute kidney injury (CMS/[facility])   • Postoperative anemia due to acute blood loss   • Hyperglycemia   • Type 2 diabetes mellitus with hyperglycemia, without long-term current use of insulin (CMS/H[facility])   • Essential hypertension   • Mixed hyperlipidemia   • Nonhealing ulcer of right lower leg with fat layer exposed (CMS/[facility])   • Atherosclerosis of artery of extremity with intermittent claudication (CMS/[facility])     Plan:  1.  To ER 1/4 with right leg pain.  History of peripheral vascular disease followed by Dr. Owens.  Chronic anticoagulation for DVT.  Dr. Owens changed Lovenox to Xarelto.  Recent hospitalization at outside facility for cellulitis right foot and was discharged on antibiotics.  Continued to have right foot pain with minimal improvement. CRP 0 .99, INR 2.09, WBC 9.35.  Vancomycin, Zosyn heparin given in ER.  2.  Dr. Owens consulted.  Right lower extremity with chronic lower extremity edema, peripheral vascular disease unchanged over the past month.  Arteriogram with balloon angioplasty and atherectomy right lower extremity 1/6  3.  Plavix 75 mg  orally daily, started per vascular surgery.  Continue aspirin.  4.  Arterial Doppler right lower extremity 1/5 patent right lower extremity arterial system with evidence of mid SFA stenosis and diminished dampened flow in the tibial vessels.  5.  Venous Dopplers no evidence of deep vein thrombosis or superficial thrombophlebitis right lower extremity.   6.  X-ray right foot osteopenia with advanced osteoarthritis changes.  No visualized acute fracture or destructive osseous changes to suggest osteomyelitis.  7.  Ulcerated lesion between fourth and fifth toe.  0.5 cm eschar near the right toenail.  Vancomycin and Zosyn continued.  Wound care consult.  Awaiting Dr. Merchant's evaluation and recommendations.  Foot red.  Some drainage noted from ulcerated region.  8.  Blood cultures no growth at 3 days.  9.  Postop blood loss anemia.  Hemoglobin 10.3 on admission.  Hemoglobin 7.3, hematocrit 23.7 today.  Monitor for blood loss.  She has some hematuria noted.  CBC tomorrow.  May require transfusion if trends down lower.  10.  Hemoglobin A1c 8.1.  Sliding scale insulin.  Glucoses 175, 147.  Hold Metformin.   11.  Acute kidney injury with creatinine 1.54 on admission, 0.77 today.    12.  Home medications reviewed and resumed if appropriate.     Her daughters, Lashell and Virginia will assist with decision-making if she is unable to make decisions for self  The above documentation resulted from a face-to-face encounter by me Sharla HUA, [facility].    Discharge Planning: I expect patient to be discharged to home in 1-2 days.    Electronically signed by JAVID Iniguez, 1/8/2021, 12:07 CST.

## 2021-01-09 ENCOUNTER — APPOINTMENT (OUTPATIENT)
Dept: CT IMAGING | Facility: HOSPITAL | Age: 81
End: 2021-01-09

## 2021-01-09 LAB
ANION GAP SERPL CALCULATED.3IONS-SCNC: 7 MMOL/L (ref 5–15)
BACTERIA SPEC AEROBE CULT: NO GROWTH
BACTERIA SPEC AEROBE CULT: NORMAL
BACTERIA SPEC AEROBE CULT: NORMAL
BUN SERPL-MCNC: 8 MG/DL (ref 8–23)
BUN/CREAT SERPL: 11.8 (ref 7–25)
CALCIUM SPEC-SCNC: 9 MG/DL (ref 8.6–10.5)
CHLORIDE SERPL-SCNC: 108 MMOL/L (ref 98–107)
CO2 SERPL-SCNC: 23 MMOL/L (ref 22–29)
CREAT SERPL-MCNC: 0.68 MG/DL (ref 0.57–1)
DEPRECATED RDW RBC AUTO: 44.1 FL (ref 37–54)
ERYTHROCYTE [DISTWIDTH] IN BLOOD BY AUTOMATED COUNT: 13.3 % (ref 12.3–15.4)
GFR SERPL CREATININE-BSD FRML MDRD: 84 ML/MIN/1.73
GLUCOSE BLDC GLUCOMTR-MCNC: 156 MG/DL (ref 70–130)
GLUCOSE BLDC GLUCOMTR-MCNC: 221 MG/DL (ref 70–130)
GLUCOSE SERPL-MCNC: 135 MG/DL (ref 65–99)
HCT VFR BLD AUTO: 24.1 % (ref 34–46.6)
HGB BLD-MCNC: 7.5 G/DL (ref 12–15.9)
IRON 24H UR-MRATE: 29 MCG/DL (ref 37–145)
IRON SATN MFR SERPL: 10 % (ref 20–50)
MCH RBC QN AUTO: 28.1 PG (ref 26.6–33)
MCHC RBC AUTO-ENTMCNC: 31.1 G/DL (ref 31.5–35.7)
MCV RBC AUTO: 90.3 FL (ref 79–97)
PLATELET # BLD AUTO: 307 10*3/MM3 (ref 140–450)
PMV BLD AUTO: 10.2 FL (ref 6–12)
POTASSIUM SERPL-SCNC: 4 MMOL/L (ref 3.5–5.2)
RBC # BLD AUTO: 2.67 10*6/MM3 (ref 3.77–5.28)
SODIUM SERPL-SCNC: 138 MMOL/L (ref 136–145)
TIBC SERPL-MCNC: 279 MCG/DL (ref 298–536)
TRANSFERRIN SERPL-MCNC: 187 MG/DL (ref 200–360)
WBC # BLD AUTO: 7.11 10*3/MM3 (ref 3.4–10.8)

## 2021-01-09 PROCEDURE — 97110 THERAPEUTIC EXERCISES: CPT

## 2021-01-09 PROCEDURE — 97116 GAIT TRAINING THERAPY: CPT

## 2021-01-09 PROCEDURE — 80048 BASIC METABOLIC PNL TOTAL CA: CPT | Performed by: NURSE PRACTITIONER

## 2021-01-09 PROCEDURE — 25010000002 IOPAMIDOL 61 % SOLUTION: Performed by: INTERNAL MEDICINE

## 2021-01-09 PROCEDURE — 83540 ASSAY OF IRON: CPT | Performed by: INTERNAL MEDICINE

## 2021-01-09 PROCEDURE — 82962 GLUCOSE BLOOD TEST: CPT

## 2021-01-09 PROCEDURE — 63710000001 ONDANSETRON PER 8 MG: Performed by: SURGERY

## 2021-01-09 PROCEDURE — 74178 CT ABD&PLV WO CNTR FLWD CNTR: CPT

## 2021-01-09 PROCEDURE — 84466 ASSAY OF TRANSFERRIN: CPT | Performed by: INTERNAL MEDICINE

## 2021-01-09 PROCEDURE — 63710000001 INSULIN LISPRO (HUMAN) PER 5 UNITS: Performed by: SURGERY

## 2021-01-09 PROCEDURE — 85027 COMPLETE CBC AUTOMATED: CPT | Performed by: INTERNAL MEDICINE

## 2021-01-09 PROCEDURE — 99232 SBSQ HOSP IP/OBS MODERATE 35: CPT | Performed by: UROLOGY

## 2021-01-09 RX ORDER — FERROUS SULFATE 325(65) MG
325 TABLET ORAL
Status: DISCONTINUED | OUTPATIENT
Start: 2021-01-10 | End: 2021-01-10 | Stop reason: HOSPADM

## 2021-01-09 RX ADMIN — ASPIRIN 81 MG: 81 TABLET, FILM COATED ORAL at 09:45

## 2021-01-09 RX ADMIN — DOXYCYCLINE 100 MG: 100 TABLET, FILM COATED ORAL at 21:20

## 2021-01-09 RX ADMIN — DOXYCYCLINE 100 MG: 100 TABLET, FILM COATED ORAL at 09:43

## 2021-01-09 RX ADMIN — ATORVASTATIN CALCIUM 20 MG: 10 TABLET, FILM COATED ORAL at 09:44

## 2021-01-09 RX ADMIN — LISINOPRIL 20 MG: 20 TABLET ORAL at 09:43

## 2021-01-09 RX ADMIN — ONDANSETRON 4 MG: 4 TABLET, FILM COATED ORAL at 17:24

## 2021-01-09 RX ADMIN — GABAPENTIN 600 MG: 300 CAPSULE ORAL at 04:15

## 2021-01-09 RX ADMIN — ONDANSETRON 4 MG: 4 TABLET, FILM COATED ORAL at 23:37

## 2021-01-09 RX ADMIN — HYDROCODONE BITARTRATE AND ACETAMINOPHEN 1 TABLET: 10; 325 TABLET ORAL at 17:30

## 2021-01-09 RX ADMIN — HYDROCODONE BITARTRATE AND ACETAMINOPHEN 1 TABLET: 10; 325 TABLET ORAL at 23:32

## 2021-01-09 RX ADMIN — INSULIN LISPRO 3 UNITS: 100 INJECTION, SOLUTION INTRAVENOUS; SUBCUTANEOUS at 12:12

## 2021-01-09 RX ADMIN — HYDROCODONE BITARTRATE AND ACETAMINOPHEN 1 TABLET: 10; 325 TABLET ORAL at 10:23

## 2021-01-09 RX ADMIN — GABAPENTIN 600 MG: 300 CAPSULE ORAL at 21:20

## 2021-01-09 RX ADMIN — AMOXICILLIN AND CLAVULANATE POTASSIUM 1 TABLET: 875; 125 TABLET, FILM COATED ORAL at 21:20

## 2021-01-09 RX ADMIN — AMOXICILLIN AND CLAVULANATE POTASSIUM 1 TABLET: 875; 125 TABLET, FILM COATED ORAL at 09:43

## 2021-01-09 RX ADMIN — ONDANSETRON 4 MG: 4 TABLET, FILM COATED ORAL at 09:51

## 2021-01-09 RX ADMIN — CLOPIDOGREL 75 MG: 75 TABLET, FILM COATED ORAL at 17:19

## 2021-01-09 RX ADMIN — GABAPENTIN 600 MG: 300 CAPSULE ORAL at 14:12

## 2021-01-09 RX ADMIN — IOPAMIDOL 100 ML: 612 INJECTION, SOLUTION INTRAVENOUS at 14:30

## 2021-01-09 RX ADMIN — LABETALOL HYDROCHLORIDE 20 MG: 5 INJECTION, SOLUTION INTRAVENOUS at 23:47

## 2021-01-09 RX ADMIN — SODIUM CHLORIDE, PRESERVATIVE FREE 10 ML: 5 INJECTION INTRAVENOUS at 21:21

## 2021-01-09 RX ADMIN — INSULIN LISPRO 2 UNITS: 100 INJECTION, SOLUTION INTRAVENOUS; SUBCUTANEOUS at 16:46

## 2021-01-09 RX ADMIN — HYDROCODONE BITARTRATE AND ACETAMINOPHEN 1 TABLET: 10; 325 TABLET ORAL at 04:14

## 2021-01-09 RX ADMIN — LISINOPRIL 20 MG: 20 TABLET ORAL at 21:20

## 2021-01-09 NOTE — PLAN OF CARE
Pt pedal pulses remained 2+ per doppler throughout the night. Distal most area remains erythemic, edematous, and warm. Pt reports pain is responsive to prn medication. Pt family remained at bedside and pt remained alert and oriented this shift. Pt post void residual was 31mL of rust-colored urine (contaminated by stool).     TELE: SR 65-77 with PVCs    Problem: Adult Inpatient Plan of Care  Goal: Plan of Care Review  Outcome: Ongoing, Progressing  Goal: Patient-Specific Goal (Individualized)  Outcome: Ongoing, Progressing  Goal: Absence of Hospital-Acquired Illness or Injury  Outcome: Ongoing, Progressing  Intervention: Identify and Manage Fall Risk  Recent Flowsheet Documentation  Taken 1/9/2021 0200 by Beth Poole RN  Safety Promotion/Fall Prevention: safety round/check completed  Taken 1/9/2021 0000 by Beth Poole RN  Safety Promotion/Fall Prevention: safety round/check completed  Taken 1/8/2021 2100 by Beth Poole RN  Safety Promotion/Fall Prevention: safety round/check completed  Intervention: Prevent Infection  Recent Flowsheet Documentation  Taken 1/8/2021 2100 by Beth Poole RN  Infection Prevention: (Family to remain at bedside) --  Goal: Optimal Comfort and Wellbeing  Outcome: Ongoing, Progressing  Goal: Readiness for Transition of Care  Outcome: Ongoing, Progressing     Problem: Wound  Goal: Optimal Wound Healing  Outcome: Ongoing, Progressing  Intervention: Promote Effective Wound Healing  Recent Flowsheet Documentation  Taken 1/8/2021 2208 by Beth Poole RN  Pain Management Interventions: see MAR     Problem: Tissue Perfusion Altered  Goal: Improved Tissue Perfusion  Outcome: Ongoing, Progressing     Problem: Skin Injury Risk Increased  Goal: Skin Health and Integrity  Outcome: Ongoing, Progressing     Problem: Fall Injury Risk  Goal: Absence of Fall and Fall-Related Injury  Outcome: Ongoing, Progressing  Intervention: Promote Injury-Free Environment  Recent  Flowsheet Documentation  Taken 1/9/2021 0200 by Beth Poole, RN  Safety Promotion/Fall Prevention: safety round/check completed  Taken 1/9/2021 0000 by Beth Poole, RN  Safety Promotion/Fall Prevention: safety round/check completed  Taken 1/8/2021 2100 by Beth Poole, RN  Safety Promotion/Fall Prevention: safety round/check completed   Goal Outcome Evaluation:  Plan of Care Reviewed With: patient  Progress: declining

## 2021-01-09 NOTE — PROGRESS NOTES
[facility] Medicine Services  INPATIENT PROGRESS NOTE    Patient Name: Evelyn Ojeda  Date of Admission: 1/4/2021  Today's Date: 01/09/21  Length of Stay: 4  Primary Care Physician: Roberto Garcia MD    Subjective   Chief Complaint: follow-up hematuria  HPI   Patient just returned from CT urogram.  She has her right leg propped up on a pillow, and sock off.  No new pain symptoms involving the right lower extremity, just chronic pain and sensitivity which is largely unchanged.  Still notices some dark red tinge to her urine.  No dysuria.  No clots.  Denies any new pain symptoms.    Review of Systems     All pertinent negatives and positives are as above. All other systems have been reviewed and are negative unless otherwise stated.     Objective    Temp:  [97.9 °F (36.6 °C)-98.4 °F (36.9 °C)] 98.2 °F (36.8 °C)  Heart Rate:  [65-79] 76  Resp:  [16] 16  BP: (152-178)/(45-53) 176/46  Physical Exam  Vitals signs reviewed.   HENT:      Head: Normocephalic.      Mouth/Throat:      Pharynx: No oropharyngeal exudate.   Eyes:      Pupils: Pupils are equal, round, and reactive to light.   Neck:      Musculoskeletal: Neck supple.   Pulmonary:      Effort: Pulmonary effort is normal. No respiratory distress.   Musculoskeletal:         General: Tenderness (right foot) present.   Skin:     Comments: Betadine noted to ulcerated site right foot; erythema and swelling stable.  No new changes   Neurological:      General: No focal deficit present.      Mental Status: She is alert.   Psychiatric:         Mood and Affect: Mood normal.       Results Review:  I have reviewed the labs, radiology results, and diagnostic studies.    Laboratory Data:   Results from last 7 days   Lab Units 01/09/21  0500 01/08/21  0530 01/07/21  0434   WBC 10*3/mm3 7.11 8.07 7.74   HEMOGLOBIN g/dL 7.5* 7.3* 7.5*   HEMATOCRIT % 24.1* 23.7* 22.8*   PLATELETS 10*3/mm3 307 306 308        Results from last 7 days   Lab  Units 01/09/21  0500 01/08/21  0530 01/07/21  0434 01/06/21  0614   SODIUM mmol/L 138 138 138 136   POTASSIUM mmol/L 4.0 4.1 4.2 4.7   CHLORIDE mmol/L 108* 108* 109* 106   CO2 mmol/L 23.0 23.0 22.0 23.0   BUN mg/dL 8 10 14 21   CREATININE mg/dL 0.68 0.77 0.82 1.14*   CALCIUM mg/dL 9.0 8.7 8.5* 8.7   BILIRUBIN mg/dL  --   --   --  0.3   ALK PHOS U/L  --   --   --  107   ALT (SGPT) U/L  --   --   --  28   AST (SGOT) U/L  --   --   --  48*   GLUCOSE mg/dL 135* 147* 169* 162*       Culture Data:   Blood Culture   Date Value Ref Range Status   01/04/2021 No growth at 4 days  Preliminary   01/04/2021 No growth at 4 days  Preliminary     Urine Culture   Date Value Ref Range Status   01/08/2021 No growth  Final       Radiology Data:   Imaging Results (Last 24 Hours)     Procedure Component Value Units Date/Time    CT Abdomen Pelvis With & Without Contrast [[id number]] Collected: 01/09/21 1349     Updated: 01/09/21 1405    Narrative:      EXAMINATION: CT ABDOMEN PELVIS W WO CONTRAST- 1/9/2021 1:40 PM CST     HISTORY: Hematuria of unknown origin     COMPARISON: None     DOSE: 1614 mGy-cm     TECHNIQUE: Sequential imaging was performed from the lung bases through  the pubic symphysis before and after the administration of IV contrast  in multiple phases, compatible with CT urogram protocol.  Sagittal and  coronal reformations were made from the original source data and  reviewed. Automated exposure control was also utilized to decrease  patient radiation dose.     FINDINGS:   CT urogram:  On precontrast images of the abdomen, bilateral vascular calcifications  are evident in the kidneys. No definite renal calculi are identified. On  postcontrast imaging, no enhancing renal mass is identified. The ureters  are decompressed. No ureteral enhancement is identified. There is a  hyperdense area of wall thickening of the posterior bladder wall  measuring approximately 2.4 x 1.0 cm in size. On delayed phase imaging,  contrast is  excreted into the renal collecting systems normally. 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 A filling defect is seen  in the urinary bladder on the delayed phase images. This appears to be  in a slightly different location than on the postcontrast images. A  focus of air is seen within the urinary bladder.     Other findings:  The visualized heart appears normal in size. There is trace pleural  fluid in the lung bases. Lung bases otherwise appear clear.     The gallbladder is surgically absent. There is intra and extrahepatic  biliary ductal dilatation which is presumably from reservoir effect.  Liver is otherwise unremarkable. The spleen, pancreas, and adrenal  glands are unremarkable.     The main portal and splenic veins and IVC appear grossly normal. The  abdominal aorta appears normal in caliber. Extensive atherosclerotic  calcifications are seen throughout the aorta and its branch vessels.  Calcifications at the origin of the celiac artery results in some  narrowing at the origin. There is normal enhancement of the on this  region.     No pathologically enlarged central mesenteric or retroperitoneal lymph  nodes are identified.     The stomach and small bowel appear normal in caliber. A moderate amount  of stool is seen in the colon, suggesting fecal stasis. Large bowel is  otherwise unremarkable. No free air or free fluid is seen in the  abdomen.     The uterus and ovaries are visualized but not well evaluated by CT. No  free fluid is seen in the pelvis. There is some inflammatory stranding  in the region of the left femoral vessels. There appears to be a tract  to the skin surface, suggesting recent intervention. No pathologically  enlarged pelvic or inguinal lymph nodes are identified.     Review of the visualized osseous structures demonstrates a presumed  chronic but age indeterminate L1 superior endplate deformity.       Impression:      1. Hyperdense material posteriorly in the urinary  bladder without  definite enhancement suggests layering debris. However, given history of  hematuria, cystoscopy should be considered.     2. Air in the urinary bladder may be related to infection or recent  instrumentation. Correlate clinically.     3. Atherosclerotic disease.     4. Trace pleural fluid bilaterally.  5. Soft tissue stranding in the left femoral region, presumably related  to recent intervention/vascular access.           This report was finalized on 01/09/2021 14:02 by Dr. Addi Jacobo MD.          I have reviewed the patient's current medications.     Assessment/Plan     Active Hospital Problems    Diagnosis   • **PAD (peripheral artery disease) (CMS/HCC)   • Acute kidney injury (CMS/HCC)   • Postoperative anemia due to acute blood loss   • Hyperglycemia   • Type 2 diabetes mellitus with hyperglycemia, without long-term current use of insulin (CMS/HCC)   • Essential hypertension   • Mixed hyperlipidemia   • Nonhealing ulcer of right lower leg with fat layer exposed (CMS/HCC)   • Atherosclerosis of artery of extremity with intermittent claudication (CMS/HCC)     Plan:  1.  Appreciate Urology assistance  2.  CT Urogram today  3.  Patient on ASA, Plavix, statin  4.  Wound care to right foot  5.  PO Doxy and Augmentin  6.  PO Norco PRN  7.  Physical therapy  8.  Add PO FeSO4  9.  Dispo: anticipate home soon pending Urology recommendations and workup      Electronically signed by Jorge Riley MD, 01/09/21, 14:16 CST.

## 2021-01-09 NOTE — PLAN OF CARE
Goal Outcome Evaluation:  Plan of Care Reviewed With: patient  Progress: improving  Outcome Summary: Pt. tolerating her pain better today. Pt. is Independent with bed mobility and transfers. She walked 225' with RWX, SBA. Pt. states she will have no needs at home.    No

## 2021-01-09 NOTE — PROGRESS NOTES
LOS: 4 days   Patient Care Team:  Roberto Garcia MD as PCP - General (Family Medicine)    Chief Complaint:  Gross hematuria    Subjective     Interval History:     Patient Reports: Patient reports no gross hematuria with today's morning void. Slept all night without needing to void.  Patient Denies:  FCR  History taken from: patient family    Review of Systems  Pertinent items are noted in HPI, all other systems reviewed and negative     Objective     Vital Signs  Temp:  [97.9 °F (36.6 °C)-98.4 °F (36.9 °C)] 98.1 °F (36.7 °C)  Heart Rate:  [65-79] 65  Resp:  [16] 16  BP: (152-178)/(45-53) 152/53    Physical Exam:  Airwick off. No urine in bedside commode to view.    Bladder scans acceptable.     Results Review:       Lab Results (last 72 hours)     Procedure Component Value Units Date/Time    Basic Metabolic Panel [[id number]]  (Abnormal) Collected: 01/09/21 0500    Specimen: Blood Updated: 01/09/21 0615     Glucose 135 mg/dL      BUN 8 mg/dL      Creatinine 0.68 mg/dL      Sodium 138 mmol/L      Potassium 4.0 mmol/L      Chloride 108 mmol/L      CO2 23.0 mmol/L      Calcium 9.0 mg/dL      eGFR Non African Amer 84 mL/min/1.73      BUN/Creatinine Ratio 11.8     Anion Gap 7.0 mmol/L     Narrative:      GFR Normal >60  Chronic Kidney Disease <60  Kidney Failure <15      Iron Profile [[id number]]  (Abnormal) Collected: 01/09/21 0500    Specimen: Blood Updated: 01/09/21 0615     Iron 29 mcg/dL      Iron Saturation 10 %      Transferrin 187 mg/dL      TIBC 279 mcg/dL     CBC (No Diff) [[id number]]  (Abnormal) Collected: 01/09/21 0500    Specimen: Blood Updated: 01/09/21 0558     WBC 7.11 10*3/mm3      RBC 2.67 10*6/mm3      Hemoglobin 7.5 g/dL      Hematocrit 24.1 %      MCV 90.3 fL      MCH 28.1 pg      MCHC 31.1 g/dL      RDW 13.3 %      RDW-SD 44.1 fl      MPV 10.2 fL      Platelets 307 10*3/mm3     Blood Culture - Blood, Blood, Venous Line [[id number]] Collected: 01/04/21 2122    Specimen: Blood, Venous Line  Updated: 01/08/21 2145     Blood Culture No growth at 4 days    Blood Culture - Blood, Arm, Left [[id number]] Collected: 01/04/21 2004    Specimen: Blood from Arm, Left Updated: 01/08/21 2017     Blood Culture No growth at 4 days    Urinalysis With Culture If Indicated - Urine, Clean Catch [[id number]]  (Abnormal) Collected: 01/08/21 1720    Specimen: Urine, Clean Catch Updated: 01/08/21 1752     Color, UA Red     Appearance, UA Turbid     pH, UA 6.0     Specific Gravity, UA 1.010     Glucose, UA Negative     Ketones, UA Negative     Bilirubin, UA Small (1+)     Blood, UA Large (3+)     Protein, UA >=300 mg/dL (3+)     Leuk Esterase, UA Moderate (2+)     Nitrite, UA Positive     Urobilinogen, UA 0.2 E.U./dL    Urinalysis, Microscopic Only - Urine, Clean Catch [[id number]]  (Abnormal) Collected: 01/08/21 1720    Specimen: Urine, Clean Catch Updated: 01/08/21 1752     RBC, UA Too Numerous to Count /HPF      WBC, UA 6-12 /HPF      Bacteria, UA Trace /HPF      Squamous Epithelial Cells, UA 0-2 /HPF      Hyaline Casts, UA --     Comment: .        Methodology Automated Microscopy    Urine Culture - Urine, Urine, Clean Catch [[id number]] Collected: 01/08/21 1720    Specimen: Urine, Clean Catch Updated: 01/08/21 1752    POC Glucose Once [[id number]]  (Abnormal) Collected: 01/08/21 1102    Specimen: Blood Updated: 01/08/21 1133     Glucose 175 mg/dL      Comment: : [id number] Mich HERRONaimee ID: [id number]       Basic Metabolic Panel [[id number]]  (Abnormal) Collected: 01/08/21 0530    Specimen: Blood Updated: 01/08/21 0620     Glucose 147 mg/dL      BUN 10 mg/dL      Creatinine 0.77 mg/dL      Sodium 138 mmol/L      Potassium 4.1 mmol/L      Chloride 108 mmol/L      CO2 23.0 mmol/L      Calcium 8.7 mg/dL      eGFR Non African Amer 72 mL/min/1.73      BUN/Creatinine Ratio 13.0     Anion Gap 7.0 mmol/L     Narrative:      GFR Normal >60  Chronic Kidney Disease <60  Kidney Failure <15      CBC & Differential [[id number]]   (Abnormal) Collected: 01/08/21 0530    Specimen: Blood Updated: 01/08/21 0607    Narrative:      The following orders were created for panel order CBC & Differential.  Procedure                               Abnormality         Status                     ---------                               -----------         ------                     CBC Auto Differential[[id number]]        Abnormal            Final result                 Please view results for these tests on the individual orders.    CBC Auto Differential [[id number]]  (Abnormal) Collected: 01/08/21 0530    Specimen: Blood Updated: 01/08/21 0607     WBC 8.07 10*3/mm3      RBC 2.57 10*6/mm3      Hemoglobin 7.3 g/dL      Hematocrit 23.7 %      MCV 92.2 fL      MCH 28.4 pg      MCHC 30.8 g/dL      RDW 13.4 %      RDW-SD 45.6 fl      MPV 10.1 fL      Platelets 306 10*3/mm3      Neutrophil % 65.1 %      Lymphocyte % 22.7 %      Monocyte % 9.0 %      Eosinophil % 2.5 %      Basophil % 0.5 %      Immature Grans % 0.2 %      Neutrophils, Absolute 5.25 10*3/mm3      Lymphocytes, Absolute 1.83 10*3/mm3      Monocytes, Absolute 0.73 10*3/mm3      Eosinophils, Absolute 0.20 10*3/mm3      Basophils, Absolute 0.04 10*3/mm3      Immature Grans, Absolute 0.02 10*3/mm3      nRBC 0.0 /100 WBC     Vancomycin, Trough [[id number]]  (Normal) Collected: 01/07/21 2207    Specimen: Blood Updated: 01/07/21 2234     Vancomycin Trough 11.30 mcg/mL     POC Glucose Once [[id number]]  (Abnormal) Collected: 01/07/21 2037    Specimen: Blood Updated: 01/07/21 2048     Glucose 180 mg/dL      Comment: : [id number] Cam[place] SaraMeter ID: [id number]       POC Glucose Once [[id number]]  (Abnormal) Collected: 01/07/21 1636    Specimen: Blood Updated: 01/07/21 1649     Glucose 224 mg/dL      Comment: : [id number] Mich Parkinson ID: [id number]       POC Glucose Once [[id number]]  (Abnormal) Collected: 01/07/21 1214    Specimen: Blood Updated: 01/07/21 1227     Glucose 168 mg/dL      Comment:  : [id number] Mich Parkinson ID: [id number]       CBC & Differential [[id number]]  (Abnormal) Collected: 01/07/21 0434    Specimen: Blood Updated: 01/07/21 0620    Narrative:      The following orders were created for panel order CBC & Differential.  Procedure                               Abnormality         Status                     ---------                               -----------         ------                     CBC Auto Differential[[id number]]        Abnormal            Final result                 Please view results for these tests on the individual orders.    CBC Auto Differential [[id number]]  (Abnormal) Collected: 01/07/21 0434    Specimen: Blood Updated: 01/07/21 0620     WBC 7.74 10*3/mm3      RBC 2.52 10*6/mm3      Hemoglobin 7.5 g/dL      Hematocrit 22.8 %      MCV 90.5 fL      MCH 29.8 pg      MCHC 32.9 g/dL      RDW 13.5 %      RDW-SD 44.8 fl      MPV 10.1 fL      Platelets 308 10*3/mm3      Neutrophil % 61.8 %      Lymphocyte % 27.5 %      Monocyte % 9.0 %      Eosinophil % 0.6 %      Basophil % 0.6 %      Immature Grans % 0.5 %      Neutrophils, Absolute 4.77 10*3/mm3      Lymphocytes, Absolute 2.13 10*3/mm3      Monocytes, Absolute 0.70 10*3/mm3      Eosinophils, Absolute 0.05 10*3/mm3      Basophils, Absolute 0.05 10*3/mm3      Immature Grans, Absolute 0.04 10*3/mm3      nRBC 0.0 /100 WBC     Basic Metabolic Panel [[id number]]  (Abnormal) Collected: 01/07/21 0434    Specimen: Blood Updated: 01/07/21 0520     Glucose 169 mg/dL      BUN 14 mg/dL      Creatinine 0.82 mg/dL      Sodium 138 mmol/L      Potassium 4.2 mmol/L      Chloride 109 mmol/L      CO2 22.0 mmol/L      Calcium 8.5 mg/dL      eGFR Non African Amer 67 mL/min/1.73      BUN/Creatinine Ratio 17.1     Anion Gap 7.0 mmol/L     Narrative:      GFR Normal >60  Chronic Kidney Disease <60  Kidney Failure <15      aPTT [[id number]]  (Normal) Collected: 01/07/21 0434    Specimen: Blood Updated: 01/07/21 0508     PTT 34.9 seconds      aPTT [[id number]]  (Normal) Collected: 01/07/21 0011    Specimen: Blood Updated: 01/07/21 0058     PTT 33.2 seconds     Urinalysis With Microscopic If Indicated (No Culture) - Urine, Clean Catch [[id number]]  (Abnormal) Collected: 01/06/21 2108    Specimen: Urine, Clean Catch Updated: 01/06/21 2133     Color, UA Red     Appearance, UA Turbid     pH, UA 7.0     Specific Gravity, UA 1.020     Glucose,  mg/dL (Trace)     Ketones, UA 15 mg/dL (1+)     Bilirubin, UA Negative     Blood, UA Large (3+)     Protein, UA >=300 mg/dL (3+)     Leuk Esterase, UA Moderate (2+)     Nitrite, UA Positive     Urobilinogen, UA 4.0 E.U./dL    Urinalysis, Microscopic Only - Urine, Clean Catch [[id number]]  (Abnormal) Collected: 01/06/21 2108    Specimen: Urine, Clean Catch Updated: 01/06/21 2133     RBC, UA Too Numerous to Count /HPF      WBC, UA       Unable to determine due to loaded field     /HPF     Bacteria, UA       Unable to determine due to loaded field     /HPF     Squamous Epithelial Cells, UA       Unable to determine due to loaded field     /HPF     Hyaline Casts, UA       Unable to determine due to loaded field     /LPF     Methodology Manual Light Microscopy    aPTT [[id number]]  (Abnormal) Collected: 01/1940    Specimen: Blood Updated: 01/06/21 2013     PTT 61.8 seconds     POC Glucose Once [[id number]]  (Abnormal) Collected: 01/06/21 1822    Specimen: Blood Updated: 01/06/21 1833     Glucose 157 mg/dL      Comment: : [id number] Palmer Mackter ID: [id number]       aPTT [[id number]]  (Abnormal) Collected: 01/06/21 1132    Specimen: Blood Updated: 01/06/21 1219     PTT 36.0 seconds     POC Glucose Once [[id number]]  (Abnormal) Collected: 01/06/21 1156    Specimen: Blood Updated: 01/06/21 1208     Glucose 156 mg/dL      Comment: : [id number] Mehdi Venegaster ID: [id number]           Imaging Results (Last 72 Hours)     Procedure Component Value Units Date/Time    IR Angiogram Extremity [[id number]] Collected:  01/07/21 0643     Updated: 01/07/21 1258    Narrative:      Performed by Dr. Owens. Please see procedure note.  This report was finalized on 01/07/2021 12:55 by Dr. Morgan Owens MD.    FL C Arm During Surgery [[id number]] Collected: 01/07/21 0643     Updated: 01/07/21 1258    Narrative:      Performed by Dr. Owens. Please see procedure note.  This report was finalized on 01/07/2021 12:55 by Dr. Morgan Owens MD.          Medication Review:     Current Facility-Administered Medications:   •  acetaminophen (TYLENOL) tablet 650 mg, 650 mg, Oral, Q4H PRN **OR** [DISCONTINUED] acetaminophen (TYLENOL) 160 MG/5ML solution 650 mg, 650 mg, Oral, Q4H PRN **OR** [DISCONTINUED] acetaminophen (TYLENOL) suppository 650 mg, 650 mg, Rectal, Q4H PRN, Morgan Owens DO  •  amoxicillin-clavulanate (AUGMENTIN) 875-125 MG per tablet 1 tablet, 1 tablet, Oral, Q12H, Jorge Riley MD, 1 tablet at 01/08/21 2025  •  aspirin EC tablet 81 mg, 81 mg, Oral, Daily, Morgan Owens DO, 81 mg at 01/08/21 1127  •  atorvastatin (LIPITOR) tablet 20 mg, 20 mg, Oral, Daily, Morgan Owens DO, 20 mg at 01/08/21 1127  •  clopidogrel (PLAVIX) tablet 75 mg, 75 mg, Oral, Daily, Morgan Owens DO, 75 mg at 01/08/21 1732  •  dextrose (D50W) 25 g/ 50mL Intravenous Solution 25 g, 25 g, Intravenous, Q15 Min PRN, Morgan Owens DO  •  dextrose (GLUTOSE) oral gel 15 g, 15 g, Oral, Q15 Min PRN, Morgan Owens DO  •  doxycycline (ADOXA) tablet 100 mg, 100 mg, Oral, Q12H, Jorge Riley MD, 100 mg at 01/08/21 2025  •  gabapentin (NEURONTIN) capsule 600 mg, 600 mg, Oral, Q8H, Morgan Owens DO, 600 mg at 01/09/21 0415  •  glucagon (human recombinant) (GLUCAGEN DIAGNOSTIC) injection 1 mg, 1 mg, Subcutaneous, Q15 Min PRN, Morgan Owens DO  •  HYDROcodone-acetaminophen (NORCO)  MG per tablet 1 tablet, 1 tablet, Oral, Q6H PRN, Morgan Owens DO, 1 tablet at 01/09/21 0414  •  insulin lispro  (humaLOG, ADMELOG) injection 2-7 Units, 2-7 Units, Subcutaneous, TID AC, Bicking, Morgan K, DO, 4 Units at 01/07/21 1655  •  labetalol (NORMODYNE,TRANDATE) injection 20 mg, 20 mg, Intravenous, Q2H PRN, Bicking, Morgan K, DO  •  lisinopril (PRINIVIL,ZESTRIL) tablet 20 mg, 20 mg, Oral, Q12H, Jorge Riley MD, 20 mg at 01/08/21 2052  •  ondansetron (ZOFRAN) tablet 4 mg, 4 mg, Oral, Q6H PRN **OR** ondansetron (ZOFRAN) injection 4 mg, 4 mg, Intravenous, Q6H PRN, Bicking, Morgan K, DO, 4 mg at 01/08/21 0835  •  sodium chloride 0.9 % flush 10 mL, 10 mL, Intravenous, Q12H, Bicking, Morgan K, DO, 10 mL at 01/08/21 1128  •  sodium chloride 0.9 % flush 10 mL, 10 mL, Intravenous, PRN, Bicking, Morgan K, DO  •  sodium chloride 0.9 % flush 10 mL, 10 mL, Intravenous, PRN, Bicking, Morgan K, DO  •  sodium chloride 0.9 % flush 3 mL, 3 mL, Intravenous, Q12H, Bicking, Morgan K, DO, 3 mL at 01/08/21 1128  •  ziprasidone (GEODON) injection 10 mg, 10 mg, Intramuscular, Once, Abner Castro MD    Assessment/Plan       PAD (peripheral artery disease) (CMS/HCC)    Essential hypertension    Mixed hyperlipidemia    Atherosclerosis of artery of extremity with intermittent claudication (CMS/[facility])    Nonhealing ulcer of right lower leg with fat layer exposed (CMS/[facility])    Hyperglycemia    Type 2 diabetes mellitus with hyperglycemia, without long-term current use of insulin (CMS/[facility])    Acute kidney injury (CMS/[facility])    Postoperative anemia due to acute blood loss      Gross hematuria: I would recommend a CT of the abdomen and pelvis without and with contrast and with delays (CT urogram) to evaluate the upper tract. I will defer to the primary team to order this exam and will review when the results are available. Urine culture pending.  She is currently on Augmentin which would provide good urinary coverage of most UTI organisms.  I will evaluate the culture when available.  There have been no blood clots or suggested clot  retention. No hematuria today per patient. Bladder scans acceptable. I have encouraged the patient to increase her water intake to further dilute her urine. Creatinine normal. No leukocytosis.    I discussed the patients findings and my recommendations with patient and family    (Please note that portions of this note were completed with a voice recognition program.)    Cedrick Meredith MD  01/09/21  08:56 CST    Time: Time spent: 25 minutes spent performing evaluation and management, chart review, and discussion with patient, > 50% of time spent in face-to-face encounter

## 2021-01-09 NOTE — THERAPY TREATMENT NOTE
Acute Care - Physical Therapy Treatment Note  [facility]     Patient Name: Evelyn Ojeda  : 1940  MRN: [id number]  Today's Date: 2021           PT Assessment (last 12 hours)      PT Evaluation and Treatment     Row Name 21 1124          Physical Therapy Time and Intention    Subjective Information  complains of;pain  -     Document Type  therapy note (daily note)  -     Mode of Treatment  physical therapy  -     Row Name 21 112          General Information    Existing Precautions/Restrictions  fall eschar tissue R toes-painful  -     Row Name 21 1124          Pain Scale: Word Pre/Post-Treatment    Pain: Word Scale, Pretreatment  4 - moderate pain  -     Posttreatment Pain Rating  4 - moderate pain  -     Pain Location - Side  Right  -     Pain Location  toe  -     Pain Intervention(s)  Repositioned;Medication (See MAR);Ambulation/increased activity  -     Row Name 21 112          Bed Mobility    Supine-Sit [place] (Bed Mobility)  independent  -     Sit-Supine [place] (Bed Mobility)  independent  -     Row Name 21 1124          Transfers    Sit-Stand [place] (Transfers)  independent  -     Stand-Sit [place] (Transfers)  independent  -     [place] Level (Toilet Transfer)  supervision  -     Assistive Device (Toilet Transfer)  commode;grab bars/safety frame  -     Row Name 21 112          Toilet Transfer    Type (Toilet Transfer)  sit-stand;stand-sit  -     Row Name 21 1124          Gait/Stairs (Locomotion)    [place] Level (Gait)  standby assist  -     Assistive Device (Gait)  walker, front-wheeled  -     Distance in Feet (Gait)  225  -     Deviations/Abnormal Patterns (Gait)  antalgic;andrews decreased;stride length decreased  -     Row Name 21 112          Hip (Therapeutic Exercise)    Hip (Therapeutic Exercise)  AROM (active range of motion)  -     Hip AROM (Therapeutic Exercise)   bilateral;flexion;aBduction;aDduction;sitting  -     Row Name 01/09/21 1124          Knee (Therapeutic Exercise)    Knee (Therapeutic Exercise)  AROM (active range of motion)  -     Knee AROM (Therapeutic Exercise)  bilateral;LAQ (long arc quad);sitting  -     Row Name 01/09/21 1124          Ankle (Therapeutic Exercise)    Ankle (Therapeutic Exercise)  AROM (active range of motion)  -     Ankle AROM (Therapeutic Exercise)  bilateral;dorsiflexion;sitting  -     Row Name             Wound 01/05/21 0213 Right anterior fifth toe    Wound - Properties Group Placement Date: 01/05/21  -DF Placement Time: 0213  -DF Present on Hospital Admission: Y  -DF Side: Right  -DF Orientation: anterior  -DF Location: fifth toe  -DF    Retired Wound - Properties Group Date first assessed: 01/05/21  -DF Time first assessed: 0213  -DF Present on Hospital Admission: Y  -DF Side: Right  -DF Location: fifth toe  -DF    Row Name             Wound 01/05/21 0214 Right anterior great toe    Wound - Properties Group Placement Date: 01/05/21  -DF Placement Time: 0214  -DF Present on Hospital Admission: Y  -DF Side: Right  -DF Orientation: anterior  -DF Location: great toe  -DF    Retired Wound - Properties Group Date first assessed: 01/05/21  -DF Time first assessed: 0214  -DF Present on Hospital Admission: Y  -DF Side: Right  -DF Location: great toe  -DF    Row Name             Wound 01/05/21 0216 Right anterior    Wound - Properties Group Placement Date: 01/05/21  -DF Placement Time: 0216  -DF Present on Hospital Admission: Y  -DF Side: Right  -DF Orientation: anterior  -DF    Retired Wound - Properties Group Date first assessed: 01/05/21  -DF Time first assessed: 0216  -DF Present on Hospital Admission: Y  -DF Side: Right  -DF    Row Name             Wound 01/06/21 1646 Left groin Incision    Wound - Properties Group Placement Date: 01/06/21  -SHAUNA Placement Time: 1646  -SHAUNA Side: Left  -SHAUNA Location: groin  -SHAUNA Primary Wound Type:  Incision  -SHAUNA    Retired Wound - Properties Group Date first assessed: 01/06/21  -SHAUNA Time first assessed: 1646  -SHAUNA Side: Left  -SHAUNA Location: groin  -SHAUNA Primary Wound Type: Incision  -SHAUNA    Row Name 01/09/21 1124          Plan of Care Review    Plan of Care Reviewed With  patient  -MF     Progress  improving  -     Outcome Summary  Pt. tolerating her pain better today. Pt. is Independent with bed mobility and transfers. She walked 225' with RWX, SBA. Pt. states she will have no needs at home.   -     Row Name 01/09/21 1124          Positioning and Restraints    Pre-Treatment Position  in bed  -     Post Treatment Position  bed  -MF     In Bed  fowlers;call light within reach;encouraged to call for assist;with family/caregiver;side rails up x2;legs elevated  -       User Key  (r) = Recorded By, (t) = Taken By, (c) = Cosigned By    Initials Name Provider Type    Georgia Ray RN Registered Nurse    Lupe Atkins RN Registered Nurse    Cate Gamino PTA Physical Therapy Assistant        Physical Therapy Education                 Title: PT OT SLP Therapies (Done)     Topic: Physical Therapy (Done)     Point: Mobility training (Done)     Learning Progress Summary           Patient Acceptance, E,TB, VU,NR by  at 1/9/2021 0122    Acceptance, E,TB,D, VU,DU,NR by NATALY at 1/6/2021 1104    Comment: Education re: purpose of PT/importance of activity; education for safety/falls prevention w/ functional mobility; education for improved tech w/ tfers and w/ use of rwx to assist w/ decreasing wt bearing R LE                   Point: Home exercise program (Done)     Learning Progress Summary           Patient Acceptance, E,TB, VU,NR by  at 1/9/2021 0122                   Point: Precautions (Done)     Learning Progress Summary           Patient Acceptance, E,TB, VU,NR by  at 1/9/2021 0122    Acceptance, E,TB,D, VU,DU,NR by NATALY at 1/6/2021 1104    Comment: Education re: purpose of PT/importance of  activity; education for safety/falls prevention w/ functional mobility; education for improved tech w/ tfers and w/ use of rwx to assist w/ decreasing wt bearing R LE                               User Key     Initials Effective Dates Name Provider Type Discipline    SF 09/30/19 -  Beth Poole, RN Registered Nurse Nurse     08/02/18 -  Nereida Faust, PT Physical Therapist PT              PT Recommendation and Plan     Plan of Care Reviewed With: patient  Progress: improving  Outcome Summary: Pt. tolerating her pain better today. Pt. is Independent with bed mobility and transfers. She walked 225' with RWX, SBA. Pt. states she will have no needs at home.        Time Calculation:   PT Charges     Row Name 01/09/21 1157             Time Calculation    Start Time  1124  -      Stop Time  1153  -      Time Calculation (min)  29 min  -      PT Non-Billable Time (min)  5 min nsg checking vitals  -      PT Received On  01/09/21  -      PT Goal Re-Cert Due Date  01/16/21  -         Time Calculation- PT    Total Timed Code Minutes- PT  24 minute(s)  -         Timed Charges    21820 - PT Therapeutic Exercise Minutes  12  -      72051 - Gait Training Minutes   12  -        User Key  (r) = Recorded By, (t) = Taken By, (c) = Cosigned By    Initials Name Provider Type     Cate Healy PTA Physical Therapy Assistant        Therapy Charges for Today     Code Description Service Date Service Provider Modifiers Qty    53392286807 HC PT THER PROC EA 15 MIN 1/8/2021 Cate Healy, PTA GP 1    29663701668 HC GAIT TRAINING EA 15 MIN 1/8/2021 Cate Healy PTA GP 1    37536027109 HC PT THER PROC EA 15 MIN 1/9/2021 Cate Healy, SOFIA GP 1    21992949854 HC GAIT TRAINING EA 15 MIN 1/9/2021 Cate Healy, SOFIA GP 1          PT G-Codes  Outcome Measure Options: AM-PAC 6 Clicks Basic Mobility (PT)  AM-PAC 6 Clicks Score (PT): 18    Cate Healy PTA  1/9/2021

## 2021-01-10 VITALS
TEMPERATURE: 98.4 F | HEIGHT: 61 IN | DIASTOLIC BLOOD PRESSURE: 45 MMHG | BODY MASS INDEX: 33.55 KG/M2 | HEART RATE: 73 BPM | SYSTOLIC BLOOD PRESSURE: 146 MMHG | WEIGHT: 177.69 LBS | RESPIRATION RATE: 16 BRPM | OXYGEN SATURATION: 95 %

## 2021-01-10 PROBLEM — R31.9 HEMATURIA: Status: ACTIVE | Noted: 2021-01-10

## 2021-01-10 LAB
GLUCOSE BLDC GLUCOMTR-MCNC: 234 MG/DL (ref 70–130)
GLUCOSE BLDC GLUCOMTR-MCNC: 279 MG/DL (ref 70–130)

## 2021-01-10 PROCEDURE — 97110 THERAPEUTIC EXERCISES: CPT

## 2021-01-10 PROCEDURE — 82962 GLUCOSE BLOOD TEST: CPT

## 2021-01-10 PROCEDURE — 97116 GAIT TRAINING THERAPY: CPT

## 2021-01-10 PROCEDURE — 99232 SBSQ HOSP IP/OBS MODERATE 35: CPT | Performed by: UROLOGY

## 2021-01-10 PROCEDURE — 63710000001 INSULIN LISPRO (HUMAN) PER 5 UNITS: Performed by: SURGERY

## 2021-01-10 RX ORDER — FERROUS SULFATE 325(65) MG
325 TABLET ORAL
Qty: 30 TABLET | Refills: 2 | Status: SHIPPED | OUTPATIENT
Start: 2021-01-11

## 2021-01-10 RX ORDER — HYDROCODONE BITARTRATE AND ACETAMINOPHEN 10; 325 MG/1; MG/1
1 TABLET ORAL EVERY 6 HOURS PRN
Qty: 12 TABLET | Refills: 0 | Status: ON HOLD | OUTPATIENT
Start: 2021-01-10 | End: 2022-01-01

## 2021-01-10 RX ORDER — AMOXICILLIN AND CLAVULANATE POTASSIUM 875; 125 MG/1; MG/1
1 TABLET, FILM COATED ORAL EVERY 12 HOURS SCHEDULED
Qty: 6 TABLET | Refills: 0 | Status: SHIPPED | OUTPATIENT
Start: 2021-01-10 | End: 2021-01-13

## 2021-01-10 RX ORDER — CLOPIDOGREL BISULFATE 75 MG/1
75 TABLET ORAL DAILY
Qty: 30 TABLET | Refills: 2 | Status: SHIPPED | OUTPATIENT
Start: 2021-01-10 | End: 2022-01-01 | Stop reason: SDUPTHER

## 2021-01-10 RX ORDER — DOXYCYCLINE 100 MG/1
100 TABLET ORAL EVERY 12 HOURS SCHEDULED
Qty: 6 TABLET | Refills: 0 | Status: SHIPPED | OUTPATIENT
Start: 2021-01-10 | End: 2021-01-13

## 2021-01-10 RX ADMIN — INSULIN LISPRO 4 UNITS: 100 INJECTION, SOLUTION INTRAVENOUS; SUBCUTANEOUS at 12:59

## 2021-01-10 RX ADMIN — AMOXICILLIN AND CLAVULANATE POTASSIUM 1 TABLET: 875; 125 TABLET, FILM COATED ORAL at 08:12

## 2021-01-10 RX ADMIN — LISINOPRIL 20 MG: 20 TABLET ORAL at 08:12

## 2021-01-10 RX ADMIN — GABAPENTIN 600 MG: 300 CAPSULE ORAL at 14:13

## 2021-01-10 RX ADMIN — HYDROCODONE BITARTRATE AND ACETAMINOPHEN 1 TABLET: 10; 325 TABLET ORAL at 14:17

## 2021-01-10 RX ADMIN — FERROUS SULFATE TAB 325 MG (65 MG ELEMENTAL FE) 325 MG: 325 (65 FE) TAB at 08:12

## 2021-01-10 RX ADMIN — HYDROCODONE BITARTRATE AND ACETAMINOPHEN 1 TABLET: 10; 325 TABLET ORAL at 06:13

## 2021-01-10 RX ADMIN — SODIUM CHLORIDE, PRESERVATIVE FREE 10 ML: 5 INJECTION INTRAVENOUS at 08:19

## 2021-01-10 RX ADMIN — GABAPENTIN 600 MG: 300 CAPSULE ORAL at 06:13

## 2021-01-10 RX ADMIN — ATORVASTATIN CALCIUM 20 MG: 10 TABLET, FILM COATED ORAL at 08:12

## 2021-01-10 RX ADMIN — ASPIRIN 81 MG: 81 TABLET, FILM COATED ORAL at 08:12

## 2021-01-10 RX ADMIN — INSULIN LISPRO 3 UNITS: 100 INJECTION, SOLUTION INTRAVENOUS; SUBCUTANEOUS at 08:19

## 2021-01-10 RX ADMIN — DOXYCYCLINE 100 MG: 100 TABLET, FILM COATED ORAL at 08:12

## 2021-01-10 NOTE — PLAN OF CARE
Goal Outcome Evaluation:  Plan of Care Reviewed With: patient  Progress: improving  Outcome Summary: Pt. is continued to improve with her mobility and pain. Pt. is independent for all mobility and is able to ambulate with a RWX without difficulty. Pt. states she has no needs at home and will have support as needed. Pt. has met all therapy goals. PT will sign off at this time and allow pt. to walk on her own as tolerated.

## 2021-01-10 NOTE — PLAN OF CARE
Goal Outcome Evaluation:  Plan of Care Reviewed With: patient  Progress: improving  Outcome Summary: CT ABDOMEN AND PELVIS WITH UROGRAM DONE TODAY. PRN PAIN MED AND PRN NAUSEA MED AVAILABLE AND PT. HAS REQUIRED THIS SHIFT. DP, PT PALPABLE TO RIGHT FOOT. NO DISTRESS NOTED.

## 2021-01-10 NOTE — PLAN OF CARE
VSS, goals met.  Safety maintained.  Patient being discharged home with daughter.  Patient and daughter verbalized understanding of all discharge instructions and wound care.    Problem: Adult Inpatient Plan of Care  Goal: Plan of Care Review  Outcome: Met   Goal Outcome Evaluation:  Plan of Care Reviewed With: patient  Progress: improving

## 2021-01-10 NOTE — PLAN OF CARE
Problem: Adult Inpatient Plan of Care  Goal: Plan of Care Review  Outcome: Ongoing, Progressing  Flowsheets (Taken 1/10/2021 0338)  Progress: improving  Plan of Care Reviewed With: patient  Outcome Summary: Medicated for pain in R foot x1 with some relief. C/o nausea x1, see MAR. R foot still slightly swollen, pulses palpable. Betadine to wounds on foot. PVR was 42 mL. See results from urogram. PO abx. Labetalol given x1 for SBP >180. Other VSS.

## 2021-01-10 NOTE — PROGRESS NOTES
LOS: 5 days   Patient Care Team:  Roberto Garcia MD as PCP - General (Family Medicine)    Chief Complaint:  Gross hematuria     Subjective     Interval History:     Patient Reports: No gross hematuria since my initial evaluation.  Patient Denies:  FCR  History taken from: patient family    Review of Systems  Pertinent items are noted in HPI, all other systems reviewed and negative     Objective     Vital Signs  Temp:  [98 °F (36.7 °C)-98.6 °F (37 °C)] 98.4 °F (36.9 °C)  Heart Rate:  [66-76] 73  Resp:  [16] 16  BP: (146-183)/(43-60) 146/45    Physical Exam:  Comfortable. Nontoxic.     Results Review:       Lab Results (last 72 hours)     Procedure Component Value Units Date/Time    POC Glucose Once [[id number]]  (Abnormal) Collected: 01/10/21 0806    Specimen: Blood Updated: 01/10/21 0836     Glucose 234 mg/dL      Comment: : [id number] Quintin PamelaMeter ID: [id number]       Blood Culture - Blood, Blood, Venous Line [[id number]] Collected: 01/04/21 2122    Specimen: Blood, Venous Line Updated: 01/09/21 2145     Blood Culture No growth at 5 days    Blood Culture - Blood, Arm, Left [[id number]] Collected: 01/04/21 2004    Specimen: Blood from Arm, Left Updated: 01/09/21 2015     Blood Culture No growth at 5 days    POC Glucose Once [[id number]]  (Abnormal) Collected: 01/09/21 1628    Specimen: Blood Updated: 01/09/21 1643     Glucose 156 mg/dL      Comment: : [id number] Gage Sheldon ID: [id number]       POC Glucose Once [[id number]]  (Abnormal) Collected: 01/09/21 1128    Specimen: Blood Updated: 01/09/21 1200     Glucose 221 mg/dL      Comment: : [id number] Roxanne Chirinos ID: [id number]       Urine Culture - Urine, Urine, Clean Catch [[id number]]  (Normal) Collected: 01/08/21 1720    Specimen: Urine, Clean Catch Updated: 01/09/21 0957     Urine Culture No growth    Basic Metabolic Panel [[id number]]  (Abnormal) Collected: 01/09/21 0500    Specimen: Blood Updated: 01/09/21 0615     Glucose  135 mg/dL      BUN 8 mg/dL      Creatinine 0.68 mg/dL      Sodium 138 mmol/L      Potassium 4.0 mmol/L      Chloride 108 mmol/L      CO2 23.0 mmol/L      Calcium 9.0 mg/dL      eGFR Non African Amer 84 mL/min/1.73      BUN/Creatinine Ratio 11.8     Anion Gap 7.0 mmol/L     Narrative:      GFR Normal >60  Chronic Kidney Disease <60  Kidney Failure <15      Iron Profile [[id number]]  (Abnormal) Collected: 01/09/21 0500    Specimen: Blood Updated: 01/09/21 0615     Iron 29 mcg/dL      Iron Saturation 10 %      Transferrin 187 mg/dL      TIBC 279 mcg/dL     CBC (No Diff) [[id number]]  (Abnormal) Collected: 01/09/21 0500    Specimen: Blood Updated: 01/09/21 0558     WBC 7.11 10*3/mm3      RBC 2.67 10*6/mm3      Hemoglobin 7.5 g/dL      Hematocrit 24.1 %      MCV 90.3 fL      MCH 28.1 pg      MCHC 31.1 g/dL      RDW 13.3 %      RDW-SD 44.1 fl      MPV 10.2 fL      Platelets 307 10*3/mm3     Urinalysis With Culture If Indicated - Urine, Clean Catch [[id number]]  (Abnormal) Collected: 01/08/21 1720    Specimen: Urine, Clean Catch Updated: 01/08/21 1752     Color, UA Red     Appearance, UA Turbid     pH, UA 6.0     Specific Gravity, UA 1.010     Glucose, UA Negative     Ketones, UA Negative     Bilirubin, UA Small (1+)     Blood, UA Large (3+)     Protein, UA >=300 mg/dL (3+)     Leuk Esterase, UA Moderate (2+)     Nitrite, UA Positive     Urobilinogen, UA 0.2 E.U./dL    Urinalysis, Microscopic Only - Urine, Clean Catch [[id number]]  (Abnormal) Collected: 01/08/21 1720    Specimen: Urine, Clean Catch Updated: 01/08/21 1752     RBC, UA Too Numerous to Count /HPF      WBC, UA 6-12 /HPF      Bacteria, UA Trace /HPF      Squamous Epithelial Cells, UA 0-2 /HPF      Hyaline Casts, UA --     Comment: .        Methodology Automated Microscopy    POC Glucose Once [[id number]]  (Abnormal) Collected: 01/08/21 1102    Specimen: Blood Updated: 01/08/21 1133     Glucose 175 mg/dL      Comment: : [id number] Mich Parkinson ID: [id number]        Basic Metabolic Panel [[id number]]  (Abnormal) Collected: 01/08/21 0530    Specimen: Blood Updated: 01/08/21 0620     Glucose 147 mg/dL      BUN 10 mg/dL      Creatinine 0.77 mg/dL      Sodium 138 mmol/L      Potassium 4.1 mmol/L      Chloride 108 mmol/L      CO2 23.0 mmol/L      Calcium 8.7 mg/dL      eGFR Non African Amer 72 mL/min/1.73      BUN/Creatinine Ratio 13.0     Anion Gap 7.0 mmol/L     Narrative:      GFR Normal >60  Chronic Kidney Disease <60  Kidney Failure <15      CBC & Differential [[id number]]  (Abnormal) Collected: 01/08/21 0530    Specimen: Blood Updated: 01/08/21 0607    Narrative:      The following orders were created for panel order CBC & Differential.  Procedure                               Abnormality         Status                     ---------                               -----------         ------                     CBC Auto Differential[[id number]]        Abnormal            Final result                 Please view results for these tests on the individual orders.    CBC Auto Differential [[id number]]  (Abnormal) Collected: 01/08/21 0530    Specimen: Blood Updated: 01/08/21 0607     WBC 8.07 10*3/mm3      RBC 2.57 10*6/mm3      Hemoglobin 7.3 g/dL      Hematocrit 23.7 %      MCV 92.2 fL      MCH 28.4 pg      MCHC 30.8 g/dL      RDW 13.4 %      RDW-SD 45.6 fl      MPV 10.1 fL      Platelets 306 10*3/mm3      Neutrophil % 65.1 %      Lymphocyte % 22.7 %      Monocyte % 9.0 %      Eosinophil % 2.5 %      Basophil % 0.5 %      Immature Grans % 0.2 %      Neutrophils, Absolute 5.25 10*3/mm3      Lymphocytes, Absolute 1.83 10*3/mm3      Monocytes, Absolute 0.73 10*3/mm3      Eosinophils, Absolute 0.20 10*3/mm3      Basophils, Absolute 0.04 10*3/mm3      Immature Grans, Absolute 0.02 10*3/mm3      nRBC 0.0 /100 WBC     Vancomycin, Trough [[id number]]  (Normal) Collected: 01/07/21 2207    Specimen: Blood Updated: 01/07/21 2234     Vancomycin Trough 11.30 mcg/mL     POC Glucose Once  [[id number]]  (Abnormal) Collected: 01/07/21 2037    Specimen: Blood Updated: 01/07/21 2048     Glucose 180 mg/dL      Comment: : [id number] Ermias Doll ID: [id number]       POC Glucose Once [[id number]]  (Abnormal) Collected: 01/07/21 1636    Specimen: Blood Updated: 01/07/21 1649     Glucose 224 mg/dL      Comment: : [id number] Mich HERRONeter ID: [id number]       POC Glucose Once [[id number]]  (Abnormal) Collected: 01/07/21 1214    Specimen: Blood Updated: 01/07/21 1227     Glucose 168 mg/dL      Comment: : [id number] Mich HERRONeter ID: [id number]           Imaging Results (Last 72 Hours)     Procedure Component Value Units Date/Time    CT Abdomen Pelvis With & Without Contrast [[id number]] Collected: 01/09/21 1349     Updated: 01/09/21 1405    Narrative:      EXAMINATION: CT ABDOMEN PELVIS W WO CONTRAST- 1/9/2021 1:40 PM CST     HISTORY: Hematuria of unknown origin     COMPARISON: None     DOSE: 1614 mGy-cm     TECHNIQUE: Sequential imaging was performed from the lung bases through  the pubic symphysis before and after the administration of IV contrast  in multiple phases, compatible with CT urogram protocol.  Sagittal and  coronal reformations were made from the original source data and  reviewed. Automated exposure control was also utilized to decrease  patient radiation dose.     FINDINGS:   CT urogram:  On precontrast images of the abdomen, bilateral vascular calcifications  are evident in the kidneys. No definite renal calculi are identified. On  postcontrast imaging, no enhancing renal mass is identified. The ureters  are decompressed. No ureteral enhancement is identified. There is a  hyperdense area of wall thickening of the posterior bladder wall  measuring approximately 2.4 x 1.0 cm in size. On delayed phase imaging,  contrast is excreted into the renal collecting systems normally. 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 A filling defect is seen  in the urinary  bladder on the delayed phase images. This appears to be  in a slightly different location than on the postcontrast images. A  focus of air is seen within the urinary bladder.     Other findings:  The visualized heart appears normal in size. There is trace pleural  fluid in the lung bases. Lung bases otherwise appear clear.     The gallbladder is surgically absent. There is intra and extrahepatic  biliary ductal dilatation which is presumably from reservoir effect.  Liver is otherwise unremarkable. The spleen, pancreas, and adrenal  glands are unremarkable.     The main portal and splenic veins and IVC appear grossly normal. The  abdominal aorta appears normal in caliber. Extensive atherosclerotic  calcifications are seen throughout the aorta and its branch vessels.  Calcifications at the origin of the celiac artery results in some  narrowing at the origin. There is normal enhancement of the on this  region.     No pathologically enlarged central mesenteric or retroperitoneal lymph  nodes are identified.     The stomach and small bowel appear normal in caliber. A moderate amount  of stool is seen in the colon, suggesting fecal stasis. Large bowel is  otherwise unremarkable. No free air or free fluid is seen in the  abdomen.     The uterus and ovaries are visualized but not well evaluated by CT. No  free fluid is seen in the pelvis. There is some inflammatory stranding  in the region of the left femoral vessels. There appears to be a tract  to the skin surface, suggesting recent intervention. No pathologically  enlarged pelvic or inguinal lymph nodes are identified.     Review of the visualized osseous structures demonstrates a presumed  chronic but age indeterminate L1 superior endplate deformity.       Impression:      1. Hyperdense material posteriorly in the urinary bladder without  definite enhancement suggests layering debris. However, given history of  hematuria, cystoscopy should be considered.     2. Air in  the urinary bladder may be related to infection or recent  instrumentation. Correlate clinically.     3. Atherosclerotic disease.     4. Trace pleural fluid bilaterally.  5. Soft tissue stranding in the left femoral region, presumably related  to recent intervention/vascular access.           This report was finalized on 01/09/2021 14:02 by Dr. Addi Jacobo MD.    IR Angiogram Extremity [[id number]] Collected: 01/07/21 0643     Updated: 01/07/21 1258    Narrative:      Performed by Dr. Owens. Please see procedure note.  This report was finalized on 01/07/2021 12:55 by Dr. Morgan Owens MD.    FL C Arm During Surgery [[id number]] Collected: 01/07/21 0643     Updated: 01/07/21 1258    Narrative:      Performed by Dr. Owens. Please see procedure note.  This report was finalized on 01/07/2021 12:55 by Dr. Morgan Owens MD.          Medication Review:     Current Facility-Administered Medications:   •  acetaminophen (TYLENOL) tablet 650 mg, 650 mg, Oral, Q4H PRN **OR** [DISCONTINUED] acetaminophen (TYLENOL) 160 MG/5ML solution 650 mg, 650 mg, Oral, Q4H PRN **OR** [DISCONTINUED] acetaminophen (TYLENOL) suppository 650 mg, 650 mg, Rectal, Q4H PRN, Morgan Owens DO  •  amoxicillin-clavulanate (AUGMENTIN) 875-125 MG per tablet 1 tablet, 1 tablet, Oral, Q12H, Jorge Riley MD, 1 tablet at 01/10/21 0812  •  aspirin EC tablet 81 mg, 81 mg, Oral, Daily, Morgan Owens DO, 81 mg at 01/10/21 0812  •  atorvastatin (LIPITOR) tablet 20 mg, 20 mg, Oral, Daily, Morgan Owens DO, 20 mg at 01/10/21 0812  •  clopidogrel (PLAVIX) tablet 75 mg, 75 mg, Oral, Daily, Morgan Owens DO, 75 mg at 01/09/21 1719  •  dextrose (D50W) 25 g/ 50mL Intravenous Solution 25 g, 25 g, Intravenous, Q15 Min PRN, Morgan Owens DO  •  dextrose (GLUTOSE) oral gel 15 g, 15 g, Oral, Q15 Min PRN, Morgan Owens DO  •  doxycycline (ADOXA) tablet 100 mg, 100 mg, Oral, Q12H, Jorge Riley MD, 100 mg at  01/10/21 0812  •  ferrous sulfate tablet 325 mg, 325 mg, Oral, Daily With Breakfast, Jorge Riley MD, 325 mg at 01/10/21 0812  •  gabapentin (NEURONTIN) capsule 600 mg, 600 mg, Oral, Q8H, BicHenry laiin K, DO, 600 mg at 01/10/21 0613  •  glucagon (human recombinant) (GLUCAGEN DIAGNOSTIC) injection 1 mg, 1 mg, Subcutaneous, Q15 Min PRN, Bicking, Morgan K, DO  •  HYDROcodone-acetaminophen (NORCO)  MG per tablet 1 tablet, 1 tablet, Oral, Q6H PRN, Bic, Morgan K, DO, 1 tablet at 01/10/21 0613  •  insulin lispro (humaLOG, ADMELOG) injection 2-7 Units, 2-7 Units, Subcutaneous, TID AC, [place], Morgan K, DO, 3 Units at 01/10/21 0819  •  labetalol (NORMODYNE,TRANDATE) injection 20 mg, 20 mg, Intravenous, Q2H PRN, Morgan Owens, DO, 20 mg at 01/09/21 2347  •  lisinopril (PRINIVIL,ZESTRIL) tablet 20 mg, 20 mg, Oral, Q12H, Jorge Riley MD, 20 mg at 01/10/21 0812  •  ondansetron (ZOFRAN) tablet 4 mg, 4 mg, Oral, Q6H PRN, 4 mg at 01/09/21 2337 **OR** ondansetron (ZOFRAN) injection 4 mg, 4 mg, Intravenous, Q6H PRN, Morgan Owens K, DO, 4 mg at 01/08/21 0835  •  sodium chloride 0.9 % flush 10 mL, 10 mL, Intravenous, Q12H, Bicking, Morgan K, DO, 10 mL at 01/10/21 0819  •  sodium chloride 0.9 % flush 10 mL, 10 mL, Intravenous, PRN, Bicking, Morgan K, DO  •  sodium chloride 0.9 % flush 10 mL, 10 mL, Intravenous, PRN, Bicking, Morgan K, DO  •  sodium chloride 0.9 % flush 3 mL, 3 mL, Intravenous, Q12H, Bicking, Morgan K, DO, 3 mL at 01/08/21 1128  •  ziprasidone (GEODON) injection 10 mg, 10 mg, Intramuscular, Once, Abner Castro MD    Assessment/Plan       PAD (peripheral artery disease) (CMS/HCC)    Essential hypertension    Mixed hyperlipidemia    Atherosclerosis of artery of extremity with intermittent claudication (CMS/HCC)    Nonhealing ulcer of right lower leg with fat layer exposed (CMS/HCC)    Hyperglycemia    Type 2 diabetes mellitus with hyperglycemia, without long-term current  use of insulin (CMS/HCC)    Acute kidney injury (CMS/[facility])    Postoperative anemia due to acute blood loss      Gross hematuria: Urine culture negative. I've independently reviewed the CT images. No stones. No renal masses. Abnormal CT of bladder (clot vs less likely mass). Will schedule patient for outpatient cystoscopy in my office. Re-evaluate LUTS at that time. Patient reports that nocturia and frequency and urgency has improved since she's been admitted. Possibly due to  decrease in caffeine.     I discussed the patients findings and my recommendations with patient and family     Message sent to my .    (Please note that portions of this note were completed with a voice recognition program.)    Cedrick Meredith MD  01/10/21  09:36 CST    Time: Time spent: 20 minutes spent performing evaluation and management, chart review, and discussion with patient, > 50% of time spent in face-to-face encounter

## 2021-01-10 NOTE — DISCHARGE SUMMARY
[facility] Medicine Services  DISCHARGE SUMMARY       Date of Admission: 1/4/2021  Date of Discharge:  1/10/2021  Primary Care Physician: Roberto Garcia MD    Presenting Problem/History of Present Illness:  PAD (peripheral artery disease) (CMS/[facility]) [I73.9]     Final Discharge Diagnoses:  Active Hospital Problems    Diagnosis   • **PAD (peripheral artery disease) (CMS/[facility])   • Hematuria   • Acute kidney injury (CMS/[facility])   • Postoperative anemia due to acute blood loss   • Type 2 diabetes mellitus with hyperglycemia, without long-term current use of insulin (CMS/[facility])   • Essential hypertension   • Mixed hyperlipidemia   • Nonhealing ulcer of right lower leg with fat layer exposed (CMS/[facility])   • Atherosclerosis of artery of extremity with intermittent claudication (CMS/[facility])       Consults:   1.  Dr. Owens with vascular surgery  2.  Dr. Merchant with podiatry  3.  Dr. Meredith with urology    Procedures Performed:   Imaging Results (Last 7 Days)     Procedure Component Value Units Date/Time    CT Abdomen Pelvis With & Without Contrast [[id number]] Collected: 01/09/21 1349     Updated: 01/09/21 1405    Narrative:      EXAMINATION: CT ABDOMEN PELVIS W WO CONTRAST- 1/9/2021 1:40 PM CST     HISTORY: Hematuria of unknown origin     COMPARISON: None     DOSE: 1614 mGy-cm     TECHNIQUE: Sequential imaging was performed from the lung bases through  the pubic symphysis before and after the administration of IV contrast  in multiple phases, compatible with CT urogram protocol.  Sagittal and  coronal reformations were made from the original source data and  reviewed. Automated exposure control was also utilized to decrease  patient radiation dose.     FINDINGS:   CT urogram:  On precontrast images of the abdomen, bilateral vascular calcifications  are evident in the kidneys. No definite renal calculi are identified. On  postcontrast imaging, no enhancing renal mass is identified. The  ureters  are decompressed. No ureteral enhancement is identified. There is a  hyperdense area of wall thickening of the posterior bladder wall  measuring approximately 2.4 x 1.0 cm in size. On delayed phase imaging,  contrast is excreted into the renal collecting systems normally. 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 A filling defect is seen  in the urinary bladder on the delayed phase images. This appears to be  in a slightly different location than on the postcontrast images. A  focus of air is seen within the urinary bladder.     Other findings:  The visualized heart appears normal in size. There is trace pleural  fluid in the lung bases. Lung bases otherwise appear clear.     The gallbladder is surgically absent. There is intra and extrahepatic  biliary ductal dilatation which is presumably from reservoir effect.  Liver is otherwise unremarkable. The spleen, pancreas, and adrenal  glands are unremarkable.     The main portal and splenic veins and IVC appear grossly normal. The  abdominal aorta appears normal in caliber. Extensive atherosclerotic  calcifications are seen throughout the aorta and its branch vessels.  Calcifications at the origin of the celiac artery results in some  narrowing at the origin. There is normal enhancement of the on this  region.     No pathologically enlarged central mesenteric or retroperitoneal lymph  nodes are identified.     The stomach and small bowel appear normal in caliber. A moderate amount  of stool is seen in the colon, suggesting fecal stasis. Large bowel is  otherwise unremarkable. No free air or free fluid is seen in the  abdomen.     The uterus and ovaries are visualized but not well evaluated by CT. No  free fluid is seen in the pelvis. There is some inflammatory stranding  in the region of the left femoral vessels. There appears to be a tract  to the skin surface, suggesting recent intervention. No pathologically  enlarged pelvic or inguinal lymph  nodes are identified.     Review of the visualized osseous structures demonstrates a presumed  chronic but age indeterminate L1 superior endplate deformity.       Impression:      1. Hyperdense material posteriorly in the urinary bladder without  definite enhancement suggests layering debris. However, given history of  hematuria, cystoscopy should be considered.     2. Air in the urinary bladder may be related to infection or recent  instrumentation. Correlate clinically.     3. Atherosclerotic disease.     4. Trace pleural fluid bilaterally.  5. Soft tissue stranding in the left femoral region, presumably related  to recent intervention/vascular access.           This report was finalized on 01/09/2021 14:02 by Dr. Addi Jacobo MD.    IR Angiogram Extremity [[id number]] Collected: 01/07/21 0643     Updated: 01/07/21 1258    Narrative:      Performed by Dr. Owens. Please see procedure note.  This report was finalized on 01/07/2021 12:55 by Dr. Morgan Owens MD.    FL C Arm During Surgery [[id number]] Collected: 01/07/21 0643     Updated: 01/07/21 1258    Narrative:      Performed by Dr. Owens. Please see procedure note.  This report was finalized on 01/07/2021 12:55 by Dr. Morgan Owens MD.    US Venous Doppler Lower Extremity Right (duplex) [[id number]] Collected: 01/05/21 1630     Updated: 01/05/21 1634    Narrative:      History: Pain and swelling       Impression:      Impression: There is no evidence of deep venous thrombosis or  superficial thrombophlebitis of the right lower extremity.     Comments: Right lower extremity venous duplex exam was performed using  color Doppler flow, Doppler wave form analysis, and grayscale imaging,  with and without compression. There is no evidence of deep venous  thrombosis of the common femoral, superficial femoral, popliteal,  posterior tibial, and peroneal veins. There is no thrombus identified in  the saphenofemoral junction or the greater saphenous vein.       This report was finalized on 01/05/2021 16:30 by Dr. Morgan Owens MD.    US Arterial Doppler Lower Extremity Right [[id number]] Collected: 01/05/21 1611     Updated: 01/05/21 1615    Narrative:      History: PAD     Comments: Grayscale imaging as well as color flow duplex were used to  evaluate the right lower extremity arterial system.     On the right, the peak systolic velocity in the common femoral artery  measured 149cm/s. In the profunda femoris artery measured 123cm/s. In  the proximal SFA measured 47.5cm/s. The mid SFA measured 31cm/s. In the  distal SFA measured 44.4cm/s. In the popliteal artery measured 28.5cm/s.  In the posterior tibial artery measured 14.3cm/s. In the anterior tibial  artery measured 12.4cm/s.       Impression:      Patent right lower extremity arterial system with evidence  of mid SFA stenosis and diminished/dampened flow in the tibial vessels.     This report was finalized on 01/05/2021 16:12 by Dr. Morgan Owens MD.    XR Foot 3+ View Right [[id number]] Collected: 01/04/21 2201     Updated: 01/04/21 2206    Narrative:      RIGHT FOOT 3 views 1/4/2021 9:42 PM CST     HISTORY: foot sores     COMPARISON: Radiographs dated 12/26/2020      FINDINGS:   Frontal, lateral and oblique radiographs of the right foot were provided  for review.      The bones are diffusely osteopenic. No acute fracture is seen. No joint  subluxation. Interphalangeal joint flexion deformities which are  degenerative. Additional moderate degenerative change at the first  metatarsophalangeal joint as well as diffusely throughout the midfoot.  No destructive osseous changes are seen. Atheromatous vascular  calcification identified. No capsular distention at the ankle joint. The  subtalar joint is unremarkable. Small plantar calcaneal spur. Quite  prominent Achilles enthesophyte.       Impression:      1. Osteopenia with advanced osteoarthritis changes. No visualized acute  fracture or destructive osseous  change to suggest osteomyelitis.  This report was finalized on 01/04/2021 22:03 by Dr Alfa Wilson, .          Pertinent Test Results:   Lab Results (last 72 hours)     Procedure Component Value Units Date/Time    POC Glucose Once [[id number]]  (Abnormal) Collected: 01/10/21 1215    Specimen: Blood Updated: 01/10/21 1226     Glucose 279 mg/dL      Comment: : [id number] Ribeiro PamelaMeter ID: [id number]       POC Glucose Once [[id number]]  (Abnormal) Collected: 01/10/21 0806    Specimen: Blood Updated: 01/10/21 0836     Glucose 234 mg/dL      Comment: : [id number] Ribeiro PamelaMeter ID: [id number]       Blood Culture - Blood, Blood, Venous Line [[id number]] Collected: 01/04/21 2122    Specimen: Blood, Venous Line Updated: 01/09/21 2145     Blood Culture No growth at 5 days    Blood Culture - Blood, Arm, Left [[id number]] Collected: 01/04/21 2004    Specimen: Blood from Arm, Left Updated: 01/09/21 2015     Blood Culture No growth at 5 days    POC Glucose Once [[id number]]  (Abnormal) Collected: 01/09/21 1628    Specimen: Blood Updated: 01/09/21 1643     Glucose 156 mg/dL      Comment: : [id number] Gage Sheldon ID: [id number]       POC Glucose Once [[id number]]  (Abnormal) Collected: 01/09/21 1128    Specimen: Blood Updated: 01/09/21 1200     Glucose 221 mg/dL      Comment: : [id number] Roxanne Chirinos ID: [id number]       Urine Culture - Urine, Urine, Clean Catch [[id number]]  (Normal) Collected: 01/08/21 1720    Specimen: Urine, Clean Catch Updated: 01/09/21 0957     Urine Culture No growth    Basic Metabolic Panel [[id number]]  (Abnormal) Collected: 01/09/21 0500    Specimen: Blood Updated: 01/09/21 0615     Glucose 135 mg/dL      BUN 8 mg/dL      Creatinine 0.68 mg/dL      Sodium 138 mmol/L      Potassium 4.0 mmol/L      Chloride 108 mmol/L      CO2 23.0 mmol/L      Calcium 9.0 mg/dL      eGFR Non African Amer 84 mL/min/1.73      BUN/Creatinine Ratio 11.8     Anion Gap 7.0 mmol/L     Narrative:       GFR Normal >60  Chronic Kidney Disease <60  Kidney Failure <15      Iron Profile [[id number]]  (Abnormal) Collected: 01/09/21 0500    Specimen: Blood Updated: 01/09/21 0615     Iron 29 mcg/dL      Iron Saturation 10 %      Transferrin 187 mg/dL      TIBC 279 mcg/dL     CBC (No Diff) [[id number]]  (Abnormal) Collected: 01/09/21 0500    Specimen: Blood Updated: 01/09/21 0558     WBC 7.11 10*3/mm3      RBC 2.67 10*6/mm3      Hemoglobin 7.5 g/dL      Hematocrit 24.1 %      MCV 90.3 fL      MCH 28.1 pg      MCHC 31.1 g/dL      RDW 13.3 %      RDW-SD 44.1 fl      MPV 10.2 fL      Platelets 307 10*3/mm3     Urinalysis With Culture If Indicated - Urine, Clean Catch [[id number]]  (Abnormal) Collected: 01/08/21 1720    Specimen: Urine, Clean Catch Updated: 01/08/21 1752     Color, UA Red     Appearance, UA Turbid     pH, UA 6.0     Specific Gravity, UA 1.010     Glucose, UA Negative     Ketones, UA Negative     Bilirubin, UA Small (1+)     Blood, UA Large (3+)     Protein, UA >=300 mg/dL (3+)     Leuk Esterase, UA Moderate (2+)     Nitrite, UA Positive     Urobilinogen, UA 0.2 E.U./dL    Urinalysis, Microscopic Only - Urine, Clean Catch [[id number]]  (Abnormal) Collected: 01/08/21 1720    Specimen: Urine, Clean Catch Updated: 01/08/21 1752     RBC, UA Too Numerous to Count /HPF      WBC, UA 6-12 /HPF      Bacteria, UA Trace /HPF      Squamous Epithelial Cells, UA 0-2 /HPF      Hyaline Casts, UA --     Comment: .        Methodology Automated Microscopy    POC Glucose Once [[id number]]  (Abnormal) Collected: 01/08/21 1102    Specimen: Blood Updated: 01/08/21 1133     Glucose 175 mg/dL      Comment: : [id number] Mich Parkinson ID: [id number]       Basic Metabolic Panel [[id number]]  (Abnormal) Collected: 01/08/21 0530    Specimen: Blood Updated: 01/08/21 0620     Glucose 147 mg/dL      BUN 10 mg/dL      Creatinine 0.77 mg/dL      Sodium 138 mmol/L      Potassium 4.1 mmol/L      Chloride 108 mmol/L      CO2 23.0 mmol/L       Calcium 8.7 mg/dL      eGFR Non African Amer 72 mL/min/1.73      BUN/Creatinine Ratio 13.0     Anion Gap 7.0 mmol/L     Narrative:      GFR Normal >60  Chronic Kidney Disease <60  Kidney Failure <15      CBC & Differential [[id number]]  (Abnormal) Collected: 01/08/21 0530    Specimen: Blood Updated: 01/08/21 0607    Narrative:      The following orders were created for panel order CBC & Differential.  Procedure                               Abnormality         Status                     ---------                               -----------         ------                     CBC Auto Differential[[id number]]        Abnormal            Final result                 Please view results for these tests on the individual orders.    CBC Auto Differential [[id number]]  (Abnormal) Collected: 01/08/21 0530    Specimen: Blood Updated: 01/08/21 0607     WBC 8.07 10*3/mm3      RBC 2.57 10*6/mm3      Hemoglobin 7.3 g/dL      Hematocrit 23.7 %      MCV 92.2 fL      MCH 28.4 pg      MCHC 30.8 g/dL      RDW 13.4 %      RDW-SD 45.6 fl      MPV 10.1 fL      Platelets 306 10*3/mm3      Neutrophil % 65.1 %      Lymphocyte % 22.7 %      Monocyte % 9.0 %      Eosinophil % 2.5 %      Basophil % 0.5 %      Immature Grans % 0.2 %      Neutrophils, Absolute 5.25 10*3/mm3      Lymphocytes, Absolute 1.83 10*3/mm3      Monocytes, Absolute 0.73 10*3/mm3      Eosinophils, Absolute 0.20 10*3/mm3      Basophils, Absolute 0.04 10*3/mm3      Immature Grans, Absolute 0.02 10*3/mm3      nRBC 0.0 /100 WBC     Vancomycin, Trough [[id number]]  (Normal) Collected: 01/07/21 2207    Specimen: Blood Updated: 01/07/21 2234     Vancomycin Trough 11.30 mcg/mL     POC Glucose Once [[id number]]  (Abnormal) Collected: 01/07/21 2037    Specimen: Blood Updated: 01/07/21 2048     Glucose 180 mg/dL      Comment: : [id number] Harling[place] SaraMeter ID: [id number]       POC Glucose Once [[id number]]  (Abnormal) Collected: 01/07/21 1636    Specimen: Blood Updated: 01/07/21 1649      Glucose 224 mg/dL      Comment: : [id number] Mich Parkinson ID: [id number]             Chief Complaint on Day of Discharge: Feels much better; eating Fritos!  Ready to go home.    Hospital Course:  The patient is a 80 y.o. female who presented to [facility] with a chief complaint of severe pain in her distal right lower extremity, primarily involving the foot.  Patient has a known history of peripheral arterial disease, in addition to DVT, and has some ulcerated sites and erythema involving the right foot.  Patient was taken to the operating room on January 6 by Dr. Owens of vascular surgery.  Patient had balloon angioplasty of the proximal SFA, balloon angioplasty of the below knee popliteal artery, balloon angioplasty of the tibial peroneal trunk and peroneal artery, balloon angioplasty of the anterior tibial artery and dorsalis pedis artery.  Following this procedure patient's pain symptoms improved significantly.  Her foot was warm and appeared to be better perfused.  Vascular surgery cleared patient for discharge with follow-up in their clinic in 2 weeks.    We initially had patient on IV antibiotics and transition to oral antibiotics in the form of Augmentin and doxycycline at the time of discharge.  She is also on antiplatelet therapy with aspirin in addition to Plavix, and will also continue her statin.    She did have a slight trend down in her hemoglobin and hematocrit.  It was noted that she was having some hematuria, but no other signs or symptoms of bleeding.  Urology was consulted.  A CT urogram was performed.  Plan was for outpatient cystoscopy, and will arrange for outpatient follow-up with Dr. Meredith.  Please note that her urine culture has been negative.  No evidence of renal mass or renal stone.  Today patient reported to me that when she has been voiding she has not noticed any blood, pink urine.  She reports that her urine does look normal.    Patient was evaluated  "by Dr. Merchant regarding the ischemic ulcerations involving the right foot.  He recommended Betadine to affected areas twice daily, and at discharge follow-up in the outpatient wound care clinic for ongoing monitoring and treatment of these ulcerated sites.    Patient has been seen by physical therapy and has met all therapy goals at this time.  Physical therapy has signed off.    Plan for discharge today with close outpatient follow-up with primary care provider, Dr. Meredith with urology, Dr. Owens with vascular surgery, and Dr. Merchant in the wound care clinic.    Condition on Discharge:  Medically stable    Physical Exam on Discharge:  /45 (BP Location: Right arm, Patient Position: Lying)   Pulse 73   Temp 98.4 °F (36.9 °C) (Oral)   Resp 16   Ht 154.9 cm (60.98\")   Wt 80.6 kg (177 lb 11.1 oz)   SpO2 95%   Breastfeeding No   BMI 33.59 kg/m²   Physical Exam  Vitals signs reviewed.   Constitutional:       Comments: Eating Fritos when I arrived   HENT:      Head: Normocephalic.      Mouth/Throat:      Pharynx: No oropharyngeal exudate.   Pulmonary:      Effort: Pulmonary effort is normal. No respiratory distress.   Skin:     Comments: Erythema over distal right foot much improved; patient has foot elevated so edema has also improved.  Ulcerated sites painted with Betadine.  Much less tender to palpation.   Neurological:      Mental Status: She is alert.       Discharge Disposition:  Home or Self Care    Discharge Medications:     Discharge Medications      New Medications      Instructions Start Date   amoxicillin-clavulanate 875-125 MG per tablet  Commonly known as: AUGMENTIN   1 tablet, Oral, Every 12 Hours Scheduled      clopidogrel 75 MG tablet  Commonly known as: PLAVIX   75 mg, Oral, Daily      doxycycline 100 MG tablet  Commonly known as: ADOXA   100 mg, Oral, Every 12 Hours Scheduled      ferrous sulfate 325 (65 FE) MG tablet   325 mg, Oral, Daily With Breakfast   Start Date: January 11, 2021   "      Changes to Medications      Instructions Start Date   HYDROcodone-acetaminophen  MG per tablet  Commonly known as: NORCO  What changed:   · when to take this  · reasons to take this   1 tablet, Oral, Every 6 Hours PRN         Continue These Medications      Instructions Start Date   aspirin 81 MG EC tablet   81 mg, Oral, Daily      atorvastatin 20 MG tablet  Commonly known as: LIPITOR   20 mg, Oral, Daily      gabapentin 600 MG tablet  Commonly known as: NEURONTIN   600 mg, Oral, 3 Times Daily      lisinopril 20 MG tablet  Commonly known as: PRINIVIL,ZESTRIL   20 mg, Oral, Daily      metFORMIN 500 MG tablet  Commonly known as: GLUCOPHAGE   500 mg, Oral, Daily With Dinner         Stop These Medications    acetaminophen 325 MG tablet  Commonly known as: TYLENOL            Discharge Diet: diabetic diet    Activity at Discharge: as tolerated    Discharge Care Plan/Instructions: Betadine to affected areas/sites on foot twice daily.  Elevate foot while at rest.    Follow-up Appointments:   Future Appointments   Date Time Provider Department Center   3/16/2021 11:00 AM PAD US [facility] CART 2  PAD US PAD   3/16/2021  1:00 PM PAD US [facility] C[facility] 2  PAD US PAD   3/16/2021  2:00 PM Morgan Owens DO [facility] PAD   Follow-up in the vascular surgery clinic in 2 weeks.  Follow-up with Dr. Meredith in the Urology clinic in the next couple of weeks for outpatient cystoscopy.  Follow-up in 1 week in the [facility] wound care clinic.  Follow-up with primary care provider in 1 week.    Test Results Pending at Discharge: none    Electronically signed by Jorge Riley MD, 01/10/21, 15:34 CST.    Time: 25 min

## 2021-01-10 NOTE — THERAPY DISCHARGE NOTE
Acute Care - Physical Therapy Treatment Note/Discharge   [place]     Patient Name: Evelyn Ojeda  : 1940  MRN: [id number]  Today's Date: 1/10/2021                Admit Date: 2021    Visit Dx:    ICD-10-CM ICD-9-CM   1. PAD (peripheral artery disease) (CMS/HCC)  I73.9 443.9   2. Cellulitis, unspecified cellulitis site  L03.90 682.9   3. Gait abnormality  R26.9 781.2     Patient Active Problem List   Diagnosis   • PAD (peripheral artery disease) (CMS/HCC)   • Essential hypertension   • Mixed hyperlipidemia   • Bilateral carotid artery stenosis   • Acute deep vein thrombosis (DVT) of distal vein of right lower extremity (CMS/[facility])   • Atherosclerosis of artery of extremity with intermittent claudication (CMS/[facility])   • Chronic osteoarthritis   • Decreased pedal pulses   • Nonhealing ulcer of right lower leg with fat layer exposed (CMS/[facility])   • Hyperglycemia   • Type 2 diabetes mellitus with hyperglycemia, without long-term current use of insulin (CMS/[facility])   • Acute kidney injury (CMS/[facility])   • Postoperative anemia due to acute blood loss     Past Medical History:   Diagnosis Date   • Diabetes mellitus (CMS/HCC)    • DVT (deep venous thrombosis) (CMS/[facility])     RLL   • Hypertension    • PVD (peripheral vascular disease) (CMS/[facility])      Past Surgical History:   Procedure Laterality Date   • AORTAGRAM N/A 2021    Procedure: 1.  Introduction of catheter/sheath into the aorta 2.  Aortoiliac angiogram with right lower extremity runoff 3.  Contralateral cannulation of the right common iliac artery 4.  Placement of a 4 mm spider distal embolic protection device in the below-knee popliteal artery 5.  Directional atherectomy of the proximal SFA and below-knee popliteal artery using the 6 [place] Pantheris d   • CHOLECYSTECTOMY     • KNEE SURGERY     • LEG SURGERY            PT Assessment (last 12 hours)      PT Evaluation and Treatment     Row Name 01/10/21 0944          Physical Therapy Time and Intention     Subjective Information  complains of;pain  -     Document Type  therapy note (daily note)  -     Mode of Treatment  physical therapy  -     Row Name 01/10/21 0944          General Information    Existing Precautions/Restrictions  fall  -     Row Name 01/10/21 0944          Pain Scale: Word Pre/Post-Treatment    Pain: Word Scale, Pretreatment  2 - mild pain  -     Posttreatment Pain Rating  2 - mild pain  -     Pain Location - Side  Right  -     Pain Location  toe  -     Pain Intervention(s)  Repositioned;Medication (See MAR);Ambulation/increased activity  -     Row Name 01/10/21 0944          Bed Mobility    Supine-Sit [place] (Bed Mobility)  independent  -     Sit-Supine [place] (Bed Mobility)  independent  -     Row Name 01/10/21 0944          Transfers    Sit-Stand [place] (Transfers)  independent  -     Stand-Sit [place] (Transfers)  independent  -     Row Name 01/10/21 0944          Gait/Stairs (Locomotion)    [place] Level (Gait)  independent  -     Assistive Device (Gait)  walker, front-wheeled  -     Distance in Feet (Gait)  350  -     Deviations/Abnormal Patterns (Gait)  antalgic  -     Row Name 01/10/21 0944          Hip (Therapeutic Exercise)    Hip (Therapeutic Exercise)  AROM (active range of motion)  -     Hip AROM (Therapeutic Exercise)  bilateral;flexion  -     Row Name 01/10/21 0944          Knee (Therapeutic Exercise)    Knee (Therapeutic Exercise)  AROM (active range of motion)  -     Knee AROM (Therapeutic Exercise)  bilateral;LAQ (long arc quad)  -     Row Name 01/10/21 0944          Ankle (Therapeutic Exercise)    Ankle (Therapeutic Exercise)  AROM (active range of motion)  -     Ankle AROM (Therapeutic Exercise)  bilateral;dorsiflexion  -     Row Name             Wound 01/05/21 0213 Right anterior fifth toe    Wound - Properties Group Placement Date: 01/05/21  -DF Placement Time: 0213 -DF Present on Hospital Admission: Y   -DF Side: Right  -DF Orientation: anterior  -DF Location: fifth toe  -DF    Retired Wound - Properties Group Date first assessed: 01/05/21  -DF Time first assessed: 0213  -DF Present on Hospital Admission: Y  -DF Side: Right  -DF Location: fifth toe  -DF    Row Name             Wound 01/05/21 0214 Right anterior great toe    Wound - Properties Group Placement Date: 01/05/21  -DF Placement Time: 0214  -DF Present on Hospital Admission: Y  -DF Side: Right  -DF Orientation: anterior  -DF Location: great toe  -DF    Retired Wound - Properties Group Date first assessed: 01/05/21  -DF Time first assessed: 0214  -DF Present on Hospital Admission: Y  -DF Side: Right  -DF Location: great toe  -DF    Row Name             Wound 01/05/21 0216 Right anterior    Wound - Properties Group Placement Date: 01/05/21  -DF Placement Time: 0216  -DF Present on Hospital Admission: Y  -DF Side: Right  -DF Orientation: anterior  -DF    Retired Wound - Properties Group Date first assessed: 01/05/21  -DF Time first assessed: 0216  -DF Present on Hospital Admission: Y  -DF Side: Right  -DF    Row Name             Wound 01/06/21 1646 Left groin Incision    Wound - Properties Group Placement Date: 01/06/21  -SHAUNA Placement Time: 1646  -SHAUNA Side: Left  -SHAUNA Location: groin  -SHAUNA Primary Wound Type: Incision  -SHAUNA    Retired Wound - Properties Group Date first assessed: 01/06/21  -SHAUNA Time first assessed: 1646  -SHAUNA Side: Left  -SHAUNA Location: groin  -SHAUNA Primary Wound Type: Incision  -SHAUNA    Row Name 01/10/21 0944          Plan of Care Review    Plan of Care Reviewed With  patient  -     Progress  improving  -     Outcome Summary  Pt. is continued to improve with her mobility and pain. Pt. is independent for all mobility and is able to ambulate with a RWX without difficulty. Pt. states she has no needs at home and will have support as needed. Pt. has met all therapy goals. PT will sign off at this time and allow pt. to walk on her own as tolerated.   [facility]      Row Name 01/10/21 0944          Bed Mobility Goal 1 (PT)    Activity/Assistive Device (Bed Mobility Goal 1, PT)  bed mobility activities, all  -MF     [place] Level/Cues Needed (Bed Mobility Goal 1, PT)  independent  -MF     Time Frame (Bed Mobility Goal 1, PT)  long term goal (LTG);10 days  -MF     Progress/Outcomes (Bed Mobility Goal 1, PT)  goal met  -MF     Row Name 01/10/21 0944          Transfer Goal 1 (PT)    Activity/Assistive Device (Transfer Goal 1, PT)  sit-to-stand/stand-to-sit;bed-to-chair/chair-to-bed;walker, rolling  -MF     [place] Level/Cues Needed (Transfer Goal 1, PT)  independent  -MF     Time Frame (Transfer Goal 1, PT)  long term goal (LTG);10 days  -MF     Strategies/Barriers (Transfers Goal 1, PT)  wt bearing R LE per MD  -MF     Progress/Outcome (Transfer Goal 1, PT)  goal met MD kept pt as WBAT  -MF     Row Name 01/10/21 0944          Gait Training Goal 1 (PT)    Activity/Assistive Device (Gait Training Goal 1, PT)  gait (walking locomotion);assistive device use;decrease fall risk;improve balance and speed;increase endurance/gait distance;maintain weight-bearing status;walker, rolling  -MF     [place] Level (Gait Training Goal 1, PT)  standby assist  -MF     Distance (Gait Training Goal 1, PT)  10-15 ft maintaining wt bearing as MD prescribes  -MF     Time Frame (Gait Training Goal 1, PT)  long term goal (LTG);10 days  -MF     Progress/Outcome (Gait Training Goal 1, PT)  goal met MD has kept pt WBAT  -MF     Row Name 01/10/21 0944          Patient Education Goal (PT)    Activity (Patient Education Goal, PT)  HEP for strengthening/ROM  -MF     [place]/Cues/Accuracy (Memory Goal 2, PT)  demonstrates adequately;independent  -MF     Time Frame (Patient Education Goal, PT)  long term goal (LTG);10 days  -MF     Progress/Outcome (Patient Education Goal, PT)  goal met  -MF     Row Name 01/10/21 0944          Positioning and Restraints    Pre-Treatment Position  in bed  -MF      Post Treatment Position  bed  -MF     In Bed  notified nsg;sitting EOB;call light within reach;encouraged to call for assist  -MF       User Key  (r) = Recorded By, (t) = Taken By, (c) = Cosigned By    Initials Name Provider Type    Georgia Ray, RN Registered Nurse    Lupe Atkins RN Registered Nurse    Cate Gamino, PTA Physical Therapy Assistant          Physical Therapy Education                 Title: PT OT SLP Therapies (Resolved)     Topic: Physical Therapy (Resolved)     Point: Mobility training (Resolved)     Learning Progress Summary           Patient Acceptance, E,TB, VU,NR by  at 1/9/2021 0122    Acceptance, E,TB,D, VU,DU,NR by  at 1/6/2021 1104    Comment: Education re: purpose of PT/importance of activity; education for safety/falls prevention w/ functional mobility; education for improved tech w/ tfers and w/ use of rwx to assist w/ decreasing wt bearing R LE                   Point: Home exercise program (Resolved)     Learning Progress Summary           Patient Acceptance, E,TB, VU,NR by  at 1/9/2021 0122                   Point: Precautions (Resolved)     Learning Progress Summary           Patient Acceptance, E,TB, VU,NR by  at 1/9/2021 0122    Acceptance, E,TB,D, VU,DU,NR by  at 1/6/2021 1104    Comment: Education re: purpose of PT/importance of activity; education for safety/falls prevention w/ functional mobility; education for improved tech w/ tfers and w/ use of rwx to assist w/ decreasing wt bearing R LE                               User Key     Initials Effective Dates Name Provider Type Discipline     09/30/19 -  Beth Poole, RN Registered Nurse Nurse    NATALY 08/02/18 -  Nereida Faust, PT Physical Therapist PT                PT Recommendation and Plan  Anticipated Discharge Disposition (PT): home  Plan of Care Reviewed With: patient  Progress: improving  Outcome Summary: Pt. is continued to improve with her mobility and pain. Pt. is  independent for all mobility and is able to ambulate with a RWX without difficulty. Pt. states she has no needs at home and will have support as needed. Pt. has met all therapy goals. PT will sign off at this time and allow pt. to walk on her own as tolerated.          Time Calculation:     Therapy Charges for Today     Code Description Service Date Service Provider Modifiers Qty    74681571357 HC PT THER PROC EA 15 MIN 1/9/2021 Cate Healy PTA GP 1    97138504732 HC GAIT TRAINING EA 15 MIN 1/9/2021 Cate Healy PTA GP 1          PT G-Codes  Outcome Measure Options: AM-PAC 6 Clicks Basic Mobility (PT)  AM-PAC 6 Clicks Score (PT): 18    PT Discharge Summary  Anticipated Discharge Disposition (PT): home  Reason for Discharge: All goals achieved  Outcomes Achieved: Able to achieve all goals within established timeline    Cate Healy PTA  1/10/2021

## 2021-01-10 NOTE — THERAPY DISCHARGE NOTE
Acute Care - Physical Therapy Treatment Note/Discharge   [place]     Patient Name: Evelyn Ojeda  : 1940  MRN: [id number]  Today's Date: 1/10/2021                Admit Date: 2021    Visit Dx:    ICD-10-CM ICD-9-CM   1. PAD (peripheral artery disease) (CMS/HCC)  I73.9 443.9   2. Cellulitis, unspecified cellulitis site  L03.90 682.9   3. Gait abnormality  R26.9 781.2     Patient Active Problem List   Diagnosis   • PAD (peripheral artery disease) (CMS/HCC)   • Essential hypertension   • Mixed hyperlipidemia   • Bilateral carotid artery stenosis   • Acute deep vein thrombosis (DVT) of distal vein of right lower extremity (CMS/[facility])   • Atherosclerosis of artery of extremity with intermittent claudication (CMS/[facility])   • Chronic osteoarthritis   • Decreased pedal pulses   • Nonhealing ulcer of right lower leg with fat layer exposed (CMS/[facility])   • Hyperglycemia   • Type 2 diabetes mellitus with hyperglycemia, without long-term current use of insulin (CMS/[facility])   • Acute kidney injury (CMS/[facility])   • Postoperative anemia due to acute blood loss     Past Medical History:   Diagnosis Date   • Diabetes mellitus (CMS/HCC)    • DVT (deep venous thrombosis) (CMS/[facility])     RLL   • Hypertension    • PVD (peripheral vascular disease) (CMS/[facility])      Past Surgical History:   Procedure Laterality Date   • AORTAGRAM N/A 2021    Procedure: 1.  Introduction of catheter/sheath into the aorta 2.  Aortoiliac angiogram with right lower extremity runoff 3.  Contralateral cannulation of the right common iliac artery 4.  Placement of a 4 mm spider distal embolic protection device in the below-knee popliteal artery 5.  Directional atherectomy of the proximal SFA and below-knee popliteal artery using the 6 [place] Pantheris d   • CHOLECYSTECTOMY     • KNEE SURGERY     • LEG SURGERY            PT Assessment (last 12 hours)      PT Evaluation and Treatment     Row Name 01/10/21 0944          Physical Therapy Time and Intention     Subjective Information  complains of;pain  -     Document Type  therapy note (daily note)  -     Mode of Treatment  physical therapy  -     Row Name 01/10/21 0944          General Information    Existing Precautions/Restrictions  fall  -     Row Name 01/10/21 0944          Pain Scale: Word Pre/Post-Treatment    Pain: Word Scale, Pretreatment  2 - mild pain  -     Posttreatment Pain Rating  2 - mild pain  -     Pain Location - Side  Right  -     Pain Location  toe  -     Pain Intervention(s)  Repositioned;Medication (See MAR);Ambulation/increased activity  -     Row Name 01/10/21 0944          Bed Mobility    Supine-Sit [place] (Bed Mobility)  independent  -     Sit-Supine [place] (Bed Mobility)  independent  -     Row Name 01/10/21 0944          Transfers    Sit-Stand [place] (Transfers)  independent  -     Stand-Sit [place] (Transfers)  independent  -     Row Name 01/10/21 0944          Gait/Stairs (Locomotion)    [place] Level (Gait)  independent  -     Assistive Device (Gait)  walker, front-wheeled  -     Distance in Feet (Gait)  350  -     Deviations/Abnormal Patterns (Gait)  antalgic  -     Row Name 01/10/21 0944          Hip (Therapeutic Exercise)    Hip (Therapeutic Exercise)  AROM (active range of motion)  -     Hip AROM (Therapeutic Exercise)  bilateral;flexion  -     Row Name 01/10/21 0944          Knee (Therapeutic Exercise)    Knee (Therapeutic Exercise)  AROM (active range of motion)  -     Knee AROM (Therapeutic Exercise)  bilateral;LAQ (long arc quad)  -     Row Name 01/10/21 0944          Ankle (Therapeutic Exercise)    Ankle (Therapeutic Exercise)  AROM (active range of motion)  -     Ankle AROM (Therapeutic Exercise)  bilateral;dorsiflexion  -     Row Name             Wound 01/05/21 0213 Right anterior fifth toe    Wound - Properties Group Placement Date: 01/05/21  -DF Placement Time: 0213 -DF Present on Hospital Admission: Y   -DF Side: Right  -DF Orientation: anterior  -DF Location: fifth toe  -DF    Retired Wound - Properties Group Date first assessed: 01/05/21  -DF Time first assessed: 0213  -DF Present on Hospital Admission: Y  -DF Side: Right  -DF Location: fifth toe  -DF    Row Name             Wound 01/05/21 0214 Right anterior great toe    Wound - Properties Group Placement Date: 01/05/21  -DF Placement Time: 0214  -DF Present on Hospital Admission: Y  -DF Side: Right  -DF Orientation: anterior  -DF Location: great toe  -DF    Retired Wound - Properties Group Date first assessed: 01/05/21  -DF Time first assessed: 0214  -DF Present on Hospital Admission: Y  -DF Side: Right  -DF Location: great toe  -DF    Row Name             Wound 01/05/21 0216 Right anterior    Wound - Properties Group Placement Date: 01/05/21  -DF Placement Time: 0216  -DF Present on Hospital Admission: Y  -DF Side: Right  -DF Orientation: anterior  -DF    Retired Wound - Properties Group Date first assessed: 01/05/21  -DF Time first assessed: 0216  -DF Present on Hospital Admission: Y  -DF Side: Right  -DF    Row Name             Wound 01/06/21 1646 Left groin Incision    Wound - Properties Group Placement Date: 01/06/21  -SHAUNA Placement Time: 1646  -SHAUNA Side: Left  -SHAUNA Location: groin  -SHAUNA Primary Wound Type: Incision  -SHAUNA    Retired Wound - Properties Group Date first assessed: 01/06/21  -SHAUNA Time first assessed: 1646  -SHAUNA Side: Left  -SHAUNA Location: groin  -SHAUNA Primary Wound Type: Incision  -SHAUNA    Row Name 01/10/21 0944          Plan of Care Review    Plan of Care Reviewed With  patient  -     Progress  improving  -     Outcome Summary  Pt. is continued to improve with her mobility and pain. Pt. is independent for all mobility and is able to ambulate with a RWX without difficulty. Pt. states she has no needs at home and will have support as needed. Pt. has met all therapy goals. PT will sign off at this time and allow pt. to walk on her own as tolerated.   [facility]      Row Name 01/10/21 0944          Bed Mobility Goal 1 (PT)    Activity/Assistive Device (Bed Mobility Goal 1, PT)  bed mobility activities, all  -MF     [place] Level/Cues Needed (Bed Mobility Goal 1, PT)  independent  -MF     Time Frame (Bed Mobility Goal 1, PT)  long term goal (LTG);10 days  -MF     Progress/Outcomes (Bed Mobility Goal 1, PT)  goal met  -MF     Row Name 01/10/21 0944          Transfer Goal 1 (PT)    Activity/Assistive Device (Transfer Goal 1, PT)  sit-to-stand/stand-to-sit;bed-to-chair/chair-to-bed;walker, rolling  -MF     [place] Level/Cues Needed (Transfer Goal 1, PT)  independent  -MF     Time Frame (Transfer Goal 1, PT)  long term goal (LTG);10 days  -MF     Strategies/Barriers (Transfers Goal 1, PT)  wt bearing R LE per MD  -MF     Progress/Outcome (Transfer Goal 1, PT)  goal met MD kept pt as WBAT  -MF     Row Name 01/10/21 0944          Gait Training Goal 1 (PT)    Activity/Assistive Device (Gait Training Goal 1, PT)  gait (walking locomotion);assistive device use;decrease fall risk;improve balance and speed;increase endurance/gait distance;maintain weight-bearing status;walker, rolling  -MF     [place] Level (Gait Training Goal 1, PT)  standby assist  -MF     Distance (Gait Training Goal 1, PT)  10-15 ft maintaining wt bearing as MD prescribes  -MF     Time Frame (Gait Training Goal 1, PT)  long term goal (LTG);10 days  -MF     Progress/Outcome (Gait Training Goal 1, PT)  goal met MD has kept pt WBAT  -MF     Row Name 01/10/21 0944          Patient Education Goal (PT)    Activity (Patient Education Goal, PT)  HEP for strengthening/ROM  -MF     [place]/Cues/Accuracy (Memory Goal 2, PT)  demonstrates adequately;independent  -MF     Time Frame (Patient Education Goal, PT)  long term goal (LTG);10 days  -MF     Progress/Outcome (Patient Education Goal, PT)  goal met  -MF     Row Name 01/10/21 0944          Positioning and Restraints    Pre-Treatment Position  in bed  -MF      Post Treatment Position  bed  -MF     In Bed  notified nsg;sitting EOB;call light within reach;encouraged to call for assist  -MF       User Key  (r) = Recorded By, (t) = Taken By, (c) = Cosigned By    Initials Name Provider Type    Georgia Ray, RN Registered Nurse    Lupe Atkins RN Registered Nurse    Cate Gamino, PTA Physical Therapy Assistant          Physical Therapy Education                 Title: PT OT SLP Therapies (Resolved)     Topic: Physical Therapy (Resolved)     Point: Mobility training (Resolved)     Learning Progress Summary           Patient Acceptance, E,TB, VU,NR by  at 1/9/2021 0122    Acceptance, E,TB,D, VU,DU,NR by  at 1/6/2021 1104    Comment: Education re: purpose of PT/importance of activity; education for safety/falls prevention w/ functional mobility; education for improved tech w/ tfers and w/ use of rwx to assist w/ decreasing wt bearing R LE                   Point: Home exercise program (Resolved)     Learning Progress Summary           Patient Acceptance, E,TB, VU,NR by  at 1/9/2021 0122                   Point: Precautions (Resolved)     Learning Progress Summary           Patient Acceptance, E,TB, VU,NR by  at 1/9/2021 0122    Acceptance, E,TB,D, VU,DU,NR by  at 1/6/2021 1104    Comment: Education re: purpose of PT/importance of activity; education for safety/falls prevention w/ functional mobility; education for improved tech w/ tfers and w/ use of rwx to assist w/ decreasing wt bearing R LE                               User Key     Initials Effective Dates Name Provider Type Discipline     09/30/19 -  Beth Poole, RN Registered Nurse Nurse    NATALY 08/02/18 -  Nereida Faust, PT Physical Therapist PT                PT Recommendation and Plan  Anticipated Discharge Disposition (PT): home  Plan of Care Reviewed With: patient  Progress: improving  Outcome Summary: Pt. is continued to improve with her mobility and pain. Pt. is  independent for all mobility and is able to ambulate with a RWX without difficulty. Pt. states she has no needs at home and will have support as needed. Pt. has met all therapy goals. PT will sign off at this time and allow pt. to walk on her own as tolerated.          Time Calculation:   PT Charges     Row Name 01/10/21 1010             Time Calculation    Start Time  0944  -      Stop Time  1007  -MF      Time Calculation (min)  23 min  -MF      PT Received On  01/10/21  -      PT Goal Re-Cert Due Date  01/16/21  -         Time Calculation- PT    Total Timed Code Minutes- PT  23 minute(s)  -MF         Timed Charges    20994 - PT Therapeutic Exercise Minutes  10  -MF      93550 - Gait Training Minutes   13  -MF        User Key  (r) = Recorded By, (t) = Taken By, (c) = Cosigned By    Initials Name Provider Type    Cate Gamino PTA Physical Therapy Assistant        Therapy Charges for Today     Code Description Service Date Service Provider Modifiers Qty    87718222867 HC PT THER PROC EA 15 MIN 1/9/2021 Cate Healy, PTA GP 1    95233746123 HC GAIT TRAINING EA 15 MIN 1/9/2021 Cate Healy, PTA GP 1    08911654378 HC PT THER PROC EA 15 MIN 1/10/2021 Cate Healy, PTA GP 1    36329389316 HC GAIT TRAINING EA 15 MIN 1/10/2021 Cate Healy, PTA GP 1          PT G-Codes  Outcome Measure Options: AM-PAC 6 Clicks Basic Mobility (PT)  AM-PAC 6 Clicks Score (PT): 18    PT Discharge Summary  Anticipated Discharge Disposition (PT): home  Reason for Discharge: All goals achieved  Outcomes Achieved: Able to achieve all goals within established timeline    Cate Healy PTA  1/10/2021

## 2021-01-11 ENCOUNTER — READMISSION MANAGEMENT (OUTPATIENT)
Dept: CALL CENTER | Facility: HOSPITAL | Age: 81
End: 2021-01-11

## 2021-01-11 NOTE — OUTREACH NOTE
Prep Survey      Responses   [facility] facility patient discharged from?  [place]   Is LACE score < 7 ?  No   Emergency Room discharge w/ pulse ox?  No   Eligibility  Readm Mgmt   Discharge diagnosis  Peripheral artery disease [s/p baloon angioplasty of the proximal SFA and below knee popliteal artery, anterior tibial artery and dorsalis pedis artery]   Does the patient have one of the following disease processes/diagnoses(primary or secondary)?  Other   Does the patient have Home health ordered?  No   Is there a DME ordered?  No   Comments regarding appointments  Needs f/u scheduled   Medication alerts for this patient  continue aspirin and start plavix   Prep survey completed?  Yes          Loan Segura RN

## 2021-01-11 NOTE — PAYOR COMM NOTE
"DC HOME 1-10-21  [id number]    Evelyn Ojeda (80 y.o. Female)     Date of Birth Social Security Number Address Home Phone MRN    [id number]  [id number]  [place] [address] [phone] [id number]    Hinduism Marital Status          Other Unknown       Admission Date Admission Type Admitting Provider Attending Provider Department, Room/Bed    1/4/21 Emergency Jorge Riley MD  [facility] 3C, 390/1    Discharge Date Discharge Disposition Discharge Destination        1/10/2021 Home or Self Care              Attending Provider: (none)   Allergies: Codeine    Isolation: None   Infection: None   Code Status: Prior    Ht: 154.9 cm (60.98\")   Wt: 80.6 kg (177 lb 11.1 oz)    Admission Cmt: None   Principal Problem: PAD (peripheral artery disease) (CMS/HCC) [I73.9]                 Active Insurance as of 1/4/2021     Primary Coverage     Payor Plan Insurance Group Employer/Plan Group    HUMANA MEDICARE REPLACEMENT HUMANA MEDICARE REPLACEMENT [id number]     Payor Plan Address Payor Plan Phone Number Payor Plan Fax Number Effective Dates    [address] [address] [phone]  9/1/2020 - None Entered    [facility] [id number]       Subscriber Name Subscriber Birth Date Member ID       EVELYN OJEDA 1940 [id number]                 Emergency Contacts      (Rel.) Home Phone Work Phone Mobile Phone    DaughterLashell -- -- [phone]    GranddaughterLupe -- -- [phone]    DaughterPauline -- -- [phone]               Discharge Summary      Jorge Riley MD at 01/10/21 1534              [facility] Medicine Services  DISCHARGE SUMMARY       Date of Admission: 1/4/2021  Date of Discharge:  1/10/2021  Primary Care Physician: Roberto Garcia MD    Presenting Problem/History of Present Illness:  PAD (peripheral artery disease) (CMS/HCC) [I73.9]     Final Discharge Diagnoses:  Active Hospital Problems    Diagnosis   • **PAD " (peripheral artery disease) (CMS/HCC)   • Hematuria   • Acute kidney injury (CMS/HCC)   • Postoperative anemia due to acute blood loss   • Type 2 diabetes mellitus with hyperglycemia, without long-term current use of insulin (CMS/HCC)   • Essential hypertension   • Mixed hyperlipidemia   • Nonhealing ulcer of right lower leg with fat layer exposed (CMS/HCC)   • Atherosclerosis of artery of extremity with intermittent claudication (CMS/HCC)       Consults:   1.  Dr. Owens with vascular surgery  2.  Dr. Merchant with podiatry  3.  Dr. Meredith with urology    Procedures Performed:   Imaging Results (Last 7 Days)     Procedure Component Value Units Date/Time    CT Abdomen Pelvis With & Without Contrast [[id number]] Collected: 01/09/21 1349     Updated: 01/09/21 1405    Narrative:      EXAMINATION: CT ABDOMEN PELVIS W WO CONTRAST- 1/9/2021 1:40 PM CST     HISTORY: Hematuria of unknown origin     COMPARISON: None     DOSE: 1614 mGy-cm     TECHNIQUE: Sequential imaging was performed from the lung bases through  the pubic symphysis before and after the administration of IV contrast  in multiple phases, compatible with CT urogram protocol.  Sagittal and  coronal reformations were made from the original source data and  reviewed. Automated exposure control was also utilized to decrease  patient radiation dose.     FINDINGS:   CT urogram:  On precontrast images of the abdomen, bilateral vascular calcifications  are evident in the kidneys. No definite renal calculi are identified. On  postcontrast imaging, no enhancing renal mass is identified. The ureters  are decompressed. No ureteral enhancement is identified. There is a  hyperdense area of wall thickening of the posterior bladder wall  measuring approximately 2.4 x 1.0 cm in size. On delayed phase imaging,  contrast is excreted into the renal collecting systems normally. 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 A filling defect is seen  in the urinary bladder  on the delayed phase images. This appears to be  in a slightly different location than on the postcontrast images. A  focus of air is seen within the urinary bladder.     Other findings:  The visualized heart appears normal in size. There is trace pleural  fluid in the lung bases. Lung bases otherwise appear clear.     The gallbladder is surgically absent. There is intra and extrahepatic  biliary ductal dilatation which is presumably from reservoir effect.  Liver is otherwise unremarkable. The spleen, pancreas, and adrenal  glands are unremarkable.     The main portal and splenic veins and IVC appear grossly normal. The  abdominal aorta appears normal in caliber. Extensive atherosclerotic  calcifications are seen throughout the aorta and its branch vessels.  Calcifications at the origin of the celiac artery results in some  narrowing at the origin. There is normal enhancement of the on this  region.     No pathologically enlarged central mesenteric or retroperitoneal lymph  nodes are identified.     The stomach and small bowel appear normal in caliber. A moderate amount  of stool is seen in the colon, suggesting fecal stasis. Large bowel is  otherwise unremarkable. No free air or free fluid is seen in the  abdomen.     The uterus and ovaries are visualized but not well evaluated by CT. No  free fluid is seen in the pelvis. There is some inflammatory stranding  in the region of the left femoral vessels. There appears to be a tract  to the skin surface, suggesting recent intervention. No pathologically  enlarged pelvic or inguinal lymph nodes are identified.     Review of the visualized osseous structures demonstrates a presumed  chronic but age indeterminate L1 superior endplate deformity.       Impression:      1. Hyperdense material posteriorly in the urinary bladder without  definite enhancement suggests layering debris. However, given history of  hematuria, cystoscopy should be considered.     2. Air in the  urinary bladder may be related to infection or recent  instrumentation. Correlate clinically.     3. Atherosclerotic disease.     4. Trace pleural fluid bilaterally.  5. Soft tissue stranding in the left femoral region, presumably related  to recent intervention/vascular access.           This report was finalized on 01/09/2021 14:02 by Dr. Addi Jacobo MD.    IR Angiogram Extremity [[id number]] Collected: 01/07/21 0643     Updated: 01/07/21 1258    Narrative:      Performed by Dr. Owens. Please see procedure note.  This report was finalized on 01/07/2021 12:55 by Dr. Morgan Owens MD.    FL C Arm During Surgery [[id number]] Collected: 01/07/21 0643     Updated: 01/07/21 1258    Narrative:      Performed by Dr. Owens. Please see procedure note.  This report was finalized on 01/07/2021 12:55 by Dr. Morgan Owens MD.    US Venous Doppler Lower Extremity Right (duplex) [[id number]] Collected: 01/05/21 1630     Updated: 01/05/21 1634    Narrative:      History: Pain and swelling       Impression:      Impression: There is no evidence of deep venous thrombosis or  superficial thrombophlebitis of the right lower extremity.     Comments: Right lower extremity venous duplex exam was performed using  color Doppler flow, Doppler wave form analysis, and grayscale imaging,  with and without compression. There is no evidence of deep venous  thrombosis of the common femoral, superficial femoral, popliteal,  posterior tibial, and peroneal veins. There is no thrombus identified in  the saphenofemoral junction or the greater saphenous vein.      This report was finalized on 01/05/2021 16:30 by Dr. Morgan Owens MD.    US Arterial Doppler Lower Extremity Right [[id number]] Collected: 01/05/21 1611     Updated: 01/05/21 1615    Narrative:      History: PAD     Comments: Grayscale imaging as well as color flow duplex were used to  evaluate the right lower extremity arterial system.     On the right, the peak systolic  velocity in the common femoral artery  measured 149cm/s. In the profunda femoris artery measured 123cm/s. In  the proximal SFA measured 47.5cm/s. The mid SFA measured 31cm/s. In the  distal SFA measured 44.4cm/s. In the popliteal artery measured 28.5cm/s.  In the posterior tibial artery measured 14.3cm/s. In the anterior tibial  artery measured 12.4cm/s.       Impression:      Patent right lower extremity arterial system with evidence  of mid SFA stenosis and diminished/dampened flow in the tibial vessels.     This report was finalized on 01/05/2021 16:12 by Dr. Morgan Owens MD.    XR Foot 3+ View Right [[id number]] Collected: 01/04/21 2201     Updated: 01/04/21 2206    Narrative:      RIGHT FOOT 3 views 1/4/2021 9:42 PM CST     HISTORY: foot sores     COMPARISON: Radiographs dated 12/26/2020      FINDINGS:   Frontal, lateral and oblique radiographs of the right foot were provided  for review.      The bones are diffusely osteopenic. No acute fracture is seen. No joint  subluxation. Interphalangeal joint flexion deformities which are  degenerative. Additional moderate degenerative change at the first  metatarsophalangeal joint as well as diffusely throughout the midfoot.  No destructive osseous changes are seen. Atheromatous vascular  calcification identified. No capsular distention at the ankle joint. The  subtalar joint is unremarkable. Small plantar calcaneal spur. Quite  prominent Achilles enthesophyte.       Impression:      1. Osteopenia with advanced osteoarthritis changes. No visualized acute  fracture or destructive osseous change to suggest osteomyelitis.  This report was finalized on 01/04/2021 22:03 by Dr Alfa Wilson, .          Pertinent Test Results:   Lab Results (last 72 hours)     Procedure Component Value Units Date/Time    POC Glucose Once [[id number]]  (Abnormal) Collected: 01/10/21 1215    Specimen: Blood Updated: 01/10/21 1226     Glucose 279 mg/dL      Comment: : [id number] Quintin  PamelaMeter ID: [id number]       POC Glucose Once [[id number]]  (Abnormal) Collected: 01/10/21 0806    Specimen: Blood Updated: 01/10/21 0836     Glucose 234 mg/dL      Comment: : [id number] Quintin SantoselaMeter ID: [id number]       Blood Culture - Blood, Blood, Venous Line [[id number]] Collected: 01/04/21 2122    Specimen: Blood, Venous Line Updated: 01/09/21 2145     Blood Culture No growth at 5 days    Blood Culture - Blood, Arm, Left [[id number]] Collected: 01/04/21 2004    Specimen: Blood from Arm, Left Updated: 01/09/21 2015     Blood Culture No growth at 5 days    POC Glucose Once [[id number]]  (Abnormal) Collected: 01/09/21 1628    Specimen: Blood Updated: 01/09/21 1643     Glucose 156 mg/dL      Comment: : [id number] Gage Sheldon ID: [id number]       POC Glucose Once [[id number]]  (Abnormal) Collected: 01/09/21 1128    Specimen: Blood Updated: 01/09/21 1200     Glucose 221 mg/dL      Comment: : [id number] Roxanne Chirinos ID: [id number]       Urine Culture - Urine, Urine, Clean Catch [[id number]]  (Normal) Collected: 01/08/21 1720    Specimen: Urine, Clean Catch Updated: 01/09/21 0957     Urine Culture No growth    Basic Metabolic Panel [[id number]]  (Abnormal) Collected: 01/09/21 0500    Specimen: Blood Updated: 01/09/21 0615     Glucose 135 mg/dL      BUN 8 mg/dL      Creatinine 0.68 mg/dL      Sodium 138 mmol/L      Potassium 4.0 mmol/L      Chloride 108 mmol/L      CO2 23.0 mmol/L      Calcium 9.0 mg/dL      eGFR Non African Amer 84 mL/min/1.73      BUN/Creatinine Ratio 11.8     Anion Gap 7.0 mmol/L     Narrative:      GFR Normal >60  Chronic Kidney Disease <60  Kidney Failure <15      Iron Profile [[id number]]  (Abnormal) Collected: 01/09/21 0500    Specimen: Blood Updated: 01/09/21 0615     Iron 29 mcg/dL      Iron Saturation 10 %      Transferrin 187 mg/dL      TIBC 279 mcg/dL     CBC (No Diff) [[id number]]  (Abnormal) Collected: 01/09/21 0500    Specimen: Blood Updated: 01/09/21 0508      WBC 7.11 10*3/mm3      RBC 2.67 10*6/mm3      Hemoglobin 7.5 g/dL      Hematocrit 24.1 %      MCV 90.3 fL      MCH 28.1 pg      MCHC 31.1 g/dL      RDW 13.3 %      RDW-SD 44.1 fl      MPV 10.2 fL      Platelets 307 10*3/mm3     Urinalysis With Culture If Indicated - Urine, Clean Catch [[id number]]  (Abnormal) Collected: 01/08/21 1720    Specimen: Urine, Clean Catch Updated: 01/08/21 1752     Color, UA Red     Appearance, UA Turbid     pH, UA 6.0     Specific Gravity, UA 1.010     Glucose, UA Negative     Ketones, UA Negative     Bilirubin, UA Small (1+)     Blood, UA Large (3+)     Protein, UA >=300 mg/dL (3+)     Leuk Esterase, UA Moderate (2+)     Nitrite, UA Positive     Urobilinogen, UA 0.2 E.U./dL    Urinalysis, Microscopic Only - Urine, Clean Catch [[id number]]  (Abnormal) Collected: 01/08/21 1720    Specimen: Urine, Clean Catch Updated: 01/08/21 1752     RBC, UA Too Numerous to Count /HPF      WBC, UA 6-12 /HPF      Bacteria, UA Trace /HPF      Squamous Epithelial Cells, UA 0-2 /HPF      Hyaline Casts, UA --     Comment: .        Methodology Automated Microscopy    POC Glucose Once [[id number]]  (Abnormal) Collected: 01/08/21 1102    Specimen: Blood Updated: 01/08/21 1133     Glucose 175 mg/dL      Comment: : [id number] Mich Parkinson ID: [id number]       Basic Metabolic Panel [[id number]]  (Abnormal) Collected: 01/08/21 0530    Specimen: Blood Updated: 01/08/21 0620     Glucose 147 mg/dL      BUN 10 mg/dL      Creatinine 0.77 mg/dL      Sodium 138 mmol/L      Potassium 4.1 mmol/L      Chloride 108 mmol/L      CO2 23.0 mmol/L      Calcium 8.7 mg/dL      eGFR Non African Amer 72 mL/min/1.73      BUN/Creatinine Ratio 13.0     Anion Gap 7.0 mmol/L     Narrative:      GFR Normal >60  Chronic Kidney Disease <60  Kidney Failure <15      CBC & Differential [[id number]]  (Abnormal) Collected: 01/08/21 0530    Specimen: Blood Updated: 01/08/21 0607    Narrative:      The following orders were created for panel  order CBC & Differential.  Procedure                               Abnormality         Status                     ---------                               -----------         ------                     CBC Auto Differential[[id number]]        Abnormal            Final result                 Please view results for these tests on the individual orders.    CBC Auto Differential [[id number]]  (Abnormal) Collected: 01/08/21 0530    Specimen: Blood Updated: 01/08/21 0607     WBC 8.07 10*3/mm3      RBC 2.57 10*6/mm3      Hemoglobin 7.3 g/dL      Hematocrit 23.7 %      MCV 92.2 fL      MCH 28.4 pg      MCHC 30.8 g/dL      RDW 13.4 %      RDW-SD 45.6 fl      MPV 10.1 fL      Platelets 306 10*3/mm3      Neutrophil % 65.1 %      Lymphocyte % 22.7 %      Monocyte % 9.0 %      Eosinophil % 2.5 %      Basophil % 0.5 %      Immature Grans % 0.2 %      Neutrophils, Absolute 5.25 10*3/mm3      Lymphocytes, Absolute 1.83 10*3/mm3      Monocytes, Absolute 0.73 10*3/mm3      Eosinophils, Absolute 0.20 10*3/mm3      Basophils, Absolute 0.04 10*3/mm3      Immature Grans, Absolute 0.02 10*3/mm3      nRBC 0.0 /100 WBC     Vancomycin, Trough [[id number]]  (Normal) Collected: 01/07/21 2207    Specimen: Blood Updated: 01/07/21 2234     Vancomycin Trough 11.30 mcg/mL     POC Glucose Once [[id number]]  (Abnormal) Collected: 01/07/21 2037    Specimen: Blood Updated: 01/07/21 2048     Glucose 180 mg/dL      Comment: : [id number] Ermias SaraMeter ID: [id number]       POC Glucose Once [[id number]]  (Abnormal) Collected: 01/07/21 1636    Specimen: Blood Updated: 01/07/21 1649     Glucose 224 mg/dL      Comment: : [id number] Mich Parkinson ID: [id number]             Chief Complaint on Day of Discharge: Feels much better; eating Fritos!  Ready to go home.    Hospital Course:  The patient is a 80 y.o. female who presented to [facility] with a chief complaint of severe pain in her distal right lower extremity, primarily involving  the foot.  Patient has a known history of peripheral arterial disease, in addition to DVT, and has some ulcerated sites and erythema involving the right foot.  Patient was taken to the operating room on January 6 by Dr. Owens of vascular surgery.  Patient had balloon angioplasty of the proximal SFA, balloon angioplasty of the below knee popliteal artery, balloon angioplasty of the tibial peroneal trunk and peroneal artery, balloon angioplasty of the anterior tibial artery and dorsalis pedis artery.  Following this procedure patient's pain symptoms improved significantly.  Her foot was warm and appeared to be better perfused.  Vascular surgery cleared patient for discharge with follow-up in their clinic in 2 weeks.    We initially had patient on IV antibiotics and transition to oral antibiotics in the form of Augmentin and doxycycline at the time of discharge.  She is also on antiplatelet therapy with aspirin in addition to Plavix, and will also continue her statin.    She did have a slight trend down in her hemoglobin and hematocrit.  It was noted that she was having some hematuria, but no other signs or symptoms of bleeding.  Urology was consulted.  A CT urogram was performed.  Plan was for outpatient cystoscopy, and will arrange for outpatient follow-up with Dr. Meredith.  Please note that her urine culture has been negative.  No evidence of renal mass or renal stone.  Today patient reported to me that when she has been voiding she has not noticed any blood, pink urine.  She reports that her urine does look normal.    Patient was evaluated by Dr. Merchant regarding the ischemic ulcerations involving the right foot.  He recommended Betadine to affected areas twice daily, and at discharge follow-up in the outpatient wound care clinic for ongoing monitoring and treatment of these ulcerated sites.    Patient has been seen by physical therapy and has met all therapy goals at this time.  Physical therapy has signed  "off.    Plan for discharge today with close outpatient follow-up with primary care provider, Dr. Meredith with urology, Dr. Owens with vascular surgery, and Dr. Merchant in the wound care clinic.    Condition on Discharge:  Medically stable    Physical Exam on Discharge:  /45 (BP Location: Right arm, Patient Position: Lying)   Pulse 73   Temp 98.4 °F (36.9 °C) (Oral)   Resp 16   Ht 154.9 cm (60.98\")   Wt 80.6 kg (177 lb 11.1 oz)   SpO2 95%   Breastfeeding No   BMI 33.59 kg/m²   Physical Exam  Vitals signs reviewed.   Constitutional:       Comments: Eating Fritos when I arrived   HENT:      Head: Normocephalic.      Mouth/Throat:      Pharynx: No oropharyngeal exudate.   Pulmonary:      Effort: Pulmonary effort is normal. No respiratory distress.   Skin:     Comments: Erythema over distal right foot much improved; patient has foot elevated so edema has also improved.  Ulcerated sites painted with Betadine.  Much less tender to palpation.   Neurological:      Mental Status: She is alert.       Discharge Disposition:  Home or Self Care    Discharge Medications:     Discharge Medications      New Medications      Instructions Start Date   amoxicillin-clavulanate 875-125 MG per tablet  Commonly known as: AUGMENTIN   1 tablet, Oral, Every 12 Hours Scheduled      clopidogrel 75 MG tablet  Commonly known as: PLAVIX   75 mg, Oral, Daily      doxycycline 100 MG tablet  Commonly known as: ADOXA   100 mg, Oral, Every 12 Hours Scheduled      ferrous sulfate 325 (65 FE) MG tablet   325 mg, Oral, Daily With Breakfast   Start Date: January 11, 2021        Changes to Medications      Instructions Start Date   HYDROcodone-acetaminophen  MG per tablet  Commonly known as: NORCO  What changed:   · when to take this  · reasons to take this   1 tablet, Oral, Every 6 Hours PRN         Continue These Medications      Instructions Start Date   aspirin 81 MG EC tablet   81 mg, Oral, Daily      atorvastatin 20 MG " tablet  Commonly known as: LIPITOR   20 mg, Oral, Daily      gabapentin 600 MG tablet  Commonly known as: NEURONTIN   600 mg, Oral, 3 Times Daily      lisinopril 20 MG tablet  Commonly known as: PRINIVIL,ZESTRIL   20 mg, Oral, Daily      metFORMIN 500 MG tablet  Commonly known as: GLUCOPHAGE   500 mg, Oral, Daily With Dinner         Stop These Medications    acetaminophen 325 MG tablet  Commonly known as: TYLENOL            Discharge Diet: diabetic diet    Activity at Discharge: as tolerated    Discharge Care Plan/Instructions: Betadine to affected areas/sites on foot twice daily.  Elevate foot while at rest.    Follow-up Appointments:   Future Appointments   Date Time Provider Department Center   3/16/2021 11:00 AM PAD US [facility] CART 2  PAD US PAD   3/16/2021  1:00 PM PAD US [facility] CART 2  PAD US PAD   3/16/2021  2:00 PM Morgan Owens,  [facility] VS PAD PAD   Follow-up in the vascular surgery clinic in 2 weeks.  Follow-up with Dr. Meredith in the Urology clinic in the next couple of weeks for outpatient cystoscopy.  Follow-up in 1 week in the [facility] wound care clinic.  Follow-up with primary care provider in 1 week.    Test Results Pending at Discharge: none    Electronically signed by Jorge Riley MD, 01/10/21, 15:34 CST.    Time: 25 min        Electronically signed by Jorge Riley MD at 01/10/21 1548

## 2021-01-13 ENCOUNTER — OFFICE VISIT (OUTPATIENT)
Dept: WOUND CARE | Facility: HOSPITAL | Age: 81
End: 2021-01-13

## 2021-01-13 ENCOUNTER — READMISSION MANAGEMENT (OUTPATIENT)
Dept: CALL CENTER | Facility: HOSPITAL | Age: 81
End: 2021-01-13

## 2021-01-13 PROCEDURE — 97597 DBRDMT OPN WND 1ST 20 CM/<: CPT | Performed by: NURSE PRACTITIONER

## 2021-01-13 PROCEDURE — 99213 OFFICE O/P EST LOW 20 MIN: CPT | Performed by: NURSE PRACTITIONER

## 2021-01-13 PROCEDURE — G0463 HOSPITAL OUTPT CLINIC VISIT: HCPCS

## 2021-01-13 NOTE — OUTREACH NOTE
Medical Week 1 Survey      Responses   [facility] patient discharged from?  [place]   Does the patient have one of the following disease processes/diagnoses(primary or secondary)?  Other   Week 1 attempt successful?  Yes   Call start time  1755   Rescheduled  Rescheduled-pt requested [She is driving in the car. ]   Call end time  1756   Discharge diagnosis  Peripheral artery disease          Erin Lowery RN

## 2021-01-14 ENCOUNTER — READMISSION MANAGEMENT (OUTPATIENT)
Dept: CALL CENTER | Facility: HOSPITAL | Age: 81
End: 2021-01-14

## 2021-01-14 NOTE — OUTREACH NOTE
Medical Week 1 Survey      Responses   [facility] patient discharged from?  [place]   Does the patient have one of the following disease processes/diagnoses(primary or secondary)?  Other   Week 1 attempt successful?  Yes   Call start time  1614   Call end time  1620   Discharge diagnosis  Peripheral artery disease   Meds reviewed with patient/caregiver?  Yes   Is the patient having any side effects they believe may be caused by any medication additions or changes?  No   Does the patient have all medications ordered at discharge?  Yes   Is the patient taking all medications as directed (includes completed medication regime)?  Yes   Does the patient have a primary care provider?   Yes   Does the patient have an appointment with their PCP within 7 days of discharge?  Yes   Has the patient kept scheduled appointments due by today?  N/A   Comments  PCP 01/18/2021   Home health comments  Goes to Wound Care weekly. Cleans wound daily with Betadine soaked 4x4 and applies a wrap   Psychosocial issues?  No   Did the patient receive a copy of their discharge instructions?  Yes   Nursing interventions  Reviewed instructions with patient   What is the patient's perception of their health status since discharge?  Improving   Is the patient/caregiver able to teach back signs and symptoms related to disease process for when to call PCP?  Yes   Is the patient/caregiver able to teach back signs and symptoms related to disease process for when to call 911?  Yes   Is the patient/caregiver able to teach back the hierarchy of who to call/visit for symptoms/problems? PCP, Specialist, Home health nurse, Urgent Care, ED, 911  Yes   Week 1 call completed?  Yes          Eliza Lopez RN

## 2021-01-20 ENCOUNTER — OFFICE VISIT (OUTPATIENT)
Dept: WOUND CARE | Facility: HOSPITAL | Age: 81
End: 2021-01-20

## 2021-01-20 PROCEDURE — 97597 DBRDMT OPN WND 1ST 20 CM/<: CPT | Performed by: NURSE PRACTITIONER

## 2021-01-22 ENCOUNTER — READMISSION MANAGEMENT (OUTPATIENT)
Dept: CALL CENTER | Facility: HOSPITAL | Age: 81
End: 2021-01-22

## 2021-01-22 NOTE — OUTREACH NOTE
Medical Week 2 Survey      Responses   [facility] patient discharged from?  [place]   Does the patient have one of the following disease processes/diagnoses(primary or secondary)?  Other   Week 2 attempt successful?  Yes   Call start time  1131   Call end time  1134   Meds reviewed with patient/caregiver?  Yes   Is the patient taking all medications as directed (includes completed medication regime)?  Yes   Has the patient kept scheduled appointments due by today?  Yes   Comments  Wound care weekly, foot is healing    Comments  wound care weekly, says wound is healing better   What is the patient's perception of their health status since discharge?  Improving   If the patient is a current smoker, are they able to teach back resources for cessation?  Not a smoker   Week 2 Call Completed?  Yes   Wrap up additional comments  She says she is doing well, no new issues or questions today          Yoly Calix RN

## 2021-01-26 ENCOUNTER — OFFICE VISIT (OUTPATIENT)
Dept: WOUND CARE | Facility: HOSPITAL | Age: 81
End: 2021-01-26

## 2021-01-26 PROCEDURE — 97597 DBRDMT OPN WND 1ST 20 CM/<: CPT | Performed by: NURSE PRACTITIONER

## 2021-01-28 ENCOUNTER — TELEPHONE (OUTPATIENT)
Dept: VASCULAR SURGERY | Facility: CLINIC | Age: 81
End: 2021-01-28

## 2021-01-28 ENCOUNTER — READMISSION MANAGEMENT (OUTPATIENT)
Dept: CALL CENTER | Facility: HOSPITAL | Age: 81
End: 2021-01-28

## 2021-01-28 NOTE — OUTREACH NOTE
Medical Week 3 Survey      Responses   [facility] patient discharged from?  [facility]   Does the patient have one of the following disease processes/diagnoses(primary or secondary)?  Other   Week 3 attempt successful?  No   Unsuccessful attempts  Attempt 1          Kenia Gillette RN

## 2021-01-28 NOTE — TELEPHONE ENCOUNTER
Tried calling to remind Ms Ojeda of her appointment for Friday, January 29th, 2021 at 1030 am with Jossie HUA.     When calling it states they are not accepting calls at this time please try again later.

## 2021-01-29 ENCOUNTER — OFFICE VISIT (OUTPATIENT)
Dept: VASCULAR SURGERY | Facility: CLINIC | Age: 81
End: 2021-01-29

## 2021-01-29 VITALS
WEIGHT: 172 LBS | BODY MASS INDEX: 32.47 KG/M2 | HEIGHT: 61 IN | SYSTOLIC BLOOD PRESSURE: 136 MMHG | DIASTOLIC BLOOD PRESSURE: 76 MMHG

## 2021-01-29 DIAGNOSIS — I10 ESSENTIAL HYPERTENSION: ICD-10-CM

## 2021-01-29 DIAGNOSIS — E78.2 MIXED HYPERLIPIDEMIA: ICD-10-CM

## 2021-01-29 DIAGNOSIS — I73.9 PAD (PERIPHERAL ARTERY DISEASE) (HCC): Primary | ICD-10-CM

## 2021-01-29 DIAGNOSIS — I65.23 BILATERAL CAROTID ARTERY STENOSIS: ICD-10-CM

## 2021-01-29 PROCEDURE — 99213 OFFICE O/P EST LOW 20 MIN: CPT | Performed by: NURSE PRACTITIONER

## 2021-01-29 RX ORDER — CHOLECALCIFEROL (VITAMIN D3) 125 MCG
2000 CAPSULE ORAL DAILY
COMMUNITY

## 2021-01-29 RX ORDER — LANOLIN ALCOHOL/MO/W.PET/CERES
1000 CREAM (GRAM) TOPICAL DAILY
COMMUNITY

## 2021-01-29 NOTE — PROGRESS NOTES
1/29/2021    Roberto Garcia MD  [address] [facility] [address]    Evelyn Ojeda  1940    Chief Complaint   Patient presents with   • Follow-up     2 Week Post-Op Follow Up For Introduction of catheter/sheath into the aorta 2.  Aortoiliac angiogram with right lower extremity runoff 3.  Contralateral cannulation of the right common iliac artery 4.  Placement of a 4 mm spider distal embolic protection device in the below-knee popliteal artery 5.  Directional atherectomy of the proximal SFA and below-knee popliteal artery using the 6 [place] Frannie Pantheris device 6.  Balloon angioplasty of the proximal SFA using a 5 x 80 mm Medtronic drug-coated bal   • Non Smoker     Patient is a Non Smoker    • Med Management     Verbally verified medications with patient/daughter        Dear Roberto Garcia MD   HPI  I had the pleasure of seeing your patient Evelyn Ojeda in the office today.   As you recall, Evelyn Ojeda is a 80 y.o.  female who you are currently following for routine health maintenance.  She was being referred here for a right leg DVT and right foot pain.  She was placed on Xarelto for 1 month.  She was hospitalized at another facility with cellulitis which did not improve.  Her foot was cool to touch and very painful to her toes.  She is following with wound care for wounds to her toes which have improved per her report.  She did undergo a right lower extremity angiogram with atherectomy of SFA and below-knee popliteal artery, balloon angioplasty and crossing of the distal anterior tibial artery occlusion on 1/6/2021.  Her foot is nice and warm and she no longer has pain when she is walking.  She does have some pain regarding her wounds.  She has Doppler DP/peroneal signals.  Groin has healed.  She is maintained on aspirin, Plavix, and Lipitor.    Review of Systems   Constitutional: Negative.    HENT: Negative.    Eyes: Negative.    Respiratory: Negative.    Cardiovascular: Negative.   "  Gastrointestinal: Negative.    Endocrine: Negative.    Genitourinary: Negative.    Musculoskeletal: Negative.    Skin: Positive for color change and wound.   Allergic/Immunologic: Negative.    Neurological: Negative.    Hematological: Negative.    Psychiatric/Behavioral: Negative.    All other systems reviewed and are negative.    /76 (BP Location: Left arm, Patient Position: Sitting, Cuff Size: Adult)   Ht 154.9 cm (61\")   Wt 78 kg (172 lb)   BMI 32.50 kg/m²     Physical Exam  Vitals signs and nursing note reviewed.   Constitutional:       Appearance: Normal appearance. She is well-developed. She is obese.   HENT:      Head: Normocephalic and atraumatic.   Eyes:      General: No scleral icterus.     Pupils: Pupils are equal, round, and reactive to light.   Neck:      Musculoskeletal: Neck supple.      Thyroid: No thyromegaly.      Vascular: No carotid bruit or JVD.   Cardiovascular:      Rate and Rhythm: Normal rate and regular rhythm.      Pulses:           Carotid pulses are 2+ on the right side and 2+ on the left side.       Femoral pulses are 2+ on the right side and 2+ on the left side.       Popliteal pulses are 0 on the right side and 0 on the left side.        Dorsalis pedis pulses are detected w/ Doppler on the right side and detected w/ Doppler on the left side.        Posterior tibial pulses are detected w/ Doppler on the right side and detected w/ Doppler on the left side.      Heart sounds: Normal heart sounds.      Comments: Right: Doppler DP/peroneal.  Pulmonary:      Effort: Pulmonary effort is normal.      Breath sounds: Normal breath sounds.   Abdominal:      General: Bowel sounds are normal. There is no distension or abdominal bruit.      Palpations: Abdomen is soft. There is no mass.      Tenderness: There is no abdominal tenderness.   Musculoskeletal: Normal range of motion.   Lymphadenopathy:      Cervical: No cervical adenopathy.   Skin:     General: Skin is warm and dry. "   Neurological:      Mental Status: She is alert and oriented to person, place, and time.      Cranial Nerves: No cranial nerve deficit.      Sensory: No sensory deficit.   Psychiatric:         Mood and Affect: Mood normal.         Behavior: Behavior normal.         Thought Content: Thought content normal.         Judgment: Judgment normal.     Diagnostic data:  Ct Abdomen Pelvis With & Without Contrast    Result Date: 1/9/2021  Narrative: EXAMINATION: CT ABDOMEN PELVIS W WO CONTRAST- 1/9/2021 1:40 PM CST  HISTORY: Hematuria of unknown origin  COMPARISON: None  DOSE: 1614 mGy-cm  TECHNIQUE: Sequential imaging was performed from the lung bases through the pubic symphysis before and after the administration of IV contrast in multiple phases, compatible with CT urogram protocol.  Sagittal and coronal reformations were made from the original source data and reviewed. Automated exposure control was also utilized to decrease patient radiation dose.  FINDINGS: CT urogram: On precontrast images of the abdomen, bilateral vascular calcifications are evident in the kidneys. No definite renal calculi are identified. On postcontrast imaging, no enhancing renal mass is identified. The ureters are decompressed. No ureteral enhancement is identified. There is a hyperdense area of wall thickening of the posterior bladder wall measuring approximately 2.4 x 1.0 cm in size. On delayed phase imaging, contrast is excreted into the renal collecting systems normally. 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 A filling defect is seen in the urinary bladder on the delayed phase images. This appears to be in a slightly different location than on the postcontrast images. A focus of air is seen within the urinary bladder.  Other findings: The visualized heart appears normal in size. There is trace pleural fluid in the lung bases. Lung bases otherwise appear clear.  The gallbladder is surgically absent. There is intra and extrahepatic  biliary ductal dilatation which is presumably from reservoir effect. Liver is otherwise unremarkable. The spleen, pancreas, and adrenal glands are unremarkable.  The main portal and splenic veins and IVC appear grossly normal. The abdominal aorta appears normal in caliber. Extensive atherosclerotic calcifications are seen throughout the aorta and its branch vessels. Calcifications at the origin of the celiac artery results in some narrowing at the origin. There is normal enhancement of the on this region.  No pathologically enlarged central mesenteric or retroperitoneal lymph nodes are identified.  The stomach and small bowel appear normal in caliber. A moderate amount of stool is seen in the colon, suggesting fecal stasis. Large bowel is otherwise unremarkable. No free air or free fluid is seen in the abdomen.  The uterus and ovaries are visualized but not well evaluated by CT. No free fluid is seen in the pelvis. There is some inflammatory stranding in the region of the left femoral vessels. There appears to be a tract to the skin surface, suggesting recent intervention. No pathologically enlarged pelvic or inguinal lymph nodes are identified.  Review of the visualized osseous structures demonstrates a presumed chronic but age indeterminate L1 superior endplate deformity.      Impression: 1. Hyperdense material posteriorly in the urinary bladder without definite enhancement suggests layering debris. However, given history of hematuria, cystoscopy should be considered.  2. Air in the urinary bladder may be related to infection or recent instrumentation. Correlate clinically.  3. Atherosclerotic disease.  4. Trace pleural fluid bilaterally. 5. Soft tissue stranding in the left femoral region, presumably related to recent intervention/vascular access.    This report was finalized on 01/09/2021 14:02 by Dr. Addi Jacobo MD.    Xr Foot 3+ View Right    Result Date: 1/4/2021  Narrative: RIGHT FOOT 3 views 1/4/2021  9:42 PM CST  HISTORY: foot sores  COMPARISON: Radiographs dated 12/26/2020  FINDINGS: Frontal, lateral and oblique radiographs of the right foot were provided for review.  The bones are diffusely osteopenic. No acute fracture is seen. No joint subluxation. Interphalangeal joint flexion deformities which are degenerative. Additional moderate degenerative change at the first metatarsophalangeal joint as well as diffusely throughout the midfoot. No destructive osseous changes are seen. Atheromatous vascular calcification identified. No capsular distention at the ankle joint. The subtalar joint is unremarkable. Small plantar calcaneal spur. Quite prominent Achilles enthesophyte.      Impression: 1. Osteopenia with advanced osteoarthritis changes. No visualized acute fracture or destructive osseous change to suggest osteomyelitis. This report was finalized on 01/04/2021 22:03 by Dr Alfa Wilson, .    Ir Angiogram Extremity    Result Date: 1/7/2021  Narrative: Performed by Dr. Owens. Please see procedure note. This report was finalized on 01/07/2021 12:55 by Dr. Morgan Owens MD.    Us Arterial Doppler Lower Extremity Right    Result Date: 1/5/2021  Narrative: History: PAD  Comments: Grayscale imaging as well as color flow duplex were used to evaluate the right lower extremity arterial system.  On the right, the peak systolic velocity in the common femoral artery measured 149cm/s. In the profunda femoris artery measured 123cm/s. In the proximal SFA measured 47.5cm/s. The mid SFA measured 31cm/s. In the distal SFA measured 44.4cm/s. In the popliteal artery measured 28.5cm/s. In the posterior tibial artery measured 14.3cm/s. In the anterior tibial artery measured 12.4cm/s.      Impression: Patent right lower extremity arterial system with evidence of mid SFA stenosis and diminished/dampened flow in the tibial vessels.  This report was finalized on 01/05/2021 16:12 by Dr. Morgan Owens MD.    Us Venous Doppler Lower  Extremity Right (duplex)    Result Date: 1/5/2021  Narrative: History: Pain and swelling      Impression: Impression: There is no evidence of deep venous thrombosis or superficial thrombophlebitis of the right lower extremity.  Comments: Right lower extremity venous duplex exam was performed using color Doppler flow, Doppler wave form analysis, and grayscale imaging, with and without compression. There is no evidence of deep venous thrombosis of the common femoral, superficial femoral, popliteal, posterior tibial, and peroneal veins. There is no thrombus identified in the saphenofemoral junction or the greater saphenous vein.  This report was finalized on 01/05/2021 16:30 by Dr. Morgan Owens MD.    Fl C Arm During Surgery    Result Date: 1/7/2021  Narrative: Performed by Dr. Owens. Please see procedure note. This report was finalized on 01/07/2021 12:55 by Dr. Morgan Owens MD.       Patient Active Problem List   Diagnosis   • PAD (peripheral artery disease) (CMS/HCC)   • Essential hypertension   • Mixed hyperlipidemia   • Bilateral carotid artery stenosis   • Acute deep vein thrombosis (DVT) of distal vein of right lower extremity (CMS/HCC)   • Atherosclerosis of artery of extremity with intermittent claudication (CMS/HCC)   • Chronic osteoarthritis   • Decreased pedal pulses   • Nonhealing ulcer of right lower leg with fat layer exposed (CMS/HCC)   • Hyperglycemia   • Type 2 diabetes mellitus with hyperglycemia, without long-term current use of insulin (CMS/HCC)   • Acute kidney injury (CMS/HCC)   • Postoperative anemia due to acute blood loss   • Hematuria        Diagnosis Plan   1. PAD (peripheral artery disease) (CMS/HCC)  US Ankle / Brachial Indices Extremity Complete   2. Bilateral carotid artery stenosis  US Carotid Bilateral    US Ankle / Brachial Indices Extremity Complete   3. Essential hypertension     4. Mixed hyperlipidemia         Plan: After thoroughly evaluating Evelyn Ojeda, I believe the  best course of action is to remain conservative from vascular surgery standpoint.  She is doing well status post angiogram of her right lower extremity.  She does still have pain to the wounds themselves but these are improving per her report and following with wound care.  Her foot is nice and warm with Doppler DP and peroneal signals.  We will see her back in 6 months with repeat noninvasive testing for continued surveillance, including ABIs and a carotid duplex.  I did discuss vascular risk factors as they pertain to the progression of vascular disease including controlling hypertension and hyperlipidemia.  Her blood pressure is stable on her current medication regimen.  She is maintained on Lipitor for her hyperlipidemia.  Patient's Body mass index is 32.5 kg/m². BMI is above normal parameters. Recommendations include: educational material. The patient is to continue taking their medications as previously discussed.   This was all discussed in full with complete understanding.  Thank you for allowing me to participate in the care of your patient.  Please do not hesitate to call with any questions or concerns.  We will keep you aware of any further encounters with Evelyn Ojeda.        Sincerely yours,         JAVID Richard, Roberto Monsivais MD

## 2021-01-30 ENCOUNTER — READMISSION MANAGEMENT (OUTPATIENT)
Dept: CALL CENTER | Facility: HOSPITAL | Age: 81
End: 2021-01-30

## 2021-01-30 NOTE — OUTREACH NOTE
Medical Week 3 Survey      Responses   [facility] patient discharged from?  [facility]   Does the patient have one of the following disease processes/diagnoses(primary or secondary)?  Other   Week 3 attempt successful?  Yes   Call start time  0943   Call end time  0946   Discharge diagnosis  Peripheral artery disease   Meds reviewed with patient/caregiver?  Yes   Is the patient taking all medications as directed (includes completed medication regime)?  Yes   Has the patient kept scheduled appointments due by today?  Yes   What is the patient's perception of their health status since discharge?  Improving   Additional teach back comments  Wound care daily and goes wound care clinic weekly.   Week 3 Call Completed?  Yes          Eliza Lopez RN

## 2021-02-01 ENCOUNTER — PROCEDURE VISIT (OUTPATIENT)
Dept: UROLOGY | Facility: CLINIC | Age: 81
End: 2021-02-01

## 2021-02-01 ENCOUNTER — OFFICE VISIT (OUTPATIENT)
Dept: WOUND CARE | Facility: HOSPITAL | Age: 81
End: 2021-02-01

## 2021-02-01 DIAGNOSIS — R31.9 HEMATURIA, UNSPECIFIED TYPE: Primary | ICD-10-CM

## 2021-02-01 LAB
BILIRUB BLD-MCNC: NEGATIVE MG/DL
CLARITY, POC: ABNORMAL
COLOR UR: YELLOW
GLUCOSE UR STRIP-MCNC: ABNORMAL MG/DL
KETONES UR QL: NEGATIVE
LEUKOCYTE EST, POC: ABNORMAL
NITRITE UR-MCNC: NEGATIVE MG/ML
PH UR: 6 [PH] (ref 5–8)
PROT UR STRIP-MCNC: ABNORMAL MG/DL
RBC # UR STRIP: ABNORMAL /UL
SP GR UR: 1.02 (ref 1–1.03)
UROBILINOGEN UR QL: NORMAL

## 2021-02-01 PROCEDURE — 81003 URINALYSIS AUTO W/O SCOPE: CPT | Performed by: UROLOGY

## 2021-02-01 PROCEDURE — 52000 CYSTOURETHROSCOPY: CPT | Performed by: UROLOGY

## 2021-02-01 PROCEDURE — 97597 DBRDMT OPN WND 1ST 20 CM/<: CPT | Performed by: NURSE PRACTITIONER

## 2021-02-01 NOTE — PROGRESS NOTES
Ms. Ojeda presents for office cystoscopy to complete her gross hematuria evaluation.  Her urine culture from her recent hospital admission was negative.  CT urogram was negative for etiologies of gross hematuria.  There was some dense layering within the bladder which could be clot but was concerning for potential mass.    Flexible cystoscopy with retroflexion was performed under routine sterile conditions.  The patient tolerated the procedure well.  Normal urethra and bladder.  No bladder tumors noted.  There was some mild urethral hypermobility.  Good sphincter action under dynamic retrograde cystoscopy.    During pelvic examination, she did have a mild to moderate rectocele.  Cervix noted.  No significant cystocele or apical descent.    I will have her follow-up in 3 months to reassess her urinary symptoms.  She will need a urine dipstick and post void bladder scan for residual urine at that time. She reports that her urinary frequency has improved.  She denies any gross hematuria since her hospitalization.

## 2021-02-08 ENCOUNTER — HOSPITAL ENCOUNTER (OUTPATIENT)
Dept: GENERAL RADIOLOGY | Facility: HOSPITAL | Age: 81
Discharge: HOME | End: 2021-02-08

## 2021-02-08 ENCOUNTER — TRANSCRIBE ORDERS (OUTPATIENT)
Dept: ADMINISTRATIVE | Facility: HOSPITAL | Age: 81
End: 2021-02-08

## 2021-02-08 ENCOUNTER — OFFICE VISIT (OUTPATIENT)
Dept: WOUND CARE | Facility: HOSPITAL | Age: 81
End: 2021-02-08

## 2021-02-08 ENCOUNTER — LAB REQUISITION (OUTPATIENT)
Dept: LAB | Facility: HOSPITAL | Age: 81
End: 2021-02-08

## 2021-02-08 DIAGNOSIS — M86.9 OSTEOMYELITIS OF ANKLE AND FOOT (HCC): Primary | ICD-10-CM

## 2021-02-08 DIAGNOSIS — M86.9 OSTEOMYELITIS OF ANKLE AND FOOT (HCC): ICD-10-CM

## 2021-02-08 DIAGNOSIS — Z00.00 ENCOUNTER FOR GENERAL ADULT MEDICAL EXAMINATION WITHOUT ABNORMAL FINDINGS: ICD-10-CM

## 2021-02-08 PROCEDURE — 87205 SMEAR GRAM STAIN: CPT | Performed by: PODIATRIST

## 2021-02-08 PROCEDURE — 97597 DBRDMT OPN WND 1ST 20 CM/<: CPT | Performed by: PODIATRIST

## 2021-02-08 PROCEDURE — 87176 TISSUE HOMOGENIZATION CULTR: CPT | Performed by: PODIATRIST

## 2021-02-08 PROCEDURE — 73630 X-RAY EXAM OF FOOT: CPT

## 2021-02-08 PROCEDURE — 87070 CULTURE OTHR SPECIMN AEROBIC: CPT | Performed by: PODIATRIST

## 2021-02-08 PROCEDURE — 87075 CULTR BACTERIA EXCEPT BLOOD: CPT | Performed by: PODIATRIST

## 2021-02-08 PROCEDURE — 99213 OFFICE O/P EST LOW 20 MIN: CPT | Performed by: PODIATRIST

## 2021-02-11 LAB
BACTERIA SPEC AEROBE CULT: NORMAL
GRAM STN SPEC: NORMAL
GRAM STN SPEC: NORMAL

## 2021-02-13 LAB — BACTERIA SPEC ANAEROBE CULT: NORMAL

## 2021-02-19 ENCOUNTER — OFFICE VISIT (OUTPATIENT)
Dept: WOUND CARE | Facility: HOSPITAL | Age: 81
End: 2021-02-19

## 2021-02-19 PROCEDURE — 97597 DBRDMT OPN WND 1ST 20 CM/<: CPT | Performed by: PODIATRIST

## 2021-02-26 ENCOUNTER — OFFICE VISIT (OUTPATIENT)
Dept: WOUND CARE | Facility: HOSPITAL | Age: 81
End: 2021-02-26

## 2021-02-26 ENCOUNTER — HOSPITAL ENCOUNTER (OUTPATIENT)
Dept: ULTRASOUND IMAGING | Facility: HOSPITAL | Age: 81
Discharge: HOME | End: 2021-02-26

## 2021-02-26 ENCOUNTER — TRANSCRIBE ORDERS (OUTPATIENT)
Dept: ADMINISTRATIVE | Facility: HOSPITAL | Age: 81
End: 2021-02-26

## 2021-02-26 DIAGNOSIS — M79.604 RIGHT LEG PAIN: Primary | ICD-10-CM

## 2021-02-26 PROCEDURE — 93971 EXTREMITY STUDY: CPT

## 2021-02-26 PROCEDURE — 93971 EXTREMITY STUDY: CPT | Performed by: SURGERY

## 2021-02-26 PROCEDURE — 99212 OFFICE O/P EST SF 10 MIN: CPT | Performed by: PODIATRIST

## 2021-02-26 PROCEDURE — 97597 DBRDMT OPN WND 1ST 20 CM/<: CPT | Performed by: PODIATRIST

## 2021-03-04 ENCOUNTER — OFFICE VISIT (OUTPATIENT)
Dept: WOUND CARE | Facility: HOSPITAL | Age: 81
End: 2021-03-04

## 2021-03-04 DIAGNOSIS — L97.509 TYPE 2 DIABETES MELLITUS WITH FOOT ULCER, UNSPECIFIED WHETHER LONG TERM INSULIN USE (HCC): ICD-10-CM

## 2021-03-04 DIAGNOSIS — M79.671 PAIN IN RIGHT FOOT: ICD-10-CM

## 2021-03-04 DIAGNOSIS — E11.621 TYPE 2 DIABETES MELLITUS WITH FOOT ULCER, UNSPECIFIED WHETHER LONG TERM INSULIN USE (HCC): ICD-10-CM

## 2021-03-04 DIAGNOSIS — I73.9 PERIPHERAL VASCULAR DISEASE, UNSPECIFIED (HCC): ICD-10-CM

## 2021-03-04 DIAGNOSIS — L97.511 NON-PRESSURE CHRONIC ULCER OF OTHER PART OF RIGHT FOOT LIMITED TO BREAKDOWN OF SKIN (HCC): ICD-10-CM

## 2021-03-04 PROCEDURE — G0463 HOSPITAL OUTPT CLINIC VISIT: HCPCS

## 2021-03-04 PROCEDURE — 99212 OFFICE O/P EST SF 10 MIN: CPT | Performed by: NURSE PRACTITIONER

## 2021-03-16 ENCOUNTER — APPOINTMENT (OUTPATIENT)
Dept: ULTRASOUND IMAGING | Facility: HOSPITAL | Age: 81
End: 2021-03-16

## 2021-07-30 ENCOUNTER — HOSPITAL ENCOUNTER (OUTPATIENT)
Dept: ULTRASOUND IMAGING | Facility: HOSPITAL | Age: 81
Discharge: HOME | End: 2021-07-30

## 2021-07-30 ENCOUNTER — APPOINTMENT (OUTPATIENT)
Dept: ULTRASOUND IMAGING | Facility: HOSPITAL | Age: 81
End: 2021-07-30

## 2021-07-30 ENCOUNTER — OFFICE VISIT (OUTPATIENT)
Dept: VASCULAR SURGERY | Facility: CLINIC | Age: 81
End: 2021-07-30

## 2021-07-30 VITALS
HEIGHT: 61 IN | BODY MASS INDEX: 32.47 KG/M2 | SYSTOLIC BLOOD PRESSURE: 140 MMHG | DIASTOLIC BLOOD PRESSURE: 82 MMHG | WEIGHT: 172 LBS

## 2021-07-30 DIAGNOSIS — I65.23 BILATERAL CAROTID ARTERY STENOSIS: ICD-10-CM

## 2021-07-30 DIAGNOSIS — E78.2 MIXED HYPERLIPIDEMIA: ICD-10-CM

## 2021-07-30 DIAGNOSIS — I73.9 PAD (PERIPHERAL ARTERY DISEASE) (HCC): Primary | ICD-10-CM

## 2021-07-30 DIAGNOSIS — I73.9 PAD (PERIPHERAL ARTERY DISEASE) (HCC): ICD-10-CM

## 2021-07-30 DIAGNOSIS — I10 ESSENTIAL HYPERTENSION: ICD-10-CM

## 2021-07-30 PROCEDURE — 99214 OFFICE O/P EST MOD 30 MIN: CPT | Performed by: NURSE PRACTITIONER

## 2021-07-30 PROCEDURE — 93923 UPR/LXTR ART STDY 3+ LVLS: CPT

## 2021-07-30 PROCEDURE — 93923 UPR/LXTR ART STDY 3+ LVLS: CPT | Performed by: SURGERY

## 2021-07-30 PROCEDURE — 93880 EXTRACRANIAL BILAT STUDY: CPT

## 2021-07-30 PROCEDURE — 93880 EXTRACRANIAL BILAT STUDY: CPT | Performed by: SURGERY

## 2021-07-30 RX ORDER — MELOXICAM 15 MG/1
15 TABLET ORAL DAILY
COMMUNITY
Start: 2021-06-11 | End: 2022-01-01 | Stop reason: HOSPADM

## 2022-01-01 ENCOUNTER — APPOINTMENT (OUTPATIENT)
Dept: GENERAL RADIOLOGY | Facility: HOSPITAL | Age: 82
End: 2022-01-01

## 2022-01-01 ENCOUNTER — HOSPITAL ENCOUNTER (OUTPATIENT)
Dept: ULTRASOUND IMAGING | Facility: HOSPITAL | Age: 82
Discharge: HOME | End: 2022-01-14

## 2022-01-01 ENCOUNTER — READMISSION MANAGEMENT (OUTPATIENT)
Dept: CALL CENTER | Facility: HOSPITAL | Age: 82
End: 2022-01-01

## 2022-01-01 ENCOUNTER — APPOINTMENT (OUTPATIENT)
Dept: CARDIOLOGY | Facility: HOSPITAL | Age: 82
End: 2022-01-01

## 2022-01-01 ENCOUNTER — APPOINTMENT (OUTPATIENT)
Dept: CT IMAGING | Facility: HOSPITAL | Age: 82
End: 2022-01-01

## 2022-01-01 ENCOUNTER — APPOINTMENT (OUTPATIENT)
Dept: ULTRASOUND IMAGING | Facility: HOSPITAL | Age: 82
End: 2022-01-01

## 2022-01-01 ENCOUNTER — HOSPITAL ENCOUNTER (INPATIENT)
Facility: HOSPITAL | Age: 82
LOS: 5 days | Discharge: HOME | End: 2022-03-19
Attending: EMERGENCY MEDICINE | Admitting: FAMILY MEDICINE

## 2022-01-01 ENCOUNTER — TELEPHONE (OUTPATIENT)
Dept: VASCULAR SURGERY | Facility: CLINIC | Age: 82
End: 2022-01-01

## 2022-01-01 ENCOUNTER — HOME HEALTH ADMISSION (OUTPATIENT)
Dept: HOME HEALTH SERVICES | Facility: HOME HEALTHCARE | Age: 82
End: 2022-01-01

## 2022-01-01 ENCOUNTER — OFFICE VISIT (OUTPATIENT)
Dept: VASCULAR SURGERY | Facility: CLINIC | Age: 82
End: 2022-01-01

## 2022-01-01 ENCOUNTER — APPOINTMENT (OUTPATIENT)
Dept: MRI IMAGING | Facility: HOSPITAL | Age: 82
End: 2022-01-01

## 2022-01-01 VITALS
HEIGHT: 61 IN | DIASTOLIC BLOOD PRESSURE: 50 MMHG | WEIGHT: 170 LBS | OXYGEN SATURATION: 97 % | BODY MASS INDEX: 32.1 KG/M2 | RESPIRATION RATE: 16 BRPM | HEART RATE: 69 BPM | SYSTOLIC BLOOD PRESSURE: 117 MMHG | TEMPERATURE: 97.8 F

## 2022-01-01 VITALS
RESPIRATION RATE: 18 BRPM | BODY MASS INDEX: 32.66 KG/M2 | DIASTOLIC BLOOD PRESSURE: 70 MMHG | OXYGEN SATURATION: 97 % | HEIGHT: 61 IN | HEART RATE: 81 BPM | SYSTOLIC BLOOD PRESSURE: 180 MMHG | WEIGHT: 173 LBS

## 2022-01-01 DIAGNOSIS — M19.90 CHRONIC OSTEOARTHRITIS: ICD-10-CM

## 2022-01-01 DIAGNOSIS — N17.9 ACUTE KIDNEY INJURY: ICD-10-CM

## 2022-01-01 DIAGNOSIS — R73.9 HYPERGLYCEMIA: ICD-10-CM

## 2022-01-01 DIAGNOSIS — E11.65 TYPE 2 DIABETES MELLITUS WITH HYPERGLYCEMIA, WITHOUT LONG-TERM CURRENT USE OF INSULIN: ICD-10-CM

## 2022-01-01 DIAGNOSIS — I63.319 CEREBROVASCULAR ACCIDENT (CVA) DUE TO THROMBOSIS OF MIDDLE CEREBRAL ARTERY, UNSPECIFIED BLOOD VESSEL LATERALITY: ICD-10-CM

## 2022-01-01 DIAGNOSIS — I65.23 BILATERAL CAROTID ARTERY STENOSIS: ICD-10-CM

## 2022-01-01 DIAGNOSIS — I63.311 CEREBROVASCULAR ACCIDENT (CVA) DUE TO THROMBOSIS OF RIGHT MIDDLE CEREBRAL ARTERY: ICD-10-CM

## 2022-01-01 DIAGNOSIS — I73.9 PAD (PERIPHERAL ARTERY DISEASE): Primary | ICD-10-CM

## 2022-01-01 DIAGNOSIS — L97.912 NONHEALING ULCER OF RIGHT LOWER LEG WITH FAT LAYER EXPOSED: ICD-10-CM

## 2022-01-01 DIAGNOSIS — I25.10 CORONARY ARTERY DISEASE DUE TO LIPID RICH PLAQUE: ICD-10-CM

## 2022-01-01 DIAGNOSIS — I73.9 PAD (PERIPHERAL ARTERY DISEASE): ICD-10-CM

## 2022-01-01 DIAGNOSIS — I82.4Z1 ACUTE DEEP VEIN THROMBOSIS (DVT) OF DISTAL VEIN OF RIGHT LOWER EXTREMITY: ICD-10-CM

## 2022-01-01 DIAGNOSIS — I10 ESSENTIAL HYPERTENSION: ICD-10-CM

## 2022-01-01 DIAGNOSIS — D62 POSTOPERATIVE ANEMIA DUE TO ACUTE BLOOD LOSS: ICD-10-CM

## 2022-01-01 DIAGNOSIS — Z74.09 IMPAIRED FUNCTIONAL MOBILITY AND ACTIVITY TOLERANCE: ICD-10-CM

## 2022-01-01 DIAGNOSIS — E78.2 MIXED HYPERLIPIDEMIA: ICD-10-CM

## 2022-01-01 DIAGNOSIS — Z78.9 DECREASED ACTIVITIES OF DAILY LIVING (ADL): ICD-10-CM

## 2022-01-01 DIAGNOSIS — R13.11 ORAL PHASE DYSPHAGIA: ICD-10-CM

## 2022-01-01 DIAGNOSIS — I63.9 CEREBROVASCULAR ACCIDENT (CVA), UNSPECIFIED MECHANISM: Primary | ICD-10-CM

## 2022-01-01 DIAGNOSIS — R09.89 DECREASED PEDAL PULSES: ICD-10-CM

## 2022-01-01 DIAGNOSIS — I25.118 CORONARY ARTERY DISEASE INVOLVING NATIVE HEART WITH OTHER FORM OF ANGINA PECTORIS, UNSPECIFIED VESSEL OR LESION TYPE: ICD-10-CM

## 2022-01-01 DIAGNOSIS — R31.9 HEMATURIA, UNSPECIFIED TYPE: ICD-10-CM

## 2022-01-01 DIAGNOSIS — I70.219 ATHEROSCLEROSIS OF ARTERY OF EXTREMITY WITH INTERMITTENT CLAUDICATION: ICD-10-CM

## 2022-01-01 DIAGNOSIS — I25.83 CORONARY ARTERY DISEASE DUE TO LIPID RICH PLAQUE: ICD-10-CM

## 2022-01-01 LAB
ABO GROUP BLD: NORMAL
ALBUMIN SERPL-MCNC: 3.4 G/DL (ref 3.5–5.2)
ALBUMIN SERPL-MCNC: 4.2 G/DL (ref 3.5–5.2)
ALBUMIN/GLOB SERPL: 1.3 G/DL
ALBUMIN/GLOB SERPL: 1.6 G/DL
ALP SERPL-CCNC: 117 U/L (ref 39–117)
ALP SERPL-CCNC: 97 U/L (ref 39–117)
ALT SERPL W P-5'-P-CCNC: 16 U/L (ref 1–33)
ALT SERPL W P-5'-P-CCNC: 17 U/L (ref 1–33)
ANION GAP SERPL CALCULATED.3IONS-SCNC: 10 MMOL/L (ref 5–15)
ANION GAP SERPL CALCULATED.3IONS-SCNC: 11 MMOL/L (ref 5–15)
ANION GAP SERPL CALCULATED.3IONS-SCNC: 12 MMOL/L (ref 5–15)
ANION GAP SERPL CALCULATED.3IONS-SCNC: 13 MMOL/L (ref 5–15)
APTT PPP: 27.5 SECONDS (ref 24.1–35)
AST SERPL-CCNC: 20 U/L (ref 1–32)
AST SERPL-CCNC: 21 U/L (ref 1–32)
BACTERIA SPEC AEROBE CULT: NORMAL
BACTERIA UR QL AUTO: ABNORMAL /HPF
BASOPHILS # BLD AUTO: 0.03 10*3/MM3 (ref 0–0.2)
BASOPHILS # BLD AUTO: 0.06 10*3/MM3 (ref 0–0.2)
BASOPHILS NFR BLD AUTO: 0.3 % (ref 0–1.5)
BASOPHILS NFR BLD AUTO: 0.7 % (ref 0–1.5)
BH CV ECHO MEAS - AO MAX PG (FULL): 14.5 MMHG
BH CV ECHO MEAS - AO MAX PG: 18 MMHG
BH CV ECHO MEAS - AO MEAN PG (FULL): 4 MMHG
BH CV ECHO MEAS - AO MEAN PG: 5 MMHG
BH CV ECHO MEAS - AO ROOT AREA (BSA CORRECTED): 1.4
BH CV ECHO MEAS - AO ROOT AREA: 4.9 CM^2
BH CV ECHO MEAS - AO ROOT DIAM: 2.5 CM
BH CV ECHO MEAS - AO V2 MAX: 212 CM/SEC
BH CV ECHO MEAS - AO V2 MEAN: 90.8 CM/SEC
BH CV ECHO MEAS - AO V2 VTI: 36 CM
BH CV ECHO MEAS - AVA(I,A): 1.7 CM^2
BH CV ECHO MEAS - AVA(I,D): 1.7 CM^2
BH CV ECHO MEAS - AVA(V,A): 1.4 CM^2
BH CV ECHO MEAS - AVA(V,D): 1.4 CM^2
BH CV ECHO MEAS - BSA(HAYCOCK): 1.9 M^2
BH CV ECHO MEAS - BSA: 1.8 M^2
BH CV ECHO MEAS - BZI_BMI: 32.1 KILOGRAMS/M^2
BH CV ECHO MEAS - BZI_METRIC_HEIGHT: 154.9 CM
BH CV ECHO MEAS - BZI_METRIC_WEIGHT: 77.1 KG
BH CV ECHO MEAS - EDV(CUBED): 113.4 ML
BH CV ECHO MEAS - EDV(MOD-SP4): 148 ML
BH CV ECHO MEAS - EDV(TEICH): 109.6 ML
BH CV ECHO MEAS - EF(CUBED): 58.5 %
BH CV ECHO MEAS - EF(MOD-SP4): 51.7 %
BH CV ECHO MEAS - EF(TEICH): 50 %
BH CV ECHO MEAS - ESV(CUBED): 47 ML
BH CV ECHO MEAS - ESV(MOD-SP4): 71.5 ML
BH CV ECHO MEAS - ESV(TEICH): 54.8 ML
BH CV ECHO MEAS - FS: 25.4 %
BH CV ECHO MEAS - IVS/LVPW: 1.1
BH CV ECHO MEAS - IVSD: 1.3 CM
BH CV ECHO MEAS - LA DIMENSION: 3.6 CM
BH CV ECHO MEAS - LA/AO: 1.4
BH CV ECHO MEAS - LAT PEAK E' VEL: 5.7 CM/SEC
BH CV ECHO MEAS - LV DIASTOLIC VOL/BSA (35-75): 83.9 ML/M^2
BH CV ECHO MEAS - LV MASS(C)D: 239.3 GRAMS
BH CV ECHO MEAS - LV MASS(C)DI: 135.7 GRAMS/M^2
BH CV ECHO MEAS - LV MAX PG: 3.5 MMHG
BH CV ECHO MEAS - LV MEAN PG: 1 MMHG
BH CV ECHO MEAS - LV SYSTOLIC VOL/BSA (12-30): 40.6 ML/M^2
BH CV ECHO MEAS - LV V1 MAX: 93.8 CM/SEC
BH CV ECHO MEAS - LV V1 MEAN: 52.6 CM/SEC
BH CV ECHO MEAS - LV V1 VTI: 19 CM
BH CV ECHO MEAS - LVIDD: 4.8 CM
BH CV ECHO MEAS - LVIDS: 3.6 CM
BH CV ECHO MEAS - LVLD AP4: 7.9 CM
BH CV ECHO MEAS - LVLS AP4: 6.5 CM
BH CV ECHO MEAS - LVOT AREA (M): 3.1 CM^2
BH CV ECHO MEAS - LVOT AREA: 3.1 CM^2
BH CV ECHO MEAS - LVOT DIAM: 2 CM
BH CV ECHO MEAS - LVPWD: 1.2 CM
BH CV ECHO MEAS - MED PEAK E' VEL: 6.31 CM/SEC
BH CV ECHO MEAS - MV A MAX VEL: 133 CM/SEC
BH CV ECHO MEAS - MV DEC SLOPE: 291 CM/SEC^2
BH CV ECHO MEAS - MV DEC TIME: 0.21 SEC
BH CV ECHO MEAS - MV E MAX VEL: 70.4 CM/SEC
BH CV ECHO MEAS - MV E/A: 0.53
BH CV ECHO MEAS - MV P1/2T MAX VEL: 88.2 CM/SEC
BH CV ECHO MEAS - MV P1/2T: 88.8 MSEC
BH CV ECHO MEAS - MVA P1/2T LCG: 2.5 CM^2
BH CV ECHO MEAS - MVA(P1/2T): 2.5 CM^2
BH CV ECHO MEAS - RVDD: 3.2 CM
BH CV ECHO MEAS - SI(AO): 100.2 ML/M^2
BH CV ECHO MEAS - SI(CUBED): 37.6 ML/M^2
BH CV ECHO MEAS - SI(LVOT): 33.9 ML/M^2
BH CV ECHO MEAS - SI(MOD-SP4): 43.4 ML/M^2
BH CV ECHO MEAS - SI(TEICH): 31.1 ML/M^2
BH CV ECHO MEAS - SV(AO): 176.7 ML
BH CV ECHO MEAS - SV(CUBED): 66.3 ML
BH CV ECHO MEAS - SV(LVOT): 59.7 ML
BH CV ECHO MEAS - SV(MOD-SP4): 76.5 ML
BH CV ECHO MEAS - SV(TEICH): 54.8 ML
BH CV ECHO MEASUREMENTS AVERAGE E/E' RATIO: 11.72
BILIRUB SERPL-MCNC: 0.3 MG/DL (ref 0–1.2)
BILIRUB SERPL-MCNC: 0.5 MG/DL (ref 0–1.2)
BILIRUB UR QL STRIP: NEGATIVE
BLD GP AB SCN SERPL QL: NEGATIVE
BUN SERPL-MCNC: 11 MG/DL (ref 8–23)
BUN SERPL-MCNC: 13 MG/DL (ref 8–23)
BUN SERPL-MCNC: 14 MG/DL (ref 8–23)
BUN SERPL-MCNC: 9 MG/DL (ref 8–23)
BUN/CREAT SERPL: 10.8 (ref 7–25)
BUN/CREAT SERPL: 12.3 (ref 7–25)
BUN/CREAT SERPL: 12.4 (ref 7–25)
BUN/CREAT SERPL: 12.8 (ref 7–25)
BUN/CREAT SERPL: 13.6 (ref 7–25)
BUN/CREAT SERPL: 15.2 (ref 7–25)
CALCIUM SPEC-SCNC: 10.1 MG/DL (ref 8.6–10.5)
CALCIUM SPEC-SCNC: 9 MG/DL (ref 8.6–10.5)
CALCIUM SPEC-SCNC: 9.1 MG/DL (ref 8.6–10.5)
CALCIUM SPEC-SCNC: 9.2 MG/DL (ref 8.6–10.5)
CALCIUM SPEC-SCNC: 9.5 MG/DL (ref 8.6–10.5)
CALCIUM SPEC-SCNC: 9.5 MG/DL (ref 8.6–10.5)
CHLORIDE SERPL-SCNC: 101 MMOL/L (ref 98–107)
CHLORIDE SERPL-SCNC: 101 MMOL/L (ref 98–107)
CHLORIDE SERPL-SCNC: 102 MMOL/L (ref 98–107)
CHOLEST SERPL-MCNC: 266 MG/DL (ref 0–200)
CLARITY UR: CLEAR
CO2 SERPL-SCNC: 22 MMOL/L (ref 22–29)
CO2 SERPL-SCNC: 23 MMOL/L (ref 22–29)
CO2 SERPL-SCNC: 24 MMOL/L (ref 22–29)
CO2 SERPL-SCNC: 25 MMOL/L (ref 22–29)
COLOR UR: YELLOW
CREAT SERPL-MCNC: 0.81 MG/DL (ref 0.57–1)
CREAT SERPL-MCNC: 0.83 MG/DL (ref 0.57–1)
CREAT SERPL-MCNC: 0.86 MG/DL (ref 0.57–1)
CREAT SERPL-MCNC: 0.89 MG/DL (ref 0.57–1)
CREAT SERPL-MCNC: 0.92 MG/DL (ref 0.57–1)
CREAT SERPL-MCNC: 1.06 MG/DL (ref 0.57–1)
DEPRECATED RDW RBC AUTO: 43.4 FL (ref 37–54)
DEPRECATED RDW RBC AUTO: 43.6 FL (ref 37–54)
DEPRECATED RDW RBC AUTO: 43.6 FL (ref 37–54)
EGFRCR SERPLBLD CKD-EPI 2021: 53.5 ML/MIN/1.73
EGFRCR SERPLBLD CKD-EPI 2021: 63.5 ML/MIN/1.73
EGFRCR SERPLBLD CKD-EPI 2021: 66 ML/MIN/1.73
EGFRCR SERPLBLD CKD-EPI 2021: 68.8 ML/MIN/1.73
EGFRCR SERPLBLD CKD-EPI 2021: 71.8 ML/MIN/1.73
EGFRCR SERPLBLD CKD-EPI 2021: 73.9 ML/MIN/1.73
EOSINOPHIL # BLD AUTO: 0.1 10*3/MM3 (ref 0–0.4)
EOSINOPHIL # BLD AUTO: 0.14 10*3/MM3 (ref 0–0.4)
EOSINOPHIL NFR BLD AUTO: 1.1 % (ref 0.3–6.2)
EOSINOPHIL NFR BLD AUTO: 1.6 % (ref 0.3–6.2)
ERYTHROCYTE [DISTWIDTH] IN BLOOD BY AUTOMATED COUNT: 12.5 % (ref 12.3–15.4)
ERYTHROCYTE [DISTWIDTH] IN BLOOD BY AUTOMATED COUNT: 12.5 % (ref 12.3–15.4)
ERYTHROCYTE [DISTWIDTH] IN BLOOD BY AUTOMATED COUNT: 12.6 % (ref 12.3–15.4)
ERYTHROCYTE [DISTWIDTH] IN BLOOD BY AUTOMATED COUNT: 12.6 % (ref 12.3–15.4)
ERYTHROCYTE [DISTWIDTH] IN BLOOD BY AUTOMATED COUNT: 12.7 % (ref 12.3–15.4)
ERYTHROCYTE [DISTWIDTH] IN BLOOD BY AUTOMATED COUNT: 12.7 % (ref 12.3–15.4)
FERRITIN SERPL-MCNC: 290.5 NG/ML (ref 13–150)
FOLATE SERPL-MCNC: >20 NG/ML (ref 4.78–24.2)
GLOBULIN UR ELPH-MCNC: 2.6 GM/DL
GLOBULIN UR ELPH-MCNC: 2.7 GM/DL
GLUCOSE BLDC GLUCOMTR-MCNC: 122 MG/DL (ref 70–130)
GLUCOSE BLDC GLUCOMTR-MCNC: 136 MG/DL (ref 70–130)
GLUCOSE BLDC GLUCOMTR-MCNC: 141 MG/DL (ref 70–130)
GLUCOSE BLDC GLUCOMTR-MCNC: 148 MG/DL (ref 70–130)
GLUCOSE BLDC GLUCOMTR-MCNC: 149 MG/DL (ref 70–130)
GLUCOSE BLDC GLUCOMTR-MCNC: 157 MG/DL (ref 70–130)
GLUCOSE BLDC GLUCOMTR-MCNC: 163 MG/DL (ref 70–130)
GLUCOSE BLDC GLUCOMTR-MCNC: 164 MG/DL (ref 70–130)
GLUCOSE BLDC GLUCOMTR-MCNC: 166 MG/DL (ref 70–130)
GLUCOSE BLDC GLUCOMTR-MCNC: 169 MG/DL (ref 70–130)
GLUCOSE BLDC GLUCOMTR-MCNC: 171 MG/DL (ref 70–130)
GLUCOSE BLDC GLUCOMTR-MCNC: 171 MG/DL (ref 70–130)
GLUCOSE BLDC GLUCOMTR-MCNC: 178 MG/DL (ref 70–130)
GLUCOSE BLDC GLUCOMTR-MCNC: 196 MG/DL (ref 70–130)
GLUCOSE SERPL-MCNC: 132 MG/DL (ref 65–99)
GLUCOSE SERPL-MCNC: 159 MG/DL (ref 65–99)
GLUCOSE SERPL-MCNC: 166 MG/DL (ref 65–99)
GLUCOSE SERPL-MCNC: 166 MG/DL (ref 65–99)
GLUCOSE SERPL-MCNC: 178 MG/DL (ref 65–99)
GLUCOSE SERPL-MCNC: 190 MG/DL (ref 65–99)
GLUCOSE UR STRIP-MCNC: NEGATIVE MG/DL
HBA1C MFR BLD: 11 % (ref 4.8–5.6)
HCT VFR BLD AUTO: 34.4 % (ref 34–46.6)
HCT VFR BLD AUTO: 35 % (ref 34–46.6)
HCT VFR BLD AUTO: 35.1 % (ref 34–46.6)
HCT VFR BLD AUTO: 36.2 % (ref 34–46.6)
HCT VFR BLD AUTO: 36.4 % (ref 34–46.6)
HCT VFR BLD AUTO: 42.3 % (ref 34–46.6)
HDLC SERPL-MCNC: 44 MG/DL (ref 40–60)
HGB BLD-MCNC: 10.8 G/DL (ref 12–15.9)
HGB BLD-MCNC: 10.9 G/DL (ref 12–15.9)
HGB BLD-MCNC: 11 G/DL (ref 12–15.9)
HGB BLD-MCNC: 11.4 G/DL (ref 12–15.9)
HGB BLD-MCNC: 11.5 G/DL (ref 12–15.9)
HGB BLD-MCNC: 13.2 G/DL (ref 12–15.9)
HGB UR QL STRIP.AUTO: ABNORMAL
HOLD SPECIMEN: NORMAL
HOLD SPECIMEN: NORMAL
HYALINE CASTS UR QL AUTO: ABNORMAL /LPF
IMM GRANULOCYTES # BLD AUTO: 0.1 10*3/MM3 (ref 0–0.05)
IMM GRANULOCYTES NFR BLD AUTO: 1.1 % (ref 0–0.5)
INR PPP: 1.02 (ref 0.91–1.09)
IRON 24H UR-MRATE: 27 MCG/DL (ref 37–145)
IRON SATN MFR SERPL: 9 % (ref 20–50)
KETONES UR QL STRIP: NEGATIVE
LDLC SERPL CALC-MCNC: 180 MG/DL (ref 0–100)
LDLC/HDLC SERPL: 4.05 {RATIO}
LEFT ATRIUM VOLUME INDEX: 35.5 ML/M2
LEFT ATRIUM VOLUME: 62.5 CM3
LEUKOCYTE ESTERASE UR QL STRIP.AUTO: ABNORMAL
LYMPHOCYTES # BLD AUTO: 2.2 10*3/MM3 (ref 0.7–3.1)
LYMPHOCYTES # BLD AUTO: 2.76 10*3/MM3 (ref 0.7–3.1)
LYMPHOCYTES NFR BLD AUTO: 23.7 % (ref 19.6–45.3)
LYMPHOCYTES NFR BLD AUTO: 30.7 % (ref 19.6–45.3)
MAXIMAL PREDICTED HEART RATE: 141 BPM
MAXIMAL PREDICTED HEART RATE: 141 BPM
MCH RBC QN AUTO: 29.3 PG (ref 26.6–33)
MCH RBC QN AUTO: 29.3 PG (ref 26.6–33)
MCH RBC QN AUTO: 29.5 PG (ref 26.6–33)
MCH RBC QN AUTO: 29.5 PG (ref 26.6–33)
MCH RBC QN AUTO: 29.7 PG (ref 26.6–33)
MCH RBC QN AUTO: 29.8 PG (ref 26.6–33)
MCHC RBC AUTO-ENTMCNC: 31.1 G/DL (ref 31.5–35.7)
MCHC RBC AUTO-ENTMCNC: 31.2 G/DL (ref 31.5–35.7)
MCHC RBC AUTO-ENTMCNC: 31.4 G/DL (ref 31.5–35.7)
MCHC RBC AUTO-ENTMCNC: 31.4 G/DL (ref 31.5–35.7)
MCHC RBC AUTO-ENTMCNC: 31.5 G/DL (ref 31.5–35.7)
MCHC RBC AUTO-ENTMCNC: 31.6 G/DL (ref 31.5–35.7)
MCV RBC AUTO: 93.2 FL (ref 79–97)
MCV RBC AUTO: 93.3 FL (ref 79–97)
MCV RBC AUTO: 94.1 FL (ref 79–97)
MCV RBC AUTO: 94.5 FL (ref 79–97)
MCV RBC AUTO: 94.6 FL (ref 79–97)
MCV RBC AUTO: 94.9 FL (ref 79–97)
MONOCYTES # BLD AUTO: 0.81 10*3/MM3 (ref 0.1–0.9)
MONOCYTES # BLD AUTO: 0.84 10*3/MM3 (ref 0.1–0.9)
MONOCYTES NFR BLD AUTO: 9 % (ref 5–12)
MONOCYTES NFR BLD AUTO: 9.1 % (ref 5–12)
NEUTROPHILS NFR BLD AUTO: 5.14 10*3/MM3 (ref 1.7–7)
NEUTROPHILS NFR BLD AUTO: 57 % (ref 42.7–76)
NEUTROPHILS NFR BLD AUTO: 6 10*3/MM3 (ref 1.7–7)
NEUTROPHILS NFR BLD AUTO: 64.7 % (ref 42.7–76)
NITRITE UR QL STRIP: NEGATIVE
NRBC BLD AUTO-RTO: 0 /100 WBC (ref 0–0.2)
PH UR STRIP.AUTO: 7.5 [PH] (ref 5–8)
PLATELET # BLD AUTO: 311 10*3/MM3 (ref 140–450)
PLATELET # BLD AUTO: 325 10*3/MM3 (ref 140–450)
PLATELET # BLD AUTO: 336 10*3/MM3 (ref 140–450)
PLATELET # BLD AUTO: 342 10*3/MM3 (ref 140–450)
PLATELET # BLD AUTO: 343 10*3/MM3 (ref 140–450)
PLATELET # BLD AUTO: 350 10*3/MM3 (ref 140–450)
PMV BLD AUTO: 10 FL (ref 6–12)
PMV BLD AUTO: 10.1 FL (ref 6–12)
PMV BLD AUTO: 10.1 FL (ref 6–12)
PMV BLD AUTO: 10.3 FL (ref 6–12)
PMV BLD AUTO: 10.7 FL (ref 6–12)
PMV BLD AUTO: 9.9 FL (ref 6–12)
POTASSIUM SERPL-SCNC: 4 MMOL/L (ref 3.5–5.2)
POTASSIUM SERPL-SCNC: 4.1 MMOL/L (ref 3.5–5.2)
POTASSIUM SERPL-SCNC: 4.1 MMOL/L (ref 3.5–5.2)
POTASSIUM SERPL-SCNC: 4.2 MMOL/L (ref 3.5–5.2)
POTASSIUM SERPL-SCNC: 4.3 MMOL/L (ref 3.5–5.2)
POTASSIUM SERPL-SCNC: 4.6 MMOL/L (ref 3.5–5.2)
PROT SERPL-MCNC: 6.1 G/DL (ref 6–8.5)
PROT SERPL-MCNC: 6.8 G/DL (ref 6–8.5)
PROT UR QL STRIP: NEGATIVE
PROTHROMBIN TIME: 13 SECONDS (ref 11.9–14.6)
QT INTERVAL: 380 MS
QT INTERVAL: 388 MS
QTC INTERVAL: 435 MS
QTC INTERVAL: 455 MS
RBC # BLD AUTO: 3.69 10*6/MM3 (ref 3.77–5.28)
RBC # BLD AUTO: 3.7 10*6/MM3 (ref 3.77–5.28)
RBC # BLD AUTO: 3.75 10*6/MM3 (ref 3.77–5.28)
RBC # BLD AUTO: 3.83 10*6/MM3 (ref 3.77–5.28)
RBC # BLD AUTO: 3.87 10*6/MM3 (ref 3.77–5.28)
RBC # BLD AUTO: 4.47 10*6/MM3 (ref 3.77–5.28)
RBC # UR STRIP: ABNORMAL /HPF
REF LAB TEST METHOD: ABNORMAL
RH BLD: POSITIVE
SARS-COV-2 RNA PNL SPEC NAA+PROBE: NOT DETECTED
SODIUM SERPL-SCNC: 134 MMOL/L (ref 136–145)
SODIUM SERPL-SCNC: 135 MMOL/L (ref 136–145)
SODIUM SERPL-SCNC: 135 MMOL/L (ref 136–145)
SODIUM SERPL-SCNC: 137 MMOL/L (ref 136–145)
SODIUM SERPL-SCNC: 138 MMOL/L (ref 136–145)
SODIUM SERPL-SCNC: 139 MMOL/L (ref 136–145)
SP GR UR STRIP: >1.03 (ref 1–1.03)
SQUAMOUS #/AREA URNS HPF: ABNORMAL /HPF
STRESS TARGET HR: 120 BPM
STRESS TARGET HR: 120 BPM
T&S EXPIRATION DATE: NORMAL
TIBC SERPL-MCNC: 297 MCG/DL (ref 298–536)
TRANSFERRIN SERPL-MCNC: 199 MG/DL (ref 200–360)
TRIGL SERPL-MCNC: 220 MG/DL (ref 0–150)
TROPONIN T SERPL-MCNC: 0.01 NG/ML (ref 0–0.03)
TSH SERPL DL<=0.05 MIU/L-ACNC: 1.63 UIU/ML (ref 0.27–4.2)
UROBILINOGEN UR QL STRIP: ABNORMAL
VIT B12 BLD-MCNC: 1212 PG/ML (ref 211–946)
VLDLC SERPL-MCNC: 42 MG/DL (ref 5–40)
WBC # UR STRIP: ABNORMAL /HPF
WBC NRBC COR # BLD: 6.99 10*3/MM3 (ref 3.4–10.8)
WBC NRBC COR # BLD: 7.14 10*3/MM3 (ref 3.4–10.8)
WBC NRBC COR # BLD: 8.02 10*3/MM3 (ref 3.4–10.8)
WBC NRBC COR # BLD: 9 10*3/MM3 (ref 3.4–10.8)
WBC NRBC COR # BLD: 9.22 10*3/MM3 (ref 3.4–10.8)
WBC NRBC COR # BLD: 9.27 10*3/MM3 (ref 3.4–10.8)
WHOLE BLOOD HOLD SPECIMEN: NORMAL
WHOLE BLOOD HOLD SPECIMEN: NORMAL

## 2022-01-01 PROCEDURE — 80053 COMPREHEN METABOLIC PANEL: CPT | Performed by: EMERGENCY MEDICINE

## 2022-01-01 PROCEDURE — 85730 THROMBOPLASTIN TIME PARTIAL: CPT | Performed by: EMERGENCY MEDICINE

## 2022-01-01 PROCEDURE — 70496 CT ANGIOGRAPHY HEAD: CPT

## 2022-01-01 PROCEDURE — 99285 EMERGENCY DEPT VISIT HI MDM: CPT

## 2022-01-01 PROCEDURE — 93356 MYOCRD STRAIN IMG SPCKL TRCK: CPT

## 2022-01-01 PROCEDURE — 97535 SELF CARE MNGMENT TRAINING: CPT

## 2022-01-01 PROCEDURE — 93923 UPR/LXTR ART STDY 3+ LVLS: CPT

## 2022-01-01 PROCEDURE — 99223 1ST HOSP IP/OBS HIGH 75: CPT | Performed by: PSYCHIATRY & NEUROLOGY

## 2022-01-01 PROCEDURE — 97116 GAIT TRAINING THERAPY: CPT

## 2022-01-01 PROCEDURE — 80048 BASIC METABOLIC PNL TOTAL CA: CPT | Performed by: FAMILY MEDICINE

## 2022-01-01 PROCEDURE — 93306 TTE W/DOPPLER COMPLETE: CPT | Performed by: INTERNAL MEDICINE

## 2022-01-01 PROCEDURE — 99214 OFFICE O/P EST MOD 30 MIN: CPT | Performed by: NURSE PRACTITIONER

## 2022-01-01 PROCEDURE — 97110 THERAPEUTIC EXERCISES: CPT

## 2022-01-01 PROCEDURE — 82962 GLUCOSE BLOOD TEST: CPT

## 2022-01-01 PROCEDURE — 36415 COLL VENOUS BLD VENIPUNCTURE: CPT | Performed by: FAMILY MEDICINE

## 2022-01-01 PROCEDURE — 97535 SELF CARE MNGMENT TRAINING: CPT | Performed by: OCCUPATIONAL THERAPIST

## 2022-01-01 PROCEDURE — 97530 THERAPEUTIC ACTIVITIES: CPT

## 2022-01-01 PROCEDURE — 93356 MYOCRD STRAIN IMG SPCKL TRCK: CPT | Performed by: INTERNAL MEDICINE

## 2022-01-01 PROCEDURE — 25010000002 CEFTRIAXONE PER 250 MG: Performed by: FAMILY MEDICINE

## 2022-01-01 PROCEDURE — 93306 TTE W/DOPPLER COMPLETE: CPT

## 2022-01-01 PROCEDURE — 92526 ORAL FUNCTION THERAPY: CPT | Performed by: SPEECH-LANGUAGE PATHOLOGIST

## 2022-01-01 PROCEDURE — 85025 COMPLETE CBC W/AUTO DIFF WBC: CPT | Performed by: EMERGENCY MEDICINE

## 2022-01-01 PROCEDURE — 85027 COMPLETE CBC AUTOMATED: CPT | Performed by: FAMILY MEDICINE

## 2022-01-01 PROCEDURE — 83540 ASSAY OF IRON: CPT | Performed by: PSYCHIATRY & NEUROLOGY

## 2022-01-01 PROCEDURE — 81001 URINALYSIS AUTO W/SCOPE: CPT | Performed by: FAMILY MEDICINE

## 2022-01-01 PROCEDURE — 82607 VITAMIN B-12: CPT | Performed by: PSYCHIATRY & NEUROLOGY

## 2022-01-01 PROCEDURE — 99233 SBSQ HOSP IP/OBS HIGH 50: CPT | Performed by: PSYCHIATRY & NEUROLOGY

## 2022-01-01 PROCEDURE — 86901 BLOOD TYPING SEROLOGIC RH(D): CPT | Performed by: EMERGENCY MEDICINE

## 2022-01-01 PROCEDURE — 63710000001 INSULIN LISPRO (HUMAN) PER 5 UNITS: Performed by: FAMILY MEDICINE

## 2022-01-01 PROCEDURE — 25010000002 PERFLUTREN 6.52 MG/ML SUSPENSION: Performed by: FAMILY MEDICINE

## 2022-01-01 PROCEDURE — 25010000002 CEFTRIAXONE IN SWFI 1 GRAM/10ML IV PUSH SYRINGE (SIMPLE): Performed by: FAMILY MEDICINE

## 2022-01-01 PROCEDURE — 71045 X-RAY EXAM CHEST 1 VIEW: CPT

## 2022-01-01 PROCEDURE — 97166 OT EVAL MOD COMPLEX 45 MIN: CPT

## 2022-01-01 PROCEDURE — 82746 ASSAY OF FOLIC ACID SERUM: CPT | Performed by: PSYCHIATRY & NEUROLOGY

## 2022-01-01 PROCEDURE — 97162 PT EVAL MOD COMPLEX 30 MIN: CPT

## 2022-01-01 PROCEDURE — 0 IOPAMIDOL PER 1 ML: Performed by: EMERGENCY MEDICINE

## 2022-01-01 PROCEDURE — 82728 ASSAY OF FERRITIN: CPT | Performed by: PSYCHIATRY & NEUROLOGY

## 2022-01-01 PROCEDURE — 92610 EVALUATE SWALLOWING FUNCTION: CPT | Performed by: SPEECH-LANGUAGE PATHOLOGIST

## 2022-01-01 PROCEDURE — 93923 UPR/LXTR ART STDY 3+ LVLS: CPT | Performed by: SURGERY

## 2022-01-01 PROCEDURE — 87086 URINE CULTURE/COLONY COUNT: CPT | Performed by: FAMILY MEDICINE

## 2022-01-01 PROCEDURE — 85610 PROTHROMBIN TIME: CPT | Performed by: EMERGENCY MEDICINE

## 2022-01-01 PROCEDURE — 70450 CT HEAD/BRAIN W/O DYE: CPT

## 2022-01-01 PROCEDURE — 84484 ASSAY OF TROPONIN QUANT: CPT | Performed by: EMERGENCY MEDICINE

## 2022-01-01 PROCEDURE — 93880 EXTRACRANIAL BILAT STUDY: CPT

## 2022-01-01 PROCEDURE — 84443 ASSAY THYROID STIM HORMONE: CPT | Performed by: FAMILY MEDICINE

## 2022-01-01 PROCEDURE — 85025 COMPLETE CBC W/AUTO DIFF WBC: CPT | Performed by: FAMILY MEDICINE

## 2022-01-01 PROCEDURE — 87635 SARS-COV-2 COVID-19 AMP PRB: CPT | Performed by: EMERGENCY MEDICINE

## 2022-01-01 PROCEDURE — 80053 COMPREHEN METABOLIC PANEL: CPT | Performed by: FAMILY MEDICINE

## 2022-01-01 PROCEDURE — 93010 ELECTROCARDIOGRAM REPORT: CPT | Performed by: INTERNAL MEDICINE

## 2022-01-01 PROCEDURE — 86900 BLOOD TYPING SEROLOGIC ABO: CPT | Performed by: EMERGENCY MEDICINE

## 2022-01-01 PROCEDURE — 25010000002 ONDANSETRON PER 1 MG: Performed by: FAMILY MEDICINE

## 2022-01-01 PROCEDURE — 70551 MRI BRAIN STEM W/O DYE: CPT

## 2022-01-01 PROCEDURE — 93246 EXT ECG>7D<15D RECORDING: CPT

## 2022-01-01 PROCEDURE — 93248 EXT ECG>7D<15D REV&INTERPJ: CPT | Performed by: INTERNAL MEDICINE

## 2022-01-01 PROCEDURE — 99232 SBSQ HOSP IP/OBS MODERATE 35: CPT | Performed by: PHYSICIAN ASSISTANT

## 2022-01-01 PROCEDURE — 86850 RBC ANTIBODY SCREEN: CPT | Performed by: EMERGENCY MEDICINE

## 2022-01-01 PROCEDURE — 93005 ELECTROCARDIOGRAM TRACING: CPT | Performed by: EMERGENCY MEDICINE

## 2022-01-01 PROCEDURE — 93880 EXTRACRANIAL BILAT STUDY: CPT | Performed by: SURGERY

## 2022-01-01 PROCEDURE — 80061 LIPID PANEL: CPT | Performed by: FAMILY MEDICINE

## 2022-01-01 PROCEDURE — 83036 HEMOGLOBIN GLYCOSYLATED A1C: CPT | Performed by: FAMILY MEDICINE

## 2022-01-01 PROCEDURE — 84466 ASSAY OF TRANSFERRIN: CPT | Performed by: PSYCHIATRY & NEUROLOGY

## 2022-01-01 PROCEDURE — 70498 CT ANGIOGRAPHY NECK: CPT

## 2022-01-01 RX ORDER — LISINOPRIL 20 MG/1
10 TABLET ORAL DAILY
Start: 2022-01-01

## 2022-01-01 RX ORDER — SODIUM CHLORIDE 0.9 % (FLUSH) 0.9 %
10 SYRINGE (ML) INJECTION AS NEEDED
Status: DISCONTINUED | OUTPATIENT
Start: 2022-01-01 | End: 2022-01-01 | Stop reason: HOSPADM

## 2022-01-01 RX ORDER — ONDANSETRON 2 MG/ML
4 INJECTION INTRAMUSCULAR; INTRAVENOUS EVERY 6 HOURS PRN
Status: DISCONTINUED | OUTPATIENT
Start: 2022-01-01 | End: 2022-01-01 | Stop reason: HOSPADM

## 2022-01-01 RX ORDER — HYDROCODONE BITARTRATE AND ACETAMINOPHEN 10; 325 MG/1; MG/1
0.5 TABLET ORAL EVERY 12 HOURS PRN
Refills: 0
Start: 2022-01-01

## 2022-01-01 RX ORDER — ONDANSETRON 4 MG/1
4 TABLET, ORALLY DISINTEGRATING ORAL EVERY 8 HOURS PRN
Qty: 24 TABLET | Refills: 2 | Status: SHIPPED | OUTPATIENT
Start: 2022-01-01

## 2022-01-01 RX ORDER — ASPIRIN 81 MG/1
81 TABLET ORAL DAILY
Qty: 90 TABLET | Refills: 1 | Status: SHIPPED | OUTPATIENT
Start: 2022-01-01

## 2022-01-01 RX ORDER — GABAPENTIN 300 MG/1
300 CAPSULE ORAL EVERY 8 HOURS SCHEDULED
Status: DISCONTINUED | OUTPATIENT
Start: 2022-01-01 | End: 2022-01-01

## 2022-01-01 RX ORDER — HYDROCODONE BITARTRATE AND ACETAMINOPHEN 10; 325 MG/1; MG/1
1 TABLET ORAL EVERY 12 HOURS PRN
Status: ON HOLD | COMMUNITY
End: 2022-01-01 | Stop reason: SDUPTHER

## 2022-01-01 RX ORDER — FAMOTIDINE 10 MG/ML
20 INJECTION, SOLUTION INTRAVENOUS
Status: DISCONTINUED | OUTPATIENT
Start: 2022-01-01 | End: 2022-01-01 | Stop reason: HOSPADM

## 2022-01-01 RX ORDER — LISINOPRIL 10 MG/1
10 TABLET ORAL DAILY
Status: DISCONTINUED | OUTPATIENT
Start: 2022-01-01 | End: 2022-01-01 | Stop reason: HOSPADM

## 2022-01-01 RX ORDER — ATORVASTATIN CALCIUM 80 MG/1
80 TABLET, FILM COATED ORAL NIGHTLY
Qty: 90 TABLET | Refills: 1 | Status: SHIPPED | OUTPATIENT
Start: 2022-01-01

## 2022-01-01 RX ORDER — NICOTINE POLACRILEX 4 MG
15 LOZENGE BUCCAL
Status: DISCONTINUED | OUTPATIENT
Start: 2022-01-01 | End: 2022-01-01 | Stop reason: HOSPADM

## 2022-01-01 RX ORDER — ASPIRIN 300 MG/1
300 SUPPOSITORY RECTAL DAILY
Status: DISCONTINUED | OUTPATIENT
Start: 2022-01-01 | End: 2022-01-01 | Stop reason: HOSPADM

## 2022-01-01 RX ORDER — HYDROCODONE BITARTRATE AND ACETAMINOPHEN 5; 325 MG/1; MG/1
1 TABLET ORAL EVERY 6 HOURS PRN
Status: DISCONTINUED | OUTPATIENT
Start: 2022-01-01 | End: 2022-01-01 | Stop reason: HOSPADM

## 2022-01-01 RX ORDER — DEXTROSE MONOHYDRATE 25 G/50ML
25 INJECTION, SOLUTION INTRAVENOUS
Status: DISCONTINUED | OUTPATIENT
Start: 2022-01-01 | End: 2022-01-01 | Stop reason: HOSPADM

## 2022-01-01 RX ORDER — ASPIRIN 81 MG/1
81 TABLET, CHEWABLE ORAL DAILY
Status: DISCONTINUED | OUTPATIENT
Start: 2022-01-01 | End: 2022-01-01

## 2022-01-01 RX ORDER — GABAPENTIN 600 MG/1
300 TABLET ORAL NIGHTLY
Start: 2022-01-01

## 2022-01-01 RX ORDER — FAMOTIDINE 20 MG/1
20 TABLET, FILM COATED ORAL 2 TIMES DAILY
Qty: 60 TABLET | Refills: 2 | Status: SHIPPED | OUTPATIENT
Start: 2022-01-01

## 2022-01-01 RX ORDER — CLOPIDOGREL BISULFATE 75 MG/1
75 TABLET ORAL DAILY
Qty: 90 TABLET | Refills: 2 | Status: SHIPPED | OUTPATIENT
Start: 2022-01-01 | End: 2022-01-01 | Stop reason: HOSPADM

## 2022-01-01 RX ORDER — SODIUM CHLORIDE 0.9 % (FLUSH) 0.9 %
10 SYRINGE (ML) INJECTION EVERY 12 HOURS SCHEDULED
Status: DISCONTINUED | OUTPATIENT
Start: 2022-01-01 | End: 2022-01-01 | Stop reason: HOSPADM

## 2022-01-01 RX ORDER — GABAPENTIN 100 MG/1
100 CAPSULE ORAL EVERY 8 HOURS SCHEDULED
Status: DISCONTINUED | OUTPATIENT
Start: 2022-01-01 | End: 2022-01-01 | Stop reason: HOSPADM

## 2022-01-01 RX ORDER — ASPIRIN 300 MG/1
300 SUPPOSITORY RECTAL DAILY
Status: DISCONTINUED | OUTPATIENT
Start: 2022-01-01 | End: 2022-01-01

## 2022-01-01 RX ORDER — LISINOPRIL 20 MG/1
20 TABLET ORAL DAILY
Status: DISCONTINUED | OUTPATIENT
Start: 2022-01-01 | End: 2022-01-01

## 2022-01-01 RX ORDER — ASPIRIN 81 MG/1
81 TABLET, CHEWABLE ORAL DAILY
Status: DISCONTINUED | OUTPATIENT
Start: 2022-01-01 | End: 2022-01-01 | Stop reason: HOSPADM

## 2022-01-01 RX ORDER — FERROUS SULFATE 325(65) MG
325 TABLET ORAL
Status: DISCONTINUED | OUTPATIENT
Start: 2022-01-01 | End: 2022-01-01

## 2022-01-01 RX ORDER — INSULIN GLARGINE 100 [IU]/ML
20 INJECTION, SOLUTION SUBCUTANEOUS NIGHTLY
COMMUNITY

## 2022-01-01 RX ORDER — SODIUM CHLORIDE, SODIUM LACTATE, POTASSIUM CHLORIDE, CALCIUM CHLORIDE 600; 310; 30; 20 MG/100ML; MG/100ML; MG/100ML; MG/100ML
60 INJECTION, SOLUTION INTRAVENOUS CONTINUOUS
Status: DISCONTINUED | OUTPATIENT
Start: 2022-01-01 | End: 2022-01-01

## 2022-01-01 RX ORDER — CILOSTAZOL 100 MG/1
100 TABLET ORAL 2 TIMES DAILY
Qty: 60 TABLET | Refills: 5 | Status: SHIPPED | OUTPATIENT
Start: 2022-01-01

## 2022-01-01 RX ORDER — ATORVASTATIN CALCIUM 40 MG/1
80 TABLET, FILM COATED ORAL NIGHTLY
Status: DISCONTINUED | OUTPATIENT
Start: 2022-01-01 | End: 2022-01-01 | Stop reason: HOSPADM

## 2022-01-01 RX ORDER — CETIRIZINE HYDROCHLORIDE 10 MG/1
10 TABLET ORAL DAILY
COMMUNITY
End: 2022-01-01 | Stop reason: HOSPADM

## 2022-01-01 RX ADMIN — LISINOPRIL 20 MG: 20 TABLET ORAL at 09:12

## 2022-01-01 RX ADMIN — ATORVASTATIN CALCIUM 80 MG: 40 TABLET, FILM COATED ORAL at 19:38

## 2022-01-01 RX ADMIN — ASPIRIN 81 MG: 81 TABLET, CHEWABLE ORAL at 09:40

## 2022-01-01 RX ADMIN — INSULIN LISPRO 2 UNITS: 100 INJECTION, SOLUTION INTRAVENOUS; SUBCUTANEOUS at 09:08

## 2022-01-01 RX ADMIN — LINAGLIPTIN 5 MG: 5 TABLET, FILM COATED ORAL at 09:08

## 2022-01-01 RX ADMIN — GABAPENTIN 300 MG: 300 CAPSULE ORAL at 14:00

## 2022-01-01 RX ADMIN — SODIUM CHLORIDE, POTASSIUM CHLORIDE, SODIUM LACTATE AND CALCIUM CHLORIDE 60 ML/HR: 600; 310; 30; 20 INJECTION, SOLUTION INTRAVENOUS at 22:42

## 2022-01-01 RX ADMIN — ONDANSETRON 4 MG: 2 INJECTION INTRAMUSCULAR; INTRAVENOUS at 19:38

## 2022-01-01 RX ADMIN — GABAPENTIN 100 MG: 100 CAPSULE ORAL at 15:43

## 2022-01-01 RX ADMIN — LINAGLIPTIN 5 MG: 5 TABLET, FILM COATED ORAL at 09:40

## 2022-01-01 RX ADMIN — LISINOPRIL 20 MG: 20 TABLET ORAL at 09:40

## 2022-01-01 RX ADMIN — HYDROCODONE BITARTRATE AND ACETAMINOPHEN 1 TABLET: 5; 325 TABLET ORAL at 09:19

## 2022-01-01 RX ADMIN — WATER 1 G: 1000 INJECTION, SOLUTION INTRAVENOUS at 09:12

## 2022-01-01 RX ADMIN — GABAPENTIN 300 MG: 300 CAPSULE ORAL at 14:14

## 2022-01-01 RX ADMIN — Medication 10 ML: at 20:41

## 2022-01-01 RX ADMIN — INSULIN LISPRO 2 UNITS: 100 INJECTION, SOLUTION INTRAVENOUS; SUBCUTANEOUS at 08:25

## 2022-01-01 RX ADMIN — INSULIN LISPRO 2 UNITS: 100 INJECTION, SOLUTION INTRAVENOUS; SUBCUTANEOUS at 12:25

## 2022-01-01 RX ADMIN — LISINOPRIL 20 MG: 20 TABLET ORAL at 09:08

## 2022-01-01 RX ADMIN — GABAPENTIN 100 MG: 100 CAPSULE ORAL at 14:56

## 2022-01-01 RX ADMIN — ASPIRIN 81 MG: 81 TABLET, CHEWABLE ORAL at 09:58

## 2022-01-01 RX ADMIN — GABAPENTIN 300 MG: 300 CAPSULE ORAL at 22:42

## 2022-01-01 RX ADMIN — ATORVASTATIN CALCIUM 80 MG: 40 TABLET, FILM COATED ORAL at 20:40

## 2022-01-01 RX ADMIN — Medication 10 ML: at 10:02

## 2022-01-01 RX ADMIN — ASPIRIN 81 MG: 81 TABLET, CHEWABLE ORAL at 09:07

## 2022-01-01 RX ADMIN — Medication 10 ML: at 08:27

## 2022-01-01 RX ADMIN — ATORVASTATIN CALCIUM 80 MG: 40 TABLET, FILM COATED ORAL at 22:42

## 2022-01-01 RX ADMIN — Medication 10 ML: at 21:12

## 2022-01-01 RX ADMIN — WATER 1 G: 1000 INJECTION, SOLUTION INTRAVENOUS at 09:08

## 2022-01-01 RX ADMIN — HYDROCODONE BITARTRATE AND ACETAMINOPHEN 1 TABLET: 5; 325 TABLET ORAL at 19:38

## 2022-01-01 RX ADMIN — FERROUS SULFATE TAB 325 MG (65 MG ELEMENTAL FE) 325 MG: 325 (65 FE) TAB at 09:58

## 2022-01-01 RX ADMIN — GABAPENTIN 300 MG: 300 CAPSULE ORAL at 05:58

## 2022-01-01 RX ADMIN — WATER 1 G: 1000 INJECTION, SOLUTION INTRAVENOUS at 11:28

## 2022-01-01 RX ADMIN — GABAPENTIN 100 MG: 100 CAPSULE ORAL at 21:12

## 2022-01-01 RX ADMIN — ONDANSETRON 4 MG: 2 INJECTION INTRAMUSCULAR; INTRAVENOUS at 08:25

## 2022-01-01 RX ADMIN — GABAPENTIN 100 MG: 100 CAPSULE ORAL at 06:46

## 2022-01-01 RX ADMIN — FERROUS SULFATE TAB 325 MG (65 MG ELEMENTAL FE) 325 MG: 325 (65 FE) TAB at 15:34

## 2022-01-01 RX ADMIN — GABAPENTIN 300 MG: 300 CAPSULE ORAL at 20:41

## 2022-01-01 RX ADMIN — ATORVASTATIN CALCIUM 80 MG: 40 TABLET, FILM COATED ORAL at 21:12

## 2022-01-01 RX ADMIN — Medication 10 ML: at 19:38

## 2022-01-01 RX ADMIN — LINAGLIPTIN 5 MG: 5 TABLET, FILM COATED ORAL at 09:12

## 2022-01-01 RX ADMIN — PERFLUTREN 8.48 MG: 6.52 INJECTION, SUSPENSION INTRAVENOUS at 08:26

## 2022-01-01 RX ADMIN — GABAPENTIN 100 MG: 100 CAPSULE ORAL at 22:39

## 2022-01-01 RX ADMIN — GABAPENTIN 100 MG: 100 CAPSULE ORAL at 05:40

## 2022-01-01 RX ADMIN — INSULIN LISPRO 2 UNITS: 100 INJECTION, SOLUTION INTRAVENOUS; SUBCUTANEOUS at 17:24

## 2022-01-01 RX ADMIN — GABAPENTIN 300 MG: 300 CAPSULE ORAL at 05:26

## 2022-01-01 RX ADMIN — LISINOPRIL 10 MG: 10 TABLET ORAL at 09:58

## 2022-01-01 RX ADMIN — WATER 1 G: 1000 INJECTION, SOLUTION INTRAVENOUS at 10:10

## 2022-01-01 RX ADMIN — ASPIRIN 81 MG: 81 TABLET, CHEWABLE ORAL at 09:12

## 2022-01-01 RX ADMIN — INSULIN LISPRO 2 UNITS: 100 INJECTION, SOLUTION INTRAVENOUS; SUBCUTANEOUS at 12:08

## 2022-01-01 RX ADMIN — Medication 10 ML: at 09:40

## 2022-01-01 RX ADMIN — ATORVASTATIN CALCIUM 80 MG: 40 TABLET, FILM COATED ORAL at 20:45

## 2022-01-01 RX ADMIN — INSULIN LISPRO 2 UNITS: 100 INJECTION, SOLUTION INTRAVENOUS; SUBCUTANEOUS at 07:53

## 2022-01-01 RX ADMIN — Medication 10 ML: at 22:42

## 2022-01-01 RX ADMIN — Medication 10 ML: at 21:00

## 2022-01-01 RX ADMIN — FAMOTIDINE 20 MG: 10 INJECTION INTRAVENOUS at 15:43

## 2022-01-01 RX ADMIN — ONDANSETRON 4 MG: 2 INJECTION INTRAMUSCULAR; INTRAVENOUS at 15:43

## 2022-01-01 RX ADMIN — IOPAMIDOL 120 ML: 755 INJECTION, SOLUTION INTRAVENOUS at 15:35

## 2022-01-01 RX ADMIN — LINAGLIPTIN 5 MG: 5 TABLET, FILM COATED ORAL at 09:58

## 2022-01-01 RX ADMIN — GABAPENTIN 300 MG: 300 CAPSULE ORAL at 20:45

## 2022-01-01 RX ADMIN — WATER 1 G: 1000 INJECTION, SOLUTION INTRAVENOUS at 08:27

## 2022-01-01 RX ADMIN — LINAGLIPTIN 5 MG: 5 TABLET, FILM COATED ORAL at 08:25

## 2022-01-01 RX ADMIN — HYDROCODONE BITARTRATE AND ACETAMINOPHEN 1 TABLET: 5; 325 TABLET ORAL at 09:58

## 2022-01-01 RX ADMIN — GABAPENTIN 300 MG: 300 CAPSULE ORAL at 05:45

## 2022-01-01 RX ADMIN — LISINOPRIL 10 MG: 10 TABLET ORAL at 08:25

## 2022-01-01 RX ADMIN — Medication 10 ML: at 09:08

## 2022-01-01 RX ADMIN — ASPIRIN 81 MG: 81 TABLET, CHEWABLE ORAL at 08:25

## 2022-01-01 RX ADMIN — GABAPENTIN 100 MG: 100 CAPSULE ORAL at 15:34

## 2022-01-01 RX ADMIN — INSULIN LISPRO 2 UNITS: 100 INJECTION, SOLUTION INTRAVENOUS; SUBCUTANEOUS at 17:00

## 2022-01-13 NOTE — TELEPHONE ENCOUNTER
Spoke with Ms Ojeda reminding her of her appointment for Friday, January 14th, 2022. Rem[facility] am for 9 o'clock testing and follow up afterwards at 130 pm with Jossie HUA. Ms Ojeda confirmed she would be here.

## 2022-01-14 NOTE — PROGRESS NOTES
"01/14/2022     Roberto Garcia MD  [address] [facility] [address]    Evelyn Ojeda  1940    Chief Complaint   Patient presents with   • Follow-up     6 month follow-up with US/WILBERT 01/14/22 for Bilateral Carotid Artery Stenosis and Peripheral Artery Disease.  Pt states she is having some edema in bilateral legs, not wearing compression and stopped monik Plavix in Sept.  Pt's PCP gave her samples of Plavix 2-3 days ago. Pt denies any stroke-like symptoms.   • Non-smoker     Pt verified non-smoker       Dear Roberto Garcia MD   HPI  I had the pleasure of seeing your patient Evelyn Ojeda in the office today.   As you recall, Evelyn Ojeda is a 81 y.o.  female who we are following for lower extremity PAD.  She is previously underwent a right lower extremity angiogram on 1/6/2021.  She continues to do well and denies any claudication.  She does have some swelling to her lower extremities but does not wear compression.  She did stop taking her Plavix in September per her report however her PCP gave her samples of Plavix.  I have sent in a new prescription.  She did have noninvasive testing, which I did review in office.    Review of Systems   Constitutional: Negative.    HENT: Negative.    Eyes: Negative.    Respiratory: Negative.    Cardiovascular: Positive for leg swelling.   Gastrointestinal: Negative.    Endocrine: Negative.    Genitourinary: Negative.    Musculoskeletal: Negative.    Skin: Negative.    Allergic/Immunologic: Negative.    Neurological: Negative.         Wheelchair   Hematological: Negative.    Psychiatric/Behavioral: Negative.         /70 (BP Location: Left arm, Patient Position: Sitting, Cuff Size: Adult)   Pulse 81   Resp 18   Ht 154.9 cm (61\")   Wt 78.5 kg (173 lb)   SpO2 97%   BMI 32.69 kg/m²   Physical Exam  Vitals and nursing note reviewed.   Constitutional:       General: She is not in acute distress.     Appearance: Normal appearance. She is " well-developed. She is obese. She is not diaphoretic.   HENT:      Head: Normocephalic and atraumatic.   Eyes:      General: No scleral icterus.     Pupils: Pupils are equal, round, and reactive to light.   Neck:      Thyroid: No thyromegaly.      Vascular: No carotid bruit or JVD.   Cardiovascular:      Rate and Rhythm: Normal rate and regular rhythm.      Pulses:           Femoral pulses are 2+ on the right side and 2+ on the left side.       Dorsalis pedis pulses are detected w/ Doppler on the right side and detected w/ Doppler on the left side.        Posterior tibial pulses are detected w/ Doppler on the right side and detected w/ Doppler on the left side.      Heart sounds: Normal heart sounds and S2 normal. No murmur heard.  No friction rub. No gallop.       Comments: Right: doppler DP/PT /peroneal  Pulmonary:      Effort: Pulmonary effort is normal.      Breath sounds: Normal breath sounds.   Abdominal:      General: Bowel sounds are normal.      Palpations: Abdomen is soft.   Musculoskeletal:         General: Normal range of motion.      Cervical back: Normal range of motion and neck supple.      Comments: Wheelchair   Skin:     General: Skin is warm and dry.   Neurological:      General: No focal deficit present.      Mental Status: She is alert and oriented to person, place, and time.      Cranial Nerves: No cranial nerve deficit.   Psychiatric:         Mood and Affect: Mood normal.         Behavior: Behavior normal.         Thought Content: Thought content normal.         Judgment: Judgment normal.          Diagnostic data:  US Carotid Bilateral    Result Date: 1/14/2022  Narrative: History: Carotid occlusive disease      Impression: Impression: 1. There is less than 50% stenosis of the right internal carotid artery. 2. There is less than 50% stenosis of the left internal carotid artery. 3. Antegrade flow is demonstrated in bilateral vertebral arteries.  Comments: Bilateral carotid vertebral arterial  duplex scan was performed.  Grayscale imaging shows intimal thickening and calcified elements at the carotid bifurcation. The right internal carotid artery peak systolic velocity is 82.1 cm/sec. The end-diastolic velocity is 17.6 cm/sec. The right ICA/CCA ratio is approximately 1.1 . These findings correlate with less than 50% stenosis of the right internal carotid artery.  Grayscale imaging shows intimal thickening and calcified elements at the carotid bifurcation. The left internal carotid artery peak systolic velocity is 124.5 cm/sec. The end-diastolic velocity is 26.3 cm/sec. The left ICA/CCA ratio is approximately 1.1 . These findings correlate with less than 50% stenosis of the left internal carotid artery.  Antegrade flow is demonstrated in bilateral vertebral arteries. There is greater than 50% stenosis of the right external carotid artery. This report was finalized on 01/14/2022 16:29 by Dr. Morgan Owens MD.    US Ankle / Brachial Indices Extremity Complete    Result Date: 1/14/2022  Narrative:  History: PAD  Comments: Bilateral lower extremity arterial with multi-level pulse volume recordings and segmental pressures were performed at rest and stress.  The right ankle/brachial index is not obtainable due to noncompressibility of the vessels. The waveforms are triphasic without dampening.  The left ankle/brachial index is 0.51. The waveforms are biphasic without dampening. These findings are consistent with severe arterial insufficiency of the left lower extremity at rest.      Impression: Impression: 1. The right ankle/brachial index was not obtainable due to noncompressibility of the vessels. 2. Severe arterial insufficiency of the left lower extremity at rest.   This report was finalized on 01/14/2022 15:42 by Dr. Morgan Owens MD.       Patient Active Problem List   Diagnosis   • PAD (peripheral artery disease) (HCC)   • Essential hypertension   • Mixed hyperlipidemia   • Bilateral carotid artery  stenosis   • Acute deep vein thrombosis (DVT) of distal vein of right lower extremity (HCC)   • Atherosclerosis of artery of extremity with intermittent claudication (HCC)   • Chronic osteoarthritis   • Decreased pedal pulses   • Nonhealing ulcer of right lower leg with fat layer exposed (HCC)   • Hyperglycemia   • Type 2 diabetes mellitus with hyperglycemia, without long-term current use of insulin (HCC)   • Acute kidney injury (HCC)   • Postoperative anemia due to acute blood loss   • Hematuria        Diagnosis Plan   1. PAD (peripheral artery disease) (HCC)  US Ankle / Brachial Indices Extremity Complete   2. Bilateral carotid artery stenosis     3. Essential hypertension     4. Mixed hyperlipidemia         Plan: After thoroughly evaluating Evelyn Ojeda, I believe the best course of action is to remain conservative from vascular surgery standpoint.  Currently she is doing well denies any change to her lower extremities.  Her right WILBERT is noncompressible however waveforms are triphasic.  Her left lower extremity shows severe arterial insufficiency but she reports no discomfort or wounds to her left.  Her carotid duplex shows less than 50% carotid stenosis bilaterally.  We will see her back in 6 months with repeat noninvasive testing for continued surveillance, including ABIs.  We will repeat a carotid duplex in 1 year.  I did discuss vascular risk factors as they pertain to the progression of vascular disease including controlling her hypertension and hyperlipidemia.  Her blood pressure stable on her current medication regimen.  She is maintained on Lipitor for hyperlipidemia. The patient is to continue taking their medications as previously discussed.   This was all discussed in full with complete understanding.  Thank you for allowing me to participate in the care of your patient.  Please do not hesitate to call with any questions or concerns.  We will keep you aware of any further encounters with Evelyn Flaherty  [place].        Sincerely yours,         Jossie Medina, JAVID Garcia, Roberto Monsivais MD

## 2022-03-14 PROBLEM — I63.9 CEREBROVASCULAR ACCIDENT (CVA): Status: ACTIVE | Noted: 2022-01-01

## 2022-03-14 NOTE — H&P
[facility] Medicine Services  HISTORY AND PHYSICAL    Date of Admission: 3/14/2022  Primary Care Physician: Roberto Garcia MD    Subjective     Chief Complaint: CVA.    History of Present Illness  Patient is a 81-year-old presented to ER for evaluation of left-sided weakness.  Patient been having this symptom for about a week now.  Patient has been admitted to [facility] 3/4/2022 to 3/6/2022 for diabetes and dehydration.  Patient's daughter stated since she came back to the hospital she has never been the same.  Patient has been progressing in a week or over the last week, patient is requiring a cane to walk around per patient.  Daughter stated that patient was extremely weak this morning at 8:30 AM this morning.  Patient has been difficulty swallowing this morning about 2 PM per daughter with drooling on the left side and lefts arm weakness.  EMS was then called patient came to the [facility] ER for code stroke.    Patient has past medical history diabetes, DVT, hypertension, peripheral vascular disease.    CT scan shows CVA . CTA of the neck and head shows no significant blockage.    Review of Systems   Constitutional: Positive for activity change, appetite change and fatigue. Negative for chills and fever.   HENT: Negative for hearing loss, nosebleeds, tinnitus and trouble swallowing.    Eyes: Negative for visual disturbance.   Respiratory: Negative for cough, chest tightness, shortness of breath and wheezing.    Cardiovascular: Negative for chest pain, palpitations and leg swelling.   Gastrointestinal: Negative for abdominal distention, abdominal pain, blood in stool, constipation, diarrhea, nausea and vomiting.   Endocrine: Negative for cold intolerance, heat intolerance, polydipsia, polyphagia and polyuria.   Genitourinary: Negative for decreased urine volume, difficulty urinating, dysuria, flank pain, frequency and hematuria.    Musculoskeletal: Positive for arthralgias, gait problem and myalgias. Negative for joint swelling.   Skin: Negative for rash.   Allergic/Immunologic: Negative for immunocompromised state.   Neurological: Positive for weakness. Negative for dizziness, syncope, light-headedness and headaches.   Hematological: Negative for adenopathy. Does not bruise/bleed easily.   Psychiatric/Behavioral: Negative for confusion and sleep disturbance. The patient is not nervous/anxious.         Otherwise complete ROS reviewed and negative except as mentioned in the HPI.      Past Medical History:   Past Medical History:   Diagnosis Date   • Diabetes mellitus (HCC)    • DVT (deep venous thrombosis) (HCC)     RLL   • Hypertension    • PVD (peripheral vascular disease) (HCC)        Past Surgical History:  Past Surgical History:   Procedure Laterality Date   • AORTAGRAM N/A 1/6/2021    Procedure: 1.  Introduction of catheter/sheath into the aorta 2.  Aortoiliac angiogram with right lower extremity runoff 3.  Contralateral cannulation of the right common iliac artery 4.  Placement of a 4 mm spider distal embolic protection device in the below-knee popliteal artery 5.  Directional atherectomy of the proximal SFA and below-knee popliteal artery using the 6 [place] [employer]her[employer] d   • CHOLECYSTECTOMY     • KNEE SURGERY     • LEG SURGERY         Family History: family history includes Cancer in her mother and sister.    Social History:  reports that she has never smoked. She has never used smokeless tobacco. She reports that she does not drink alcohol and does not use drugs.    Allergies:  Allergies   Allergen Reactions   • Codeine Other (See Comments)     Medications:  Prior to Admission medications    Medication Sig Start Date End Date Taking? Authorizing Provider   aspirin 81 MG EC tablet Take 81 mg by mouth Daily.    Provider, MD Ana   atorvastatin (LIPITOR) 20 MG tablet Take 20 mg by mouth Daily. 12/3/20   Provider,  "MD Ana   Cholecalciferol (Vitamin D3) 50 MCG (2000 UT) tablet Take 2,500 Units by mouth Daily.    Ana Arce MD   clopidogrel (PLAVIX) 75 MG tablet Take 1 tablet by mouth Daily. 1/14/22   Jossie Medina APRN   ferrous sulfate 325 (65 FE) MG tablet Take 1 tablet by mouth Daily With Breakfast. 1/11/21   Jorge Riley MD   gabapentin (NEURONTIN) 600 MG tablet Take 600 mg by mouth 3 (Three) Times a Day. 12/3/20   Ana Arce MD   HYDROcodone-acetaminophen (NORCO)  MG per tablet Take 1 tablet by mouth Every 6 (Six) Hours As Needed for Moderate Pain . 1/10/21   Jorge Riley MD   lisinopril (PRINIVIL,ZESTRIL) 20 MG tablet Take 20 mg by mouth Daily. 12/3/20   Ana Arce MD   meloxicam (MOBIC) 15 MG tablet Take 15 mg by mouth Daily. 6/11/21   Ana Arce MD   metFORMIN (GLUCOPHAGE) 500 MG tablet Take 500 mg by mouth Daily With Dinner. 12/3/20   Ana Arce MD   vitamin B-12 (CYANOCOBALAMIN) 1000 MCG tablet Take 1,000 mcg by mouth Daily.    Ana Arce MD       I have utilized all available immediate resources to obtain, update, and review the patient's current medications.    Objective     Vital Signs: /60   Pulse 74   Temp 97.4 °F (36.3 °C) (Oral)   Resp 16   Ht 154.9 cm (61\")   Wt 75.3 kg (165 lb 14.4 oz)   SpO2 100%   BMI 31.35 kg/m²   Physical Exam  Vitals and nursing note reviewed.   Constitutional:       Appearance: She is well-developed.      Comments: Advanced age.   HENT:      Head: Normocephalic.   Eyes:      Conjunctiva/sclera: Conjunctivae normal.      Pupils: Pupils are equal, round, and reactive to light.   Neck:      Vascular: No JVD.   Cardiovascular:      Rate and Rhythm: Normal rate and regular rhythm.      Heart sounds: Normal heart sounds. No murmur heard.    No friction rub. No gallop.   Pulmonary:      Effort: Pulmonary effort is normal. No respiratory distress.      Breath sounds: Normal " breath sounds. No wheezing or rales.   Chest:      Chest wall: No tenderness.   Abdominal:      General: Bowel sounds are normal. There is no distension.      Palpations: Abdomen is soft.      Tenderness: There is no abdominal tenderness. There is no guarding or rebound.      Comments: Obesity.   Musculoskeletal:         General: No tenderness or deformity.      Cervical back: Neck supple.      Comments: Arthritis multiple knuckle   Skin:     General: Skin is warm and dry.      Capillary Refill: Capillary refill takes 2 to 3 seconds.      Findings: No rash.   Neurological:      Mental Status: She is alert and oriented to person, place, and time.      Cranial Nerves: No cranial nerve deficit.      Motor: Weakness present. No abnormal muscle tone.      Gait: Gait abnormal.      Deep Tendon Reflexes: Reflexes normal.      Comments: Cranial keystroke grossly intact.  Negative pronator drift.  Negative finger touch.  Strength 5 out of 5 symmetrical.   Psychiatric:         Behavior: Behavior normal.         Thought Content: Thought content normal.             Results Reviewed:    Lab Results (last 24 hours)     Procedure Component Value Units Date/Time    [place]man Draw [[id number]] Collected: 03/14/22 1600    Specimen: Blood Updated: 03/14/22 1703    Narrative:      The following orders were created for panel order [place]man Draw.  Procedure                               Abnormality         Status                     ---------                               -----------         ------                     Green Top (Gel)[[id number]]                                  Final result               Lavender Top[[id number]]                                     Final result               Red Top[[id number]]                                          Final result               Light Blue Top[[id number]]                                   Final result                 Please view results for these tests on the individual orders.    Green Top (Gel)  [[id number]] Collected: 03/14/22 1600    Specimen: Blood Updated: 03/14/22 1703     Extra Tube Hold for add-ons.     Comment: Auto resulted.       Red Top [[id number]] Collected: 03/14/22 1600    Specimen: Blood Updated: 03/14/22 1703     Extra Tube Hold for add-ons.     Comment: Auto resulted.       Light Blue Top [[id number]] Collected: 03/14/22 1600    Specimen: Blood Updated: 03/14/22 1703     Extra Tube hold for add-on     Comment: Auto resulted       Lavender Top [[id number]] Collected: 03/14/22 1600    Specimen: Blood Updated: 03/14/22 1703     Extra Tube hold for add-on     Comment: Auto resulted       Comprehensive Metabolic Panel [[id number]]  (Abnormal) Collected: 03/14/22 1600    Specimen: Blood Updated: 03/14/22 1630     Glucose 166 mg/dL      BUN 13 mg/dL      Creatinine 1.06 mg/dL      Sodium 139 mmol/L      Potassium 4.0 mmol/L      Chloride 102 mmol/L      CO2 25.0 mmol/L      Calcium 9.5 mg/dL      Total Protein 6.1 g/dL      Albumin 3.40 g/dL      ALT (SGPT) 16 U/L      AST (SGOT) 20 U/L      Alkaline Phosphatase 97 U/L      Total Bilirubin 0.3 mg/dL      Globulin 2.7 gm/dL      A/G Ratio 1.3 g/dL      BUN/Creatinine Ratio 12.3     Anion Gap 12.0 mmol/L      eGFR 53.5 mL/min/1.73      Comment: National Kidney Foundation and American Society of Nephrology (ASN) Task Force recommended calculation based on the Chronic Kidney Disease Epidemiology Collaboration (CKD-EPI) equation refit without adjustment for race.       Narrative:      GFR Normal >60  Chronic Kidney Disease <60  Kidney Failure <15      Troponin [[id number]]  (Normal) Collected: 03/14/22 1600    Specimen: Blood Updated: 03/14/22 1628     Troponin T 0.012 ng/mL     Narrative:      Troponin T Reference Range:  <= 0.03 ng/mL-   Negative for AMI  >0.03 ng/mL-     Abnormal for myocardial necrosis.  Clinicians would have to utilize clinical acumen, EKG, Troponin and serial changes to determine if it is an Acute Myocardial Infarction or myocardial  injury due to an underlying chronic condition.       Results may be falsely decreased if patient taking Biotin.      Protime-INR [[id number]]  (Normal) Collected: 03/14/22 1600    Specimen: Blood Updated: 03/14/22 1622     Protime 13.0 Seconds      INR 1.02    aPTT [[id number]]  (Normal) Collected: 03/14/22 1600    Specimen: Blood Updated: 03/14/22 1622     PTT 27.5 seconds     CBC & Differential [[id number]]  (Abnormal) Collected: 03/14/22 1600    Specimen: Blood Updated: 03/14/22 1612    Narrative:      The following orders were created for panel order CBC & Differential.  Procedure                               Abnormality         Status                     ---------                               -----------         ------                     CBC Auto Differential[[id number]]        Abnormal            Final result                 Please view results for these tests on the individual orders.    CBC Auto Differential [[id number]]  (Abnormal) Collected: 03/14/22 1600    Specimen: Blood Updated: 03/14/22 1612     WBC 9.27 10*3/mm3      RBC 3.87 10*6/mm3      Hemoglobin 11.5 g/dL      Hematocrit 36.4 %      MCV 94.1 fL      MCH 29.7 pg      MCHC 31.6 g/dL      RDW 12.5 %      RDW-SD 43.4 fl      MPV 10.0 fL      Platelets 350 10*3/mm3      Neutrophil % 64.7 %      Lymphocyte % 23.7 %      Monocyte % 9.1 %      Eosinophil % 1.1 %      Basophil % 0.3 %      Immature Grans % 1.1 %      Neutrophils, Absolute 6.00 10*3/mm3      Lymphocytes, Absolute 2.20 10*3/mm3      Monocytes, Absolute 0.84 10*3/mm3      Eosinophils, Absolute 0.10 10*3/mm3      Basophils, Absolute 0.03 10*3/mm3      Immature Grans, Absolute 0.10 10*3/mm3      nRBC 0.0 /100 WBC            Radiology Data:    Imaging Results (Last 24 Hours)     Procedure Component Value Units Date/Time    XR Chest 1 View [[id number]] Collected: 03/14/22 1614     Updated: 03/14/22 1618    Narrative:      EXAMINATION:  XR CHEST 1 VW-  3/14/2022 4:08 PM CDT     HISTORY: Acute  Stroke Protocol (Onset < 12 hrs)     COMPARISON: No comparison study.     FINDINGS:  There is hypoventilation with vascular crowding. There is  minimal bronchial wall thickening. There is no focal infiltrate. The  heart is normal in size. The thoracic aorta is atheromatous. There are  degenerative changes of the spine. Surgical anchor is noted in the right  humeral head.       Impression:      1. No focal infiltrate.  2. Mild hypoventilation with vascular crowding. Mild bronchial wall  thickening.        This report was finalized on 03/14/2022 16:15 by Dr. Tim Ozuna MD.    CT Angiogram Neck [[id number]] Collected: 03/14/22 1556     Updated: 03/14/22 1606    Narrative:      EXAMINATION: CT ANGIOGRAM NECK-      3/14/2022 3:27 PM CDT     HISTORY: Stroke, follow up     In order to have a CT radiation dose as low as reasonably achievable  Automated Exposure Control was utilized for adjustment of the mA and/or  KV according to patient size.     DLP in mGycm= 199.     CT angiogram neck.  CT angiography protocol.   CT imaging with bolus IV contrast injection.   Under concurrent supervision axial, sagittal, coronal, and  three-dimensional data sets were constructed.     Aortic arch calcification.  Patent great vessel origins though there is moderate atherosclerotic  calcification with approximately 50% narrowing of the proximal left  subclavian artery.     Patchy atherosclerotic calcification within the common carotid arteries.     Atherosclerotic calcification at the left ICA origin and within the  proximal 22 mm of the left ICA.  Left ICA origin stenosis with lumen diameter ratio = 3.4/4.5.  25% stenosis based on ACAS/NASCET criteria.  The degree of stenosis is derived by comparing the narrowest segment  with the distal luminal diameter.  2 cm distal to the left ICA origin there is calcified plaque with less  prominent stenosis with a lumen diameter ratio = 4.0/4.5.  12% stenosis based on ACAS/NASCET criteria.  The  degree of stenosis is derived by comparing the narrowest segment  with the distal luminal diameter.     Atherosclerotic calcification within the proximal 15 mm of the right  ICA.  Calcified plaque at the right ICA origin with mild stenosis and lumen  diameter ratio = 3.7/4.3.  14% stenosis based on ACAS/NASCET criteria.  The degree of stenosis is derived by comparing the narrowest segment  with the distal luminal diameter.     No intraluminal thrombus.     The right vertebral artery is dominant.  The left vertebral artery is patent though diminutive throughout its  length.     Summary:  1. Patchy atherosclerotic plaque at both carotid bifurcations with no  high-grade stenosis.  2. The greatest degree of stenosis is at the left ICA origin where there  is 25% narrowing.                                      This report was finalized on 03/14/2022 16:03 by Dr. Jorge Corrigan MD.    CT Angiogram Head w AI Analysis of LVO [[id number]] Collected: 03/14/22 1554     Updated: 03/14/22 1559    Narrative:      EXAMINATION: CT ANGIOGRAM HEAD W AI ANALYSIS OF LVO-     3/14/2022 3:27 PM CDT     HISTORY: Acute Stroke     CT angiogram head.  CT angiography protocol.   CT imaging with bolus IV contrast injection.   Under concurrent supervision axial, sagittal, coronal, and  three-dimensional data sets were constructed.     In order to have a CT radiation dose as low as reasonably achievable  Automated Exposure Control was utilized for adjustment of the mA and/or  KV according to patient size.     DLP in mGycm= 199.     Calcified though patent distal internal carotid arteries.     Intact [place].  Anterior, middle, and posterior cerebral arteries are normally patent.  No aneurysm or proximal arterial occlusion.     There is absence of opacification of distal right MCA branches in the  area of known infarction.     Summary:  1. Intact [place].  2. There is no central arterial occlusion or thrombus.   This report was  finalized on 03/14/2022 15:56 by Dr. Jorge Corrigan MD.    CT Head Without Contrast Stroke Protocol [[id number]] Collected: 03/14/22 1531     Updated: 03/14/22 1536    Narrative:      EXAMINATION: CT HEAD WO CONTRAST STROKE PROTOCOL-      3/14/2022 3:26 PM CDT     HISTORY: Stroke, follow up     In order to have a CT radiation dose as low as reasonably achievable  Automated Exposure Control was utilized for adjustment of the mA and/or  KV according to patient size.     DLP in mGycm= 600.     Noncontrast head CT.     Large right MCA hypodense subacute infarct involving an area measuring  approximately 7 x 5 x 4 cm.     No hemorrhagic conversion.  No midline shift.     Ventricle size is normal.     Mild atrophy and small vessel disease.     Summary:  1. Large hypodense subacute right MCA infarct.  2. No intracranial hemorrhage.                                   This report was finalized on 03/14/2022 15:33 by Dr. Jorge Corrigan MD.          I have personally reviewed and interpreted the radiology studies and ECG obtained at time of admission.     Assessment / Plan      Assessment & Plan  Active Hospital Problems    Diagnosis    • Cerebrovascular accident (CVA) (HCC)      Plan .    CVA .  No aspirin for now per neurology.  Chest x-ray-No focal infiltrate, Mild hypoventilation with vascular crowding. Mild bronchial wall  Thickening.  CTA of the head-Intact [place], no central arterial occlusion or thrombus.   CTA of the neck-Patchy atherosclerotic plaque at both carotid bifurcations with no high-grade stenosis, greatest degree of stenosis is at the left ICA origin where there is 25% narrowing.  CT scan head-Large hypodense subacute right MCA infarct, No intracranial hemorrhage.  MRI of the brain pending.    CAD/hypertension/hyperlipidemia.  Lipitor high-dose per neurology.  Hold Plavix per neurology.  Continue lisinopril.  Echocardiogram pending.    Anemia.  Hold iron sulfate.    Chronic pain/arthritis.  Cut back  Neurontin.  Cut back Norco.  Hold Mobic.    Diabetes .  Hold Glucophage.    Renal insufficiency.  We will follow.    Nausea.  Zofran as needed    Code Status/Advanced Care Plan: Full code.    The patient's surrogate decision maker is Lashell, daughter.      I discussed the patient's findings and my recommendations with: Patient and daughter    Estimated length of stay: 1 to 3 days    Electronically signed by Nik Goodson MD, 03/14/22, 5:15 PM CDT.

## 2022-03-14 NOTE — CONSULTS
Neurology Consult Note    Referring Provider: Rajendra Gutierrez MD  Reason for Consultation: code stroke/suspect acute stroke      History of present illness:    This is a 81 y.o. female patient with PMH of DM, DVT, HTN, PAD, on DAPT,prior PTCA SFA and popliteal/tibial artery 2021 recently started on insulin after hospital stay at [facility] 3/4-3/6/2022 for diabetes and dehydration. Patient lives with her 2 daughters. History is obtained by daughter who is at the bedside. Daughter states patient was in normal state this AM when daughter left for doctor appointment this AM and returned about 1345. Patient states she was having difficulty swallowing this AM. About 1400 daughter noted drooling from left side of mouth and left arm weakness. EMS was called and patient transported to [facility] ED where code stroke was called. PCP is Dr. Fry.     CT head showed subacute/acute right PCA stroke about 1/3 or more in size and possibly petechial hemorrhage. CTA head and neck were done that showed distal MCA occlusion. While it CT it was noted LUE was flaccid. BP 140s/80s.     Of note, daughter reports, patient had upcoming appointment with Dr. Bryant for cardiac reason and daughter thinks may be arrhythmia purpose.       Past Medical History:   Diagnosis Date    Diabetes mellitus (HCC)     DVT (deep venous thrombosis) ([facility])     RLL    Hypertension     PVD (peripheral vascular disease) ([facility])        Allergies   Allergen Reactions    Codeine Other (See Comments)     No current facility-administered medications on file prior to encounter.     Current Outpatient Medications on File Prior to Encounter   Medication Sig    aspirin 81 MG EC tablet Take 81 mg by mouth Daily.    atorvastatin (LIPITOR) 20 MG tablet Take 20 mg by mouth Daily.    Cholecalciferol (Vitamin D3) 50 MCG (2000 UT) tablet Take 2,500 Units by mouth Daily.    clopidogrel (PLAVIX) 75 MG tablet Take 1 tablet by mouth Daily.    ferrous sulfate 325 (65 FE) MG tablet  Take 1 tablet by mouth Daily With Breakfast.    gabapentin (NEURONTIN) 600 MG tablet Take 600 mg by mouth 3 (Three) Times a Day.    HYDROcodone-acetaminophen (NORCO)  MG per tablet Take 1 tablet by mouth Every 6 (Six) Hours As Needed for Moderate Pain .    lisinopril (PRINIVIL,ZESTRIL) 20 MG tablet Take 20 mg by mouth Daily.    meloxicam (MOBIC) 15 MG tablet Take 15 mg by mouth Daily.    metFORMIN (GLUCOPHAGE) 500 MG tablet Take 500 mg by mouth Daily With Dinner.    vitamin B-12 (CYANOCOBALAMIN) 1000 MCG tablet Take 1,000 mcg by mouth Daily.       Current Facility-Administered Medications:     sodium chloride 0.9 % flush 10 mL, 10 mL, Intravenous, PRN, Rajendra Gutierrez MD    Current Outpatient Medications:     aspirin 81 MG EC tablet, Take 81 mg by mouth Daily., Disp: , Rfl:     atorvastatin (LIPITOR) 20 MG tablet, Take 20 mg by mouth Daily., Disp: , Rfl:     Cholecalciferol (Vitamin D3) 50 MCG (2000 UT) tablet, Take 2,500 Units by mouth Daily., Disp: , Rfl:     clopidogrel (PLAVIX) 75 MG tablet, Take 1 tablet by mouth Daily., Disp: 90 tablet, Rfl: 2    ferrous sulfate 325 (65 FE) MG tablet, Take 1 tablet by mouth Daily With Breakfast., Disp: 30 tablet, Rfl: 2    gabapentin (NEURONTIN) 600 MG tablet, Take 600 mg by mouth 3 (Three) Times a Day., Disp: , Rfl:     HYDROcodone-acetaminophen (NORCO)  MG per tablet, Take 1 tablet by mouth Every 6 (Six) Hours As Needed for Moderate Pain ., Disp: 12 tablet, Rfl: 0    lisinopril (PRINIVIL,ZESTRIL) 20 MG tablet, Take 20 mg by mouth Daily., Disp: , Rfl:     meloxicam (MOBIC) 15 MG tablet, Take 15 mg by mouth Daily., Disp: , Rfl:     metFORMIN (GLUCOPHAGE) 500 MG tablet, Take 500 mg by mouth Daily With Dinner., Disp: , Rfl:     vitamin B-12 (CYANOCOBALAMIN) 1000 MCG tablet, Take 1,000 mcg by mouth Daily., Disp: , Rfl:   Social History     Socioeconomic History    Marital status:    Tobacco Use    Smoking status: Never Smoker    Smokeless tobacco: Never Used    Substance and Sexual Activity    Alcohol use: Never    Drug use: Never    Sexual activity: Defer     Family History   Problem Relation Age of Onset    Cancer Mother     Cancer Sister        Review of Systems  A 14 point review of systems was reviewed and was negative except for drooling on left and LUE weakness.     Vital Signs   Temp:  [97.4 °F (36.3 °C)] 97.4 °F (36.3 °C)  Heart Rate:  [86] 86  Resp:  [16] 16  BP: (148)/(86) 148/86    EKG showed sinus rhythm.       General Exam:  Head:  Normal cephalic, atraumatic  HEENT:  Neck supple  Fundoscopic Exam:  No signs of disc edema  CVS:  Regular rate and rhythm.  No murmurs  Carotid Examination:  No bruits  Lungs:  Clear to auscultation  Abdomen:  Non-tender, Non-distended  Extremities:  No signs of peripheral edema  Skin:  No rashes    Neurologic Exam:    Mental Status:    -Awake, Alert, Oriented X 3  -No word finding difficulties  -No aphasia  -No dysarthria  -Follows simple and complex commands    CN II:  Visual fields with left visual field deficit.  Pupils equally reactive to light. Left neglect.   CN III, IV, VI:  Extraocular Muscles full with no signs of nystagmus  CN V:  Facial sensory is symmetric with no asymmetries.  CN VII:  Facial motor asymmetric with left lower facial weakness.   CN VIII:  Gross hearing intact bilaterally  CN IX:  Palate elevates symmetrically  CN X:  Palate elevates symmetrically  CN XI:  Shoulder shrug symmetric  CN XII:  Tongue is midline on protrusion    Motor: (strength out of 5:  1= minimal movement, 2 = movement in plane of gravity, 3 = movement against gravity, 4 = movement against some resistance, 5 = full strength)    -Right Upper Ext: Proximal: 5 Distal: 5  -Left Upper Ext: Proximal: 4 Distal: 4    -Right Lower Ext: Proximal: 5 Distal: 5  -Left Lower Ext: Proximal: 5 Distal: 5    DTR:  -Right   Bicep: 2+ Tricep: 2+ Brachioradialis: 2+   Patella: 2+ Ankle: 2+ Neg Babinski  -Left   Bicep: 2+ Tricep: 2+ Brachioradialis:  2+   Patella: 2+ Ankle: 2+ Neg Babinski    Sensory:  -Intact to light touch, pinprick, temperature, pain, and proprioception    Coordination:  -Finger to nose intact. Mild dysmetria on left.   -Heel to shin intact  -No ataxia    Gait  -not tested during code stroke.       Results Review:  Lab Results (last 24 hours)       ** No results found for the last 24 hours. **            .  Imaging Results (Last 24 Hours)       Procedure Component Value Units Date/Time    CT Angiogram Head w AI Analysis of LVO [[id number]] Collected: 03/14/22 1554     Updated: 03/14/22 1559    Narrative:      EXAMINATION: CT ANGIOGRAM HEAD W AI ANALYSIS OF LVO-     3/14/2022 3:27 PM CDT     HISTORY: Acute Stroke     CT angiogram head.  CT angiography protocol.   CT imaging with bolus IV contrast injection.   Under concurrent supervision axial, sagittal, coronal, and  three-dimensional data sets were constructed.     In order to have a CT radiation dose as low as reasonably achievable  Automated Exposure Control was utilized for adjustment of the mA and/or  KV according to patient size.     DLP in mGycm= 199.     Calcified though patent distal internal carotid arteries.     Intact [place].  Anterior, middle, and posterior cerebral arteries are normally patent.  No aneurysm or proximal arterial occlusion.     There is absence of opacification of distal right MCA branches in the  area of known infarction.     Summary:  1. Intact [place].  2. There is no central arterial occlusion or thrombus.   This report was finalized on 03/14/2022 15:56 by Dr. Jorge Corrigan MD.    CT Angiogram Neck [[id number]] Resulted: 03/14/22 1527     Updated: 03/14/22 1544    CT Head Without Contrast Stroke Protocol [[id number]] Collected: 03/14/22 1531     Updated: 03/14/22 1536    Narrative:      EXAMINATION: CT HEAD WO CONTRAST STROKE PROTOCOL-      3/14/2022 3:26 PM CDT     HISTORY: Stroke, follow up     In order to have a CT radiation dose as low as  reasonably achievable  Automated Exposure Control was utilized for adjustment of the mA and/or  KV according to patient size.     DLP in mGycm= 600.     Noncontrast head CT.     Large right MCA hypodense subacute infarct involving an area measuring  approximately 7 x 5 x 4 cm.     No hemorrhagic conversion.  No midline shift.     Ventricle size is normal.     Mild atrophy and small vessel disease.     Summary:  1. Large hypodense subacute right MCA infarct.  2. No intracranial hemorrhage.                                   This report was finalized on 03/14/2022 15:33 by Dr. Jorge Corrigan MD.                Impression  1. Patient presenting with acute onset of drooling on left with left facial weakness and LUE weakness with CT finding of subacute/acute right MCA/PCA stroke. IV TPA not considered based upon size of stroke of more than 1/3 of hemisphere and disparities of last known well. Reviewing CT head there is concern for possibility of CAA (cerebral amyloid angiopathy).   2. DM  3. Hypertension  4. History of DVT and PAD  5. Hyperlipidemia.       Plan  1. Admit to hospitalist service.  2. MRI brain.  3. No need for carotid duplex scan as CTA head and neck were completed.  4. Transthoracic echo with bubble.  5. Lipid panel, A1C.  6. OT/PT/ST  7. SCD for DVT prophylaxis.  8. Hold ASA and plavix for now as CT head possibly suggestive of petechial hemorrhage.   9. Cardiac telemetry  10. Lipitor 80 mg daily.      I discussed the patients findings and my recommendations with patient, family and consulting provider    Tami Sharma, APRN  03/14/22  16:04 CDT      Reviewed and agree with above.    Ney Rodriguez MD

## 2022-03-14 NOTE — ED PROVIDER NOTES
Subjective   Patient with left-sided weakness the thought process was a sudden onset but after reviewing the case with the neurologist and with the patient and family if she may be having had this for a week      Cerebrovascular Accident  The primary symptoms include dizziness, paresthesias, focal weakness and nausea. Primary symptoms do not include headaches, syncope, speech change, memory loss or vomiting. The symptoms began 3 to 5 days ago.   The symptoms are unchanged. The neurological symptoms are focal.   The dizziness began more than 1 week ago. The dizziness has been unchanged since its onset. Dizziness also occurs with nausea and weakness. Dizziness does not occur with blurred vision, tinnitus or vomiting.   Additional symptoms include weakness. Additional symptoms do not include neck stiffness, pain, lower back pain, hallucinations, nystagmus, tinnitus, vertigo, anxiety or dysphoric mood. Medical issues also include cerebral vascular accident. Workup history does not include MRI, CT scan or lumbar puncture.       Review of Systems   HENT: Negative.  Negative for tinnitus.    Eyes: Negative.  Negative for blurred vision.   Respiratory: Negative.    Cardiovascular: Negative.  Negative for syncope.   Gastrointestinal: Positive for nausea. Negative for vomiting.   Musculoskeletal: Negative.  Negative for back pain, neck pain and neck stiffness.   Skin: Negative.    Neurological: Positive for dizziness, focal weakness, weakness and paresthesias. Negative for vertigo, speech change and headaches.   Psychiatric/Behavioral: Negative for dysphoric mood, hallucinations and memory loss.   All other systems reviewed and are negative.      Past Medical History:   Diagnosis Date   • Diabetes mellitus (HCC)    • DVT (deep venous thrombosis) ([facility])     RLL   • Hypertension    • PVD (peripheral vascular disease) ([facility])        Allergies   Allergen Reactions   • Codeine Other (See Comments)       Past Surgical History:    Procedure Laterality Date   • AORTAGRAM N/A 1/6/2021    Procedure: 1.  Introduction of catheter/sheath into the aorta 2.  Aortoiliac angiogram with right lower extremity runoff 3.  Contralateral cannulation of the right common iliac artery 4.  Placement of a 4 mm spider distal embolic protection device in the below-knee popliteal artery 5.  Directional atherectomy of the proximal SFA and below-knee popliteal artery using the 6 [place] [employer] Pantheris d   • CHOLECYSTECTOMY     • KNEE SURGERY     • LEG SURGERY         Family History   Problem Relation Age of Onset   • Cancer Mother    • Cancer Sister        Social History     Socioeconomic History   • Marital status:    Tobacco Use   • Smoking status: Never Smoker   • Smokeless tobacco: Never Used   Substance and Sexual Activity   • Alcohol use: Never   • Drug use: Never   • Sexual activity: Defer           Objective   Physical Exam  Vitals and nursing note reviewed. Exam conducted with a chaperone present.   Constitutional:       General: She is not in acute distress.     Appearance: Normal appearance. She is not ill-appearing or diaphoretic.      Comments: Confused   HENT:      Head: Normocephalic and atraumatic.      Nose: Nose normal.      Mouth/Throat:      Mouth: Mucous membranes are moist.      Pharynx: Oropharynx is clear.   Eyes:      General: No visual field deficit or scleral icterus.     Extraocular Movements: Extraocular movements intact.      Conjunctiva/sclera: Conjunctivae normal.      Pupils: Pupils are equal, round, and reactive to light.   Neck:      Vascular: No carotid bruit.   Cardiovascular:      Rate and Rhythm: Normal rate and regular rhythm.      Pulses: Normal pulses.      Heart sounds: No murmur heard.    No friction rub.   Pulmonary:      Effort: Pulmonary effort is normal. No respiratory distress.      Breath sounds: Normal breath sounds. No stridor.   Abdominal:      General: Abdomen is flat. Bowel sounds are normal. There is  no distension.      Palpations: There is no mass.      Tenderness: There is no abdominal tenderness.   Musculoskeletal:         General: No swelling or tenderness. Normal range of motion.      Cervical back: Normal range of motion and neck supple. No rigidity. No muscular tenderness.   Lymphadenopathy:      Cervical: No cervical adenopathy.   Skin:     General: Skin is warm.      Capillary Refill: Capillary refill takes less than 2 seconds.      Coloration: Skin is not jaundiced or pale.      Findings: No bruising or rash.   Neurological:      General: No focal deficit present.      Mental Status: She is alert. Mental status is at baseline. She is disoriented and confused.      GCS: GCS eye subscore is 4. GCS verbal subscore is 5. GCS motor subscore is 6.      Cranial Nerves: Cranial nerves are intact. No cranial nerve deficit, dysarthria or facial asymmetry.      Motor: Weakness and abnormal muscle tone present. No seizure activity or pronator drift.      Deep Tendon Reflexes: Reflexes are normal and symmetric. Babinski sign absent on the right side. Babinski sign absent on the left side.      Reflex Scores:       Bicep reflexes are 2+ on the right side and 2+ on the left side.       Patellar reflexes are 2+ on the right side and 2+ on the left side.     Comments: Left-sided weakness coordination not checked   Psychiatric:         Attention and Perception: Attention normal.         Mood and Affect: Mood and affect normal.         Speech: Speech normal.         Behavior: Behavior normal.         Procedures           ED Course  ED Course as of 03/14/22 1735   Mon Mar 14, 2022   1605 Neurology has seen the patient [TS]   1615 Normal sinus rhythm a lot of baseline artifact we will repeat EKG [TS]   1712 discussed with neurology and hospitalist will admit to the hospital service [TS]   1735 Hemant EKG shows normal sinus rhythm rate 83 bpm no acute ischemia [TS]      ED Course User Index  [TS] Rajendra Gutierrez MD                                                  MDM  Number of Diagnoses or Management Options  Diagnosis management comments: Patient with a possible stroke       Amount and/or Complexity of Data Reviewed  Clinical lab tests: ordered and reviewed  Tests in the radiology section of CPT®: ordered and reviewed  Tests in the medicine section of CPT®: reviewed and ordered    Risk of Complications, Morbidity, and/or Mortality  Presenting problems: moderate  Diagnostic procedures: moderate  Management options: moderate        Final diagnoses:   Cerebrovascular accident (CVA), unspecified mechanism (HCC)       ED Disposition  ED Disposition     ED Disposition   Decision to Admit    Condition   --    Comment   Level of Care: Telemetry [5]   Diagnosis: Cerebrovascular accident (CVA), unspecified mechanism (HCC) [[id number]]   Admitting Physician: JOAO PEARCE [[id number]]   Attending Physician: JOAO PEARCE [[id number]]   Certification: I Certify That Inpatient Hospital Services Are Medically Necessary For Greater Than 2 Midnights               No follow-up provider specified.       Medication List      No changes were made to your prescriptions during this visit.          Rajendra Gutierrez MD  03/14/22 5098       Rajendra Gutierrez MD  03/14/22 1738

## 2022-03-15 PROBLEM — I25.10 CAD (CORONARY ARTERY DISEASE): Status: ACTIVE | Noted: 2022-01-01

## 2022-03-15 NOTE — PROGRESS NOTES
[facility] Medicine Services  INPATIENT PROGRESS NOTE    Length of Stay: 1  Date of Admission: 3/14/2022  Primary Care Physician: Roberto Garcia MD    Subjective   Chief Complaint: CVA.    HPI   Discussed with patient by MRI results, stroke.  Patient family wants patient go to rehab.  Patient denies any chest pain.  Blood pressure stable, afebrile.  Recommendation from neurology discussed with patient.  Echocardiogram still pending.    Review of Systems   Constitutional: Positive for activity change, appetite change and fatigue. Negative for chills and fever.   HENT: Negative for hearing loss, nosebleeds, tinnitus and trouble swallowing.    Eyes: Negative for visual disturbance.   Respiratory: Negative for cough, chest tightness, shortness of breath and wheezing.    Cardiovascular: Negative for chest pain, palpitations and leg swelling.   Gastrointestinal: Negative for abdominal distention, abdominal pain, blood in stool, constipation, diarrhea, nausea and vomiting.   Endocrine: Negative for cold intolerance, heat intolerance, polydipsia, polyphagia and polyuria.   Genitourinary: Negative for decreased urine volume, difficulty urinating, dysuria, flank pain, frequency and hematuria.   Musculoskeletal: Positive for arthralgias, gait problem and myalgias. Negative for joint swelling.   Skin: Negative for rash.   Allergic/Immunologic: Negative for immunocompromised state.   Neurological: Positive for weakness. Negative for dizziness, syncope, light-headedness and headaches.   Hematological: Negative for adenopathy. Does not bruise/bleed easily.   Psychiatric/Behavioral: Negative for confusion and sleep disturbance. The patient is not nervous/anxious.           All pertinent negatives and positives are as above. All other systems have been reviewed and are negative unless otherwise stated.     Objective    Temp:  [97.4 °F (36.3 °C)-98.5 °F (36.9 °C)] 98.2 °F (36.8  °C)  Heart Rate:  [68-89] 86  Resp:  [16] 16  BP: (112-191)/() 132/62    Intake/Output Summary (Last 24 hours) at 3/15/2022 1344  Last data filed at 3/15/2022 1300  Gross per 24 hour   Intake 480 ml   Output 500 ml   Net -20 ml     Physical Exam  Vitals and nursing note reviewed.   Constitutional:       Appearance: She is well-developed.      Comments: Advanced age.   HENT:      Head: Normocephalic.   Eyes:      Conjunctiva/sclera: Conjunctivae normal.      Pupils: Pupils are equal, round, and reactive to light.   Neck:      Vascular: No JVD.   Cardiovascular:      Rate and Rhythm: Normal rate and regular rhythm.      Heart sounds: Normal heart sounds. No murmur heard.    No friction rub. No gallop.   Pulmonary:      Effort: Pulmonary effort is normal. No respiratory distress.      Breath sounds: Normal breath sounds. No wheezing or rales.   Chest:      Chest wall: No tenderness.   Abdominal:      General: Bowel sounds are normal. There is no distension.      Palpations: Abdomen is soft.      Tenderness: There is no abdominal tenderness. There is no guarding or rebound.      Comments: Obesity.   Musculoskeletal:         General: No tenderness or deformity.      Cervical back: Neck supple.      Comments: Arthritis multiple knuckle   Skin:     General: Skin is warm and dry.      Capillary Refill: Capillary refill takes 2 to 3 seconds.      Findings: No rash.   Neurological:      Mental Status: She is alert and oriented to person, place, and time.      Cranial Nerves: No cranial nerve deficit.      Motor: Weakness present. No abnormal muscle tone.      Gait: Gait abnormal.      Deep Tendon Reflexes: Reflexes normal.      Comments: Cranial keystroke grossly intact.  Negative pronator drift.  Negative finger touch.  Strength 5 out of 5 symmetrical.   Psychiatric:         Behavior: Behavior normal.         Thought Content: Thought content normal.      Results Review:  Lab Results (last 24 hours)     Procedure  Component Value Units Date/Time    POC Glucose Once [[id number]]  (Abnormal) Collected: 03/15/22 1113    Specimen: Blood Updated: 03/15/22 1128     Glucose 141 mg/dL      Comment: : FELISA Reynolds ID: [id number]       TSH [[id number]]  (Normal) Collected: 03/15/22 0631    Specimen: Blood Updated: 03/15/22 0817     TSH 1.630 uIU/mL     Lipid Panel [[id number]]  (Abnormal) Collected: 03/15/22 0631    Specimen: Blood Updated: 03/15/22 0754     Total Cholesterol 266 mg/dL      Triglycerides 220 mg/dL      HDL Cholesterol 44 mg/dL      LDL Cholesterol  180 mg/dL      VLDL Cholesterol 42 mg/dL      LDL/HDL Ratio 4.05    Narrative:      Cholesterol Reference Ranges  (U.S. Department of Health and Human Services ATP III Classifications)    Desirable          <200 mg/dL  Borderline High    200-239 mg/dL  High Risk          >240 mg/dL      Triglyceride Reference Ranges  (U.S. Department of Health and Human Services ATP III Classifications)    Normal           <150 mg/dL  Borderline High  150-199 mg/dL  High             200-499 mg/dL  Very High        >500 mg/dL    HDL Reference Ranges  (U.S. Department of Health and Human Services ATP III Classifications)    Low     <40 mg/dl (major risk factor for CHD)  High    >60 mg/dl ('negative' risk factor for CHD)        LDL Reference Ranges  (U.S. Department of Health and Human Services ATP III Classifications)    Optimal          <100 mg/dL  Near Optimal     100-129 mg/dL  Borderline High  130-159 mg/dL  High             160-189 mg/dL  Very High        >189 mg/dL    Comprehensive Metabolic Panel [[id number]]  (Abnormal) Collected: 03/15/22 0631    Specimen: Blood Updated: 03/15/22 0751     Glucose 132 mg/dL      BUN 11 mg/dL      Creatinine 0.86 mg/dL      Sodium 138 mmol/L      Potassium 4.3 mmol/L      Chloride 102 mmol/L      CO2 23.0 mmol/L      Calcium 10.1 mg/dL      Total Protein 6.8 g/dL      Albumin 4.20 g/dL      ALT (SGPT) 17 U/L      AST (SGOT) 21 U/L       Alkaline Phosphatase 117 U/L      Total Bilirubin 0.5 mg/dL      Globulin 2.6 gm/dL      A/G Ratio 1.6 g/dL      BUN/Creatinine Ratio 12.8     Anion Gap 13.0 mmol/L      eGFR 68.8 mL/min/1.73      Comment: National Kidney Foundation and American Society of Nephrology (ASN) Task Force recommended calculation based on the Chronic Kidney Disease Epidemiology Collaboration (CKD-EPI) equation refit without adjustment for race.       Narrative:      GFR Normal >60  Chronic Kidney Disease <60  Kidney Failure <15      Hemoglobin A1c [[id number]]  (Abnormal) Collected: 03/15/22 0631    Specimen: Blood Updated: 03/15/22 0749     Hemoglobin A1C 11.00 %     Narrative:      Hemoglobin A1C Ranges:    Increased Risk for Diabetes  5.7% to 6.4%  Diabetes                     >= 6.5%  Diabetic Goal                < 7.0%    CBC Auto Differential [[id number]]  (Abnormal) Collected: 03/15/22 0631    Specimen: Blood Updated: 03/15/22 0736     WBC 9.00 10*3/mm3      RBC 4.47 10*6/mm3      Hemoglobin 13.2 g/dL      Hematocrit 42.3 %      MCV 94.6 fL      MCH 29.5 pg      MCHC 31.2 g/dL      RDW 12.7 %      RDW-SD 43.6 fl      MPV 10.7 fL      Platelets 336 10*3/mm3      Neutrophil % 57.0 %      Lymphocyte % 30.7 %      Monocyte % 9.0 %      Eosinophil % 1.6 %      Basophil % 0.7 %      Neutrophils, Absolute 5.14 10*3/mm3      Lymphocytes, Absolute 2.76 10*3/mm3      Monocytes, Absolute 0.81 10*3/mm3      Eosinophils, Absolute 0.14 10*3/mm3      Basophils, Absolute 0.06 10*3/mm3     Urinalysis With Culture If Indicated - Urine, Clean Catch [[id number]]  (Abnormal) Collected: 03/15/22 0306    Specimen: Urine, Clean Catch Updated: 03/15/22 0326     Color, UA Yellow     Appearance, UA Clear     pH, UA 7.5     Specific Gravity, UA >1.030     Glucose, UA Negative     Ketones, UA Negative     Bilirubin, UA Negative     Blood, UA Trace     Protein, UA Negative     Leuk Esterase, UA Moderate (2+)     Nitrite, UA Negative     Urobilinogen, UA 0.2  E.U./dL    Urinalysis, Microscopic Only - Urine, Clean Catch [[id number]]  (Abnormal) Collected: 03/15/22 0306    Specimen: Urine, Clean Catch Updated: 03/15/22 0326     RBC, UA 3-5 /HPF      WBC, UA 31-50 /HPF      Bacteria, UA 1+ /HPF      Squamous Epithelial Cells, UA 0-2 /HPF      Hyaline Casts, UA 0-2 /LPF      Methodology Automated Microscopy    Urine Culture - Urine, Urine, Clean Catch [[id number]] Collected: 03/15/22 0306    Specimen: Urine, Clean Catch Updated: 03/15/22 0326    COVID PRE-OP / PRE-PROCEDURE SCREENING ORDER (NO ISOLATION) - Swab, Nasal Cavity [[id number]]  (Normal) Collected: 03/14/22 1811    Specimen: Swab from Nasal Cavity Updated: 03/14/22 1852    Narrative:      The following orders were created for panel order COVID PRE-OP / PRE-PROCEDURE SCREENING ORDER (NO ISOLATION) - Swab, Nasal Cavity.  Procedure                               Abnormality         Status                     ---------                               -----------         ------                     COVID-19,[employer] IN-VASHTI...[[id number]]  Normal              Final result                 Please view results for these tests on the individual orders.    COVID-19,[employer] IN-HOUSE,Nasal Swab No Transport Media 3-4 HR TAT - Swab, Nasal Cavity [[id number]]  (Normal) Collected: 03/14/22 1811    Specimen: Swab from Nasal Cavity Updated: 03/14/22 1852     COVID19 Not Detected    Narrative:      Fact sheet for providers: https://www.fda.gov/media/927478/download     Fact sheet for patients: https://www.fda.gov/media/206697/download    Test performed by PCR.    Consider negative results in combination with clinical observations, patient history, and epidemiological information.  Fact sheet for providers: https://www.fda.gov/media/017370/download     Fact sheet for patients: https://www.fda.gov/media/830770/download    Test performed by PCR.    Consider negative results in combination with clinical observations, patient history, and  epidemiological information.    [place] Draw [[id number]] Collected: 03/14/22 1600    Specimen: Blood Updated: 03/14/22 1703    Narrative:      The following orders were created for panel order [place] Draw.  Procedure                               Abnormality         Status                     ---------                               -----------         ------                     Green Top (Gel)[[id number]]                                  Final result               Lavender Top[[id number]]                                     Final result               Red Top[[id number]]                                          Final result               Light Blue Top[[id number]]                                   Final result                 Please view results for these tests on the individual orders.    Green Top (Gel) [[id number]] Collected: 03/14/22 1600    Specimen: Blood Updated: 03/14/22 1703     Extra Tube Hold for add-ons.     Comment: Auto resulted.       Red Top [[id number]] Collected: 03/14/22 1600    Specimen: Blood Updated: 03/14/22 1703     Extra Tube Hold for add-ons.     Comment: Auto resulted.       Light Blue Top [[id number]] Collected: 03/14/22 1600    Specimen: Blood Updated: 03/14/22 1703     Extra Tube hold for add-on     Comment: Auto resulted       Lavender Top [[id number]] Collected: 03/14/22 1600    Specimen: Blood Updated: 03/14/22 1703     Extra Tube hold for add-on     Comment: Auto resulted       Comprehensive Metabolic Panel [[id number]]  (Abnormal) Collected: 03/14/22 1600    Specimen: Blood Updated: 03/14/22 1630     Glucose 166 mg/dL      BUN 13 mg/dL      Creatinine 1.06 mg/dL      Sodium 139 mmol/L      Potassium 4.0 mmol/L      Chloride 102 mmol/L      CO2 25.0 mmol/L      Calcium 9.5 mg/dL      Total Protein 6.1 g/dL      Albumin 3.40 g/dL      ALT (SGPT) 16 U/L      AST (SGOT) 20 U/L      Alkaline Phosphatase 97 U/L      Total Bilirubin 0.3 mg/dL      Globulin 2.7 gm/dL      A/G Ratio 1.3 g/dL       BUN/Creatinine Ratio 12.3     Anion Gap 12.0 mmol/L      eGFR 53.5 mL/min/1.73      Comment: National Kidney Foundation and American Society of Nephrology (ASN) Task Force recommended calculation based on the Chronic Kidney Disease Epidemiology Collaboration (CKD-EPI) equation refit without adjustment for race.       Narrative:      GFR Normal >60  Chronic Kidney Disease <60  Kidney Failure <15      Troponin [[id number]]  (Normal) Collected: 03/14/22 1600    Specimen: Blood Updated: 03/14/22 1628     Troponin T 0.012 ng/mL     Narrative:      Troponin T Reference Range:  <= 0.03 ng/mL-   Negative for AMI  >0.03 ng/mL-     Abnormal for myocardial necrosis.  Clinicians would have to utilize clinical acumen, EKG, Troponin and serial changes to determine if it is an Acute Myocardial Infarction or myocardial injury due to an underlying chronic condition.       Results may be falsely decreased if patient taking Biotin.      Protime-INR [[id number]]  (Normal) Collected: 03/14/22 1600    Specimen: Blood Updated: 03/14/22 1622     Protime 13.0 Seconds      INR 1.02    aPTT [[id number]]  (Normal) Collected: 03/14/22 1600    Specimen: Blood Updated: 03/14/22 1622     PTT 27.5 seconds     CBC & Differential [[id number]]  (Abnormal) Collected: 03/14/22 1600    Specimen: Blood Updated: 03/14/22 1612    Narrative:      The following orders were created for panel order CBC & Differential.  Procedure                               Abnormality         Status                     ---------                               -----------         ------                     CBC Auto Differential[[id number]]        Abnormal            Final result                 Please view results for these tests on the individual orders.    CBC Auto Differential [[id number]]  (Abnormal) Collected: 03/14/22 1600    Specimen: Blood Updated: 03/14/22 1612     WBC 9.27 10*3/mm3      RBC 3.87 10*6/mm3      Hemoglobin 11.5 g/dL      Hematocrit 36.4 %      MCV 94.1 fL       MCH 29.7 pg      MCHC 31.6 g/dL      RDW 12.5 %      RDW-SD 43.4 fl      MPV 10.0 fL      Platelets 350 10*3/mm3      Neutrophil % 64.7 %      Lymphocyte % 23.7 %      Monocyte % 9.1 %      Eosinophil % 1.1 %      Basophil % 0.3 %      Immature Grans % 1.1 %      Neutrophils, Absolute 6.00 10*3/mm3      Lymphocytes, Absolute 2.20 10*3/mm3      Monocytes, Absolute 0.84 10*3/mm3      Eosinophils, Absolute 0.10 10*3/mm3      Basophils, Absolute 0.03 10*3/mm3      Immature Grans, Absolute 0.10 10*3/mm3      nRBC 0.0 /100 WBC            Cultures:  No results found for: BLOODCX, URINECX, WOUNDCX, MRSACX, RESPCX, STOOLCX    Radiology Data:    Imaging Results (Last 24 Hours)     Procedure Component Value Units Date/Time    MRI Brain Without Contrast [[id number]] Collected: 03/14/22 1838     Updated: 03/14/22 1846    Narrative:      EXAMINATION: MRI the brain without contrast 3/14/2022     HISTORY: Stroke     FINDINGS: Multiplanar fast spin echo imaging sequences were obtained of  the brain on a high-field magnet without gadolinium enhancement.     There is diffusion restriction within the right posterior division MCA  territory with involvement of the temporal and parietal lobe with  associated ADC mapping abnormality. FINDINGS are consistent with an  acute MCA territory infarct. There is associated sulcal effacement and  edema. No evidence of hemorrhagic conversion.     No additional sites of diffusion restriction are present. There is  atrophy of the brain with prominence of the subarachnoid spaces and  ventricular enlargement. There is evidence of previous lacunar  infarctions involving the right cerebellar hemisphere, left basal  ganglia and right thalamus with focal encephalomalacia and gliosis. Mild  to moderate small vessel ischemic changes are noted involving the  periventricular and higher white matter tracts.     Normal flow voids are noted within the [place]. The visualized  paranasal sinuses and mastoid  air cells demonstrate normal aeration.  There are normal flow-voids within the [place].       Impression:      1.. Extensive right MCA territory infarct with diffusion restriction and  ADC mapping abnormality. There is associated sulcal effacement and  edema. No evidence of hemorrhagic conversion or mass effect.  2. Atrophy with small vessel ischemic changes.  3. Remote infarcts involving the right thalamus, left basal ganglia and  right cerebellar hemisphere with focal encephalomalacia and gliosis.  This report was finalized on 03/14/2022 18:43 by Dr. Marco A Vasquez MD.    XR Chest 1 View [[id number]] Collected: 03/14/22 1614     Updated: 03/14/22 1618    Narrative:      EXAMINATION:  XR CHEST 1 VW-  3/14/2022 4:08 PM CDT     HISTORY: Acute Stroke Protocol (Onset < 12 hrs)     COMPARISON: No comparison study.     FINDINGS:  There is hypoventilation with vascular crowding. There is  minimal bronchial wall thickening. There is no focal infiltrate. The  heart is normal in size. The thoracic aorta is atheromatous. There are  degenerative changes of the spine. Surgical anchor is noted in the right  humeral head.       Impression:      1. No focal infiltrate.  2. Mild hypoventilation with vascular crowding. Mild bronchial wall  thickening.        This report was finalized on 03/14/2022 16:15 by Dr. Tim Ozuna MD.    CT Angiogram Neck [[id number]] Collected: 03/14/22 1556     Updated: 03/14/22 1606    Narrative:      EXAMINATION: CT ANGIOGRAM NECK-      3/14/2022 3:27 PM CDT     HISTORY: Stroke, follow up     In order to have a CT radiation dose as low as reasonably achievable  Automated Exposure Control was utilized for adjustment of the mA and/or  KV according to patient size.     DLP in mGycm= 199.     CT angiogram neck.  CT angiography protocol.   CT imaging with bolus IV contrast injection.   Under concurrent supervision axial, sagittal, coronal, and  three-dimensional data sets were constructed.      Aortic arch calcification.  Patent great vessel origins though there is moderate atherosclerotic  calcification with approximately 50% narrowing of the proximal left  subclavian artery.     Patchy atherosclerotic calcification within the common carotid arteries.     Atherosclerotic calcification at the left ICA origin and within the  proximal 22 mm of the left ICA.  Left ICA origin stenosis with lumen diameter ratio = 3.4/4.5.  25% stenosis based on ACAS/NASCET criteria.  The degree of stenosis is derived by comparing the narrowest segment  with the distal luminal diameter.  2 cm distal to the left ICA origin there is calcified plaque with less  prominent stenosis with a lumen diameter ratio = 4.0/4.5.  12% stenosis based on ACAS/NASCET criteria.  The degree of stenosis is derived by comparing the narrowest segment  with the distal luminal diameter.     Atherosclerotic calcification within the proximal 15 mm of the right  ICA.  Calcified plaque at the right ICA origin with mild stenosis and lumen  diameter ratio = 3.7/4.3.  14% stenosis based on ACAS/NASCET criteria.  The degree of stenosis is derived by comparing the narrowest segment  with the distal luminal diameter.     No intraluminal thrombus.     The right vertebral artery is dominant.  The left vertebral artery is patent though diminutive throughout its  length.     Summary:  1. Patchy atherosclerotic plaque at both carotid bifurcations with no  high-grade stenosis.  2. The greatest degree of stenosis is at the left ICA origin where there  is 25% narrowing.                                      This report was finalized on 03/14/2022 16:03 by Dr. Jorge Corrigan MD.    CT Angiogram Head w AI Analysis of LVO [[id number]] Collected: 03/14/22 1554     Updated: 03/14/22 1559    Narrative:      EXAMINATION: CT ANGIOGRAM HEAD W AI ANALYSIS OF LVO-     3/14/2022 3:27 PM CDT     HISTORY: Acute Stroke     CT angiogram head.  CT angiography protocol.   CT imaging with  bolus IV contrast injection.   Under concurrent supervision axial, sagittal, coronal, and  three-dimensional data sets were constructed.     In order to have a CT radiation dose as low as reasonably achievable  Automated Exposure Control was utilized for adjustment of the mA and/or  KV according to patient size.     DLP in mGycm= 199.     Calcified though patent distal internal carotid arteries.     Intact [place].  Anterior, middle, and posterior cerebral arteries are normally patent.  No aneurysm or proximal arterial occlusion.     There is absence of opacification of distal right MCA branches in the  area of known infarction.     Summary:  1. Intact [place].  2. There is no central arterial occlusion or thrombus.   This report was finalized on 03/14/2022 15:56 by Dr. Jorge Corrigan MD.    CT Head Without Contrast Stroke Protocol [[id number]] Collected: 03/14/22 1531     Updated: 03/14/22 1536    Narrative:      EXAMINATION: CT HEAD WO CONTRAST STROKE PROTOCOL-      3/14/2022 3:26 PM CDT     HISTORY: Stroke, follow up     In order to have a CT radiation dose as low as reasonably achievable  Automated Exposure Control was utilized for adjustment of the mA and/or  KV according to patient size.     DLP in mGycm= 600.     Noncontrast head CT.     Large right MCA hypodense subacute infarct involving an area measuring  approximately 7 x 5 x 4 cm.     No hemorrhagic conversion.  No midline shift.     Ventricle size is normal.     Mild atrophy and small vessel disease.     Summary:  1. Large hypodense subacute right MCA infarct.  2. No intracranial hemorrhage.                                   This report was finalized on 03/14/2022 15:33 by Dr. Jorge Corrigan MD.          Allergies   Allergen Reactions   • Codeine Other (See Comments)       Scheduled meds:   aspirin, 81 mg, Oral, Daily   Or  aspirin, 300 mg, Rectal, Daily  atorvastatin, 80 mg, Oral, Nightly  cefTRIAXone, 1 g, Intravenous, Q24H  gabapentin,  300 mg, Oral, Q8H  insulin lispro, 2-9 Units, Subcutaneous, TID AC  linagliptin, 5 mg, Oral, Daily  lisinopril, 20 mg, Oral, Daily  sodium chloride, 10 mL, Intravenous, Q12H        PRN meds:  dextrose  •  dextrose  •  glucagon (human recombinant)  •  HYDROcodone-acetaminophen  •  ondansetron  •  sodium chloride  •  sodium chloride    Assessment/Plan       Cerebrovascular accident (CVA) ([facility])    PAD (peripheral artery disease) ([facility])    Mixed hyperlipidemia    Type 2 diabetes mellitus with hyperglycemia, without long-term current use of insulin ([facility])    CAD (coronary artery disease)      Plan:  CVA-extensive right MCA territory infarct.  No aspirin for now per neurology.  Aspirin daily.  Chest x-ray-No focal infiltrate, Mild hypoventilation with vascular crowding. Mild bronchial wall  Thickening.  CTA of the head-Intact [place], no central arterial occlusion or thrombus.   CTA of the neck-Patchy atherosclerotic plaque at both carotid bifurcations with no high-grade stenosis, greatest degree of stenosis is at the left ICA origin where there is 25% narrowing.  CT scan head-Large hypodense subacute right MCA infarct, No intracranial hemorrhage.  MRI of the brain-Extensive right MCA territory infarct with diffusion restriction and ADC mapping abnormality- associated sulcal effacement and edema, No evidence of hemorrhagic conversion or mass effect, Atrophy with small vessel ischemic changes, Remote infarcts involving the right thalamus and  left basal ganglia and right cerebellar hemisphere with focal encephalomalacia and gliosis.  Recommendation from neurologist-recommend continue aspirin for now, can be discharged on this and in 14 days recommend initiate parenteral therapy with aspirin, also recommend high dose of statin and increased glycemic control.     CAD/hypertension/hyperlipidemia.  Lipitor high-dose per neurology.  Hold Plavix per neurology.  Continue lisinopril.  Echocardiogram pending.    UTI.   Rocephin.     Anemia.  Hold iron sulfate.     Chronic pain/arthritis.  Cut back Neurontin.  Cut back Norco.  Hold Mobic.     Diabetes .  Hold Glucophage.  Hemoglobin A1 C 11.  Discussed patient will need better control of her diabetes.  Start patient Tradjenta.  Continue with low sliding scale.     Renal insufficiency.    Resolved.     Nausea.  Zofran as needed    Urine culture pending.  Covid-19-negative.    Discharge Planning: Plan for rehab placement.    Electronically signed by Nik Goodson MD, 03/15/22, 1:36 PM CDT.

## 2022-03-15 NOTE — THERAPY EVALUATION
Patient Name: Evelyn Ojeda  : 1940    MRN: [id number]                              Today's Date: 3/15/2022       Admit Date: 3/14/2022    Visit Dx:     ICD-10-CM ICD-9-CM   1. Cerebrovascular accident (CVA), unspecified mechanism ([facility])  I63.9 434.91   2. Oral phase dysphagia  R13.11 787.21   3. Impaired functional mobility and activity tolerance  Z74.09 V49.89     Patient Active Problem List   Diagnosis   • PAD (peripheral artery disease) ([facility])   • Essential hypertension   • Mixed hyperlipidemia   • Bilateral carotid artery stenosis   • Acute deep vein thrombosis (DVT) of distal vein of right lower extremity ([facility])   • Atherosclerosis of artery of extremity with intermittent claudication ([facility])   • Chronic osteoarthritis   • Decreased pedal pulses   • Nonhealing ulcer of right lower leg with fat layer exposed ([facility])   • Hyperglycemia   • Type 2 diabetes mellitus with hyperglycemia, without long-term current use of insulin ([facility])   • Acute kidney injury ([facility])   • Postoperative anemia due to acute blood loss   • Hematuria   • Cerebrovascular accident (CVA) ([facility])   • CAD (coronary artery disease)     Past Medical History:   Diagnosis Date   • Diabetes mellitus ([facility])    • DVT (deep venous thrombosis) ([facility])     RLL   • Hypertension    • PVD (peripheral vascular disease) ([facility])      Past Surgical History:   Procedure Laterality Date   • AORTAGRAM N/A 2021    Procedure: 1.  Introduction of catheter/sheath into the aorta 2.  Aortoiliac angiogram with right lower extremity runoff 3.  Contralateral cannulation of the right common iliac artery 4.  Placement of a 4 mm spider distal embolic protection device in the below-knee popliteal artery 5.  Directional atherectomy of the proximal SFA and below-knee popliteal artery using the 6 [place] d   • CHOLECYSTECTOMY     • KNEE SURGERY     • LEG SURGERY        General Information     Row Name 03/15/22 0950          Physical Therapy Time and Intention     Document Type evaluation;other (see comments)  see MAR  -JE     Mode of Treatment physical therapy  -     Row Name 03/15/22 0950          General Information    Patient Profile Reviewed yes  -JE     Prior Level of Function independent:;all household mobility;community mobility;ADL's;home management;driving;shopping  -JE     Existing Precautions/Restrictions fall  -JE     Barriers to Rehab previous functional deficit  -     Row Name 03/15/22 0950          Living Environment    People in Home child(anselmo), adult  -JE     Name(s) of People in Home 2 dtrs w/ pt in the home, a 3 rd daughter right up the road, but there every day  -JE     Row Name 03/15/22 0950          Home Main Entrance    Number of Stairs, Main Entrance other (see comments)  ramp  -     Row Name 03/15/22 0950          Stairs Within Home, Primary    Number of Stairs, Within Home, Primary none  -     Row Name 03/15/22 0950          Cognition    Orientation Status (Cognition) oriented x 4;other (see comments)  pt initially stated it was the 2nd month, however was able to correct that answer and say it was March  -     Row Name 03/15/22 0950          Safety Issues, Functional Mobility    Safety Issues Affecting Function (Mobility) safety precaution awareness  -JE     Impairments Affecting Function (Mobility) balance;endurance/activity tolerance;coordination;postural/trunk control;strength  -           User Key  (r) = Recorded By, (t) = Taken By, (c) = Cosigned By    Initials Name Provider Type    Nereida Orozco, PT Physical Therapist               Mobility     Row Name 03/15/22 0950          Bed Mobility    Bed Mobility rolling left;rolling right;scooting/bridging;supine-sit;sit-supine  -NATALY     Rolling Left [place] (Bed Mobility) standby assist;modified independence  -JE     Rolling Right [place] (Bed Mobility) standby assist;modified independence  -NATALY     Scooting/Bridging [place] (Bed Mobility) standby assist;independent   -     Supine-Sit [place] (Bed Mobility) standby assist  -     Sit-Supine [place] (Bed Mobility) standby assist  -     Assistive Device (Bed Mobility) bed rails  -     Row Name 03/15/22 0950          Transfers    Comment, (Transfers) on/off toilet w/ CGA  -     Row Name 03/15/22 0950          Sit-Stand Transfer    Sit-Stand [place] (Transfers) contact guard;verbal cues  -     Row Name 03/15/22 0950          Gait/Stairs (Locomotion)    [place] Level (Gait) contact guard  -     Assistive Device (Gait) other (see comments)  HHA X 1  -     Distance in Feet (Gait) 150 ft  -     Deviations/Abnormal Patterns (Gait) gait speed decreased;stride length decreased;base of support, wide  -     Bilateral Gait Deviations forward flexed posture;heel strike decreased  -           User Key  (r) = Recorded By, (t) = Taken By, (c) = Cosigned By    Initials Name Provider Type     Nereida Faust, PT Physical Therapist               Obj/Interventions     Row Name 03/15/22 0950          Range of Motion Comprehensive    General Range of Motion no range of motion deficits identified  -     Comment, General Range of Motion except noted arthritic deformity in B hands  -     Row Name 03/15/22 0950          Strength Comprehensive (MMT)    Comment, General Manual Muscle Testing (MMT) Assessment B shld flex 4-/5, L hip flexor 3+ to 4-/5; all other ms groups 4+ to 5/5  -     Row Name 03/15/22 0950          Motor Skills    Motor Skills other (see comments)  decrease accuracy w/ FTN on L, finger opposition intact, heel to shin intact, difficulty w/ LEANDRO in UEs noted deficits on L  -[facility] Name 03/15/22 0950          Balance    Balance Assessment sitting static balance;sitting dynamic balance;sit to stand dynamic balance;standing static balance;standing dynamic balance  -     Static Sitting Balance supervision  -     Dynamic Sitting Balance contact guard;supervision  posterior lean w/ dynamic  activity  -JE     Position, Sitting Balance supported;unsupported;sitting edge of bed  -JE     Sit to Stand Dynamic Balance contact guard  -JE     Static Standing Balance contact guard  -JE     Dynamic Standing Balance contact guard  -JE     Position/Device Used, Standing Balance supported;unsupported;other (see comments)  HHA X 1  -JE     Row Name 03/15/22 0950          Sensory Assessment (Somatosensory)    Sensory Assessment (Somatosensory) other (see comments)  reports no c/os N/T  -JE           User Key  (r) = Recorded By, (t) = Taken By, (c) = Cosigned By    Initials Name Provider Type    Nereida Orozco, PT Physical Therapist               Goals/Plan     Row Name 03/15/22 0950          Bed Mobility Goal 1 (PT)    Activity/Assistive Device (Bed Mobility Goal 1, PT) bed mobility activities, all  -JE     [place] Level/Cues Needed (Bed Mobility Goal 1, PT) independent  -JE     Time Frame (Bed Mobility Goal 1, PT) long term goal (LTG);10 days  -JE     Progress/Outcomes (Bed Mobility Goal 1, PT) goal ongoing  -     Row Name 03/15/22 0950          Transfer Goal 1 (PT)    Activity/Assistive Device (Transfer Goal 1, PT) sit-to-stand/stand-to-sit;bed-to-chair/chair-to-bed  -JE     [place] Level/Cues Needed (Transfer Goal 1, PT) standby assist;independent  -JE     Time Frame (Transfer Goal 1, PT) long term goal (LTG);10 days  -JE     Progress/Outcome (Transfer Goal 1, PT) goal ongoing  -     Row Name 03/15/22 0950          Gait Training Goal 1 (PT)    Activity/Assistive Device (Gait Training Goal 1, PT) gait (walking locomotion);decrease fall risk;diminish gait deviation;assistive device use;improve balance and speed;increase endurance/gait distance;other (see comments)  cane vs rwx  -JE     [place] Level (Gait Training Goal 1, PT) standby assist;modified independence  -JE     Distance (Gait Training Goal 1, PT) 200 ft+  -JE     Time Frame (Gait Training Goal 1, PT) long term goal (LTG);10 days   -JE     Progress/Outcome (Gait Training Goal 1, PT) goal ongoing  -     Row Name 03/15/22 0950          Problem Specific Goal 1 (PT)    Problem Specific Goal 1 (PT) dynamic sitting balance w/out posterior lean or need for assist to maintain  -JE     Time Frame (Problem Specific Goal 1, PT) long-term goal (LTG);other (see comments)  10 days  -JE     Progress/Outcome (Problem Specific Goal 1, PT) goal ongoing  -     Row Name 03/15/22 0950          Patient Education Goal (PT)    Activity (Patient Education Goal, PT) I w/ HEP for strengthening/coordination  -     [place]/Cues/Accuracy (Memory Goal 2, PT) demonstrates adequately;independent;verbalizes understanding  -     Time Frame (Patient Education Goal, PT) long term goal (LTG);10 days  -     Progress/Outcome (Patient Education Goal, PT) goal ongoing  -           User Key  (r) = Recorded By, (t) = Taken By, (c) = Cosigned By    Initials Name Provider Type    Nereida Orozco, PT Physical Therapist               Clinical Impression     Row Name 03/15/22 0950          Pain    Pretreatment Pain Rating 0/10 - no pain  -     Posttreatment Pain Rating 0/10 - no pain  -     Row Name 03/15/22 0950          Plan of Care Review    Plan of Care Reviewed With patient;daughter  -     Progress improving  -     Outcome Evaluation PT eval completed.  Pt motivated and agreeable to therapy.  Pt w/ decrease coordination in the L UE, decrease strength B shlds and L hip flexor, decrease dynamic sitting balance, and decrease I w/ tfers and gait requiring CGA and HHA for ambulation.  Pt w/ decrease gait speed, step length and heel strike.  Pt required HHA X 1.  Pt reports she has a cane and rwx and usually carries the cane w/ her not always using it, but has it.  Pt will benefit from continued PT services for improved strength, balance, activity tolerance, coordination, and to improve I w/ tfers/gait w/ the most appropriate AD.  Pt reports she already has HH and  has help from family 24/7 and would like to return home.  If pt truly does have 24/7 assist, feel pt can return home at discharge, however recommed continued skilled care.  Would recommend HH therapy.  Will follow for progress and needs.  May benefit from use of rwx to improve stability and reduce fall risk.  -     Row Name 03/15/22 0950          Therapy Assessment/Plan (PT)    Patient/Family Therapy Goals Statement (PT) return home  -     Rehab Potential (PT) good, to achieve stated therapy goals  -     Criteria for Skilled Interventions Met (PT) yes;meets criteria;skilled treatment is necessary  -     Predicted Duration of Therapy Intervention (PT) until discharge or goals achieved  -     Row Name 03/15/22 0950          Vital Signs    Pretreatment Heart Rate (beats/min) 82  -JE     Posttreatment Heart Rate (beats/min) 83  -JE     Pre SpO2 (%) 95  -JE     O2 Delivery Pre Treatment room air  -JE     O2 Delivery Intra Treatment room air  -JE     Post SpO2 (%) 95  -JE     O2 Delivery Post Treatment room air  -JE     Pre Patient Position Supine  -JE     Intra Patient Position Standing  -     Post Patient Position Supine  -JE     Row Name 03/15/22 0950          Positioning and Restraints    Pre-Treatment Position in bed  -JE     Post Treatment Position bed  -JE     In Bed fowlers;call light within reach;encouraged to call for assist;with family/caregiver;side rails up x2;SCD pump applied;exit alarm on  -           User Key  (r) = Recorded By, (t) = Taken By, (c) = Cosigned By    Initials Name Provider Type    Nereida Orozco, PT Physical Therapist               Outcome Measures     Row Name 03/15/22 0950          How much help from another person do you currently need...    Turning from your back to your side while in flat bed without using bedrails? 3  -JE     Moving from lying on back to sitting on the side of a flat bed without bedrails? 3  -JE     Moving to and from a bed to a chair (including a  wheelchair)? 3  -JE     Standing up from a chair using your arms (e.g., wheelchair, bedside chair)? 3  -JE     Climbing 3-5 steps with a railing? 3  -JE     To walk in hospital room? 3  -JE     AM-PAC 6 Clicks Score (PT) 18  -JE     Row Name 03/15/22 0951          Modified [place] Scale    Pre-Stroke Modified [place] Scale 0 - No Symptoms at all.  -CS (r) KG (t) CS (c)     Modified [place] Scale 3 - Moderate disability.  Requiring some help, but able to walk without assistance.  -CS (r) KG (t) CS (c)     Row Name 03/15/22 0951 03/15/22 0950       Functional Assessment    Outcome Measure Options AM-PAC 6 Clicks Daily Activity (OT);Modified [place]  -CS (r) KG (t) CS (c) AM-PAC 6 Clicks Basic Mobility (PT)  -          User Key  (r) = Recorded By, (t) = Taken By, (c) = Cosigned By    Initials Name Provider Type    CS Rosalva Augustin, OTR/L, CNT Occupational Therapist    Nereida Orozco, PT Physical Therapist    Hermelinda Olivo, OT Student OT Student                             Physical Therapy Education                 Title: PT OT SLP Therapies (In Progress)     Topic: Physical Therapy (In Progress)     Point: Mobility training (Done)     Learning Progress Summary           Patient Acceptance, E,TB,D, VU,DU,NR by  at 3/15/2022 1237    Comment: Education re: purpose of PT/importance of activity, for safety/falls prevention, to have assist when up   Family Acceptance, E,TB,D, VU,DU,NR by  at 3/15/2022 1237    Comment: Education re: purpose of PT/importance of activity, for safety/falls prevention, to have assist when up                   Point: Home exercise program (Not Started)     Learner Progress:  Not documented in this visit.          Point: Precautions (Done)     Learning Progress Summary           Patient Acceptance, E,TB,D, VU,DU,NR by  at 3/15/2022 1237    Comment: Education re: purpose of PT/importance of activity, for safety/falls prevention, to have assist when up   Family Acceptance, E,TB,D,  BRANDON,MISTY,NR by  at 3/15/2022 1673    Comment: Education re: purpose of PT/importance of activity, for safety/falls prevention, to have assist when up                               User Key     Initials Effective Dates Name Provider Type Discipline     08/02/18 -  Nereida Faust, PT Physical Therapist PT              PT Recommendation and Plan  Planned Therapy Interventions (PT): balance training, bed mobility training, gait training, home exercise program, motor coordination training, patient/family education, postural re-education, ROM (range of motion), strengthening, transfer training, other (see comments) (safety/falls prevention)  Plan of Care Reviewed With: patient, daughter  Progress: improving  Outcome Evaluation: PT eval completed.  Pt motivated and agreeable to therapy.  Pt w/ decrease coordination in the L UE, decrease strength B shlds and L hip flexor, decrease dynamic sitting balance, and decrease I w/ tfers and gait requiring CGA and HHA for ambulation.  Pt w/ decrease gait speed, step length and heel strike.  Pt required HHA X 1.  Pt reports she has a cane and rwx and usually carries the cane w/ her not always using it, but has it.  Pt will benefit from continued PT services for improved strength, balance, activity tolerance, coordination, and to improve I w/ tfers/gait w/ the most appropriate AD.  Pt reports she already has HH and has help from family 24/7 and would like to return home.  If pt truly does have 24/7 assist, feel pt can return home at discharge, however recommed continued skilled care.  Would recommend HH therapy.  Will follow for progress and needs.  May benefit from use of rwx to improve stability and reduce fall risk.     Time Calculation:    PT Charges     Row Name 03/15/22 1420 03/15/22 1113          Time Calculation    Start Time 1320  -TB 0950  -     Stop Time 1343  -TB 1100  -     Time Calculation (min) 23 min  -TB 70 min  -     PT Received On 03/15/22  -TB 03/15/22   -NTAALY     PT Goal Re-Cert Due Date -- 03/25/22  -NATALY            Time Calculation- PT    Total Timed Code Minutes- PT 23 minute(s)  -TB --           User Key  (r) = Recorded By, (t) = Taken By, (c) = Cosigned By    Initials Name Provider Type    TB Daxa Rudolph, PTA Physical Therapy Assistant    Nereida Orozco, PT Physical Therapist              Therapy Charges for Today     Code Description Service Date Service Provider Modifiers Qty    28850263238 HC PT EVAL MOD COMPLEXITY 4 3/15/2022 Nereida Faust, PT GP 1    [id number]  GAIT TRAINING EA 15 MIN 3/15/2022 Nereida Faust, PT GP 1          PT G-Codes  Outcome Measure Options: AM-PAC 6 Clicks Daily Activity (OT), Modified [place]  AM-PAC 6 Clicks Score (PT): 18  AM-PAC 6 Clicks Score (OT): 18  Modified [place] Scale: 3 - Moderate disability.  Requiring some help, but able to walk without assistance.    Nereida Faust, PT  3/15/2022

## 2022-03-15 NOTE — CASE MANAGEMENT/SOCIAL WORK
Continued Stay Note  [facility]     Patient Name: Eevlyn Ojeda  MRN: [id number]  Today's Date: 3/15/2022    Admit Date: 3/14/2022     Discharge Plan     Row Name 03/15/22 1642       Plan    Plan Comments Referral sent to [facility] bed per pt/family request. Will follow up with admissions in AM to see if bed will be offered. If bed offered, precert will be started.    Row Name 03/15/22 2448       Plan    Plan Correction. Patient wishes referral to [facility]B at [facility] rather than SNF.  Pending referral per  and bed offer.  SW updated.    Row Name 03/15/22 0845       Plan    Plan Family requesting referral to [facility]  .  updated and will follow with referral. Pending bed offer.               Discharge Codes    No documentation.                     JAYDON Deshpande

## 2022-03-15 NOTE — THERAPY EVALUATION
Acute Care - Speech Language Pathology   Swallow Initial Evaluation [facility]     Patient Name: Evelyn Ojeda  : 1940  MRN: [id number]  Today's Date: 3/15/2022               Admit Date: 3/14/2022  Clinical bedside swallow evaluation completed. The patient was alert and cooperative. She was able to reposition herself in bed to begin breakfast tray. Daughter present at end of evaluation. Her daughter reports drooling on left and left sided pocketing at home.     Primary problem: MRI with extensive R MCA infarct, complaints of difficulty swallowing with drooling on L, and L arm weakness.   Medical history: DM, DVT, HTN, PVD.    Oral motor assessment completed. Possible mild left facial droop with decreased sensation on left side evidenced by lack of awareness of pocketed food during intake. The patient consumed trials from regular diet and thin liquids meal tray. Moderate pocketing and left sulci residue present following regular solid trials. Patient required cue to clear with liquid wash. Was able to clear with liquid wash to minimal amount. Lingual sweep utilized to clear the rest of the residue present. Patient educated on need for alternating bites/sips and use of lingual sweep. Patient exhibited a 1x throat clear following thin liquid intake; however, vocal quality remained clear and unchanged.     SLP recommends:   1) Regular diet   2) Thin liquids   3) Medications whole with apple sauce/pudding  4) Oral care, standard swallow precautions, alternating bites/sips and lingual sweep    Ensure oral care is completed following meals to decrease risk of choking on any pocketed food from meal.     SLP will continue to follow and treat.   Felicita Daly, MS CCC-SLP 3/15/2022 10:27 CDT    Visit Dx:     ICD-10-CM ICD-9-CM   1. Cerebrovascular accident (CVA), unspecified mechanism (HCC)  I63.9 434.91   2. Oral phase dysphagia  R13.11 787.21     Patient Active Problem List   Diagnosis   • PAD (peripheral  artery disease) (HCC)   • Essential hypertension   • Mixed hyperlipidemia   • Bilateral carotid artery stenosis   • Acute deep vein thrombosis (DVT) of distal vein of right lower extremity (HCC)   • Atherosclerosis of artery of extremity with intermittent claudication (HCC)   • Chronic osteoarthritis   • Decreased pedal pulses   • Nonhealing ulcer of right lower leg with fat layer exposed (HCC)   • Hyperglycemia   • Type 2 diabetes mellitus with hyperglycemia, without long-term current use of insulin (HCC)   • Acute kidney injury (HCC)   • Postoperative anemia due to acute blood loss   • Hematuria   • Cerebrovascular accident (CVA) (HCC)     Past Medical History:   Diagnosis Date   • Diabetes mellitus (HCC)    • DVT (deep venous thrombosis) (HCC)     RLL   • Hypertension    • PVD (peripheral vascular disease) (HCC)      Past Surgical History:   Procedure Laterality Date   • AORTAGRAM N/A 1/6/2021    Procedure: 1.  Introduction of catheter/sheath into the aorta 2.  Aortoiliac angiogram with right lower extremity runoff 3.  Contralateral cannulation of the right common iliac artery 4.  Placement of a 4 mm spider distal embolic protection device in the below-knee popliteal artery 5.  Directional atherectomy of the proximal SFA and below-knee popliteal artery using the 6 [place] Pantheris d   • CHOLECYSTECTOMY     • KNEE SURGERY     • LEG SURGERY         SLP Recommendation and Plan  SLP Swallowing Diagnosis: mild-moderate, oral dysphagia, R/O pharyngeal dysphagia (03/15/22 0805)  SLP Diet Recommendation: regular textures, thin liquids (03/15/22 0805)  Recommended Precautions and Strategies: upright posture during/after eating, small bites of food and sips of liquid, check mouth frequently for oral residue/pocketing, general aspiration precautions (03/15/22 0805)  SLP Rec. for Method of Medication Administration: meds whole, with pudding or applesauce, as tolerated (03/15/22 0805)     Monitor for Signs of  Aspiration: yes, notify SLP if any concerns (03/15/22 0805)  Recommended Diagnostics: VFSS (MBS), SLE/Cog/Motor Speech Evaluation (If any increased concern at bedside) (03/15/22 0805)  Swallow Criteria for Skilled Therapeutic Interventions Met: demonstrates skilled criteria (03/15/22 0805)  Anticipated Discharge Disposition (SLP): unknown (03/15/22 0805)  Rehab Potential/Prognosis, Swallowing: good, to achieve stated therapy goals (03/15/22 0805)  Therapy Frequency (Swallow): at least, 2 days per week (03/15/22 0805)  Predicted Duration Therapy Intervention (Days): until discharge (03/15/22 0805)                                  Plan of Care Reviewed With: patient, daughter, caregiver (NERY Nieto)  Progress: no change (Initial Evaluation)      SWALLOW EVALUATION (last 72 hours)     SLP Adult Swallow Evaluation     Row Name 03/15/22 0805                   Rehab Evaluation    Document Type evaluation  -MM        Subjective Information no complaints  -MM        Patient Observations alert;cooperative;agree to therapy  -MM        Patient/Family/Caregiver Comments/Observations Daughter present at end of evaluation.  -MM        Patient Effort good  -MM        Symptoms Noted During/After Treatment none  -MM                  General Information    Patient Profile Reviewed yes  -MM        Pertinent History Of Current Problem Primary problem: MRI with extensive R MCA infarct, complaints of difficulty swallowing with drooling on L, and L arm weakness. Medical history: DM, DVT, HTN, PVD.  -MM        Current Method of Nutrition regular textures;thin liquids  -MM        Precautions/Limitations, Vision WFL;for purposes of eval  -MM        Precautions/Limitations, Hearing WFL;for purposes of eval  -MM        Prior Level of Function-Communication WFL  -MM        Prior Level of Function-Swallowing no diet consistency restrictions  -MM        Plans/Goals Discussed with patient and family;other (see comments);agreed upon  NERY Nieto   -MM        Barriers to Rehab none identified  -MM        Patient's Goals for Discharge patient did not state  -MM        Family Goals for Discharge patient able to eat/drink without coughing/choking  -MM                  Pain    Additional Documentation Pain Scale: FACES Pre/Post-Treatment (Group)  -MM                  Pain Scale: FACES Pre/Post-Treatment    Pain: FACES Scale, Pretreatment 0-->no hurt  -MM        Posttreatment Pain Rating 0-->no hurt  -MM                  Oral Motor Structure and Function    Dentition Assessment upper dentures/partial in place;lower dentures/partial in place  -MM        Secretion Management WNL/WFL  reported L drooling at home  -MM        Mucosal Quality moist, healthy  -MM        Volitional Swallow WFL  -MM        Volitional Cough WFL  -MM                  Oral Musculature and Cranial Nerve Assessment    Oral Motor General Assessment oral labial or buccal impairment  -MM        Mandibular Impairment Detail, Cranial Nerve V (Trigeminal) CN5: sensory impairment;reduced facial sensation;reduced facial sensation on left  -MM        Oral Labial or Buccal Impairment, Detail, Cranial Nerve VII (Facial): CN7: Motor Impairment;left labial droop  -MM                  General Eating/Swallowing Observations    Respiratory Support Currently in Use room air  -MM        Eating/Swallowing Skills self-fed  -MM        Positioning During Eating upright in bed  -MM        Utensils Used spoon;straw  -MM        Consistencies Trialed regular textures;thin liquids  -MM                  Clinical Swallow Eval    Oral Prep Phase impaired  -MM        Oral Transit WFL  -MM        Oral Residue impaired  -MM        Pharyngeal Phase suspected pharyngeal impairment  -MM        Esophageal Phase unremarkable  -MM        Clinical Swallow Evaluation Summary See note.  -MM                  Oral Prep Concerns    Oral Prep Concerns anterior loss  -MM        Anterior Loss regular consistencies  -MM                  Oral  Residue Concerns    Oral Residue Concerns lateral sulcus residue, left  -MM        Lateral Sulcus Residue, Left regular consistencies  -MM                  Pharyngeal Phase Concerns    Pharyngeal Phase Concerns throat clear  -MM        Throat Clear thin  -MM                  SLP Evaluation Clinical Impression    SLP Swallowing Diagnosis mild-moderate;oral dysphagia;R/O pharyngeal dysphagia  -MM        Functional Impact risk of aspiration/pneumonia  -MM        Rehab Potential/Prognosis, Swallowing good, to achieve stated therapy goals  -MM        Swallow Criteria for Skilled Therapeutic Interventions Met demonstrates skilled criteria  -MM                  Recommendations    Therapy Frequency (Swallow) at least;2 days per week  -MM        Predicted Duration Therapy Intervention (Days) until discharge  -MM        SLP Diet Recommendation regular textures;thin liquids  -MM        Recommended Diagnostics VFSS (MBS);SLE/Cog/Motor Speech Evaluation  If any increased concern at bedside  -MM        Recommended Precautions and Strategies upright posture during/after eating;small bites of food and sips of liquid;check mouth frequently for oral residue/pocketing;general aspiration precautions  -MM        Oral Care Recommendations Oral Care BID/PRN;Toothbrush  -MM        SLP Rec. for Method of Medication Administration meds whole;with pudding or applesauce;as tolerated  -MM        Monitor for Signs of Aspiration yes;notify SLP if any concerns  -MM        Anticipated Discharge Disposition (SLP) unknown  -MM                  Swallow Goals (SLP)    Oral Nutrition/Hydration Goal Selection (SLP) oral nutrition/hydration, SLP goal 1  -MM        Labial Strengthening Goal Selection (SLP) labial strengthening, SLP goal 1  -MM        Swallow Compensatory Strategies Goal Selection (SLP) swallow compensatory strategies, SLP goal 1  -MM        Additional Documentation swallow compensatory strategies goal selection (SLP);labial strengthening  goal selection (SLP)  -MM                  Oral Nutrition/Hydration Goal 1 (SLP)    Oral Nutrition/Hydration Goal 1, SLP Patient will tolerate LRD without s/s of aspiration.  -MM        Time Frame (Oral Nutrition/Hydration Goal 1, SLP) by discharge  -MM        Barriers (Oral Nutrition/Hydration Goal 1, SLP) none  -MM        Progress/Outcomes (Oral Nutrition/Hydration Goal 1, SLP) goal ongoing  -MM                  Labial Strengthening Goal 1 (SLP)    Activity (Labial Strengthening Goal 1, SLP) increase labial tone  -MM        Increase Labial Tone labial resistance exercises  -MM        [place]/Accuracy (Labial Strengthening Goal 1, SLP) independently (over 90% accuracy)  -MM        Time Frame (Labial Strengthening Goal 1, SLP) by discharge  -MM        Barriers (Labial Strengthening Goal 1, SLP) none  -MM        Progress/Outcomes (Labial Strengthening Goal 1, SLP) goal ongoing  -MM                  Swallow Compensatory Strategies Goal 1 (SLP)    Activity (Swallow Compensatory Strategies/Techniques Goal 1, SLP) compensatory strategies;during meal intake;alternate food/liquid intake;other (see comments)  lingual sweep to clear L sulci residue  -MM        [place]/Accuracy (Swallow Compensatory Strategies/Techniques Goal 1, SLP) independently (over 90% accuracy)  -MM        Time Frame (Swallow Compensatory Strategies/Techniques Goal 1, SLP) by discharge  -MM        Barriers (Swallow Compensatory Strategies/Techniques Goal 1, SLP) none  -MM        Progress/Outcomes (Swallow Compensatory Strategies/Techniques Goal 1, SLP) goal ongoing  -MM              User Key  (r) = Recorded By, (t) = Taken By, (c) = Cosigned By    Initials Name Effective Dates    MM Felicita Daly MS CCC-SLP 06/16/21 -                 EDUCATION  The patient has been educated in the following areas:   Dysphagia (Swallowing Impairment) Oral Care/Hydration.        SLP GOALS     Row Name 03/15/22 0805             Oral Nutrition/Hydration  Goal 1 (SLP)    Oral Nutrition/Hydration Goal 1, SLP Patient will tolerate LRD without s/s of aspiration.  -MM      Time Frame (Oral Nutrition/Hydration Goal 1, SLP) by discharge  -MM      Barriers (Oral Nutrition/Hydration Goal 1, SLP) none  -MM      Progress/Outcomes (Oral Nutrition/Hydration Goal 1, SLP) goal ongoing  -MM              Labial Strengthening Goal 1 (SLP)    Activity (Labial Strengthening Goal 1, SLP) increase labial tone  -MM      Increase Labial Tone labial resistance exercises  -MM      [place]/Accuracy (Labial Strengthening Goal 1, SLP) independently (over 90% accuracy)  -MM      Time Frame (Labial Strengthening Goal 1, SLP) by discharge  -MM      Barriers (Labial Strengthening Goal 1, SLP) none  -MM      Progress/Outcomes (Labial Strengthening Goal 1, SLP) goal ongoing  -MM              Swallow Compensatory Strategies Goal 1 (SLP)    Activity (Swallow Compensatory Strategies/Techniques Goal 1, SLP) compensatory strategies;during meal intake;alternate food/liquid intake;other (see comments)  lingual sweep to clear L sulci residue  -MM      [place]/Accuracy (Swallow Compensatory Strategies/Techniques Goal 1, SLP) independently (over 90% accuracy)  -MM      Time Frame (Swallow Compensatory Strategies/Techniques Goal 1, SLP) by discharge  -MM      Barriers (Swallow Compensatory Strategies/Techniques Goal 1, SLP) none  -MM      Progress/Outcomes (Swallow Compensatory Strategies/Techniques Goal 1, SLP) goal ongoing  -MM            User Key  (r) = Recorded By, (t) = Taken By, (c) = Cosigned By    Initials Name Provider Type    Felicita Delgadillo, MS CCC-SLP Speech and Language Pathologist                   Time Calculation:    Time Calculation- SLP     Row Name 03/15/22 1024             Time Calculation- SLP    SLP Start Time 0805  -MM      SLP Stop Time 0915  -MM      SLP Time Calculation (min) 70 min  -MM      SLP Received On 03/15/22  -MM      SLP Goal Re-Cert Due Date 03/25/22  -MM               Untimed Charges    SLP Eval/Re-eval  ST Eval Oral Pharyng Swallow - 30019  -MM      12125-ND Eval Oral Pharyng Swallow Minutes 70  -MM              Total Minutes    Untimed Charges Total Minutes 70  -MM       Total Minutes 70  -MM            User Key  (r) = Recorded By, (t) = Taken By, (c) = Cosigned By    Initials Name Provider Type    Felicita Delgadillo, MS CCC-SLP Speech and Language Pathologist                Therapy Charges for Today     Code Description Service Date Service Provider Modifiers Qty    [id number]  ST EVAL ORAL PHARYNG SWALLOW 5 3/15/2022 Felicita Daly MS MICHELLE-SLP GN 1               Felicita Daly MS CCC-MONICA  3/15/2022

## 2022-03-15 NOTE — PLAN OF CARE
Goal Outcome Evaluation:  Plan of Care Reviewed With: patient, daughter        Progress: no change  Outcome Evaluation: OT eval completed. Pt A&Ox4. Pt SBA for bed mobility, CGA for t/fs, and CGA/min A for functional mobility. Upon entering room, pt requested to use BR and was able to walk to BR with CGA. Pt completed toileting with SBA, but OT noticed pt req multiple attempts to grasp toilet paper from roll with L hand. Pt req mod A to doff/don socks and req multiple attempts to latch L hand in sock. While sitting EOB, pt demo a slight posterior lean with activity but was able to self correct with vc and maintain balance with support of BUE on bed. During amb, pt demo a wide VIELKA and req HHAx1 for stability, and pt stated that she has a cane at home that she uses for mobility. During gross motor coordination assessment, pt was able to complete finger nose accurately with extra time for L hand. Both pt and daughter report that pt is near her baseline and that she was previously receiving HH. Pt would benefit from OT service to increase strength, functional balance, and safety awareness with ADLs, as well as fine/gross motor coordination of L hand. Recommend discharge home with 24/7 assist and HH OT/PT.

## 2022-03-15 NOTE — DISCHARGE PLACEMENT REQUEST
"Referral for swing bed. Please call Mitzi at [phone] after info reviewed.   Thanks        Evelyn Dukes (81 y.o. Female)             Date of Birth   1940    Social Security Number       Address   [address] [address]    Home Phone   [phone]    MRN   [id number]       Latter-day   Evangelical    Marital Status                               Admission Date   3/14/22    Admission Type   Emergency    Admitting Provider   Nik Goodson MD    Attending Provider   Nik Goodson MD    Department, Room/Bed   [facility] 3A, 330/1       Discharge Date       Discharge Disposition       Discharge Destination                               Attending Provider: Nik Goodson MD    Allergies: Codeine    Isolation: None   Infection: None   Code Status: CPR   Advance Care Planning Activity    Ht: 154.9 cm (61\")   Wt: 77.1 kg (170 lb)    Admission Cmt: None   Principal Problem: Cerebrovascular accident (CVA) (HCC) [I63.9]                 Active Insurance as of 3/14/2022     Primary Coverage     Payor Plan Insurance Group Employer/Plan Group    [facility] MEDICARE REPLACEMENT [employer] MEDICARE REPLACEMENT [id number]     Payor Plan Address Payor Plan Phone Number Payor Plan Fax Number Effective Dates    [address] [address] [phone]  1/1/2022 - None Entered    [facility] [address]       Subscriber Name Subscriber Birth Date Member ID       EVELYN DUKES 1940 [id number]                 Emergency Contacts      (Rel.) Home Phone Work Phone Mobile Phone    Sepideh DUKESte (Daughter) -- -- [phone]    Lupe AGUERO (Grandchild) -- -- [phone]    Pauline Raya (Daughter) -- -- [phone]            Insurance Information                [facility]/WELLCARE MEDICARE REPLACEMENT Phone: [phone]    Subscriber: Evelyn Dukes Subscriber#: [id number]    Group#: [id number] Precert#: --             History & Physical    "   Nik Goodson MD at 03/14/22 1715              [facility]  HISTORY AND PHYSICAL    Date of Admission: 3/14/2022  Primary Care Physician: Roberto Garcia MD    Subjective     Chief Complaint: CVA.    History of Present Illness  Patient is a 81-year-old presented to ER for evaluation of left-sided weakness.  Patient been having this symptom for about a week now.  Patient has been admitted to [facility] 3/4/2022 to 3/6/2022 for diabetes and dehydration.  Patient's daughter stated since she came back to the hospital she has never been the same.  Patient has been progressing in a week or over the last week, patient is requiring a cane to walk around per patient.  Daughter stated that patient was extremely weak this morning at 8:30 AM this morning.  Patient has been difficulty swallowing this morning about 2 PM per daughter with drooling on the left side and lefts arm weakness.  EMS was then called patient came to the [facility] ER for code stroke.    Patient has past medical history diabetes, DVT, hypertension, peripheral vascular disease.    CT scan shows CVA . CTA of the neck and head shows no significant blockage.    Review of Systems   Constitutional: Positive for activity change, appetite change and fatigue. Negative for chills and fever.   HENT: Negative for hearing loss, nosebleeds, tinnitus and trouble swallowing.    Eyes: Negative for visual disturbance.   Respiratory: Negative for cough, chest tightness, shortness of breath and wheezing.    Cardiovascular: Negative for chest pain, palpitations and leg swelling.   Gastrointestinal: Negative for abdominal distention, abdominal pain, blood in stool, constipation, diarrhea, nausea and vomiting.   Endocrine: Negative for cold intolerance, heat intolerance, polydipsia, polyphagia and polyuria.   Genitourinary: Negative for decreased urine volume, difficulty urinating, dysuria, flank  pain, frequency and hematuria.   Musculoskeletal: Positive for arthralgias, gait problem and myalgias. Negative for joint swelling.   Skin: Negative for rash.   Allergic/Immunologic: Negative for immunocompromised state.   Neurological: Positive for weakness. Negative for dizziness, syncope, light-headedness and headaches.   Hematological: Negative for adenopathy. Does not bruise/bleed easily.   Psychiatric/Behavioral: Negative for confusion and sleep disturbance. The patient is not nervous/anxious.         Otherwise complete ROS reviewed and negative except as mentioned in the HPI.      Past Medical History:   Past Medical History:   Diagnosis Date   • Diabetes mellitus (HCC)    • DVT (deep venous thrombosis) (HCC)     RLL   • Hypertension    • PVD (peripheral vascular disease) (HCC)        Past Surgical History:  Past Surgical History:   Procedure Laterality Date   • AORTAGRAM N/A 1/6/2021    Procedure: 1.  Introduction of catheter/sheath into the aorta 2.  Aortoiliac angiogram with right lower extremity runoff 3.  Contralateral cannulation of the right common iliac artery 4.  Placement of a 4 mm spider distal embolic protection device in the below-knee popliteal artery 5.  Directional atherectomy of the proximal SFA and below-knee popliteal artery using the 6 [place] [employer] Pantheris d   • CHOLECYSTECTOMY     • KNEE SURGERY     • LEG SURGERY         Family History: family history includes Cancer in her mother and sister.    Social History:  reports that she has never smoked. She has never used smokeless tobacco. She reports that she does not drink alcohol and does not use drugs.    Allergies:  Allergies   Allergen Reactions   • Codeine Other (See Comments)     Medications:  Prior to Admission medications    Medication Sig Start Date End Date Taking? Authorizing Provider   aspirin 81 MG EC tablet Take 81 mg by mouth Daily.    Provider, MD Ana   atorvastatin (LIPITOR) 20 MG tablet Take 20 mg by mouth Daily.  "12/3/20   Ana Arce MD   Cholecalciferol (Vitamin D3) 50 MCG (2000 UT) tablet Take 2,500 Units by mouth Daily.    Ana Arce MD   clopidogrel (PLAVIX) 75 MG tablet Take 1 tablet by mouth Daily. 1/14/22   Jossie Medina APRN   ferrous sulfate 325 (65 FE) MG tablet Take 1 tablet by mouth Daily With Breakfast. 1/11/21   Jorge Riley MD   gabapentin (NEURONTIN) 600 MG tablet Take 600 mg by mouth 3 (Three) Times a Day. 12/3/20   Ana Arce MD   HYDROcodone-acetaminophen (NORCO)  MG per tablet Take 1 tablet by mouth Every 6 (Six) Hours As Needed for Moderate Pain . 1/10/21   Jorge Riley MD   lisinopril (PRINIVIL,ZESTRIL) 20 MG tablet Take 20 mg by mouth Daily. 12/3/20   Ana Arce MD   meloxicam (MOBIC) 15 MG tablet Take 15 mg by mouth Daily. 6/11/21   Ana Arce MD   metFORMIN (GLUCOPHAGE) 500 MG tablet Take 500 mg by mouth Daily With Dinner. 12/3/20   Ana Arce MD   vitamin B-12 (CYANOCOBALAMIN) 1000 MCG tablet Take 1,000 mcg by mouth Daily.    Ana Arce MD       I have utilized all available immediate resources to obtain, update, and review the patient's current medications.    Objective     Vital Signs: /60   Pulse 74   Temp 97.4 °F (36.3 °C) (Oral)   Resp 16   Ht 154.9 cm (61\")   Wt 75.3 kg (165 lb 14.4 oz)   SpO2 100%   BMI 31.35 kg/m²   Physical Exam  Vitals and nursing note reviewed.   Constitutional:       Appearance: She is well-developed.      Comments: Advanced age.   HENT:      Head: Normocephalic.   Eyes:      Conjunctiva/sclera: Conjunctivae normal.      Pupils: Pupils are equal, round, and reactive to light.   Neck:      Vascular: No JVD.   Cardiovascular:      Rate and Rhythm: Normal rate and regular rhythm.      Heart sounds: Normal heart sounds. No murmur heard.    No friction rub. No gallop.   Pulmonary:      Effort: Pulmonary effort is normal. No respiratory distress.      Breath " sounds: Normal breath sounds. No wheezing or rales.   Chest:      Chest wall: No tenderness.   Abdominal:      General: Bowel sounds are normal. There is no distension.      Palpations: Abdomen is soft.      Tenderness: There is no abdominal tenderness. There is no guarding or rebound.      Comments: Obesity.   Musculoskeletal:         General: No tenderness or deformity.      Cervical back: Neck supple.      Comments: Arthritis multiple knuckle   Skin:     General: Skin is warm and dry.      Capillary Refill: Capillary refill takes 2 to 3 seconds.      Findings: No rash.   Neurological:      Mental Status: She is alert and oriented to person, place, and time.      Cranial Nerves: No cranial nerve deficit.      Motor: Weakness present. No abnormal muscle tone.      Gait: Gait abnormal.      Deep Tendon Reflexes: Reflexes normal.      Comments: Cranial keystroke grossly intact.  Negative pronator drift.  Negative finger touch.  Strength 5 out of 5 symmetrical.   Psychiatric:         Behavior: Behavior normal.         Thought Content: Thought content normal.             Results Reviewed:    Lab Results (last 24 hours)     Procedure Component Value Units Date/Time    [place] Draw [[id number]] Collected: 03/14/22 1600    Specimen: Blood Updated: 03/14/22 1703    Narrative:      The following orders were created for panel order [place] Draw.  Procedure                               Abnormality         Status                     ---------                               -----------         ------                     Green Top (Gel)[[id number]]                                  Final result               Lavender Top[[id number]]                                     Final result               Red Top[[id number]]                                          Final result               Light Blue Top[[id number]]                                   Final result                 Please view results for these tests on the individual orders.     Green Top (Gel) [[id number]] Collected: 03/14/22 1600    Specimen: Blood Updated: 03/14/22 1703     Extra Tube Hold for add-ons.     Comment: Auto resulted.       Red Top [[id number]] Collected: 03/14/22 1600    Specimen: Blood Updated: 03/14/22 1703     Extra Tube Hold for add-ons.     Comment: Auto resulted.       Light Blue Top [[id number]] Collected: 03/14/22 1600    Specimen: Blood Updated: 03/14/22 1703     Extra Tube hold for add-on     Comment: Auto resulted       Lavender Top [[id number]] Collected: 03/14/22 1600    Specimen: Blood Updated: 03/14/22 1703     Extra Tube hold for add-on     Comment: Auto resulted       Comprehensive Metabolic Panel [[id number]]  (Abnormal) Collected: 03/14/22 1600    Specimen: Blood Updated: 03/14/22 1630     Glucose 166 mg/dL      BUN 13 mg/dL      Creatinine 1.06 mg/dL      Sodium 139 mmol/L      Potassium 4.0 mmol/L      Chloride 102 mmol/L      CO2 25.0 mmol/L      Calcium 9.5 mg/dL      Total Protein 6.1 g/dL      Albumin 3.40 g/dL      ALT (SGPT) 16 U/L      AST (SGOT) 20 U/L      Alkaline Phosphatase 97 U/L      Total Bilirubin 0.3 mg/dL      Globulin 2.7 gm/dL      A/G Ratio 1.3 g/dL      BUN/Creatinine Ratio 12.3     Anion Gap 12.0 mmol/L      eGFR 53.5 mL/min/1.73      Comment: National Kidney Foundation and American Society of Nephrology (ASN) Task Force recommended calculation based on the Chronic Kidney Disease Epidemiology Collaboration (CKD-EPI) equation refit without adjustment for race.       Narrative:      GFR Normal >60  Chronic Kidney Disease <60  Kidney Failure <15      Troponin [[id number]]  (Normal) Collected: 03/14/22 1600    Specimen: Blood Updated: 03/14/22 1628     Troponin T 0.012 ng/mL     Narrative:      Troponin T Reference Range:  <= 0.03 ng/mL-   Negative for AMI  >0.03 ng/mL-     Abnormal for myocardial necrosis.  Clinicians would have to utilize clinical acumen, EKG, Troponin and serial changes to determine if it is an Acute Myocardial Infarction  or myocardial injury due to an underlying chronic condition.       Results may be falsely decreased if patient taking Biotin.      Protime-INR [[id number]]  (Normal) Collected: 03/14/22 1600    Specimen: Blood Updated: 03/14/22 1622     Protime 13.0 Seconds      INR 1.02    aPTT [[id number]]  (Normal) Collected: 03/14/22 1600    Specimen: Blood Updated: 03/14/22 1622     PTT 27.5 seconds     CBC & Differential [[id number]]  (Abnormal) Collected: 03/14/22 1600    Specimen: Blood Updated: 03/14/22 1612    Narrative:      The following orders were created for panel order CBC & Differential.  Procedure                               Abnormality         Status                     ---------                               -----------         ------                     CBC Auto Differential[[id number]]        Abnormal            Final result                 Please view results for these tests on the individual orders.    CBC Auto Differential [[id number]]  (Abnormal) Collected: 03/14/22 1600    Specimen: Blood Updated: 03/14/22 1612     WBC 9.27 10*3/mm3      RBC 3.87 10*6/mm3      Hemoglobin 11.5 g/dL      Hematocrit 36.4 %      MCV 94.1 fL      MCH 29.7 pg      MCHC 31.6 g/dL      RDW 12.5 %      RDW-SD 43.4 fl      MPV 10.0 fL      Platelets 350 10*3/mm3      Neutrophil % 64.7 %      Lymphocyte % 23.7 %      Monocyte % 9.1 %      Eosinophil % 1.1 %      Basophil % 0.3 %      Immature Grans % 1.1 %      Neutrophils, Absolute 6.00 10*3/mm3      Lymphocytes, Absolute 2.20 10*3/mm3      Monocytes, Absolute 0.84 10*3/mm3      Eosinophils, Absolute 0.10 10*3/mm3      Basophils, Absolute 0.03 10*3/mm3      Immature Grans, Absolute 0.10 10*3/mm3      nRBC 0.0 /100 WBC            Radiology Data:    Imaging Results (Last 24 Hours)     Procedure Component Value Units Date/Time    XR Chest 1 View [[id number]] Collected: 03/14/22 1614     Updated: 03/14/22 1618    Narrative:      EXAMINATION:  XR CHEST 1 VW-  3/14/2022 4:08 PM CDT      HISTORY: Acute Stroke Protocol (Onset < 12 hrs)     COMPARISON: No comparison study.     FINDINGS:  There is hypoventilation with vascular crowding. There is  minimal bronchial wall thickening. There is no focal infiltrate. The  heart is normal in size. The thoracic aorta is atheromatous. There are  degenerative changes of the spine. Surgical anchor is noted in the right  humeral head.       Impression:      1. No focal infiltrate.  2. Mild hypoventilation with vascular crowding. Mild bronchial wall  thickening.        This report was finalized on 03/14/2022 16:15 by Dr. Tim Ozuna MD.    CT Angiogram Neck [[id number]] Collected: 03/14/22 1556     Updated: 03/14/22 1606    Narrative:      EXAMINATION: CT ANGIOGRAM NECK-      3/14/2022 3:27 PM CDT     HISTORY: Stroke, follow up     In order to have a CT radiation dose as low as reasonably achievable  Automated Exposure Control was utilized for adjustment of the mA and/or  KV according to patient size.     DLP in mGycm= 199.     CT angiogram neck.  CT angiography protocol.   CT imaging with bolus IV contrast injection.   Under concurrent supervision axial, sagittal, coronal, and  three-dimensional data sets were constructed.     Aortic arch calcification.  Patent great vessel origins though there is moderate atherosclerotic  calcification with approximately 50% narrowing of the proximal left  subclavian artery.     Patchy atherosclerotic calcification within the common carotid arteries.     Atherosclerotic calcification at the left ICA origin and within the  proximal 22 mm of the left ICA.  Left ICA origin stenosis with lumen diameter ratio = 3.4/4.5.  25% stenosis based on ACAS/NASCET criteria.  The degree of stenosis is derived by comparing the narrowest segment  with the distal luminal diameter.  2 cm distal to the left ICA origin there is calcified plaque with less  prominent stenosis with a lumen diameter ratio = 4.0/4.5.  12% stenosis based on ACAS/NASCET  criteria.  The degree of stenosis is derived by comparing the narrowest segment  with the distal luminal diameter.     Atherosclerotic calcification within the proximal 15 mm of the right  ICA.  Calcified plaque at the right ICA origin with mild stenosis and lumen  diameter ratio = 3.7/4.3.  14% stenosis based on ACAS/NASCET criteria.  The degree of stenosis is derived by comparing the narrowest segment  with the distal luminal diameter.     No intraluminal thrombus.     The right vertebral artery is dominant.  The left vertebral artery is patent though diminutive throughout its  length.     Summary:  1. Patchy atherosclerotic plaque at both carotid bifurcations with no  high-grade stenosis.  2. The greatest degree of stenosis is at the left ICA origin where there  is 25% narrowing.                                      This report was finalized on 03/14/2022 16:03 by Dr. Jorge Corrigan MD.    CT Angiogram Head w AI Analysis of LVO [[id number]] Collected: 03/14/22 1554     Updated: 03/14/22 1559    Narrative:      EXAMINATION: CT ANGIOGRAM HEAD W AI ANALYSIS OF LVO-     3/14/2022 3:27 PM CDT     HISTORY: Acute Stroke     CT angiogram head.  CT angiography protocol.   CT imaging with bolus IV contrast injection.   Under concurrent supervision axial, sagittal, coronal, and  three-dimensional data sets were constructed.     In order to have a CT radiation dose as low as reasonably achievable  Automated Exposure Control was utilized for adjustment of the mA and/or  KV according to patient size.     DLP in mGycm= 199.     Calcified though patent distal internal carotid arteries.     Intact [place].  Anterior, middle, and posterior cerebral arteries are normally patent.  No aneurysm or proximal arterial occlusion.     There is absence of opacification of distal right MCA branches in the  area of known infarction.     Summary:  1. Intact [place].  2. There is no central arterial occlusion or thrombus.   This  report was finalized on 03/14/2022 15:56 by Dr. Jorge Corrigan MD.    CT Head Without Contrast Stroke Protocol [[id number]] Collected: 03/14/22 1531     Updated: 03/14/22 1536    Narrative:      EXAMINATION: CT HEAD WO CONTRAST STROKE PROTOCOL-      3/14/2022 3:26 PM CDT     HISTORY: Stroke, follow up     In order to have a CT radiation dose as low as reasonably achievable  Automated Exposure Control was utilized for adjustment of the mA and/or  KV according to patient size.     DLP in mGycm= 600.     Noncontrast head CT.     Large right MCA hypodense subacute infarct involving an area measuring  approximately 7 x 5 x 4 cm.     No hemorrhagic conversion.  No midline shift.     Ventricle size is normal.     Mild atrophy and small vessel disease.     Summary:  1. Large hypodense subacute right MCA infarct.  2. No intracranial hemorrhage.                                   This report was finalized on 03/14/2022 15:33 by Dr. Jorge Corrigan MD.          I have personally reviewed and interpreted the radiology studies and ECG obtained at time of admission.     Assessment / Plan      Assessment & Plan  Active Hospital Problems    Diagnosis    • Cerebrovascular accident (CVA) (HCC)      Plan .    CVA .  No aspirin for now per neurology.  Chest x-ray-No focal infiltrate, Mild hypoventilation with vascular crowding. Mild bronchial wall  Thickening.  CTA of the head-Intact [place], no central arterial occlusion or thrombus.   CTA of the neck-Patchy atherosclerotic plaque at both carotid bifurcations with no high-grade stenosis, greatest degree of stenosis is at the left ICA origin where there is 25% narrowing.  CT scan head-Large hypodense subacute right MCA infarct, No intracranial hemorrhage.  MRI of the brain pending.    CAD/hypertension/hyperlipidemia.  Lipitor high-dose per neurology.  Hold Plavix per neurology.  Continue lisinopril.  Echocardiogram pending.    Anemia.  Hold iron sulfate.    Chronic pain/arthritis.   Cut back Neurontin.  Cut back Norco.  Hold Mobic.    Diabetes .  Hold Glucophage.    Renal insufficiency.  We will follow.    Nausea.  Zofran as needed    Code Status/Advanced Care Plan: Full code.    The patient's surrogate decision maker is Lashell, daughter.      I discussed the patient's findings and my recommendations with: Patient and daughter    Estimated length of stay: 1 to 3 days    Electronically signed by Nik Goodson MD, 03/14/22, 5:15 PM CDT.              Electronically signed by Nik Goodson MD at 03/14/22 7770       Vital Signs (last day)     Date/Time Temp Temp src Pulse Resp BP Patient Position SpO2    03/15/22 1603 97.9 (36.6) Oral 77 16 158/58 Lying 95    03/15/22 1210 -- -- -- -- 132/62 Lying --    03/15/22 1126 98.2 (36.8) Oral 86 16 135/107 Lying 96    03/15/22 0744 98.1 (36.7) Oral 77 16 191/72 Lying 94    03/15/22 0447 98.2 (36.8) Oral 68 16 168/52 Lying 96    03/14/22 2354 98.5 (36.9) Oral 79 16 157/53 Lying --    03/14/22 2056 98 (36.7) Oral 83 16 158/58 Lying 97    03/14/22 1921 -- -- -- -- 155/60 -- --    03/14/22 1917 -- -- 89 -- -- -- --    03/14/22 1914 -- -- -- -- -- -- 97    03/14/22 1749 -- -- -- -- -- -- 94    03/14/22 1747 -- -- 80 -- -- -- --    03/14/22 1746 -- -- -- -- 144/53 -- --    03/14/22 1653 -- -- -- -- -- -- 100    03/14/22 1646 -- -- 74 16 147/60 -- --    03/14/22 1631 -- -- 73 -- 112/44 -- --    03/14/22 1616 -- -- 78 -- 150/50 -- --    03/14/22 1603 -- -- 80 -- 147/49 -- --    03/14/22 1546 97.4 (36.3) Oral 86 16 148/86 Lying --          Operative/Procedure Notes (last 7 days)  Notes from 03/08/22 1638 through 03/15/22 1638   No notes of this type exist for this encounter.            Physician Progress Notes (last 24 hours)      Nik Goodson MD at 03/15/22 1336              [facility] Medicine Services  INPATIENT PROGRESS NOTE    Length of Stay: 1  Date of Admission: 3/14/2022  Primary Care Physician: Roberto Garcia  MD Loi    Subjective   Chief Complaint: CVA.    HPI   Discussed with patient by MRI results, stroke.  Patient family wants patient go to rehab.  Patient denies any chest pain.  Blood pressure stable, afebrile.  Recommendation from neurology discussed with patient.  Echocardiogram still pending.    Review of Systems   Constitutional: Positive for activity change, appetite change and fatigue. Negative for chills and fever.   HENT: Negative for hearing loss, nosebleeds, tinnitus and trouble swallowing.    Eyes: Negative for visual disturbance.   Respiratory: Negative for cough, chest tightness, shortness of breath and wheezing.    Cardiovascular: Negative for chest pain, palpitations and leg swelling.   Gastrointestinal: Negative for abdominal distention, abdominal pain, blood in stool, constipation, diarrhea, nausea and vomiting.   Endocrine: Negative for cold intolerance, heat intolerance, polydipsia, polyphagia and polyuria.   Genitourinary: Negative for decreased urine volume, difficulty urinating, dysuria, flank pain, frequency and hematuria.   Musculoskeletal: Positive for arthralgias, gait problem and myalgias. Negative for joint swelling.   Skin: Negative for rash.   Allergic/Immunologic: Negative for immunocompromised state.   Neurological: Positive for weakness. Negative for dizziness, syncope, light-headedness and headaches.   Hematological: Negative for adenopathy. Does not bruise/bleed easily.   Psychiatric/Behavioral: Negative for confusion and sleep disturbance. The patient is not nervous/anxious.           All pertinent negatives and positives are as above. All other systems have been reviewed and are negative unless otherwise stated.     Objective    Temp:  [97.4 °F (36.3 °C)-98.5 °F (36.9 °C)] 98.2 °F (36.8 °C)  Heart Rate:  [68-89] 86  Resp:  [16] 16  BP: (112-191)/() 132/62    Intake/Output Summary (Last 24 hours) at 3/15/2022 1344  Last data filed at 3/15/2022 1300  Gross per 24 hour    Intake 480 ml   Output 500 ml   Net -20 ml     Physical Exam  Vitals and nursing note reviewed.   Constitutional:       Appearance: She is well-developed.      Comments: Advanced age.   HENT:      Head: Normocephalic.   Eyes:      Conjunctiva/sclera: Conjunctivae normal.      Pupils: Pupils are equal, round, and reactive to light.   Neck:      Vascular: No JVD.   Cardiovascular:      Rate and Rhythm: Normal rate and regular rhythm.      Heart sounds: Normal heart sounds. No murmur heard.    No friction rub. No gallop.   Pulmonary:      Effort: Pulmonary effort is normal. No respiratory distress.      Breath sounds: Normal breath sounds. No wheezing or rales.   Chest:      Chest wall: No tenderness.   Abdominal:      General: Bowel sounds are normal. There is no distension.      Palpations: Abdomen is soft.      Tenderness: There is no abdominal tenderness. There is no guarding or rebound.      Comments: Obesity.   Musculoskeletal:         General: No tenderness or deformity.      Cervical back: Neck supple.      Comments: Arthritis multiple knuckle   Skin:     General: Skin is warm and dry.      Capillary Refill: Capillary refill takes 2 to 3 seconds.      Findings: No rash.   Neurological:      Mental Status: She is alert and oriented to person, place, and time.      Cranial Nerves: No cranial nerve deficit.      Motor: Weakness present. No abnormal muscle tone.      Gait: Gait abnormal.      Deep Tendon Reflexes: Reflexes normal.      Comments: Cranial keystroke grossly intact.  Negative pronator drift.  Negative finger touch.  Strength 5 out of 5 symmetrical.   Psychiatric:         Behavior: Behavior normal.         Thought Content: Thought content normal.      Results Review:  Lab Results (last 24 hours)     Procedure Component Value Units Date/Time    POC Glucose Once [[id number]]  (Abnormal) Collected: 03/15/22 1113    Specimen: Blood Updated: 03/15/22 1128     Glucose 141 mg/dL      Comment: :  MEJIALEAH Hernandez [facility] ID: [id number]       TSH [[id number]]  (Normal) Collected: 03/15/22 0631    Specimen: Blood Updated: 03/15/22 0817     TSH 1.630 uIU/mL     Lipid Panel [[id number]]  (Abnormal) Collected: 03/15/22 0631    Specimen: Blood Updated: 03/15/22 0754     Total Cholesterol 266 mg/dL      Triglycerides 220 mg/dL      HDL Cholesterol 44 mg/dL      LDL Cholesterol  180 mg/dL      VLDL Cholesterol 42 mg/dL      LDL/HDL Ratio 4.05    Narrative:      Cholesterol Reference Ranges  (U.S. Department of Health and Human Services ATP III Classifications)    Desirable          <200 mg/dL  Borderline High    200-239 mg/dL  High Risk          >240 mg/dL      Triglyceride Reference Ranges  (U.S. Department of Health and Human Services ATP III Classifications)    Normal           <150 mg/dL  Borderline High  150-199 mg/dL  High             200-499 mg/dL  Very High        >500 mg/dL    HDL Reference Ranges  (U.S. Department of Health and Human Services ATP III Classifications)    Low     <40 mg/dl (major risk factor for CHD)  High    >60 mg/dl ('negative' risk factor for CHD)        LDL Reference Ranges  (U.S. Department of Health and Human Services ATP III Classifications)    Optimal          <100 mg/dL  Near Optimal     100-129 mg/dL  Borderline High  130-159 mg/dL  High             160-189 mg/dL  Very High        >189 mg/dL    Comprehensive Metabolic Panel [[id number]]  (Abnormal) Collected: 03/15/22 0631    Specimen: Blood Updated: 03/15/22 0751     Glucose 132 mg/dL      BUN 11 mg/dL      Creatinine 0.86 mg/dL      Sodium 138 mmol/L      Potassium 4.3 mmol/L      Chloride 102 mmol/L      CO2 23.0 mmol/L      Calcium 10.1 mg/dL      Total Protein 6.8 g/dL      Albumin 4.20 g/dL      ALT (SGPT) 17 U/L      AST (SGOT) 21 U/L      Alkaline Phosphatase 117 U/L      Total Bilirubin 0.5 mg/dL      Globulin 2.6 gm/dL      A/G Ratio 1.6 g/dL      BUN/Creatinine Ratio 12.8     Anion Gap 13.0 mmol/L      eGFR 68.8  mL/min/1.73      Comment: National Kidney Foundation and American Society of Nephrology (ASN) Task Force recommended calculation based on the Chronic Kidney Disease Epidemiology Collaboration (CKD-EPI) equation refit without adjustment for race.       Narrative:      GFR Normal >60  Chronic Kidney Disease <60  Kidney Failure <15      Hemoglobin A1c [[id number]]  (Abnormal) Collected: 03/15/22 0631    Specimen: Blood Updated: 03/15/22 0749     Hemoglobin A1C 11.00 %     Narrative:      Hemoglobin A1C Ranges:    Increased Risk for Diabetes  5.7% to 6.4%  Diabetes                     >= 6.5%  Diabetic Goal                < 7.0%    CBC Auto Differential [[id number]]  (Abnormal) Collected: 03/15/22 0631    Specimen: Blood Updated: 03/15/22 0736     WBC 9.00 10*3/mm3      RBC 4.47 10*6/mm3      Hemoglobin 13.2 g/dL      Hematocrit 42.3 %      MCV 94.6 fL      MCH 29.5 pg      MCHC 31.2 g/dL      RDW 12.7 %      RDW-SD 43.6 fl      MPV 10.7 fL      Platelets 336 10*3/mm3      Neutrophil % 57.0 %      Lymphocyte % 30.7 %      Monocyte % 9.0 %      Eosinophil % 1.6 %      Basophil % 0.7 %      Neutrophils, Absolute 5.14 10*3/mm3      Lymphocytes, Absolute 2.76 10*3/mm3      Monocytes, Absolute 0.81 10*3/mm3      Eosinophils, Absolute 0.14 10*3/mm3      Basophils, Absolute 0.06 10*3/mm3     Urinalysis With Culture If Indicated - Urine, Clean Catch [[id number]]  (Abnormal) Collected: 03/15/22 0306    Specimen: Urine, Clean Catch Updated: 03/15/22 0326     Color, UA Yellow     Appearance, UA Clear     pH, UA 7.5     Specific Gravity, UA >1.030     Glucose, UA Negative     Ketones, UA Negative     Bilirubin, UA Negative     Blood, UA Trace     Protein, UA Negative     Leuk Esterase, UA Moderate (2+)     Nitrite, UA Negative     Urobilinogen, UA 0.2 E.U./dL    Urinalysis, Microscopic Only - Urine, Clean Catch [[id number]]  (Abnormal) Collected: 03/15/22 0306    Specimen: Urine, Clean Catch Updated: 03/15/22 0326     RBC, UA 3-5 /HPF       WBC, UA 31-50 /HPF      Bacteria, UA 1+ /HPF      Squamous Epithelial Cells, UA 0-2 /HPF      Hyaline Casts, UA 0-2 /LPF      Methodology Automated Microscopy    Urine Culture - Urine, Urine, Clean Catch [[id number]] Collected: 03/15/22 0306    Specimen: Urine, Clean Catch Updated: 03/15/22 0326    COVID PRE-OP / PRE-PROCEDURE SCREENING ORDER (NO ISOLATION) - Swab, Nasal Cavity [[id number]]  (Normal) Collected: 03/14/22 1811    Specimen: Swab from Nasal Cavity Updated: 03/14/22 1852    Narrative:      The following orders were created for panel order COVID PRE-OP / PRE-PROCEDURE SCREENING ORDER (NO ISOLATION) - Swab, Nasal Cavity.  Procedure                               Abnormality         Status                     ---------                               -----------         ------                     COVID-19,[employer] IN-VASHTI...[[id number]]  Normal              Final result                 Please view results for these tests on the individual orders.    COVID-19,[employer] IN-HOUSE,Nasal Swab No Transport Media 3-4 HR TAT - Swab, Nasal Cavity [[id number]]  (Normal) Collected: 03/14/22 1811    Specimen: Swab from Nasal Cavity Updated: 03/14/22 1852     COVID19 Not Detected    Narrative:      Fact sheet for providers: https://www.fda.gov/media/542225/download     Fact sheet for patients: https://www.fda.gov/media/177627/download    Test performed by PCR.    Consider negative results in combination with clinical observations, patient history, and epidemiological information.  Fact sheet for providers: https://www.fda.gov/media/821614/download     Fact sheet for patients: https://www.fda.gov/media/318796/download    Test performed by PCR.    Consider negative results in combination with clinical observations, patient history, and epidemiological information.    [place] Draw [[id number]] Collected: 03/14/22 1600    Specimen: Blood Updated: 03/14/22 1703    Narrative:      The following orders were created for panel order  [place] Draw.  Procedure                               Abnormality         Status                     ---------                               -----------         ------                     Green Top (Gel)[[id number]]                                  Final result               Lavender Top[[id number]]                                     Final result               Red Top[[id number]]                                          Final result               Light Blue Top[[id number]]                                   Final result                 Please view results for these tests on the individual orders.    Green Top (Gel) [[id number]] Collected: 03/14/22 1600    Specimen: Blood Updated: 03/14/22 1703     Extra Tube Hold for add-ons.     Comment: Auto resulted.       Red Top [[id number]] Collected: 03/14/22 1600    Specimen: Blood Updated: 03/14/22 1703     Extra Tube Hold for add-ons.     Comment: Auto resulted.       Light Blue Top [[id number]] Collected: 03/14/22 1600    Specimen: Blood Updated: 03/14/22 1703     Extra Tube hold for add-on     Comment: Auto resulted       Lavender Top [[id number]] Collected: 03/14/22 1600    Specimen: Blood Updated: 03/14/22 1703     Extra Tube hold for add-on     Comment: Auto resulted       Comprehensive Metabolic Panel [[id number]]  (Abnormal) Collected: 03/14/22 1600    Specimen: Blood Updated: 03/14/22 1630     Glucose 166 mg/dL      BUN 13 mg/dL      Creatinine 1.06 mg/dL      Sodium 139 mmol/L      Potassium 4.0 mmol/L      Chloride 102 mmol/L      CO2 25.0 mmol/L      Calcium 9.5 mg/dL      Total Protein 6.1 g/dL      Albumin 3.40 g/dL      ALT (SGPT) 16 U/L      AST (SGOT) 20 U/L      Alkaline Phosphatase 97 U/L      Total Bilirubin 0.3 mg/dL      Globulin 2.7 gm/dL      A/G Ratio 1.3 g/dL      BUN/Creatinine Ratio 12.3     Anion Gap 12.0 mmol/L      eGFR 53.5 mL/min/1.73      Comment: National Kidney Foundation and American Society of Nephrology (ASN) Task Force recommended calculation  based on the Chronic Kidney Disease Epidemiology Collaboration (CKD-EPI) equation refit without adjustment for race.       Narrative:      GFR Normal >60  Chronic Kidney Disease <60  Kidney Failure <15      Troponin [[id number]]  (Normal) Collected: 03/14/22 1600    Specimen: Blood Updated: 03/14/22 1628     Troponin T 0.012 ng/mL     Narrative:      Troponin T Reference Range:  <= 0.03 ng/mL-   Negative for AMI  >0.03 ng/mL-     Abnormal for myocardial necrosis.  Clinicians would have to utilize clinical acumen, EKG, Troponin and serial changes to determine if it is an Acute Myocardial Infarction or myocardial injury due to an underlying chronic condition.       Results may be falsely decreased if patient taking Biotin.      Protime-INR [[id number]]  (Normal) Collected: 03/14/22 1600    Specimen: Blood Updated: 03/14/22 1622     Protime 13.0 Seconds      INR 1.02    aPTT [[id number]]  (Normal) Collected: 03/14/22 1600    Specimen: Blood Updated: 03/14/22 1622     PTT 27.5 seconds     CBC & Differential [[id number]]  (Abnormal) Collected: 03/14/22 1600    Specimen: Blood Updated: 03/14/22 1612    Narrative:      The following orders were created for panel order CBC & Differential.  Procedure                               Abnormality         Status                     ---------                               -----------         ------                     CBC Auto Differential[[id number]]        Abnormal            Final result                 Please view results for these tests on the individual orders.    CBC Auto Differential [[id number]]  (Abnormal) Collected: 03/14/22 1600    Specimen: Blood Updated: 03/14/22 1612     WBC 9.27 10*3/mm3      RBC 3.87 10*6/mm3      Hemoglobin 11.5 g/dL      Hematocrit 36.4 %      MCV 94.1 fL      MCH 29.7 pg      MCHC 31.6 g/dL      RDW 12.5 %      RDW-SD 43.4 fl      MPV 10.0 fL      Platelets 350 10*3/mm3      Neutrophil % 64.7 %      Lymphocyte % 23.7 %      Monocyte % 9.1 %       Eosinophil % 1.1 %      Basophil % 0.3 %      Immature Grans % 1.1 %      Neutrophils, Absolute 6.00 10*3/mm3      Lymphocytes, Absolute 2.20 10*3/mm3      Monocytes, Absolute 0.84 10*3/mm3      Eosinophils, Absolute 0.10 10*3/mm3      Basophils, Absolute 0.03 10*3/mm3      Immature Grans, Absolute 0.10 10*3/mm3      nRBC 0.0 /100 WBC            Cultures:  No results found for: BLOODCX, URINECX, WOUNDCX, MRSACX, RESPCX, STOOLCX    Radiology Data:    Imaging Results (Last 24 Hours)     Procedure Component Value Units Date/Time    MRI Brain Without Contrast [[id number]] Collected: 03/14/22 1838     Updated: 03/14/22 1846    Narrative:      EXAMINATION: MRI the brain without contrast 3/14/2022     HISTORY: Stroke     FINDINGS: Multiplanar fast spin echo imaging sequences were obtained of  the brain on a high-field magnet without gadolinium enhancement.     There is diffusion restriction within the right posterior division MCA  territory with involvement of the temporal and parietal lobe with  associated ADC mapping abnormality. FINDINGS are consistent with an  acute MCA territory infarct. There is associated sulcal effacement and  edema. No evidence of hemorrhagic conversion.     No additional sites of diffusion restriction are present. There is  atrophy of the brain with prominence of the subarachnoid spaces and  ventricular enlargement. There is evidence of previous lacunar  infarctions involving the right cerebellar hemisphere, left basal  ganglia and right thalamus with focal encephalomalacia and gliosis. Mild  to moderate small vessel ischemic changes are noted involving the  periventricular and higher white matter tracts.     Normal flow voids are noted within the [place]. The visualized  paranasal sinuses and mastoid air cells demonstrate normal aeration.  There are normal flow-voids within the [place].       Impression:      1.. Extensive right MCA territory infarct with diffusion restriction  and  ADC mapping abnormality. There is associated sulcal effacement and  edema. No evidence of hemorrhagic conversion or mass effect.  2. Atrophy with small vessel ischemic changes.  3. Remote infarcts involving the right thalamus, left basal ganglia and  right cerebellar hemisphere with focal encephalomalacia and gliosis.  This report was finalized on 03/14/2022 18:43 by Dr. Marco A Vasquez MD.    XR Chest 1 View [[id number]] Collected: 03/14/22 1614     Updated: 03/14/22 1618    Narrative:      EXAMINATION:  XR CHEST 1 VW-  3/14/2022 4:08 PM CDT     HISTORY: Acute Stroke Protocol (Onset < 12 hrs)     COMPARISON: No comparison study.     FINDINGS:  There is hypoventilation with vascular crowding. There is  minimal bronchial wall thickening. There is no focal infiltrate. The  heart is normal in size. The thoracic aorta is atheromatous. There are  degenerative changes of the spine. Surgical anchor is noted in the right  humeral head.       Impression:      1. No focal infiltrate.  2. Mild hypoventilation with vascular crowding. Mild bronchial wall  thickening.        This report was finalized on 03/14/2022 16:15 by Dr. Tim Ozuna MD.    CT Angiogram Neck [[id number]] Collected: 03/14/22 1556     Updated: 03/14/22 1606    Narrative:      EXAMINATION: CT ANGIOGRAM NECK-      3/14/2022 3:27 PM CDT     HISTORY: Stroke, follow up     In order to have a CT radiation dose as low as reasonably achievable  Automated Exposure Control was utilized for adjustment of the mA and/or  KV according to patient size.     DLP in mGycm= 199.     CT angiogram neck.  CT angiography protocol.   CT imaging with bolus IV contrast injection.   Under concurrent supervision axial, sagittal, coronal, and  three-dimensional data sets were constructed.     Aortic arch calcification.  Patent great vessel origins though there is moderate atherosclerotic  calcification with approximately 50% narrowing of the proximal left  subclavian artery.      Patchy atherosclerotic calcification within the common carotid arteries.     Atherosclerotic calcification at the left ICA origin and within the  proximal 22 mm of the left ICA.  Left ICA origin stenosis with lumen diameter ratio = 3.4/4.5.  25% stenosis based on ACAS/NASCET criteria.  The degree of stenosis is derived by comparing the narrowest segment  with the distal luminal diameter.  2 cm distal to the left ICA origin there is calcified plaque with less  prominent stenosis with a lumen diameter ratio = 4.0/4.5.  12% stenosis based on ACAS/NASCET criteria.  The degree of stenosis is derived by comparing the narrowest segment  with the distal luminal diameter.     Atherosclerotic calcification within the proximal 15 mm of the right  ICA.  Calcified plaque at the right ICA origin with mild stenosis and lumen  diameter ratio = 3.7/4.3.  14% stenosis based on ACAS/NASCET criteria.  The degree of stenosis is derived by comparing the narrowest segment  with the distal luminal diameter.     No intraluminal thrombus.     The right vertebral artery is dominant.  The left vertebral artery is patent though diminutive throughout its  length.     Summary:  1. Patchy atherosclerotic plaque at both carotid bifurcations with no  high-grade stenosis.  2. The greatest degree of stenosis is at the left ICA origin where there  is 25% narrowing.                                      This report was finalized on 03/14/2022 16:03 by Dr. Jorge Corrigan MD.    CT Angiogram Head w AI Analysis of LVO [[id number]] Collected: 03/14/22 1554     Updated: 03/14/22 1559    Narrative:      EXAMINATION: CT ANGIOGRAM HEAD W AI ANALYSIS OF LVO-     3/14/2022 3:27 PM CDT     HISTORY: Acute Stroke     CT angiogram head.  CT angiography protocol.   CT imaging with bolus IV contrast injection.   Under concurrent supervision axial, sagittal, coronal, and  three-dimensional data sets were constructed.     In order to have a CT radiation dose as low as  reasonably achievable  Automated Exposure Control was utilized for adjustment of the mA and/or  KV according to patient size.     DLP in mGycm= 199.     Calcified though patent distal internal carotid arteries.     Intact [place].  Anterior, middle, and posterior cerebral arteries are normally patent.  No aneurysm or proximal arterial occlusion.     There is absence of opacification of distal right MCA branches in the  area of known infarction.     Summary:  1. Intact [place].  2. There is no central arterial occlusion or thrombus.   This report was finalized on 03/14/2022 15:56 by Dr. Jorge Corrigan MD.    CT Head Without Contrast Stroke Protocol [[id number]] Collected: 03/14/22 1531     Updated: 03/14/22 1536    Narrative:      EXAMINATION: CT HEAD WO CONTRAST STROKE PROTOCOL-      3/14/2022 3:26 PM CDT     HISTORY: Stroke, follow up     In order to have a CT radiation dose as low as reasonably achievable  Automated Exposure Control was utilized for adjustment of the mA and/or  KV according to patient size.     DLP in mGycm= 600.     Noncontrast head CT.     Large right MCA hypodense subacute infarct involving an area measuring  approximately 7 x 5 x 4 cm.     No hemorrhagic conversion.  No midline shift.     Ventricle size is normal.     Mild atrophy and small vessel disease.     Summary:  1. Large hypodense subacute right MCA infarct.  2. No intracranial hemorrhage.                                   This report was finalized on 03/14/2022 15:33 by Dr. Jorge Corrigan MD.          Allergies   Allergen Reactions   • Codeine Other (See Comments)       Scheduled meds:   aspirin, 81 mg, Oral, Daily   Or  aspirin, 300 mg, Rectal, Daily  atorvastatin, 80 mg, Oral, Nightly  cefTRIAXone, 1 g, Intravenous, Q24H  gabapentin, 300 mg, Oral, Q8H  insulin lispro, 2-9 Units, Subcutaneous, TID AC  linagliptin, 5 mg, Oral, Daily  lisinopril, 20 mg, Oral, Daily  sodium chloride, 10 mL, Intravenous, Q12H        PRN  meds:  dextrose  •  dextrose  •  glucagon (human recombinant)  •  HYDROcodone-acetaminophen  •  ondansetron  •  sodium chloride  •  sodium chloride    Assessment/Plan       Cerebrovascular accident (CVA) (HCC)    PAD (peripheral artery disease) ([facility])    Mixed hyperlipidemia    Type 2 diabetes mellitus with hyperglycemia, without long-term current use of insulin (HCC)    CAD (coronary artery disease)      Plan:  CVA-extensive right MCA territory infarct.  No aspirin for now per neurology.  Aspirin daily.  Chest x-ray-No focal infiltrate, Mild hypoventilation with vascular crowding. Mild bronchial wall  Thickening.  CTA of the head-Intact [place], no central arterial occlusion or thrombus.   CTA of the neck-Patchy atherosclerotic plaque at both carotid bifurcations with no high-grade stenosis, greatest degree of stenosis is at the left ICA origin where there is 25% narrowing.  CT scan head-Large hypodense subacute right MCA infarct, No intracranial hemorrhage.  MRI of the brain-Extensive right MCA territory infarct with diffusion restriction and ADC mapping abnormality- associated sulcal effacement and edema, No evidence of hemorrhagic conversion or mass effect, Atrophy with small vessel ischemic changes, Remote infarcts involving the right thalamus and  left basal ganglia and right cerebellar hemisphere with focal encephalomalacia and gliosis.  Recommendation from neurologist-recommend continue aspirin for now, can be discharged on this and in 14 days recommend initiate parenteral therapy with aspirin, also recommend high dose of statin and increased glycemic control.     CAD/hypertension/hyperlipidemia.  Lipitor high-dose per neurology.  Hold Plavix per neurology.  Continue lisinopril.  Echocardiogram pending.    UTI.  Rocephin.     Anemia.  Hold iron sulfate.     Chronic pain/arthritis.  Cut back Neurontin.  Cut back Norco.  Hold Mobic.     Diabetes .  Hold Glucophage.  Hemoglobin A1 C 11.  Discussed  patient will need better control of her diabetes.  Start patient Tradjenta.  Continue with low sliding scale.     Renal insufficiency.    Resolved.     Nausea.  Zofran as needed    Urine culture pending.  Covid-19-negative.    Discharge Planning: Plan for rehab placement.    Electronically signed by Nik Goodson MD, 03/15/22, 1:36 PM CDT.                    Electronically signed by Nik Goodson MD at 03/15/22 1413     Tami Sharma APRN at 03/15/22 0911     Attestation signed by Ney Rodriguez MD at 03/15/22 1256    I have reviewed this documentation and agree.  Patient seen and examined by me.  She continues to have some left upper extremity weakness.  Therapy is currently in the room.  She was able to ambulate to the restroom with minimal assistance.  Lab work does show evidence of a hemoglobin A1c is elevated at 11 and LDL which is elevated at 1.8.  She is already on dual antiplatelet therapy.  We are recommending continuation of aspirin for now.  She can be discharged on this and in 14 days I would recommend initiating Pletal therapy with aspirin.  We are also recommending high-dose statins and increasing glycemic control.  Therapy consultations are pending with discharge planning.    Electronically signed by Ney Rodriguez MD, 03/15/22, 12:56 PM CDT.                      Neurology Progress Note      Chief Complaint:  F/u code stroke/stroke    Subjective     Subjective:  Patient sitting up in bed. Daughter at bedside. She has more movement of LUE this AM and has been feeding self breakfast with right hand. Patient denies missing doses of ASA/Plavix prior to admission. She takes DAPT for PAD.  MRI brain showed Right MCA/PCA stroke with no hemorrhagic conversion. There was concern for CAA and T2* did not show signs of this.  CTA head and neck showed no LVO. Left ICA stenosis 25% and mold right ICA stenosis. CTA head showed absence of rifht MCA branches in area of infarction.     LABS:  A1c-  11  LDL- 180  TSH- 1.63  UA with trace RBC and moderate leuk esterase    Medications:  Current Facility-Administered Medications   Medication Dose Route Frequency Provider Last Rate Last Admin   • aspirin chewable tablet 81 mg  81 mg Oral Daily Tami Sharma APRN        Or   • aspirin suppository 300 mg  300 mg Rectal Daily Tami Sharma APRN       • atorvastatin (LIPITOR) tablet 80 mg  80 mg Oral Nightly Nik Goodson MD   80 mg at 03/14/22 2242   • cefTRIAXone (ROCEPHIN) in SWFI 1 gram/10ml IV PUSH syringe  1 g Intravenous Q24H Nik Goodson MD       • dextrose (D50W) (25 g/50 mL) IV injection 25 g  25 g Intravenous Q15 Min PRN Nik Goodson MD       • dextrose (GLUTOSE) oral gel 15 g  15 g Oral Q15 Min PRN Nik Goodson MD       • gabapentin (NEURONTIN) capsule 300 mg  300 mg Oral Q8H Nik Goodson MD   300 mg at 03/15/22 0545   • glucagon (human recombinant) (GLUCAGEN DIAGNOSTIC) injection 1 mg  1 mg Intramuscular Q15 Min PRN Nik Goodson MD       • HYDROcodone-acetaminophen (NORCO) 5-325 MG per tablet 1 tablet  1 tablet Oral Q6H PRN Nik Goodson MD       • insulin lispro (humaLOG) injection 2-9 Units  2-9 Units Subcutaneous TID AC Nik Goodson MD       • lactated ringers infusion  60 mL/hr Intravenous Continuous Nik Goodson MD 60 mL/hr at 03/14/22 2242 60 mL/hr at 03/14/22 2242   • linagliptin (TRADJENTA) tablet 5 mg  5 mg Oral Daily Nik Goodson MD       • lisinopril (PRINIVIL,ZESTRIL) tablet 20 mg  20 mg Oral Daily Nik Goodson MD       • ondansetron (ZOFRAN) injection 4 mg  4 mg Intravenous Q6H PRN Nik Goodson MD       • sodium chloride 0.9 % flush 10 mL  10 mL Intravenous PRN Nik Goodson MD       • sodium chloride 0.9 % flush 10 mL  10 mL Intravenous Q12H Nik Goodson MD   10 mL at 03/14/22 9456   • sodium chloride 0.9 % flush 10 mL  10 mL Intravenous PRN Nik Goodson MD           Review of Systems:   -A 14 point review of systems is completed and is  negative except for left sided weakness and left vision loss.       Objective      Vital Signs  Temp:  [97.4 °F (36.3 °C)-98.5 °F (36.9 °C)] 98.1 °F (36.7 °C)  Heart Rate:  [68-89] 77  Resp:  [16] 16  BP: (112-191)/(44-86) 191/72    Telemetry:S 70-83      Physical Exam:  General Exam:  Head:  Normal cephalic, atraumatic  HEENT:  Neck supple  Fundoscopic Exam:  No signs of disc edema  CVS:  Regular rate and rhythm.  No murmurs  Carotid Examination:  No bruits  Lungs:  Clear to auscultation  Abdomen:  Non-tender, Non-distended  Extremities:  No signs of peripheral edema  Skin:  No rashes     Neurologic Exam:     Mental Status:    -Awake, Alert, Oriented to person and place. At first stated month as January and then corrected to March. Stated year as 2017. Correctly named the president and current world events.   -No word finding difficulties  -No aphasia  -No dysarthria  -Follows simple and complex commands     CN II:  Visual fields with left visual field deficit.  Pupils equally reactive to light. Left neglect.   CN III, IV, VI:  Extraocular Muscles full with no signs of nystagmus  CN V:  Facial sensory is symmetric with no asymmetries.  CN VII:  Facial motor asymmetric with left lower facial weakness.   CN VIII:  Gross hearing intact bilaterally  CN IX:  Palate elevates symmetrically  CN X:  Palate elevates symmetrically  CN XI:  Shoulder shrug symmetric  CN XII:  Tongue is midline on protrusion     Motor: (strength out of 5:  1= minimal movement, 2 = movement in plane of gravity, 3 = movement against gravity, 4 = movement against some resistance, 5 = full strength)     -Right Upper Ext: Proximal: 5 Distal: 5  -Left Upper Ext: Proximal: 5   Distal: 5     -Right Lower Ext: Proximal: 5 Distal: 5  -Left Lower Ext: Proximal: 5   Distal: 5     DTR:  -Right              Bicep: 2+         Tricep: 2+        Brachioradialis: 2+              Patella: 2+       Ankle: 2+         Neg Babinski  -Left              Bicep: 2+          Tricep: 2+        Brachioradialis: 2+              Patella: 2+       Ankle: 2+         Neg Babinski     Sensory:  -Intact to light touch, pinprick, temperature, pain, and proprioception     Coordination:  -Finger to nose intact.   -Heel to shin intact  -No ataxia     Gait  Up with assist.         Results Review:    I reviewed the patient's new clinical results.    Results from last 7 days   Lab Units 03/15/22  0631 03/14/22  1600   WBC 10*3/mm3 9.00 9.27   HEMOGLOBIN g/dL 13.2 11.5*   HEMATOCRIT % 42.3 36.4   PLATELETS 10*3/mm3 336 350        Results from last 7 days   Lab Units 03/15/22  0631 03/14/22  1600   SODIUM mmol/L 138 139   POTASSIUM mmol/L 4.3 4.0   CHLORIDE mmol/L 102 102   CO2 mmol/L 23.0 25.0   BUN mg/dL 11 13   CREATININE mg/dL 0.86 1.06*   CALCIUM mg/dL 10.1 9.5   BILIRUBIN mg/dL 0.5 0.3   ALK PHOS U/L 117 97   ALT (SGPT) U/L 17 16   AST (SGOT) U/L 21 20   GLUCOSE mg/dL 132* 166*        Lab Results   Component Value Date    MG 1.5 (L) 01/04/2021    PROTIME 13.0 03/14/2022    INR 1.02 03/14/2022     No components found for: POCGLUC  No components found for: A1C  Lab Results   Component Value Date    HDL 44 03/15/2022     (H) 03/15/2022     No components found for: B12  Lab Results   Component Value Date    TSH 1.630 03/15/2022       MRI brain personally reviewed by me showing acute/subacute right MCA/PCA stroke.       Assessment/Plan     Hospital Problem List      Cerebrovascular accident (CVA) (HCC)    Impression:  1. Acute/subacute right MCA/PCA stroke.  2. DM uncontrolled, A1C 11.  3. Dyslipidemia.   3. PAD on dual antiplatelet therapy of ASA 81 mg and Plavix 75 mg daily.  4. Hypertension.  5. Hx DVT  6. UTI per attending.     Plan:  1. ASA 81 mg daily. Hold on dual antiplatelet therapy as size and location of stroke at risk for hemorrhagic conversion.  2. Lipitor 80 mg daily.  3. DM management for A1C goal less than 7.  4. Systolic BP goal less than 160.  5. Transthoracic echo.  6.  ST/PT/OT  7. No driving at discharge and no driving until seen in follow up with neurology clinic.   8. Discharge planning TBD after therapy eval.   9. Consider 14 day ZIO at discharge.       Tami Estevessh, APRN  03/15/22  09:11 CDT    Electronically signed by Ney Rodriguez MD at 03/15/22 1256       Consult Notes (last 24 hours)  Notes from 22 1639 through 03/15/22 1639   No notes of this type exist for this encounter.            Physical Therapy Notes (last 24 hours)      Daxa Rudolph PTA at 03/15/22 1343  Version 1 of 1         Acute Care - Physical Therapy Treatment Note   [place]     Patient Name: Evelyn Ojeda  : 1940  MRN: [id number]  Today's Date: 3/15/2022      Visit Dx:     ICD-10-CM ICD-9-CM   1. Cerebrovascular accident (CVA), unspecified mechanism ([facility])  I63.9 434.91   2. Oral phase dysphagia  R13.11 787.21   3. Impaired functional mobility and activity tolerance  Z74.09 V49.89     Patient Active Problem List   Diagnosis   • PAD (peripheral artery disease) ([facility])   • Essential hypertension   • Mixed hyperlipidemia   • Bilateral carotid artery stenosis   • Acute deep vein thrombosis (DVT) of distal vein of right lower extremity ([facility])   • Atherosclerosis of artery of extremity with intermittent claudication ([facility])   • Chronic osteoarthritis   • Decreased pedal pulses   • Nonhealing ulcer of right lower leg with fat layer exposed ([facility])   • Hyperglycemia   • Type 2 diabetes mellitus with hyperglycemia, without long-term current use of insulin ([facility])   • Acute kidney injury ([facility])   • Postoperative anemia due to acute blood loss   • Hematuria   • Cerebrovascular accident (CVA) ([facility])   • CAD (coronary artery disease)     Past Medical History:   Diagnosis Date   • Diabetes mellitus ([facility])    • DVT (deep venous thrombosis) ([facility])     RLL   • Hypertension    • PVD (peripheral vascular disease) ([facility])      Past Surgical History:   Procedure Laterality Date   • AORTAGRAM N/A 2021     Procedure: 1.  Introduction of catheter/sheath into the aorta 2.  Aortoiliac angiogram with right lower extremity runoff 3.  Contralateral cannulation of the right common iliac artery 4.  Placement of a 4 mm spider distal embolic protection device in the below-knee popliteal artery 5.  Directional atherectomy of the proximal SFA and below-knee popliteal artery using the 6 [place] Pantheris d   • CHOLECYSTECTOMY     • KNEE SURGERY     • LEG SURGERY       PT Assessment (last 12 hours)     PT Evaluation and Treatment     Row Name 03/15/22 1320          Physical Therapy Time and Intention    Subjective Information no complaints  -TB     Document Type therapy note (daily note)  -TB     Mode of Treatment physical therapy  -TB     Patient Effort good  -TB     Row Name 03/15/22 1320          General Information    Existing Precautions/Restrictions fall  -TB     Row Name 03/15/22 1320          Pain    Pretreatment Pain Rating 0/10 - no pain  -TB     Posttreatment Pain Rating 0/10 - no pain  -TB     Row Name 03/15/22 1320          Bed Mobility    Bed Mobility rolling left;rolling right;scooting/bridging;supine-sit;sit-supine  -TB     Rolling Left [place] (Bed Mobility) modified independence  -TB     Rolling Right [place] (Bed Mobility) modified independence  -TB     Scooting/Bridging [place] (Bed Mobility) modified independence  -TB     Supine-Sit [place] (Bed Mobility) modified independence  -TB     Sit-Supine [place] (Bed Mobility) modified independence  -TB     Assistive Device (Bed Mobility) bed rails  -TB     Row Name 03/15/22 1320          Transfers    Transfers sit-stand transfer;stand-sit transfer;toilet transfer  -TB     Sit-Stand [place] (Transfers) standby assist  -TB     Stand-Sit [place] (Transfers) standby assist  -TB     [place] Level (Toilet Transfer) contact guard;verbal cues  -TB     Assistive Device (Toilet Transfer) grab bars/safety frame  -TB     Row Name  03/15/22 1320          Toilet Transfer    Type (Toilet Transfer) sit-stand;stand-sit  -TB     Row Name 03/15/22 1320          Gait/Stairs (Locomotion)    [place] Level (Gait) contact guard;standby assist  -TB     Distance in Feet (Gait) 150'x2  -TB     Deviations/Abnormal Patterns (Gait) gait speed decreased;stride length decreased  -TB     Bilateral Gait Deviations forward flexed posture;heel strike decreased  -TB     Row Name 03/15/22 1320          Positioning and Restraints    Pre-Treatment Position in bed  -TB     Post Treatment Position bed  -TB     In Bed fowlers;call light within reach;encouraged to call for assist;exit alarm on;side rails up x2  -TB           User Key  (r) = Recorded By, (t) = Taken By, (c) = Cosigned By    Initials Name Provider Type    Daxa Fields, PTA Physical Therapy Assistant                Physical Therapy Education                 Title: PT OT SLP Therapies (In Progress)     Topic: Physical Therapy (In Progress)     Point: Mobility training (Done)     Learning Progress Summary           Patient Acceptance, E,TB,D, VU,DU,NR by NATALY at 3/15/2022 1237    Comment: Education re: purpose of PT/importance of activity, for safety/falls prevention, to have assist when up   Family Acceptance, E,TB,D, VU,DU,NR by NATALY at 3/15/2022 1237    Comment: Education re: purpose of PT/importance of activity, for safety/falls prevention, to have assist when up                   Point: Home exercise program (Not Started)     Learner Progress:  Not documented in this visit.          Point: Precautions (Done)     Learning Progress Summary           Patient Acceptance, E,TB,D, VU,DU,NR by NATALY at 3/15/2022 1237    Comment: Education re: purpose of PT/importance of activity, for safety/falls prevention, to have assist when up   Family Acceptance, E,TB,D, VU,DU,NR by NATALY at 3/15/2022 1237    Comment: Education re: purpose of PT/importance of activity, for safety/falls prevention, to have assist when  up                               User Key     Initials Effective Dates Name Provider Type Discipline     08/02/18 -  Nereida Faust, PT Physical Therapist PT              PT Recommendation and Plan             Time Calculation:    PT Charges     Row Name 03/15/22 1420 03/15/22 1113          Time Calculation    Start Time 1320  -TB 0950  -     Stop Time 1343  -TB 1100  -     Time Calculation (min) 23 min  -TB 70 min  -     PT Received On 03/15/22  - 03/15/22  -     PT Goal Re-Cert Due Date -- 03/25/22  -            Time Calculation- PT    Total Timed Code Minutes- PT 23 minute(s)  -TB --           User Key  (r) = Recorded By, (t) = Taken By, (c) = Cosigned By    Initials Name Provider Type    Daxa Fields, PTA Physical Therapy Assistant    Nereida Orozco, PT Physical Therapist              Therapy Charges for Today     Code Description Service Date Service Provider Modifiers Qty    [id number] HC GAIT TRAINING EA 15 MIN 3/15/2022 Daxa Rudolph, PTA GP 2          PT G-Codes  Outcome Measure Options: AM-PAC 6 Clicks Basic Mobility (PT)  AM-PAC 6 Clicks Score (PT): 18    Daxa Rudolph PTA  3/15/2022      Electronically signed by Daax Rudolph, SOFIA at 03/15/22 1420     Nereida Faust, PT at 03/15/22 1614  Version 1 of 1       Goal Outcome Evaluation:  Plan of Care Reviewed With: patient, daughter        Progress: improving  Outcome Evaluation: PT eval completed.  Pt motivated and agreeable to therapy.  Pt w/ decrease coordination in the L UE, decrease strength B shlds and L hip flexor, decrease dynamic sitting balance, and decrease I w/ tfers and gait requiring CGA and HHA for ambulation.  Pt w/ decrease gait speed, step length and heel strike.  Pt required HHA X 1.  Pt reports she has a cane and rwx and usually carries the cane w/ her not always using it, but has it.  Pt will benefit from continued PT services for improved strength, balance, activity tolerance,  coordination, and to improve I w/ tfers/gait w/ the most appropriate AD.  Pt reports she already has HH and has help from family  and would like to return home.  If pt truly does have  assist, feel pt can return home at discharge, however recommed continued skilled care.  Would recommend HH therapy.  Will follow for progress and needs.  May benefit from use of rwx to improve stability and reduce fall risk.    Electronically signed by Nereida Faust, PT at 03/15/22 1614     Nereida Faust, PT at 03/15/22 1614  Version 1 of 1         Patient Name: Evelyn Ojeda  : 1940    MRN: [id number]                              Today's Date: 3/15/2022       Admit Date: 3/14/2022    Visit Dx:     ICD-10-CM ICD-9-CM   1. Cerebrovascular accident (CVA), unspecified mechanism ([facility])  I63.9 434.91   2. Oral phase dysphagia  R13.11 787.21   3. Impaired functional mobility and activity tolerance  Z74.09 V49.89     Patient Active Problem List   Diagnosis   • PAD (peripheral artery disease) ([facility])   • Essential hypertension   • Mixed hyperlipidemia   • Bilateral carotid artery stenosis   • Acute deep vein thrombosis (DVT) of distal vein of right lower extremity ([facility])   • Atherosclerosis of artery of extremity with intermittent claudication ([facility])   • Chronic osteoarthritis   • Decreased pedal pulses   • Nonhealing ulcer of right lower leg with fat layer exposed ([facility])   • Hyperglycemia   • Type 2 diabetes mellitus with hyperglycemia, without long-term current use of insulin ([facility])   • Acute kidney injury ([facility])   • Postoperative anemia due to acute blood loss   • Hematuria   • Cerebrovascular accident (CVA) ([facility])   • CAD (coronary artery disease)     Past Medical History:   Diagnosis Date   • Diabetes mellitus ([facility])    • DVT (deep venous thrombosis) ([facility])     RLL   • Hypertension    • PVD (peripheral vascular disease) ([facility])      Past Surgical History:   Procedure Laterality Date   • AORTAGRAM N/A 2021    Procedure: 1.   Introduction of catheter/sheath into the aorta 2.  Aortoiliac angiogram with right lower extremity runoff 3.  Contralateral cannulation of the right common iliac artery 4.  Placement of a 4 mm spider distal embolic protection device in the below-knee popliteal artery 5.  Directional atherectomy of the proximal SFA and below-knee popliteal artery using the 6 [place] Pantheris d   • CHOLECYSTECTOMY     • KNEE SURGERY     • LEG SURGERY        General Information     Row Name 03/15/22 0950          Physical Therapy Time and Intention    Document Type evaluation;other (see comments)  see MAR  -JE     Mode of Treatment physical therapy  -     Row Name 03/15/22 0950          General Information    Patient Profile Reviewed yes  -JE     Prior Level of Function independent:;all household mobility;community mobility;ADL's;home management;driving;shopping  -JE     Existing Precautions/Restrictions fall  -     Barriers to Rehab previous functional deficit  -     Row Name 03/15/22 0950          Living Environment    People in Home child(anselmo), adult  -     Name(s) of People in Home 2 dtrs w/ pt in the home, a 3 rd daughter right up the road, but there every day  -     Row Name 03/15/22 0950          Home Main Entrance    Number of Stairs, Main Entrance other (see comments)  ramp  -     Row Name 03/15/22 0950          Stairs Within Home, Primary    Number of Stairs, Within Home, Primary none  -     Row Name 03/15/22 0950          Cognition    Orientation Status (Cognition) oriented x 4;other (see comments)  pt initially stated it was the 2nd month, however was able to correct that answer and say it was March  -     Row Name 03/15/22 0950          Safety Issues, Functional Mobility    Safety Issues Affecting Function (Mobility) safety precaution awareness  -JE     Impairments Affecting Function (Mobility) balance;endurance/activity tolerance;coordination;postural/trunk control;strength  -           User Key   (r) = Recorded By, (t) = Taken By, (c) = Cosigned By    Initials Name Provider Type    Nereida Orozco, PT Physical Therapist               Mobility     Row Name 03/15/22 0950          Bed Mobility    Bed Mobility rolling left;rolling right;scooting/bridging;supine-sit;sit-supine  -     Rolling Left [place] (Bed Mobility) standby assist;modified independence  -     Rolling Right [place] (Bed Mobility) standby assist;modified independence  -     Scooting/Bridging [place] (Bed Mobility) standby assist;independent  -     Supine-Sit [place] (Bed Mobility) standby assist  -     Sit-Supine [place] (Bed Mobility) standby assist  -     Assistive Device (Bed Mobility) bed rails  -     Row Name 03/15/22 0950          Transfers    Comment, (Transfers) on/off toilet w/ CGA  -     Row Name 03/15/22 0950          Sit-Stand Transfer    Sit-Stand [place] (Transfers) contact guard;verbal cues  -     Row Name 03/15/22 0950          Gait/Stairs (Locomotion)    [place] Level (Gait) contact guard  -     Assistive Device (Gait) other (see comments)  HHA X 1  -     Distance in Feet (Gait) 150 ft  -     Deviations/Abnormal Patterns (Gait) gait speed decreased;stride length decreased;base of support, wide  -     Bilateral Gait Deviations forward flexed posture;heel strike decreased  -           User Key  (r) = Recorded By, (t) = Taken By, (c) = Cosigned By    Initials Name Provider Type    Nereida Orozco, PT Physical Therapist               Obj/Interventions     Row Name 03/15/22 0950          Range of Motion Comprehensive    General Range of Motion no range of motion deficits identified  -     Comment, General Range of Motion except noted arthritic deformity in B hands  -     Row Name 03/15/22 0950          Strength Comprehensive (MMT)    Comment, General Manual Muscle Testing (MMT) Assessment B shld flex 4-/5, L hip flexor 3+ to 4-/5; all other ms groups 4+ to  5/5  -     Row Name 03/15/22 0950          Motor Skills    Motor Skills other (see comments)  decrease accuracy w/ FTN on L, finger opposition intact, heel to shin intact, difficulty w/ LEANDRO in UEs noted deficits on L  -     Row Name 03/15/22 0950          Balance    Balance Assessment sitting static balance;sitting dynamic balance;sit to stand dynamic balance;standing static balance;standing dynamic balance  -     Static Sitting Balance supervision  -     Dynamic Sitting Balance contact guard;supervision  posterior lean w/ dynamic activity  -     Position, Sitting Balance supported;unsupported;sitting edge of bed  -     Sit to Stand Dynamic Balance contact guard  -     Static Standing Balance contact guard  -     Dynamic Standing Balance contact guard  -JE     Position/Device Used, Standing Balance supported;unsupported;other (see comments)  HHA X 1  -     Row Name 03/15/22 0950          Sensory Assessment (Somatosensory)    Sensory Assessment (Somatosensory) other (see comments)  reports no c/os N/T  -           User Key  (r) = Recorded By, (t) = Taken By, (c) = Cosigned By    Initials Name Provider Type    Nereida Orozco, PT Physical Therapist               Goals/Plan     Row Name 03/15/22 0950          Bed Mobility Goal 1 (PT)    Activity/Assistive Device (Bed Mobility Goal 1, PT) bed mobility activities, all  -     [place] Level/Cues Needed (Bed Mobility Goal 1, PT) independent  -     Time Frame (Bed Mobility Goal 1, PT) long term goal (LTG);10 days  -     Progress/Outcomes (Bed Mobility Goal 1, PT) goal ongoing  -     Row Name 03/15/22 0950          Transfer Goal 1 (PT)    Activity/Assistive Device (Transfer Goal 1, PT) sit-to-stand/stand-to-sit;bed-to-chair/chair-to-bed  -     [place] Level/Cues Needed (Transfer Goal 1, PT) standby assist;independent  -     Time Frame (Transfer Goal 1, PT) long term goal (LTG);10 days  -     Progress/Outcome (Transfer Goal 1,  PT) goal ongoing  -     Row Name 03/15/22 0950          Gait Training Goal 1 (PT)    Activity/Assistive Device (Gait Training Goal 1, PT) gait (walking locomotion);decrease fall risk;diminish gait deviation;assistive device use;improve balance and speed;increase endurance/gait distance;other (see comments)  cane vs rwx  -JE     [place] Level (Gait Training Goal 1, PT) standby assist;modified independence  -JE     Distance (Gait Training Goal 1, PT) 200 ft+  -JE     Time Frame (Gait Training Goal 1, PT) long term goal (LTG);10 days  -JE     Progress/Outcome (Gait Training Goal 1, PT) goal ongoing  -     Row Name 03/15/22 0950          Problem Specific Goal 1 (PT)    Problem Specific Goal 1 (PT) dynamic sitting balance w/out posterior lean or need for assist to maintain  -JE     Time Frame (Problem Specific Goal 1, PT) long-term goal (LTG);other (see comments)  10 days  -JE     Progress/Outcome (Problem Specific Goal 1, PT) goal ongoing  -     Row Name 03/15/22 0950          Patient Education Goal (PT)    Activity (Patient Education Goal, PT) I w/ HEP for strengthening/coordination  -     [place]/Cues/Accuracy (Memory Goal 2, PT) demonstrates adequately;independent;verbalizes understanding  -JE     Time Frame (Patient Education Goal, PT) long term goal (LTG);10 days  -JE     Progress/Outcome (Patient Education Goal, PT) goal ongoing  -           User Key  (r) = Recorded By, (t) = Taken By, (c) = Cosigned By    Initials Name Provider Type    Nereida Orozco, PT Physical Therapist               Clinical Impression     Row Name 03/15/22 0950          Pain    Pretreatment Pain Rating 0/10 - no pain  -     Posttreatment Pain Rating 0/10 - no pain  -     Row Name 03/15/22 0950          Plan of Care Review    Plan of Care Reviewed With patient;daughter  -     Progress improving  -     Outcome Evaluation PT eval completed.  Pt motivated and agreeable to therapy.  Pt w/ decrease coordination in  the L UE, decrease strength B shlds and L hip flexor, decrease dynamic sitting balance, and decrease I w/ tfers and gait requiring CGA and HHA for ambulation.  Pt w/ decrease gait speed, step length and heel strike.  Pt required HHA X 1.  Pt reports she has a cane and rwx and usually carries the cane w/ her not always using it, but has it.  Pt will benefit from continued PT services for improved strength, balance, activity tolerance, coordination, and to improve I w/ tfers/gait w/ the most appropriate AD.  Pt reports she already has HH and has help from family 24/7 and would like to return home.  If pt truly does have 24/7 assist, feel pt can return home at discharge, however recommed continued skilled care.  Would recommend HH therapy.  Will follow for progress and needs.  May benefit from use of rwx to improve stability and reduce fall risk.  -     Row Name 03/15/22 0950          Therapy Assessment/Plan (PT)    Patient/Family Therapy Goals Statement (PT) return home  -     Rehab Potential (PT) good, to achieve stated therapy goals  -     Criteria for Skilled Interventions Met (PT) yes;meets criteria;skilled treatment is necessary  -     Predicted Duration of Therapy Intervention (PT) until discharge or goals achieved  -     Row Name 03/15/22 0950          Vital Signs    Pretreatment Heart Rate (beats/min) 82  -JE     Posttreatment Heart Rate (beats/min) 83  -JE     Pre SpO2 (%) 95  -JE     O2 Delivery Pre Treatment room air  -JE     O2 Delivery Intra Treatment room air  -JE     Post SpO2 (%) 95  -JE     O2 Delivery Post Treatment room air  -JE     Pre Patient Position Supine  -JE     Intra Patient Position Standing  -JE     Post Patient Position Supine  -JE     Row Name 03/15/22 0950          Positioning and Restraints    Pre-Treatment Position in bed  -JE     Post Treatment Position bed  -JE     In Bed fowlers;call light within reach;encouraged to call for assist;with family/caregiver;side rails up  x2;SCD pump applied;exit alarm on  -           User Key  (r) = Recorded By, (t) = Taken By, (c) = Cosigned By    Initials Name Provider Type    Nereida Orozco, PT Physical Therapist               Outcome Measures     Row Name 03/15/22 0950          How much help from another person do you currently need...    Turning from your back to your side while in flat bed without using bedrails? 3  -JE     Moving from lying on back to sitting on the side of a flat bed without bedrails? 3  -JE     Moving to and from a bed to a chair (including a wheelchair)? 3  -JE     Standing up from a chair using your arms (e.g., wheelchair, bedside chair)? 3  -JE     Climbing 3-5 steps with a railing? 3  -JE     To walk in hospital room? 3  -     AM-PAC 6 Clicks Score (PT) 18  -     Row Name 03/15/22 0951          Modified [place] Scale    Pre-Stroke Modified [place] Scale 0 - No Symptoms at all.  -CS (r) KG (t) CS (c)     Modified [place] Scale 3 - Moderate disability.  Requiring some help, but able to walk without assistance.  -CS (r) KG (t) CS (c)     Row Name 03/15/22 0951 03/15/22 0950       Functional Assessment    Outcome Measure Options AM-PAC 6 Clicks Daily Activity (OT);Modified [place]  -CS (r) KG (t) CS (c) AM-PAC 6 Clicks Basic Mobility (PT)  -          User Key  (r) = Recorded By, (t) = Taken By, (c) = Cosigned By    Initials Name Provider Type    Rosalva Valle, OTR/L, CNT Occupational Therapist    Nereida Orozco, PT Physical Therapist    Hermelinda Olivo, OT Student OT Student                             Physical Therapy Education                 Title: PT OT SLP Therapies (In Progress)     Topic: Physical Therapy (In Progress)     Point: Mobility training (Done)     Learning Progress Summary           Patient Acceptance, E,TB,D, VU,DU,NR by  at 3/15/2022 8308    Comment: Education re: purpose of PT/importance of activity, for safety/falls prevention, to have assist when up   Family Acceptance,  E,TB,D, VU,DU,NR by NATALY at 3/15/2022 1237    Comment: Education re: purpose of PT/importance of activity, for safety/falls prevention, to have assist when up                   Point: Home exercise program (Not Started)     Learner Progress:  Not documented in this visit.          Point: Precautions (Done)     Learning Progress Summary           Patient Acceptance, E,TB,D, VU,DU,NR by NATALY at 3/15/2022 1237    Comment: Education re: purpose of PT/importance of activity, for safety/falls prevention, to have assist when up   Family Acceptance, E,TB,D, VU,DU,NR by NATALY at 3/15/2022 1237    Comment: Education re: purpose of PT/importance of activity, for safety/falls prevention, to have assist when up                               User Key     Initials Effective Dates Name Provider Type Discipline     08/02/18 -  Nereida Faust, PT Physical Therapist PT              PT Recommendation and Plan  Planned Therapy Interventions (PT): balance training, bed mobility training, gait training, home exercise program, motor coordination training, patient/family education, postural re-education, ROM (range of motion), strengthening, transfer training, other (see comments) (safety/falls prevention)  Plan of Care Reviewed With: patient, daughter  Progress: improving  Outcome Evaluation: PT eval completed.  Pt motivated and agreeable to therapy.  Pt w/ decrease coordination in the L UE, decrease strength B shlds and L hip flexor, decrease dynamic sitting balance, and decrease I w/ tfers and gait requiring CGA and HHA for ambulation.  Pt w/ decrease gait speed, step length and heel strike.  Pt required HHA X 1.  Pt reports she has a cane and rwx and usually carries the cane w/ her not always using it, but has it.  Pt will benefit from continued PT services for improved strength, balance, activity tolerance, coordination, and to improve I w/ tfers/gait w/ the most appropriate AD.  Pt reports she already has HH and has help from family 24/7  and would like to return home.  If pt truly does have 24/7 assist, feel pt can return home at discharge, however recommed continued skilled care.  Would recommend HH therapy.  Will follow for progress and needs.  May benefit from use of rwx to improve stability and reduce fall risk.     Time Calculation:    PT Charges     Row Name 03/15/22 1420 03/15/22 1113          Time Calculation    Start Time 1320  -TB 0950  -JE     Stop Time 1343  -TB 1100  -JE     Time Calculation (min) 23 min  -TB 70 min  -JE     PT Received On 03/15/22  -TB 03/15/22  -     PT Goal Re-Cert Due Date -- 03/25/22  -            Time Calculation- PT    Total Timed Code Minutes- PT 23 minute(s)  -TB --           User Key  (r) = Recorded By, (t) = Taken By, (c) = Cosigned By    Initials Name Provider Type    TB Daxa Rudolph, PTA Physical Therapy Assistant    Nereida Orozco, PT Physical Therapist              Therapy Charges for Today     Code Description Service Date Service Provider Modifiers Qty    25647279732 HC PT EVAL MOD COMPLEXITY 4 3/15/2022 Nereida Faust, PT GP 1    38178802276 HC GAIT TRAINING EA 15 MIN 3/15/2022 Nereida Faust, PT GP 1          PT G-Codes  Outcome Measure Options: AM-PAC 6 Clicks Daily Activity (OT), Modified [place]  AM-PAC 6 Clicks Score (PT): 18  AM-PAC 6 Clicks Score (OT): 18  Modified [place] Scale: 3 - Moderate disability.  Requiring some help, but able to walk without assistance.    Nereida Faust PT  3/15/2022      Electronically signed by Nereida Faust, PT at 03/15/22 1614       Occupational Therapy Notes (last 24 hours)  Notes from 03/14/22 1639 through 03/15/22 1639   No notes exist for this encounter.       Goal Outcome Evaluation:  Plan of Care Reviewed With: patient, daughter  Progress: no change  Outcome Evaluation: OT eval completed. Pt A&Ox4. Pt SBA for bed mobility, CGA for t/fs, and CGA/min A for functional mobility. Upon entering room, pt requested to use BR and was able to  walk to BR with CGA. Pt completed toileting with SBA, but OT noticed pt req multiple attempts to grasp toilet paper from roll with L hand. Pt req mod A to doff/don socks and req multiple attempts to latch L hand in sock. While sitting EOB, pt demo a slight posterior lean with activity but was able to self correct with vc and maintain balance with support of BUE on bed. During amb, pt demo a wide VIELKA and req HHAx1 for stability, and pt stated that she has a cane at home that she uses for mobility. During gross motor coordination assessment, pt was able to complete finger nose accurately with extra time for L hand. Both pt and daughter report that pt is near her baseline and that she was previously receiving HH. Pt would benefit from OT service to increase strength, functional balance, and safety awareness with ADLs, as well as fine/gross motor coordination of L hand. Recommend discharge home with 24/7 assist and HH OT/PT.            Cosigned by: Rosalva Augustin OTR/L, CNT at 03/15/22 1536                                                                                                     Hermelinda Townsend OT Student    OT Student   Occupational Therapy        Therapy Evaluation   The patient can view the shared note after they get an active [employer] account This note has been shared with the patient   Attested        Date of Service:  03/15/22 1535   Creation Time:  03/15/22 1535                            Attested                            Attestation signed by Rosalva Augustin OTR/L, CNT at 03/15/22 1536        I reviewed the documentation and agree.                        Expand AllCollapse All                        Expand widget buttonCollapse widget button        Show:Clear all      ManualTemplateCopied    Added by:          Hermelinda Townsend OT Siddhartha Alicia for detailscustomization  button                                                                                                                                                                       Patient Name: Evelyn Ojeda                 : 1940                          MRN: [id number]                              Today's Date: 3/15/2022                                       Admit Date: 3/14/2022                          Visit Dx:     Visit Diagnosis                     ICD-10-CM     ICD-9-CM       1.     Cerebrovascular accident (CVA), unspecified mechanism ([facility])      I63.9     434.91       2.     Oral phase dysphagia      R13.11     787.21       3.     Impaired functional mobility and activity tolerance      Z74.09     V49.89               Problem List             Patient Active Problem List       Diagnosis       •     PAD (peripheral artery disease) ([facility])       •     Essential hypertension       •     Mixed hyperlipidemia       •     Bilateral carotid artery stenosis       •     Acute deep vein thrombosis (DVT) of distal vein of right lower extremity ([facility])       •     Atherosclerosis of artery of extremity with intermittent claudication ([facility])       •     Chronic osteoarthritis       •     Decreased pedal pulses       •     Nonhealing ulcer of right lower leg with fat layer exposed ([facility])       •     Hyperglycemia       •     Type 2 diabetes mellitus with hyperglycemia, without long-term current use of insulin ([facility])       •     Acute kidney injury ([facility])       •     Postoperative anemia due to acute blood loss       •     Hematuria       •     Cerebrovascular accident (CVA) ([facility])       •     CAD (coronary artery disease)               Medical History                                                                                                                         Surgical History                                                                                                                                                                         General Information                         Row Name     03/15/22 0951                                OT Time and Intention             Document Type     evaluation untitled image Pt presented to hospital with left-sided weakness. Dx: right MCA/PCA infarct.  -CS (r) KG (t) CS (c)                   Mode of Treatment     occupational therapy  -CS (r) KG (t) CS (c)                              Row Name     03/15/22 0951                                General Information             Patient Profile Reviewed     yes  -CS (r) KG (t) CS (c)                   Prior Level of Function     independent:;ADL's;all household mobility;community mobility;home management;driving;shopping  -CS (r) KG (t) CS (c)                   Existing Precautions/Restrictions     fall  -CS (r) KG (t) CS (c)                   Barriers to Rehab     medically complex  -CS (r) KG (t) CS (c)                              Row Name     03/15/22 0951                                Occupational Profile             Environmental Supports and Barriers (Occupational Profile)     tub shower, shower seat, BSC, cane  -CS (r) KG (t) CS (c)                              Row Name     03/15/22 0951                                Living Environment             People in Home     child(anselmo), adult  -CS (r) KG (t) CS (c)                              Row Name     03/15/22 0951                                Home Main Entrance             Number of Stairs, Main Entrance     other (see comments) untitled image ramp  -CS (r) KG (t) CS (c)                              Row Name     03/15/22 0951                                Stairs Within Home, Primary             Number of Stairs, Within Home, Primary     none  -CS (r) KG (t) CS (c)                   Stair Railings, Within Home, Primary     none  -CS (r) KG (t) CS (c)                              Row Name     03/15/22 0951                                Cognition             Orientation Status  (Cognition)     oriented x 4  -CS (r) KG (t) CS (c)                              Row Name     03/15/22 0951                                Safety Issues, Functional Mobility             Safety Issues Affecting Function (Mobility)     awareness of need for assistance;safety precaution awareness;safety precautions follow-through/compliance  -CS (r) KG (t) CS (c)                   Impairments Affecting Function (Mobility)     balance;endurance/activity tolerance;strength;coordination  -CS (r) KG (t) CS (c)                                                                                                                                                                                                                   Mobility/ADL's                         Row Name     03/15/22 0951                                Bed Mobility             Bed Mobility     rolling left;rolling right;scooting/bridging;supine-sit;sit-supine  -CS (r) KG (t) CS (c)                   Rolling Left [place] (Bed Mobility)     standby assist;verbal cues  -CS (r) KG (t) CS (c)                   Rolling Right [place] (Bed Mobility)     standby assist;verbal cues  -CS (r) KG (t) CS (c)                   Scooting/Bridging [place] (Bed Mobility)     standby assist  -CS (r) KG (t) CS (c)                   Supine-Sit [place] (Bed Mobility)     standby assist;verbal cues  -CS (r) KG (t) CS (c)                   Sit-Supine [place] (Bed Mobility)     standby assist;verbal cues  -CS (r) KG (t) CS (c)                   Assistive Device (Bed Mobility)     bed rails  -CS (r) KG (t) CS (c)                              Row Name     03/15/22 0951                                Transfers             Transfers     sit-stand transfer;stand-sit transfer;toilet transfer  -CS (r) KG (t) CS (c)                   Sit-Stand [place] (Transfers)     contact guard  -CS (r) KG (t) CS (c)                   Stand-Sit [place] (Transfers)     contact  guard  -CS (r) KG (t) CS (c)                   [place] Level (Toilet Transfer)     contact guard  -CS (r) KG (t) CS (c)                   Assistive Device (Toilet Transfer)     commode  -CS (r) KG (t) CS (c)                              Row Name     03/15/22 0951                                Toilet Transfer             Type (Toilet Transfer)     sit-stand;stand-sit  -CS (r) KG (t) CS (c)                              Row Name     03/15/22 0951                                Functional Mobility             Functional Mobility- Ind. Level     minimum assist (75% patient effort)  -CS (r) KG (t) CS (c)                   Functional Mobility- Device     other (see comments) untitled image HHA  -CS (r) KG (t) CS (c)                              Row Name     03/15/22 0951                                Activities of Daily Living             BADL Assessment/Intervention     lower body dressing;toileting;grooming;upper body dressing  -CS (r) KG (t) CS (c)                              Row Name     03/15/22 0951                                Lower Body Dressing Assessment/Training             [place] Level (Lower Body Dressing)     doff;don;socks;moderate assist (50% patient effort)  -CS (r) KG (t) CS (c)                   Position (Lower Body Dressing)     edge of bed sitting  -CS (r) KG (t) CS (c)                              Row Name     03/15/22 0951                                Toileting Assessment/Training             [place] Level (Toileting)     toileting skills;standby assist  -CS (r) KG (t) CS (c)                   Assistive Devices (Toileting)     commode  -CS (r) KG (t) CS (c)                   Position (Toileting)     supported sitting  -CS (r) KG (t) CS (c)                              Row Name     03/15/22 0951                                Grooming Assessment/Training             [place] Level (Grooming)     wash face, hands;standby assist  -CS (r) KG (t) CS (c)                    Position (Grooming)     sink side;unsupported standing  -CS (r) KG (t) CS (c)                              Row Name     03/15/22 0951                                Upper Body Dressing Assessment/Training             [place] Level (Upper Body Dressing)     doff;don;front opening garment;set up  -CS (r) KG (t) CS (c)                   Position (Upper Body Dressing)     edge of bed sitting  -CS (r) KG (t) CS (c)                                                                                                                                                                                                      Obj/Interventions                         Row Name     03/15/22 0951                                Sensory Assessment (Somatosensory)             Sensory Assessment (Somatosensory)     UE sensation intact  -CS (r) KG (t) CS (c)                              Row Name     03/15/22 0951                                Vision Assessment/Intervention             Visual Impairment/Limitations     WFL  -CS (r) KG (t) CS (c)                              Row Name     03/15/22 0951                                Range of Motion Comprehensive             Comment, General Range of Motion     BUE WFL. Arthrtic deformity in B hands  -CS (r) KG (t) CS (c)                              Row Name     03/15/22 0951                                Strength Comprehensive (MMT)             Comment, General Manual Muscle Testing (MMT) Assessment     B shoulders 3/5; B elbow and  4/5  -CS (r) KG (t) CS (c)                              Row Name     03/15/22 0951                                Motor Skills             Motor Skills     coordination  -CS (r) KG (t) CS (c)                   Coordination     fine motor deficit;left;gross motor deficit;minimal impairment  -CS (r) KG (t) CS (c)                              Row Name     03/15/22 0951                                Balance             Balance Assessment     sitting static  balance;sitting dynamic balance;standing static balance;standing dynamic balance  -CS (r) KG (t) CS (c)                   Static Sitting Balance     supervision  -CS (r) KG (t) CS (c)                   Dynamic Sitting Balance     supervision  -CS (r) KG (t) CS (c)                   Position, Sitting Balance     sitting edge of bed  -CS (r) KG (t) CS (c)                   Static Standing Balance     contact guard  -CS (r) KG (t) CS (c)                   Dynamic Standing Balance     minimal assist  -CS (r) KG (t) CS (c)                   Position/Device Used, Standing Balance     other (see comments) untitled image HHA  -CS (r) KG (t) CS (c)                                                                                                                                                                                                    Goals/Plan                         Row Name     03/15/22 0951                                Transfer Goal 1 (OT)             Activity/Assistive Device (Transfer Goal 1, OT)     shower chair;tub  -CS (r) KG (t) CS (c)                   [place] Level/Cues Needed (Transfer Goal 1, OT)     standby assist  -CS (r) KG (t) CS (c)                   Time Frame (Transfer Goal 1, OT)     long term goal (LTG)  -CS (r) KG (t) CS (c)                   Progress/Outcome (Transfer Goal 1, OT)     goal ongoing  -CS (r) KG (t) CS (c)                              Row Name     03/15/22 0951                                Dressing Goal 1 (OT)             Activity/Device (Dressing Goal 1, OT)     dressing skills, all  -CS (r) KG (t) CS (c)                   [place]/Cues Needed (Dressing Goal 1, OT)     standby assist  -CS (r) KG (t) CS (c)                   Time Frame (Dressing Goal 1, OT)     long term goal (LTG)  -CS (r) KG (t) CS (c)                   Progress/Outcome (Dressing Goal 1, OT)     goal ongoing  -CS (r) KG (t) CS (c)                              Row Name     03/15/22 0951                                 Problem Specific Goal 1 (OT)             Problem Specific Goal 1 (OT)     Pt will be I with fine motor HEP to increase I with ADLs/IADLs.  -CS (r) KG (t) CS (c)                   Time Frame (Problem Specific Goal 1, OT)     long term goal (LTG)  -CS (r) KG (t) CS (c)                   Progress/Outcome (Problem Specific Goal 1, OT)     goal ongoing  -CS (r) KG (t) CS (c)                              Row Name     03/15/22 0951                                Therapy Assessment/Plan (OT)             Planned Therapy Interventions (OT)     activity tolerance training;adaptive equipment training;BADL retraining;functional balance retraining;occupation/activity based interventions;patient/caregiver education/training;strengthening exercise;transfer/mobility retraining;neuromuscular control/coordination retraining  -CS (r) KG (t) CS (c)                                                   User Key      (r) = Recorded By, (t) = Taken By, (c) = Cosigned By                        Initials     Name     Provider Type             CS     Rosalva Augustin, OTR/L, CNT     Occupational Therapist             Hermelinda Olivo, NOLAN Student     OT Student                                                            Clinical Impression                         Row Name     03/15/22 0951                                Pain Assessment             Pretreatment Pain Rating     0/10 - no pain  -CS (r) KG (t) CS (c)                   Posttreatment Pain Rating     0/10 - no pain  -CS (r) KG (t) CS (c)                              Row Name     03/15/22 0951                                Plan of Care Review             Plan of Care Reviewed With     patient;daughter  -CS (r) KG (t) CS (c)                   Progress     no change  -CS (r) KG (t) CS (c)                   Outcome Evaluation     OT eval completed. Pt A&Ox4. Pt SBA for bed mobility, CGA for t/fs, and CGA/min A for functional mobility. Upon entering room, pt requested  to use BR and was able to walk to BR with CGA. Pt completed toileting with SBA, but OT noticed pt req multiple attempts to grasp toilet paper from roll with L hand. Pt req mod A to doff/don socks and req multiple attempts to latch L hand in sock. While sitting EOB, pt demo a slight posterior lean with activity but was able to self correct with vc and maintain balance with support of BUE on bed. During amb, pt demo a wide VIELKA and req HHAx1 for stability, and pt stated that she has a cane at home that she uses for mobility. During gross motor coordination assessment, pt was able to complete finger nose accurately with extra time for L hand. Both pt and daughter report that pt is near her baseline and that she was previously receiving HH. Pt would benefit from OT service to increase strength, functional balance, and safety awareness with ADLs, as well as fine/gross motor coordination of L hand. Recommend discharge home with 24/7 assist and HH OT/PT.  -CS (r) KG (t) CS (c)                              Row Name     03/15/22 0951                                Therapy Assessment/Plan (OT)             Rehab Potential (OT)     good, to achieve stated therapy goals  -CS (r) KG (t) CS (c)                   Criteria for Skilled Therapeutic Interventions Met (OT)     yes;skilled treatment is necessary  -CS (r) KG (t) CS (c)                   Therapy Frequency (OT)     5 times/wk  -CS (r) KG (t) CS (c)                   Predicted Duration of Therapy Intervention (OT)     until hospital discharge  -CS (r) KG (t) CS (c)                              Row Name     03/15/22 0951                                Therapy Plan Review/Discharge Plan (OT)             Anticipated Discharge Disposition (OT)     home with 24/7 care;home with home health  -CS (r) KG (t) CS (c)                              Row Name     03/15/22 0951                                Positioning and Restraints             Pre-Treatment Position     in bed  -CS (r) KG  (t) CS (c)                   Post Treatment Position     bed  -CS (r) KG (t) CS (c)                   In Bed     fowlers;call light within reach;encouraged to call for assist;exit alarm on;side rails up x2;SCD pump applied;with family/caregiver  -CS (r) KG (t) CS (c)                                                                                                                                                                                                      Outcome Measures                         Row Name     03/15/22 0951                                How much help from another is currently needed...             Putting on and taking off regular lower body clothing?     3  -CS (r) KG (t) CS (c)                   Bathing (including washing, rinsing, and drying)     3  -CS (r) KG (t) CS (c)                   Toileting (which includes using toilet bed pan or urinal)     3  -CS (r) KG (t) CS (c)                   Putting on and taking off regular upper body clothing     3  -CS (r) KG (t) CS (c)                   Taking care of personal grooming (such as brushing teeth)     3  -CS (r) KG (t) CS (c)                   Eating meals     3  -CS (r) KG (t) CS (c)                   AM-PAC 6 Clicks Score (OT)     18  -CS (r) KG (t)                              Row Name     03/15/22 0950                                How much help from another person do you currently need...             Turning from your back to your side while in flat bed without using bedrails?     3  -JE                   Moving from lying on back to sitting on the side of a flat bed without bedrails?     3  -JE                   Moving to and from a bed to a chair (including a wheelchair)?     3  -JE                   Standing up from a chair using your arms (e.g., wheelchair, bedside chair)?     3  -JE                   Climbing 3-5 steps with a railing?     3  -JE                   To walk in hospital room?     3  -JE                   AM-PAC 6  Clicks Score (PT)     18  -JE                              Row Name     03/15/22 0951                                Modified [place] Scale             Pre-Stroke Modified [place] Scale     0 - No Symptoms at all.  -CS (r) KG (t) CS (c)                   Modified [place] Scale     3 - Moderate disability.  Requiring some help, but able to walk without assistance.  -CS (r) KG (t) CS (c)                              Row Name     03/15/22 0951     03/15/22 0950                     Functional Assessment             Outcome Measure Options     AM-PAC 6 Clicks Daily Activity (OT);Modified [place]  -CS (r) KG (t) CS (c)     AM-PAC 6 Clicks Basic Mobility (PT)  -                                                                                                                                                                                                               Occupational Therapy Education                                                                                                                                                                                                                                                                                                                                                                                                                                                                                                                                                                                                                                                                                                                                                                                                                                                                                                                                                                                                                                                                                                                                                  OT Recommendation and Plan    Planned Therapy Interventions (OT): activity tolerance training, adaptive equipment training, BADL retraining, functional balance retraining, occupation/activity based interventions, patient/caregiver education/training, strengthening exercise, transfer/mobility retraining, neuromuscular control/coordination retraining    Therapy Frequency (OT): 5 times/wk    Plan of Care Review    Plan of Care Reviewed With: patient, daughter    Progress: no change    Outcome Evaluation: OT eval completed. Pt A&Ox4. Pt SBA for bed mobility, CGA for t/fs, and CGA/min A for functional mobility. Upon entering room, pt requested to use BR and was able to walk to BR with CGA. Pt completed toileting with SBA, but OT noticed pt req multiple attempts to grasp toilet paper from roll with L hand. Pt req mod A to doff/don socks and req multiple attempts to latch L hand in sock. While sitting EOB, pt demo a slight posterior lean with activity but was able to self correct with vc and maintain balance with support of BUE on bed. During amb, pt demo a wide VIELKA and req HHAx1 for stability, and pt stated that she has a cane at home that she uses for mobility. During gross motor coordination assessment, pt was able to complete finger nose accurately with extra time for L hand. Both pt and daughter report that pt is near her baseline and that she was previously receiving HH. Pt would benefit from OT service to increase strength, functional balance, and safety awareness with ADLs, as well as fine/gross motor coordination of L hand. Recommend discharge home with 24/7 assist and HH OT/PT.          Time Calculation:                         Time Calculation- OT                          Row Name     03/15/22 0951                                           Time Calculation- OT             OT Start Time     0951 untitled image +8 min chart review  -CS (r) KG (t) CS (c)        "                  OT Stop Time     1057  -CS (r) KG (t) CS (c)                         OT Time Calculation (min)     66 min  -CS (r) KG (t)                         OT Received On     03/15/22  -CS (r) KG (t) CS (c)                         OT Goal Re-Cert Due Date     03/25/22  -CS (r) KG (t) CS (c)                                                     Timed Charges             68784 - OT Self Care/Mgmt Minutes     14  -CS (r) KG (t) CS (c)                                                     Untimed Charges             OT Eval/Re-eval Minutes     60  -CS (r) KG (t) CS (c)                                                     Total Minutes             Timed Charges Total Minutes     14  -CS (r) KG (t)                         Untimed Charges Total Minutes     60  -CS (r) KG (t)                          Total Minutes     74  -CS (r) KG (t)                                                         User Key      (r) = Recorded By, (t) = Taken By, (c) = Cosigned By                        Initials     Name     Provider Type             CS     Rosalva Augustin, OTR/L, CNT     Occupational Therapist             Chiki Olivo, OT Student     OT Student                                                         CHIKI ELIAS, OT Student                    3/15/2022                Cosigned by: Rosalva Augustin, OTR/L, SHANNON at 03/15/22 1536                                          ED to Hosp-Admission (Current) on 3/14/2022                            Revision History                            Detailed Report                            ROS Info                        Note shared with patient                  Additional Documentation      Vitals:         /58 (BP Location: Right arm, Patient Position: Lying)             Pulse 77             Temp 97.9 °F (36.6 °C) (Oral)             Resp 16             Ht 154.9 cm (61\")             Wt 77.1 kg (170 lb)             SpO2 95%             BMI 32.12 kg/m²             BSA 1.76 m²     "                    More Vitals       Flowsheets:          Patient Radiology Status,             Acuity/Destination,                                                                                                                                                                                                                                                                                                                                                                                                                                                                                                                                                                                                                                                                                                                                                                             ...(62 more)     Encounter Info:          Billing Info,             History,             Allergies,             Detailed Report                           Patient Summary Extracts       3/15/2022  4:39 PM   Payor Communication 3/15/2022  8:55 AM             Encounter Information      Encounter Information             Department    Encounter #      3/14/2022  3:27 PM  [facility] 3A [id number]                                   Care Timeline      03/14     Admitted from ED 2046                 Clinical Impressions            Primary Diagnosis    Cerebrovascular accident (CVA), unspecified mechanism (HCC)              Disposition                Decision to Admit Level of Care: Telemetry [5] Diagnosis: Cerebrovascular accident (CVA), unspecified mechanism (HCC) [[id number]] Admitting Physician: JOAO PEARCE [[id number]] Attending Physician: JOAO PEARCE [[id number]] Certification: I Certify That Inpatient Hospital Services Are Medically Necessary For Greater Than 2 Midnights               Orders Placed               Labs                CBC & Differential Once                CBC Auto  Differential Morning Draw                                                                                                                                                                                                                                                                                                            ...(19 more)             Imaging                CT Angiogram Head w AI Analysis of LVO 1 Time Imaging                CT Angiogram Neck 1 Time Imaging                                                    ...(3 more)             Other Orders                ECG 12 Lead Once                ECG 12 Lead Once                                                                                                                                                                                                                                                                                                                                                                                                                                                                                                                                                                                                                                                  ...(44 more)         All Encounter Results                   Care Timeline          SCANNED EKG       SCANNED EKG       SCANNED - TELEMETRY     1523     Arrived   1527     CT Head Without Contrast Stroke Protocol   1535     Iopamidol 120 mL       CT Angiogram Neck       CT Angiogram Head w AI Analysis of LVO   1548     ECG 12 Lead   1600     Comprehensive Metabolic Panel Abnormal Result       Type & Screen       CBC & Differential Abnormal Result       Protime-INR       aPTT       Troponin   1609     XR Chest 1 View   1714     Admitted (ED Boarder)   1732     ECG 12 Lead   1811     COVID PRE-OP / PRE-PROCEDURE SCREENING ORDER (NO ISOLATION) - Swab, Nasal Cavity   1835     MRI Brain  Without Contrast   2046     Transferred to [facility] 3A             Medications Administered        Aspirin 81 mg        Atorvastatin Calcium 80 mg        cefTRIAXone Sodium 1 g        Gabapentin (3 administrations)        Iopamidol 120 mL        Lactated Ringers 60 mL/hr        linaGLIPtin 5 mg        Lisinopril 20 mg        Sodium Chloride Flush 10 mL              Visit Diagnoses            Primary Diagnosis    Cerebrovascular accident (CVA), unspecified mechanism (HCC)              Oral phase dysphagia              Impaired functional mobility and activity tolerance           Problem List                           Media   From this encounter        Electronic signature on 3/14/2022 1709 - E-signed       Scan on 3/14/2022 by [employer], Combs: QUICK CHART, [facility], 03/14/2022               Committee Details      There is no Committee Review information to display for this encounter.             Committee Members      Neurology: Ney Rodriguez MD Occupational Therapy: Hermelinda Townsend, OT Student   Rosalva Augustin S, OTR/L, CNT   Physical Therapy: Daxa Rudolph, PTA   Nereida Faust, PT   Gwen Whitten, PTA   Candy Barney, PTA Speech Pathology: Felicita Daly, MS CCC-SLP

## 2022-03-15 NOTE — CASE MANAGEMENT/SOCIAL WORK
Continued Stay Note  [facility]     Patient Name: Evelyn Ojeda  MRN: [id number]  Today's Date: 3/15/2022    Admit Date: 3/14/2022     Discharge Plan     Row Name 03/15/22 1535       Plan    Plan Family requesting referral to [facility] SW  .  updated and will follow with referral. Pending bed offer.               Discharge Codes    No documentation.                     Hyun Rome RN

## 2022-03-15 NOTE — PROGRESS NOTES
Neurology Progress Note      Chief Complaint:  F/u code stroke/stroke    Subjective     Subjective:  Patient sitting up in bed. Daughter at bedside. She has more movement of LUE this AM and has been feeding self breakfast with right hand. Patient denies missing doses of ASA/Plavix prior to admission. She takes DAPT for PAD.  MRI brain showed Right MCA/PCA stroke with no hemorrhagic conversion. There was concern for CAA and T2* did not show signs of this.  CTA head and neck showed no LVO. Left ICA stenosis 25% and mold right ICA stenosis. CTA head showed absence of rifht MCA branches in area of infarction.     LABS:  A1c- 11  LDL- 180  TSH- 1.63  UA with trace RBC and moderate leuk esterase    Medications:  Current Facility-Administered Medications   Medication Dose Route Frequency Provider Last Rate Last Admin   • aspirin chewable tablet 81 mg  81 mg Oral Daily Tami Sharma APRN        Or   • aspirin suppository 300 mg  300 mg Rectal Daily Tami Sharma APRN       • atorvastatin (LIPITOR) tablet 80 mg  80 mg Oral Nightly Nik Goodson MD   80 mg at 03/14/22 2242   • cefTRIAXone (ROCEPHIN) in FI 1 gram/10ml IV PUSH syringe  1 g Intravenous Q24H Nik Goodson MD       • dextrose (D50W) (25 g/50 mL) IV injection 25 g  25 g Intravenous Q15 Min PRN Nik Goodson MD       • dextrose (GLUTOSE) oral gel 15 g  15 g Oral Q15 Min PRN Nik Goodson MD       • gabapentin (NEURONTIN) capsule 300 mg  300 mg Oral Q8H Nik Goodson MD   300 mg at 03/15/22 0545   • glucagon (human recombinant) (GLUCAGEN DIAGNOSTIC) injection 1 mg  1 mg Intramuscular Q15 Min PRN Nik Goodson MD       • HYDROcodone-acetaminophen (NORCO) 5-325 MG per tablet 1 tablet  1 tablet Oral Q6H PRN Nik Goodson MD       • insulin lispro (humaLOG) injection 2-9 Units  2-9 Units Subcutaneous TID AC Nik Goodson MD       • lactated ringers infusion  60 mL/hr Intravenous Continuous Nik Goodson MD 60 mL/hr at 03/14/22 2242 60 mL/hr  at 03/14/22 2242   • linagliptin (TRADJENTA) tablet 5 mg  5 mg Oral Daily Nik Goodson MD       • lisinopril (PRINIVIL,ZESTRIL) tablet 20 mg  20 mg Oral Daily Nik Goodson MD       • ondansetron (ZOFRAN) injection 4 mg  4 mg Intravenous Q6H PRN Nik Goodson MD       • sodium chloride 0.9 % flush 10 mL  10 mL Intravenous PRN Nik Goodson MD       • sodium chloride 0.9 % flush 10 mL  10 mL Intravenous Q12H Nik Goodson MD   10 mL at 03/14/22 2242   • sodium chloride 0.9 % flush 10 mL  10 mL Intravenous PRN Nik Goodson MD           Review of Systems:   -A 14 point review of systems is completed and is negative except for left sided weakness and left vision loss.       Objective      Vital Signs  Temp:  [97.4 °F (36.3 °C)-98.5 °F (36.9 °C)] 98.1 °F (36.7 °C)  Heart Rate:  [68-89] 77  Resp:  [16] 16  BP: (112-191)/(44-86) 191/72    Telemetry:S 70-83      Physical Exam:  General Exam:  Head:  Normal cephalic, atraumatic  HEENT:  Neck supple  Fundoscopic Exam:  No signs of disc edema  CVS:  Regular rate and rhythm.  No murmurs  Carotid Examination:  No bruits  Lungs:  Clear to auscultation  Abdomen:  Non-tender, Non-distended  Extremities:  No signs of peripheral edema  Skin:  No rashes     Neurologic Exam:     Mental Status:    -Awake, Alert, Oriented to person and place. At first stated month as January and then corrected to March. Stated year as 2017. Correctly named the president and current world events.   -No word finding difficulties  -No aphasia  -No dysarthria  -Follows simple and complex commands     CN II:  Visual fields with left visual field deficit.  Pupils equally reactive to light. Left neglect.   CN III, IV, VI:  Extraocular Muscles full with no signs of nystagmus  CN V:  Facial sensory is symmetric with no asymmetries.  CN VII:  Facial motor asymmetric with left lower facial weakness.   CN VIII:  Gross hearing intact bilaterally  CN IX:  Palate elevates symmetrically  CN X:  Palate  elevates symmetrically  CN XI:  Shoulder shrug symmetric  CN XII:  Tongue is midline on protrusion     Motor: (strength out of 5:  1= minimal movement, 2 = movement in plane of gravity, 3 = movement against gravity, 4 = movement against some resistance, 5 = full strength)     -Right Upper Ext: Proximal: 5 Distal: 5  -Left Upper Ext: Proximal: 5   Distal: 5     -Right Lower Ext: Proximal: 5 Distal: 5  -Left Lower Ext: Proximal: 5   Distal: 5     DTR:  -Right              Bicep: 2+         Tricep: 2+        Brachioradialis: 2+              Patella: 2+       Ankle: 2+         Neg Babinski  -Left              Bicep: 2+         Tricep: 2+        Brachioradialis: 2+              Patella: 2+       Ankle: 2+         Neg Babinski     Sensory:  -Intact to light touch, pinprick, temperature, pain, and proprioception     Coordination:  -Finger to nose intact.   -Heel to shin intact  -No ataxia     Gait  Up with assist.         Results Review:    I reviewed the patient's new clinical results.    Results from last 7 days   Lab Units 03/15/22  0631 03/14/22  1600   WBC 10*3/mm3 9.00 9.27   HEMOGLOBIN g/dL 13.2 11.5*   HEMATOCRIT % 42.3 36.4   PLATELETS 10*3/mm3 336 350        Results from last 7 days   Lab Units 03/15/22  0631 03/14/22  1600   SODIUM mmol/L 138 139   POTASSIUM mmol/L 4.3 4.0   CHLORIDE mmol/L 102 102   CO2 mmol/L 23.0 25.0   BUN mg/dL 11 13   CREATININE mg/dL 0.86 1.06*   CALCIUM mg/dL 10.1 9.5   BILIRUBIN mg/dL 0.5 0.3   ALK PHOS U/L 117 97   ALT (SGPT) U/L 17 16   AST (SGOT) U/L 21 20   GLUCOSE mg/dL 132* 166*        Lab Results   Component Value Date    MG 1.5 (L) 01/04/2021    PROTIME 13.0 03/14/2022    INR 1.02 03/14/2022     No components found for: POCGLUC  No components found for: A1C  Lab Results   Component Value Date    HDL 44 03/15/2022     (H) 03/15/2022     No components found for: B12  Lab Results   Component Value Date    TSH 1.630 03/15/2022       MRI brain personally reviewed by me showing  acute/subacute right MCA/PCA stroke.       Assessment/Plan     Hospital Problem List      Cerebrovascular accident (CVA) ([facility])    Impression:  1. Acute/subacute right MCA/PCA stroke.  2. DM uncontrolled, A1C 11.  3. Dyslipidemia.   3. PAD on dual antiplatelet therapy of ASA 81 mg and Plavix 75 mg daily.  4. Hypertension.  5. Hx DVT  6. UTI per attending.     Plan:  1. ASA 81 mg daily. Hold on dual antiplatelet therapy as size and location of stroke at risk for hemorrhagic conversion.  2. Lipitor 80 mg daily.  3. DM management for A1C goal less than 7.  4. Systolic BP goal less than 160.  5. Transthoracic echo.  6. ST/PT/OT  7. No driving at discharge and no driving until seen in follow up with neurology clinic.   8. Discharge planning TBD after therapy eval.   9. Consider 14 day ZIO at discharge.       Tami Sharma, APRN  03/15/22  09:11 CDT

## 2022-03-15 NOTE — PAYOR COMM NOTE
"FROM: NII STERN  PHONE: [phone]  FAX: [phone]  MRN: [id number]  [id number]    Evelyn Dukes (81 y.o. Female)             Date of Birth   1940    Social Security Number       Address   [address] [facility] [address]    Home Phone   [phone]    MRN   [id number]       Hindu   Mormonism    Marital Status                               Admission Date   3/14/22    Admission Type   Emergency    Admitting Provider   Nik Goodson MD    Attending Provider   Nik Goodson MD    Department, Room/Bed   [facility] 3A, 330/1       Discharge Date       Discharge Disposition       Discharge Destination                               Attending Provider: Nik Goodson MD    Allergies: Codeine    Isolation: None   Infection: None   Code Status: CPR   Advance Care Planning Activity    Ht: 154.9 cm (61\")   Wt: 77.1 kg (170 lb)    Admission Cmt: None   Principal Problem: None                Active Insurance as of 3/14/2022     Primary Coverage     Payor Plan Insurance Group Employer/Plan Group    [facility] MEDICARE REPLACEMENT [employer] MEDICARE REPLACEMENT [id number]     Payor Plan Address Payor Plan Phone Number Payor Plan Fax Number Effective Dates    [address] [address] [phone]  1/1/2022 - None Entered    [facility] [address]       Subscriber Name Subscriber Birth Date Member ID       EVELYN DUKES 1940 [id number]                 Emergency Contacts      (Rel.) Home Phone Work Phone Mobile Phone    ERNSTSepidehLashell (Daughter) -- -- [phone]    Lupe AGUERO (Grandchild) -- -- [phone]    Pauline Raya (Daughter) -- -- [phone]            Insurance Information                [employer] [facility] MEDICARE REPLACEMENT/[employer]LL[employer] MEDICARE REPLACEMENT Phone: [phone]    Subscriber: Evelyn Dukes Subscriber#: [id number]    Group#: [id number] Precert#: --          "

## 2022-03-15 NOTE — PLAN OF CARE
Goal Outcome Evaluation:  Plan of Care Reviewed With: patient, daughter        Progress: improving  Outcome Evaluation: PT eval completed.  Pt motivated and agreeable to therapy.  Pt w/ decrease coordination in the L UE, decrease strength B shlds and L hip flexor, decrease dynamic sitting balance, and decrease I w/ tfers and gait requiring CGA and HHA for ambulation.  Pt w/ decrease gait speed, step length and heel strike.  Pt required HHA X 1.  Pt reports she has a cane and rwx and usually carries the cane w/ her not always using it, but has it.  Pt will benefit from continued PT services for improved strength, balance, activity tolerance, coordination, and to improve I w/ tfers/gait w/ the most appropriate AD.  Pt reports she already has HH and has help from family 24/7 and would like to return home.  If pt truly does have 24/7 assist, feel pt can return home at discharge, however recommed continued skilled care.  Would recommend HH therapy.  Will follow for progress and needs.  May benefit from use of rwx to improve stability and reduce fall risk.

## 2022-03-15 NOTE — THERAPY EVALUATION
Patient Name: Evelyn Ojeda  : 1940    MRN: [id number]                              Today's Date: 3/15/2022       Admit Date: 3/14/2022    Visit Dx:     ICD-10-CM ICD-9-CM   1. Cerebrovascular accident (CVA), unspecified mechanism ([facility])  I63.9 434.91   2. Oral phase dysphagia  R13.11 787.21   3. Impaired functional mobility and activity tolerance  Z74.09 V49.89     Patient Active Problem List   Diagnosis   • PAD (peripheral artery disease) ([facility])   • Essential hypertension   • Mixed hyperlipidemia   • Bilateral carotid artery stenosis   • Acute deep vein thrombosis (DVT) of distal vein of right lower extremity ([facility])   • Atherosclerosis of artery of extremity with intermittent claudication ([facility])   • Chronic osteoarthritis   • Decreased pedal pulses   • Nonhealing ulcer of right lower leg with fat layer exposed ([facility])   • Hyperglycemia   • Type 2 diabetes mellitus with hyperglycemia, without long-term current use of insulin ([facility])   • Acute kidney injury ([facility])   • Postoperative anemia due to acute blood loss   • Hematuria   • Cerebrovascular accident (CVA) ([facility])   • CAD (coronary artery disease)     Past Medical History:   Diagnosis Date   • Diabetes mellitus ([facility])    • DVT (deep venous thrombosis) ([facility])     RLL   • Hypertension    • PVD (peripheral vascular disease) ([facility])      Past Surgical History:   Procedure Laterality Date   • AORTAGRAM N/A 2021    Procedure: 1.  Introduction of catheter/sheath into the aorta 2.  Aortoiliac angiogram with right lower extremity runoff 3.  Contralateral cannulation of the right common iliac artery 4.  Placement of a 4 mm spider distal embolic protection device in the below-knee popliteal artery 5.  Directional atherectomy of the proximal SFA and below-knee popliteal artery using the 6 [place] Staples Pantheris d   • CHOLECYSTECTOMY     • KNEE SURGERY     • LEG SURGERY        General Information     Row Name 03/15/22 0951          OT Time and Intention    Document Type  evaluation  Pt presented to hospital with left-sided weakness. Dx: right MCA/PCA infarct.  -CS (r) KG (t) CS (c)     Mode of Treatment occupational therapy  -CS (r) KG (t) CS (c)     Row Name 03/15/22 0951          General Information    Patient Profile Reviewed yes  -CS (r) KG (t) CS (c)     Prior Level of Function independent:;ADL's;all household mobility;community mobility;home management;driving;shopping  -CS (r) KG (t) CS (c)     Existing Precautions/Restrictions fall  -CS (r) KG (t) CS (c)     Barriers to Rehab medically complex  -CS (r) KG (t) CS (c)     Row Name 03/15/22 0951          Occupational Profile    Environmental Supports and Barriers (Occupational Profile) tub shower, shower seat, BSC, cane  -CS (r) KG (t) CS (c)     Row Name 03/15/22 0951          Living Environment    People in Home child(anselmo), adult  -CS (r) KG (t) CS (c)     Row Name 03/15/22 0951          Home Main Entrance    Number of Stairs, Main Entrance other (see comments)  ramp  -CS (r) KG (t) CS (c)     Row Name 03/15/22 0951          Stairs Within Home, Primary    Number of Stairs, Within Home, Primary none  -CS (r) KG (t) CS (c)     Stair Railings, Within Home, Primary none  -CS (r) KG (t) CS (c)     Row Name 03/15/22 0951          Cognition    Orientation Status (Cognition) oriented x 4  -CS (r) KG (t) CS (c)     Row Name 03/15/22 0951          Safety Issues, Functional Mobility    Safety Issues Affecting Function (Mobility) awareness of need for assistance;safety precaution awareness;safety precautions follow-through/compliance  -CS (r) KG (t) CS (c)     Impairments Affecting Function (Mobility) balance;endurance/activity tolerance;strength;coordination  -CS (r) KG (t) CS (c)           User Key  (r) = Recorded By, (t) = Taken By, (c) = Cosigned By    Initials Name Provider Type    CS Rosalva Augustin S, OTR/L, CNT Occupational Therapist    KG Hermelinda Townsend, OT Student OT Student                 Mobility/ADL's     Row Name 03/15/22  0951          Bed Mobility    Bed Mobility rolling left;rolling right;scooting/bridging;supine-sit;sit-supine  -CS (r) KG (t) CS (c)     Rolling Left [place] (Bed Mobility) standby assist;verbal cues  -CS (r) KG (t) CS (c)     Rolling Right [place] (Bed Mobility) standby assist;verbal cues  -CS (r) KG (t) CS (c)     Scooting/Bridging [place] (Bed Mobility) standby assist  -CS (r) KG (t) CS (c)     Supine-Sit [place] (Bed Mobility) standby assist;verbal cues  -CS (r) KG (t) CS (c)     Sit-Supine [place] (Bed Mobility) standby assist;verbal cues  -CS (r) KG (t) CS (c)     Assistive Device (Bed Mobility) bed rails  -CS (r) KG (t) CS (c)     Row Name 03/15/22 0951          Transfers    Transfers sit-stand transfer;stand-sit transfer;toilet transfer  -CS (r) KG (t) CS (c)     Sit-Stand [place] (Transfers) contact guard  -CS (r) KG (t) CS (c)     Stand-Sit [place] (Transfers) contact guard  -CS (r) KG (t) CS (c)     [place] Level (Toilet Transfer) contact guard  -CS (r) KG (t) CS (c)     Assistive Device (Toilet Transfer) commode  -CS (r) KG (t) CS (c)     Row Name 03/15/22 0951          Toilet Transfer    Type (Toilet Transfer) sit-stand;stand-sit  -CS (r) KG (t) CS (c)     Row Name 03/15/22 0951          Functional Mobility    Functional Mobility- Ind. Level minimum assist (75% patient effort)  -CS (r) KG (t) CS (c)     Functional Mobility- Device other (see comments)  HHA  -CS (r) KG (t) CS (c)     Row Name 03/15/22 0951          Activities of Daily Living    BADL Assessment/Intervention lower body dressing;toileting;grooming;upper body dressing  -CS (r) KG (t) CS (c)     Row Name 03/15/22 0951          Lower Body Dressing Assessment/Training    [place] Level (Lower Body Dressing) doff;don;socks;moderate assist (50% patient effort)  -CS (r) KG (t) CS (c)     Position (Lower Body Dressing) edge of bed sitting  -CS (r) KG (t) CS (c)     Row Name 03/15/22 0918           Toileting Assessment/Training    [place] Level (Toileting) toileting skills;standby assist  -CS (r) KG (t) CS (c)     Assistive Devices (Toileting) commode  -CS (r) KG (t) CS (c)     Position (Toileting) supported sitting  -CS (r) KG (t) CS (c)     Row Name 03/15/22 0951          Grooming Assessment/Training    [place] Level (Grooming) wash face, hands;standby assist  -CS (r) KG (t) CS (c)     Position (Grooming) sink side;unsupported standing  -CS (r) KG (t) CS (c)     Row Name 03/15/22 0951          Upper Body Dressing Assessment/Training    [place] Level (Upper Body Dressing) doff;don;front opening garment;set up  -CS (r) KG (t) CS (c)     Position (Upper Body Dressing) edge of bed sitting  -CS (r) KG (t) CS (c)           User Key  (r) = Recorded By, (t) = Taken By, (c) = Cosigned By    Initials Name Provider Type    CS Rosalva Augustin S, OTR/L, CNT Occupational Therapist    KG Hermelinda Townsend, OT Student OT Student               Obj/Interventions     Row Name 03/15/22 0951          Sensory Assessment (Somatosensory)    Sensory Assessment (Somatosensory) UE sensation intact  -CS (r) KG (t) CS (c)     Row Name 03/15/22 0951          Vision Assessment/Intervention    Visual Impairment/Limitations WFL  -CS (r) KG (t) CS (c)     Row Name 03/15/22 0951          Range of Motion Comprehensive    Comment, General Range of Motion BUE WFL. Arthrtic deformity in B hands  -CS (r) KG (t) CS (c)     Row Name 03/15/22 0951          Strength Comprehensive (MMT)    Comment, General Manual Muscle Testing (MMT) Assessment B shoulders 3/5; B elbow and  4/5  -CS (r) KG (t) CS (c)     Row Name 03/15/22 0951          Motor Skills    Motor Skills coordination  -CS (r) KG (t) CS (c)     Coordination fine motor deficit;left;gross motor deficit;minimal impairment  -CS (r) KG (t) CS (c)     Row Name 03/15/22 0951          Balance    Balance Assessment sitting static balance;sitting dynamic balance;standing static  balance;standing dynamic balance  -CS (r) KG (t) CS (c)     Static Sitting Balance supervision  -CS (r) KG (t) CS (c)     Dynamic Sitting Balance supervision  -CS (r) KG (t) CS (c)     Position, Sitting Balance sitting edge of bed  -CS (r) KG (t) CS (c)     Static Standing Balance contact guard  -CS (r) KG (t) CS (c)     Dynamic Standing Balance minimal assist  -CS (r) KG (t) CS (c)     Position/Device Used, Standing Balance other (see comments)  HHA  -CS (r) KG (t) CS (c)           User Key  (r) = Recorded By, (t) = Taken By, (c) = Cosigned By    Initials Name Provider Type    CS Rosalva Augustin, OTR/L, CNT Occupational Therapist    KG Hermelinda Townsend, OT Student OT Student               Goals/Plan     Row Name 03/15/22 0951          Transfer Goal 1 (OT)    Activity/Assistive Device (Transfer Goal 1, OT) shower chair;tub  -CS (r) KG (t) CS (c)     [place] Level/Cues Needed (Transfer Goal 1, OT) standby assist  -CS (r) KG (t) CS (c)     Time Frame (Transfer Goal 1, OT) long term goal (LTG)  -CS (r) KG (t) CS (c)     Progress/Outcome (Transfer Goal 1, OT) goal ongoing  -CS (r) KG (t) CS (c)     Row Name 03/15/22 0951          Dressing Goal 1 (OT)    Activity/Device (Dressing Goal 1, OT) dressing skills, all  -CS (r) KG (t) CS (c)     [place]/Cues Needed (Dressing Goal 1, OT) standby assist  -CS (r) KG (t) CS (c)     Time Frame (Dressing Goal 1, OT) long term goal (LTG)  -CS (r) KG (t) CS (c)     Progress/Outcome (Dressing Goal 1, OT) goal ongoing  -CS (r) KG (t) CS (c)     Row Name 03/15/22 0951          Problem Specific Goal 1 (OT)    Problem Specific Goal 1 (OT) Pt will be I with fine motor HEP to increase I with ADLs/IADLs.  -CS (r) KG (t) CS (c)     Time Frame (Problem Specific Goal 1, OT) long term goal (LTG)  -CS (r) KG (t) CS (c)     Progress/Outcome (Problem Specific Goal 1, OT) goal ongoing  -CS (r) KG (t) CS (c)     Row Name 03/15/22 0951          Therapy Assessment/Plan (OT)    Planned Therapy  Interventions (OT) activity tolerance training;adaptive equipment training;BADL retraining;functional balance retraining;occupation/activity based interventions;patient/caregiver education/training;strengthening exercise;transfer/mobility retraining;neuromuscular control/coordination retraining  -CS (r) KG (t) CS (c)           User Key  (r) = Recorded By, (t) = Taken By, (c) = Cosigned By    Initials Name Provider Type    CS Rosalva Augustin S, OTR/L, CNT Occupational Therapist    Hermelinda Olivo, OT Student OT Student               Clinical Impression     Row Name 03/15/22 0951          Pain Assessment    Pretreatment Pain Rating 0/10 - no pain  -CS (r) KG (t) CS (c)     Posttreatment Pain Rating 0/10 - no pain  -CS (r) KG (t) CS (c)     Row Name 03/15/22 0951          Plan of Care Review    Plan of Care Reviewed With patient;daughter  -CS (r) KG (t) CS (c)     Progress no change  -CS (r) KG (t) CS (c)     Outcome Evaluation OT eval completed. Pt A&Ox4. Pt SBA for bed mobility, CGA for t/fs, and CGA/min A for functional mobility. Upon entering room, pt requested to use BR and was able to walk to BR with CGA. Pt completed toileting with SBA, but OT noticed pt req multiple attempts to grasp toilet paper from roll with L hand. Pt req mod A to doff/don socks and req multiple attempts to latch L hand in sock. While sitting EOB, pt demo a slight posterior lean with activity but was able to self correct with vc and maintain balance with support of BUE on bed. During amb, pt demo a wide VIELKA and req HHAx1 for stability, and pt stated that she has a cane at home that she uses for mobility. During gross motor coordination assessment, pt was able to complete finger nose accurately with extra time for L hand. Both pt and daughter report that pt is near her baseline and that she was previously receiving HH. Pt would benefit from OT service to increase strength, functional balance, and safety awareness with ADLs, as well as  fine/gross motor coordination of L hand. Recommend discharge home with 24/7 assist and HH OT/PT.  -CS (r) KG (t) CS (c)     Row Name 03/15/22 0951          Therapy Assessment/Plan (OT)    Rehab Potential (OT) good, to achieve stated therapy goals  -CS (r) KG (t) CS (c)     Criteria for Skilled Therapeutic Interventions Met (OT) yes;skilled treatment is necessary  -CS (r) KG (t) CS (c)     Therapy Frequency (OT) 5 times/wk  -CS (r) KG (t) CS (c)     Predicted Duration of Therapy Intervention (OT) until hospital discharge  -CS (r) KG (t) CS (c)     Row Name 03/15/22 0951          Therapy Plan Review/Discharge Plan (OT)    Anticipated Discharge Disposition (OT) home with 24/7 care;home with home health  -CS (r) KG (t) CS (c)     Row Name 03/15/22 0951          Positioning and Restraints    Pre-Treatment Position in bed  -CS (r) KG (t) CS (c)     Post Treatment Position bed  -CS (r) KG (t) CS (c)     In Bed fowlers;call light within reach;encouraged to call for assist;exit alarm on;side rails up x2;SCD pump applied;with family/caregiver  -CS (r) KG (t) CS (c)           User Key  (r) = Recorded By, (t) = Taken By, (c) = Cosigned By    Initials Name Provider Type    Rosalva Valle, OTR/L, CNT Occupational Therapist    Hermelinda Olivo, OT Student OT Student               Outcome Measures     Row Name 03/15/22 0951          How much help from another is currently needed...    Putting on and taking off regular lower body clothing? 3  -CS (r) KG (t) CS (c)     Bathing (including washing, rinsing, and drying) 3  -CS (r) KG (t) CS (c)     Toileting (which includes using toilet bed pan or urinal) 3  -CS (r) KG (t) CS (c)     Putting on and taking off regular upper body clothing 3  -CS (r) KG (t) CS (c)     Taking care of personal grooming (such as brushing teeth) 3  -CS (r) KG (t) CS (c)     Eating meals 3  -CS (r) KG (t) CS (c)     AM-PAC 6 Clicks Score (OT) 18  -CS (r) KG (t)     Row Name 03/15/22 0950          How  much help from another person do you currently need...    Turning from your back to your side while in flat bed without using bedrails? 3  -JE     Moving from lying on back to sitting on the side of a flat bed without bedrails? 3  -JE     Moving to and from a bed to a chair (including a wheelchair)? 3  -JE     Standing up from a chair using your arms (e.g., wheelchair, bedside chair)? 3  -JE     Climbing 3-5 steps with a railing? 3  -JE     To walk in hospital room? 3  -JE     AM-PAC 6 Clicks Score (PT) 18  -JE     Row Name 03/15/22 0951          Modified Darrin Scale    Pre-Stroke Modified Darrin Scale 0 - No Symptoms at all.  -CS (r) KG (t) CS (c)     Modified Richardson Scale 3 - Moderate disability.  Requiring some help, but able to walk without assistance.  -CS (r) KG (t) CS (c)     Row Name 03/15/22 0951 03/15/22 0950       Functional Assessment    Outcome Measure Options AM-PAC 6 Clicks Daily Activity (OT);Modified Richardson  -CS (r) KG (t) CS (c) AM-PAC 6 Clicks Basic Mobility (PT)  -JE          User Key  (r) = Recorded By, (t) = Taken By, (c) = Cosigned By    Initials Name Provider Type    CS Rosalva Augustin, OTR/L, CNT Occupational Therapist    Nereida Orozco, PT Physical Therapist    Hermelinda Olivo, OT Student OT Student                Occupational Therapy Education                 Title: PT OT SLP Therapies (In Progress)     Topic: Occupational Therapy (Done)     Point: ADL training (Done)     Description:   Instruct learner(s) on proper safety adaptation and remediation techniques during self care or transfers.   Instruct in proper use of assistive devices.              Learning Progress Summary           Patient Acceptance, E, VU by KG at 3/15/2022 1320                   Point: Home exercise program (Done)     Description:   Instruct learner(s) on appropriate technique for monitoring, assisting and/or progressing therapeutic exercises/activities.              Learning Progress Summary            Patient Acceptance, E, VU by KG at 3/15/2022 1320                   Point: Precautions (Done)     Description:   Instruct learner(s) on prescribed precautions during self-care and functional transfers.              Learning Progress Summary           Patient Acceptance, E, VU by KG at 3/15/2022 1320                   Point: Body mechanics (Done)     Description:   Instruct learner(s) on proper positioning and spine alignment during self-care, functional mobility activities and/or exercises.              Learning Progress Summary           Patient Acceptance, E, VU by KG at 3/15/2022 1320                               User Key     Initials Effective Dates Name Provider Type Discipline    KG 12/27/21 -  Hermelinda Townsend, NOLAN Student OT Student OT              OT Recommendation and Plan  Planned Therapy Interventions (OT): activity tolerance training, adaptive equipment training, BADL retraining, functional balance retraining, occupation/activity based interventions, patient/caregiver education/training, strengthening exercise, transfer/mobility retraining, neuromuscular control/coordination retraining  Therapy Frequency (OT): 5 times/wk  Plan of Care Review  Plan of Care Reviewed With: patient, daughter  Progress: no change  Outcome Evaluation: OT eval completed. Pt A&Ox4. Pt SBA for bed mobility, CGA for t/fs, and CGA/min A for functional mobility. Upon entering room, pt requested to use BR and was able to walk to BR with CGA. Pt completed toileting with SBA, but OT noticed pt req multiple attempts to grasp toilet paper from roll with L hand. Pt req mod A to doff/don socks and req multiple attempts to latch L hand in sock. While sitting EOB, pt demo a slight posterior lean with activity but was able to self correct with vc and maintain balance with support of BUE on bed. During amb, pt demo a wide VIELKA and req HHAx1 for stability, and pt stated that she has a cane at home that she uses for mobility. During gross motor  coordination assessment, pt was able to complete finger nose accurately with extra time for L hand. Both pt and daughter report that pt is near her baseline and that she was previously receiving HH. Pt would benefit from OT service to increase strength, functional balance, and safety awareness with ADLs, as well as fine/gross motor coordination of L hand. Recommend discharge home with 24/7 assist and HH OT/PT.     Time Calculation:    Time Calculation- OT     Row Name 03/15/22 0951             Time Calculation- OT    OT Start Time 0951  +8 min chart review  -CS (r) KG (t) CS (c)      OT Stop Time 1057  -CS (r) KG (t) CS (c)      OT Time Calculation (min) 66 min  -CS (r) KG (t)      OT Received On 03/15/22  -CS (r) KG (t) CS (c)      OT Goal Re-Cert Due Date 03/25/22  -CS (r) KG (t) CS (c)              Timed Charges    65836 - OT Self Care/Mgmt Minutes 14  -CS (r) KG (t) CS (c)              Untimed Charges    OT Eval/Re-eval Minutes 60  -CS (r) KG (t) CS (c)              Total Minutes    Timed Charges Total Minutes 14  -CS (r) KG (t)      Untimed Charges Total Minutes 60  -CS (r) KG (t)       Total Minutes 74  -CS (r) KG (t)            User Key  (r) = Recorded By, (t) = Taken By, (c) = Cosigned By    Initials Name Provider Type    CS Rosalva Augustin, OTR/L, CNT Occupational Therapist    Chiki Olivo OT Student OT Student                       HCIKI ELIAS OT Student  3/15/2022

## 2022-03-15 NOTE — PLAN OF CARE
Goal Outcome Evaluation:  Plan of Care Reviewed With: patient        Progress: no change  Outcome Evaluation: Pt A&Ox4, VSS. NIH 0, no drift noted in any extremities but  is weaker in L hand. Pt also seems to have difficulty with fine motor movements in L hand, for example pt had difficulty holding onto a turkey sandwich she was attempting to eat. Pt voiding, purewick in place. Incontinent of bowel this shift. Bottom red/blanchable, turned q 2 hours as tolerated. NSR on telemetry. Safety maintained, will continue to monitor.

## 2022-03-15 NOTE — PLAN OF CARE
Goal Outcome Evaluation:  Plan of Care Reviewed With: patient, daughter, caregiver (NERY Nieto)        Progress: no change (Initial Evaluation)     Clinical bedside swallow evaluation completed. The patient was alert and cooperative. She was able to reposition herself in bed to begin breakfast tray. Daughter present at end of evaluation. Her daughter reports drooling on left and left sided pocketing at home.     Primary problem: MRI with extensive R MCA infarct, complaints of difficulty swallowing with drooling on L, and L arm weakness.   Medical history: DM, DVT, HTN, PVD.    Oral motor assessment completed. Possible mild left facial droop with decreased sensation on left side evidenced by lack of awareness of pocketed food during intake. The patient consumed trials from regular diet and thin liquids meal tray. Moderate pocketing and left sulci residue present following regular solid trials. Patient required cue to clear with liquid wash. Was able to clear with liquid wash to minimal amount. Lingual sweep utilized to clear the rest of the residue present. Patient educated on need for alternating bites/sips and use of lingual sweep. Patient exhibited a 1x throat clear following thin liquid intake; however, vocal quality remained clear and unchanged.     SLP recommends:   1) Regular diet   2) Thin liquids   3) Medications whole with apple sauce/pudding  4) Oral care, standard swallow precautions, alternating bites/sips and lingual sweep    Ensure oral care is completed following meals to decrease risk of choking on any pocketed food from meal.     SLP will continue to follow and treat.     Felicita Daly, MS CCC-SLP 3/15/2022 10:23 CDT

## 2022-03-15 NOTE — PLAN OF CARE
Goal Outcome Evaluation:  Plan of Care Reviewed With: patient, daughter, family        Progress: improving  Outcome Evaluation: Pt is aox4, NIH 0 at this time. No complaints of pain or discomfort. Up with standby assist. tolerating regular consistency diet with thin liquids. some weakness noted to left hand when picking up utensils, straw. family has been at bedside today. safety maintained.

## 2022-03-16 NOTE — CASE MANAGEMENT/SOCIAL WORK
Continued Stay Note   [place]     Patient Name: Evelyn Ojeda  MRN: [id number]  Today's Date: 3/16/2022    Admit Date: 3/14/2022     Discharge Plan     Row Name 03/16/22 1541       Plan    Plan Comments Received call from [facility] stating they are not in network with pt insurance. Attempted to call pt dtr ([place] [phone]) to discuss but had to leave a message. Await return call from dtr.               Discharge Codes    No documentation.                     JAYDON Deshpande

## 2022-03-16 NOTE — PROGRESS NOTES
[facility] Emergency Room Medicine Services  INPATIENT PROGRESS NOTE    Length of Stay: 2  Date of Admission: 3/14/2022  Primary Care Physician: Roberto Garcia MD    Subjective   Chief Complaint: CVA.    HPI   Discussed MRI results with the family.  Plan for rehab placement.  Patient had met team call this morning secondary to vasovagal.  Vital signs stable.  Afebrile.  Recommendation per neurology discussed with patient and sister.  Long discussion about CODE STATUS.    Review of Systems   Constitutional: Positive for activity change, appetite change and fatigue. Negative for chills and fever.   HENT: Negative for hearing loss, nosebleeds, tinnitus and trouble swallowing.    Eyes: Negative for visual disturbance.   Respiratory: Negative for cough, chest tightness, shortness of breath and wheezing.    Cardiovascular: Negative for chest pain, palpitations and leg swelling.   Gastrointestinal: Negative for abdominal distention, abdominal pain, blood in stool, constipation, diarrhea, nausea and vomiting.   Endocrine: Negative for cold intolerance, heat intolerance, polydipsia, polyphagia and polyuria.   Genitourinary: Negative for decreased urine volume, difficulty urinating, dysuria, flank pain, frequency and hematuria.   Musculoskeletal: Positive for arthralgias, gait problem and myalgias. Negative for joint swelling.   Skin: Negative for rash.   Allergic/Immunologic: Negative for immunocompromised state.   Neurological: Positive for weakness. Negative for dizziness, syncope, light-headedness and headaches.   Hematological: Negative for adenopathy. Does not bruise/bleed easily.   Psychiatric/Behavioral: Negative for confusion and sleep disturbance. The patient is not nervous/anxious.            All pertinent negatives and positives are as above. All other systems have been reviewed and are negative unless otherwise stated.     Objective    Temp:  [97.5 °F (36.4 °C)-98.7 °F (37.1 °C)]  98.7 °F (37.1 °C)  Heart Rate:  [76-87] 76  Resp:  [16-18] 18  BP: ()/() 153/50    Intake/Output Summary (Last 24 hours) at 3/16/2022 1045  Last data filed at 3/15/2022 1300  Gross per 24 hour   Intake 480 ml   Output 200 ml   Net 280 ml     Physical Exam  Vitals and nursing note reviewed.   Constitutional:       Appearance: She is well-developed.      Comments: Advanced age.   HENT:      Head: Normocephalic.   Eyes:      Conjunctiva/sclera: Conjunctivae normal.      Pupils: Pupils are equal, round, and reactive to light.   Neck:      Vascular: No JVD.   Cardiovascular:      Rate and Rhythm: Normal rate and regular rhythm.      Heart sounds: Normal heart sounds. No murmur heard.    No friction rub. No gallop.   Pulmonary:      Effort: Pulmonary effort is normal. No respiratory distress.      Breath sounds: Normal breath sounds. No wheezing or rales.   Chest:      Chest wall: No tenderness.   Abdominal:      General: Bowel sounds are normal. There is no distension.      Palpations: Abdomen is soft.      Tenderness: There is no abdominal tenderness. There is no guarding or rebound.      Comments: Obesity.   Musculoskeletal:         General: No tenderness or deformity.      Cervical back: Neck supple.      Comments: Arthritis multiple knuckle   Skin:     General: Skin is warm and dry.      Capillary Refill: Capillary refill takes 2 to 3 seconds.      Findings: No rash.   Neurological:      Mental Status: She is alert and oriented to person, place, and time.      Cranial Nerves: No cranial nerve deficit.      Motor: Weakness present. No abnormal muscle tone.      Gait: Gait abnormal.      Deep Tendon Reflexes: Reflexes normal.      Comments: Cranial keystroke grossly intact.  Negative pronator drift.  Negative finger touch.  Strength 5 out of 5 symmetrical.   Psychiatric:         Behavior: Behavior normal.         Thought Content: Thought content normal.   Results Review:  Lab Results (last 24 hours)      Procedure Component Value Units Date/Time    POC Glucose Once [[id number]]  (Abnormal) Collected: 03/16/22 1024    Specimen: Blood Updated: 03/16/22 1036     Glucose 196 mg/dL      Comment: : MEJIALEAH Hernandez LaDonnaMeter ID: [id number]       Urine Culture - Urine, Urine, Clean Catch [[id number]] Collected: 03/15/22 0306    Specimen: Urine, Clean Catch Updated: 03/16/22 0759     Urine Culture 25,000 CFU/mL Normal Urogenital Lauren    POC Glucose Once [[id number]]  (Abnormal) Collected: 03/16/22 0737    Specimen: Blood Updated: 03/16/22 0755     Glucose 166 mg/dL      Comment: : MEJIALEAH Bondberna LaDonnaMeter ID: [id number]       Basic Metabolic Panel [[id number]]  (Abnormal) Collected: 03/16/22 0648    Specimen: Blood Updated: 03/16/22 0736     Glucose 190 mg/dL      BUN 9 mg/dL      Creatinine 0.83 mg/dL      Sodium 134 mmol/L      Potassium 4.2 mmol/L      Chloride 101 mmol/L      CO2 22.0 mmol/L      Calcium 9.5 mg/dL      BUN/Creatinine Ratio 10.8     Anion Gap 11.0 mmol/L      eGFR 71.8 mL/min/1.73      Comment: National Kidney Foundation and American Society of Nephrology (ASN) Task Force recommended calculation based on the Chronic Kidney Disease Epidemiology Collaboration (CKD-EPI) equation refit without adjustment for race.       Narrative:      GFR Normal >60  Chronic Kidney Disease <60  Kidney Failure <15      CBC (No Diff) [[id number]]  (Abnormal) Collected: 03/16/22 0648    Specimen: Blood Updated: 03/16/22 0717     WBC 9.22 10*3/mm3      RBC 3.83 10*6/mm3      Hemoglobin 11.4 g/dL      Hematocrit 36.2 %      MCV 94.5 fL      MCH 29.8 pg      MCHC 31.5 g/dL      RDW 12.6 %      RDW-SD 43.4 fl      MPV 10.1 fL      Platelets 343 10*3/mm3     POC Glucose Once [[id number]]  (Normal) Collected: 03/15/22 1708    Specimen: Blood Updated: 03/15/22 1720     Glucose 122 mg/dL      Comment: : MEJIABERNATye Hernandez LaDonnaMeter ID: [id number]       POC Glucose Once [[id number]]  (Abnormal) Collected:  03/15/22 1113    Specimen: Blood Updated: 03/15/22 1128     Glucose 141 mg/dL      Comment: : FELISA PatelonnaMeter ID: [id number]              Cultures:  Urine Culture   Date Value Ref Range Status   03/15/2022 25,000 CFU/mL Normal Urogenital Lauren  Final       Radiology Data:    Imaging Results (Last 24 Hours)     ** No results found for the last 24 hours. **          Allergies   Allergen Reactions   • Codeine Other (See Comments)       Scheduled meds:   aspirin, 81 mg, Oral, Daily   Or  aspirin, 300 mg, Rectal, Daily  atorvastatin, 80 mg, Oral, Nightly  cefTRIAXone, 1 g, Intravenous, Q24H  gabapentin, 300 mg, Oral, Q8H  insulin lispro, 2-9 Units, Subcutaneous, TID AC  linagliptin, 5 mg, Oral, Daily  lisinopril, 20 mg, Oral, Daily  sodium chloride, 10 mL, Intravenous, Q12H        PRN meds:  dextrose  •  dextrose  •  glucagon (human recombinant)  •  HYDROcodone-acetaminophen  •  ondansetron  •  sodium chloride  •  sodium chloride    Assessment/Plan       Cerebrovascular accident (CVA) ([facility])    PAD (peripheral artery disease) ([facility])    Mixed hyperlipidemia    Type 2 diabetes mellitus with hyperglycemia, without long-term current use of insulin ([facility])    CAD (coronary artery disease)      Plan:  CVA-extensive right MCA territory infarct.  No aspirin for now per neurology.  Aspirin daily.  Chest x-ray-No focal infiltrate, Mild hypoventilation with vascular crowding. Mild bronchial wall  Thickening.  CTA of the head-Intact [place], no central arterial occlusion or thrombus.   CTA of the neck-Patchy atherosclerotic plaque at both carotid bifurcations with no high-grade stenosis, greatest degree of stenosis is at the left ICA origin where there is 25% narrowing.  CT scan head-Large hypodense subacute right MCA infarct, No intracranial hemorrhage.  MRI of the brain-Extensive right MCA territory infarct with diffusion restriction and ADC mapping abnormality- associated sulcal effacement and edema, No  evidence of hemorrhagic conversion or mass effect, Atrophy with small vessel ischemic changes, Remote infarcts involving the right thalamus and  left basal ganglia and right cerebellar hemisphere with focal encephalomalacia and gliosis.  Recommendation from neurologist-  1. ASA 81 mg daily. Hold on dual antiplatelet therapy as size and location of stroke at risk for hemorrhagic conversion. May resume dual antiplatelet therapy in 14 days on 3/28/2022 with Pletal 100 mg BID in place of Plavix.   2. Lipitor 80 mg daily.  3. DM management for A1C goal less than 7.  4. Systolic BP goal less than 160.  5. Transthoracic echo done and pending.   6. ST/PT/OT  7. No driving at discharge and no driving until seen in follow up with neurology clinic.   8. Therapy has recommended continued therapy with skilled nursing or home with home health. [facility] and [facility] pending.    9. 14 day ZIO at discharge.   10. F/u neurology 3-4 weeks.      CAD/hypertension/hyperlipidemia.  Lipitor high-dose per neurology.  Hold Plavix per neurology.  Continue lisinopril.  Echocardiogram pending.  Preliminary ejection fraction 51%.     UTI.  Rocephin.     Anemia.  Hold iron sulfate.     Chronic pain/arthritis.  Cut back Neurontin.  Cut back Norco.  Hold Mobic.     Diabetes .  Hold Glucophage.  Hemoglobin A1 C 11.  Discussed patient will need better control of her diabetes.    Continue Tradjenta.  Continue with low sliding scale.     Renal insufficiency.    Resolved.     Nausea.  Zofran as needed     Urine culture pending.  Covid-19-negative.     Discharge Planning: Plan for rehab placement.    Electronically signed by Nik Goodson MD, 03/16/22, 10:45 AM CDT.

## 2022-03-16 NOTE — CASE MANAGEMENT/SOCIAL WORK
Continued Stay Note   [place]     Patient Name: Evelyn Ojeda  MRN: [id number]  Today's Date: 3/16/2022    Admit Date: 3/14/2022     Discharge Plan     Row Name 03/16/22 1541       Plan    Plan Comments Received call from [facility] stating they are not in network with pt insurance. Attempted to call pt dtr ([place] [phone]) to discuss but had to leave a message. Await return call from dtr.               Discharge Codes    No documentation.                     JAYDON Deshpande

## 2022-03-16 NOTE — NURSING NOTE
@ 1020 Pt standing in hallway with PT, complaints of being lightheaded and lost consciousness. RRT called. HR 40's sbp 90's. Dr Rodriguez and Katia Sharma at bedside, pt was assisted back to bed, regained consciousness and orientation. SBP up to 150. Hr NSR 70's. See RRT notes.

## 2022-03-16 NOTE — THERAPY TREATMENT NOTE
Acute Care - Physical Therapy Treatment Note  [facility]     Patient Name: Evelyn Ojeda  : 1940  MRN: [id number]  Today's Date: 3/16/2022      Visit Dx:     ICD-10-CM ICD-9-CM   1. Cerebrovascular accident (CVA), unspecified mechanism ([facility])  I63.9 434.91   2. Oral phase dysphagia  R13.11 787.21   3. Impaired functional mobility and activity tolerance  Z74.09 V49.89   4. Cerebrovascular accident (CVA) due to thrombosis of right middle cerebral artery ([facility])  I63.311 434.01     Patient Active Problem List   Diagnosis   • PAD (peripheral artery disease) ([facility])   • Essential hypertension   • Mixed hyperlipidemia   • Bilateral carotid artery stenosis   • Acute deep vein thrombosis (DVT) of distal vein of right lower extremity ([facility])   • Atherosclerosis of artery of extremity with intermittent claudication ([facility])   • Chronic osteoarthritis   • Decreased pedal pulses   • Nonhealing ulcer of right lower leg with fat layer exposed ([facility])   • Hyperglycemia   • Type 2 diabetes mellitus with hyperglycemia, without long-term current use of insulin ([facility])   • Acute kidney injury ([facility])   • Postoperative anemia due to acute blood loss   • Hematuria   • Cerebrovascular accident (CVA) ([facility])   • CAD (coronary artery disease)     Past Medical History:   Diagnosis Date   • Diabetes mellitus (HCC)    • DVT (deep venous thrombosis) ([facility])     RLL   • Hypertension    • PVD (peripheral vascular disease) ([facility])      Past Surgical History:   Procedure Laterality Date   • AORTAGRAM N/A 2021    Procedure: 1.  Introduction of catheter/sheath into the aorta 2.  Aortoiliac angiogram with right lower extremity runoff 3.  Contralateral cannulation of the right common iliac artery 4.  Placement of a 4 mm spider distal embolic protection device in the below-knee popliteal artery 5.  Directional atherectomy of the proximal SFA and below-knee popliteal artery using the 6 [place] [employer] d   • CHOLECYSTECTOMY     • KNEE SURGERY     • LEG  SURGERY       PT Assessment (last 12 hours)     PT Evaluation and Treatment     Row Name 03/16/22 1000          Physical Therapy Time and Intention    Subjective Information complains of;fatigue  -TB     Document Type therapy note (daily note)  -TB     Mode of Treatment physical therapy  -TB     Row Name 03/16/22 1000          General Information    Existing Precautions/Restrictions fall  -TB     Row Name 03/16/22 1000          Pain    Pretreatment Pain Rating 0/10 - no pain  -TB     Posttreatment Pain Rating 0/10 - no pain  -TB     Row Name 03/16/22 1000          Bed Mobility    Comment, (Bed Mobility) Up in chair  -TB     Row Name 03/16/22 1000          Transfers    Transfers sit-stand transfer;stand-sit transfer  -TB     Sit-Stand [place] (Transfers) standby assist  -TB     Stand-Sit [place] (Transfers) standby assist  -TB     Row Name 03/16/22 1000          Gait/Stairs (Locomotion)    [place] Level (Gait) contact guard;verbal cues  -TB     Assistive Device (Gait) other (see comments)  HHA  -TB     Distance in Feet (Gait) 15'  -TB     Row Name 03/16/22 1000          Plan of Care Review    Plan of Care Reviewed With patient;daughter  -TB     Outcome Evaluation Pt up sitting in chair. Pt had finished taking a shower w/ nurse aide a few mins ago. Pt had no complaints except that she was ready to take a nap. Pt stood SBA and amb 15' to the hallway. Dr. Rodriguez and Katia Sharma came and spoke w/ pt while in the hallway. Pt was leaning up against the wall. When they walked away to see another pt the therapist asked the pt if she was ready to walk. The pt didn't answer right away and had a blank look on her face and wasnt quite right. Therapist requested a chair for the pt. Got pt safely to the chair and pt was unresponsive for a few seconds. Dr. Rodriguez came back out in the swain from a pts room and therapist called for help. Dr and NP came to pts side. RRT was called. Therapist checked BP and first reading  was 154/137. Rechecked again and it was much lower. Lifted pt back to bed w/ assit fro lift team and another RN. Will check w/ RN later to see if pt is appropiate to see.  -TB     Row Name 03/16/22 1000          Positioning and Restraints    Pre-Treatment Position sitting in chair/recliner  -TB     Post Treatment Position bed  -TB     In Bed with nsg  -TB           User Key  (r) = Recorded By, (t) = Taken By, (c) = Cosigned By    Initials Name Provider Type    TB Daxa Rudolph R, PTA Physical Therapy Assistant                Physical Therapy Education                 Title: PT OT SLP Therapies (In Progress)     Topic: Physical Therapy (In Progress)     Point: Mobility training (Done)     Learning Progress Summary           Patient Acceptance, E,TB,D, VU,DU,NR by  at 3/15/2022 1237    Comment: Education re: purpose of PT/importance of activity, for safety/falls prevention, to have assist when up   Family Acceptance, E,TB,D, VU,DU,NR by  at 3/15/2022 1237    Comment: Education re: purpose of PT/importance of activity, for safety/falls prevention, to have assist when up                   Point: Home exercise program (Not Started)     Learner Progress:  Not documented in this visit.          Point: Precautions (Done)     Learning Progress Summary           Patient Acceptance, E,TB,D, VU,DU,NR by  at 3/15/2022 1237    Comment: Education re: purpose of PT/importance of activity, for safety/falls prevention, to have assist when up   Family Acceptance, E,TB,D, VU,DU,NR by  at 3/15/2022 1237    Comment: Education re: purpose of PT/importance of activity, for safety/falls prevention, to have assist when up                               User Key     Initials Effective Dates Name Provider Type Discipline     08/02/18 -  Nereida Faust, PT Physical Therapist PT              PT Recommendation and Plan     Plan of Care Reviewed With: patient, daughter  Outcome Evaluation: Pt up sitting in chair. Pt had finished  taking a shower w/ nurse aide a few mins ago. Pt had no complaints except that she was ready to take a nap. Pt stood SBA and amb 15' to the hallway. Dr. Rodriguez and Katia Sharma came and spoke w/ pt while in the hallway. Pt was leaning up against the wall. When they walked away to see another pt the therapist asked the pt if she was ready to walk. The pt didn't answer right away and had a blank look on her face and wasnt quite right. Therapist requested a chair for the pt. Got pt safely to the chair and pt was unresponsive for a few seconds. Dr. Rodriguez came back out in the swain from a pts room and therapist called for help. Dr and NP came to pts side. RRT was called. Therapist checked BP and first reading was 154/137. Rechecked again and it was much lower. Lifted pt back to bed w/ assit fro lift team and another RN. Will check w/ RN later to see if pt is appropiate to see.       Time Calculation:    PT Charges     Row Name 03/16/22 1325             Time Calculation    Start Time 1000  -TB      Stop Time 1028  -TB      Time Calculation (min) 28 min  -TB      PT Received On 03/16/22  -TB              Time Calculation- PT    Total Timed Code Minutes- PT 28 minute(s)  -TB            User Key  (r) = Recorded By, (t) = Taken By, (c) = Cosigned By    Initials Name Provider Type    TB Daxa Rudolph PTA Physical Therapy Assistant              Therapy Charges for Today     Code Description Service Date Service Provider Modifiers Qty    02503799603 HC GAIT TRAINING EA 15 MIN 3/15/2022 Daxa Rudolph, PTA GP 2    65180276189 HC GAIT TRAINING EA 15 MIN 3/16/2022 Daxa Rudolph, PTA GP 1    67360158382 HC PT THERAPEUTIC ACT EA 15 MIN 3/16/2022 Daxa Rudolph, PTA GP 1          PT G-Codes  Outcome Measure Options: AM-PAC 6 Clicks Daily Activity (OT), Modified [place]  AM-PAC 6 Clicks Score (PT): 18  AM-PAC 6 Clicks Score (OT): 18  Modified [place] Scale: 3 - Moderate disability.  Requiring some help, but  able to walk without assistance.    Daxa Rudolph, PTA  3/16/2022

## 2022-03-16 NOTE — CASE MANAGEMENT/SOCIAL WORK
Continued Stay Note   [place]     Patient Name: Evelyn Ojeda  MRN: [id number]  Today's Date: 3/16/2022    Admit Date: 3/14/2022     Discharge Plan     Row Name 03/16/22 1037       Plan    Plan Comments Spoke to Ariela at [facility] swing bed ([phone]) and she is requesting updates be faxed to [phone]. Updates faxed. Await answer and if bed offered precert will be started.               Discharge Codes    No documentation.                     JAYDON Deshpande

## 2022-03-16 NOTE — PLAN OF CARE
Goal Outcome Evaluation:  Plan of Care Reviewed With: patient, daughter           Outcome Evaluation: Pt up sitting in chair. Pt had finished taking a shower w/ nurse aide a few mins ago. Pt had no complaints except that she was ready to take a nap. Pt stood SBA and amb 15' to the hallway. Dr. Rodriguez and Katia Sharma came and spoke w/ pt while in the hallway. Pt was leaning up against the wall. When they walked away to see another pt the therapist asked the pt if she was ready to walk. The pt didn't answer right away and had a blank look on her face and wasnt quite right. Therapist requested a chair for the pt. Got pt safely to the chair and pt was unresponsive for a few seconds. Dr. Rodriguez came back out in the swain from a pts room and therapist called for help. Dr and NP came to pts side. RRT was called. Therapist checked BP and first reading was 154/137. Rechecked again and it was much lower. Lifted pt back to bed w/ assit fro lift team and another RN. Will check w/ RN later to see if pt is appropiate to see.

## 2022-03-16 NOTE — THERAPY TREATMENT NOTE
Acute Care - Speech Language Pathology   Swallow Treatment Note [facility]     Patient Name: Evelyn Ojeda  : 1940  MRN: [id number]  Today's Date: 3/16/2022               Admit Date: 3/14/2022  Swallow treatment completed. The patient was alert and cooperative. She reports she is fatigued and has been all day. No concerns voiced by RN, nurse aid, or patient regarding toleration of current diet. Patient completed trials of thin liquids and regular solids. No overt s/s of aspiration observed. L labial residue present with minimal awareness. Discussed importance of oral care and us of lingual sweep to clear oral cavity post meals. Patient verbalized understanding.   Felicita Daly, MS CCC-SLP 3/16/2022 14:56 CDT    Visit Dx:     ICD-10-CM ICD-9-CM   1. Cerebrovascular accident (CVA), unspecified mechanism ([facility])  I63.9 434.91   2. Oral phase dysphagia  R13.11 787.21   3. Impaired functional mobility and activity tolerance  Z74.09 V49.89   4. Cerebrovascular accident (CVA) due to thrombosis of right middle cerebral artery ([facility])  I63.311 434.01     Patient Active Problem List   Diagnosis   • PAD (peripheral artery disease) ([facility])   • Essential hypertension   • Mixed hyperlipidemia   • Bilateral carotid artery stenosis   • Acute deep vein thrombosis (DVT) of distal vein of right lower extremity ([facility])   • Atherosclerosis of artery of extremity with intermittent claudication ([facility])   • Chronic osteoarthritis   • Decreased pedal pulses   • Nonhealing ulcer of right lower leg with fat layer exposed ([facility])   • Hyperglycemia   • Type 2 diabetes mellitus with hyperglycemia, without long-term current use of insulin ([facility])   • Acute kidney injury ([facility])   • Postoperative anemia due to acute blood loss   • Hematuria   • Cerebrovascular accident (CVA) ([facility])   • CAD (coronary artery disease)     Past Medical History:   Diagnosis Date   • Diabetes mellitus ([facility])    • DVT (deep venous thrombosis) ([facility])     RLL   • Hypertension     • PVD (peripheral vascular disease) ([facility])      Past Surgical History:   Procedure Laterality Date   • AORTAGRAM N/A 1/6/2021    Procedure: 1.  Introduction of catheter/sheath into the aorta 2.  Aortoiliac angiogram with right lower extremity runoff 3.  Contralateral cannulation of the right common iliac artery 4.  Placement of a 4 mm spider distal embolic protection device in the below-knee popliteal artery 5.  Directional atherectomy of the proximal SFA and below-knee popliteal artery using the 6 [place] [employer] d   • CHOLECYSTECTOMY     • KNEE SURGERY     • LEG SURGERY         SLP Recommendation and Plan                                         Daily Summary of Progress (SLP): progress toward functional goals as expected (03/16/22 1439)                  Plan for Continued Treatment (SLP): continue treatment per plan of care (03/16/22 1439)         Plan of Care Reviewed With: patient, caregiver  Progress: improving      SWALLOW EVALUATION (last 72 hours)     SLP Adult Swallow Evaluation     Row Name 03/16/22 1439 03/16/22 1020 03/15/22 0805             Rehab Evaluation    Document Type therapy note (daily note)  -MM therapy note (daily note)  -MM evaluation  -MM      Subjective Information complains of;fatigue  -MM -- no complaints  -MM      Patient Observations alert;cooperative;agree to therapy  -MM -- alert;cooperative;agree to therapy  -MM      Patient/Family/Caregiver Comments/Observations No family present  -MM -- Daughter present at end of evaluation.  -MM      Session Not Performed -- patient unavailable for treatment  -MM --      Patient Effort good  -MM -- good  -MM      Comment -- With PTA and then RRT called  -MM --      Symptoms Noted During/After Treatment fatigue  -MM -- none  -MM              General Information    Patient Profile Reviewed -- -- yes  -MM      Pertinent History Of Current Problem -- -- Primary problem: MRI with extensive R MCA infarct, complaints of difficulty swallowing  with drooling on L, and L arm weakness. Medical history: DM, DVT, HTN, PVD.  -MM      Current Method of Nutrition -- -- regular textures;thin liquids  -MM      Precautions/Limitations, Vision -- -- WFL;for purposes of eval  -MM      Precautions/Limitations, Hearing -- -- WFL;for purposes of eval  -MM      Prior Level of Function-Communication -- -- WFL  -MM      Prior Level of Function-Swallowing -- -- no diet consistency restrictions  -MM      Plans/Goals Discussed with -- -- patient and family;other (see comments);agreed upon  NERY Nieto  -MM      Barriers to Rehab -- -- none identified  -MM      Patient's Goals for Discharge -- -- patient did not state  -MM      Family Goals for Discharge -- -- patient able to eat/drink without coughing/choking  -MM              Pain    Additional Documentation Pain Scale: FACES Pre/Post-Treatment (Group)  -MM -- Pain Scale: FACES Pre/Post-Treatment (Group)  -MM              Pain Scale: FACES Pre/Post-Treatment    Pain: FACES Scale, Pretreatment 0-->no hurt  -MM -- 0-->no hurt  -MM      Posttreatment Pain Rating 0-->no hurt  -MM -- 0-->no hurt  -MM              Oral Motor Structure and Function    Dentition Assessment -- -- upper dentures/partial in place;lower dentures/partial in place  -MM      Secretion Management -- -- WNL/WFL  reported L drooling at home  -MM      Mucosal Quality -- -- moist, healthy  -MM      Volitional Swallow -- -- WFL  -MM      Volitional Cough -- -- WFL  -MM              Oral Musculature and Cranial Nerve Assessment    Oral Motor General Assessment -- -- oral labial or buccal impairment  -MM      Mandibular Impairment Detail, Cranial Nerve V (Trigeminal) -- -- CN5: sensory impairment;reduced facial sensation;reduced facial sensation on left  -MM      Oral Labial or Buccal Impairment, Detail, Cranial Nerve VII (Facial): -- -- CN7: Motor Impairment;left labial droop  -MM              General Eating/Swallowing Observations    Respiratory Support Currently  in Use -- -- room air  -MM      Eating/Swallowing Skills -- -- self-fed  -MM      Positioning During Eating -- -- upright in bed  -MM      Utensils Used -- -- spoon;straw  -MM      Consistencies Trialed -- -- regular textures;thin liquids  -MM              Clinical Swallow Eval    Oral Prep Phase -- -- impaired  -MM      Oral Transit -- -- WFL  -MM      Oral Residue -- -- impaired  -MM      Pharyngeal Phase -- -- suspected pharyngeal impairment  -MM      Esophageal Phase -- -- unremarkable  -MM      Clinical Swallow Evaluation Summary -- -- See note.  -MM              Oral Prep Concerns    Oral Prep Concerns -- -- anterior loss  -MM      Anterior Loss -- -- regular consistencies  -MM              Oral Residue Concerns    Oral Residue Concerns -- -- lateral sulcus residue, left  -MM      Lateral Sulcus Residue, Left -- -- regular consistencies  -MM              Pharyngeal Phase Concerns    Pharyngeal Phase Concerns -- -- throat clear  -MM      Throat Clear -- -- thin  -MM              SLP Evaluation Clinical Impression    SLP Swallowing Diagnosis -- -- mild-moderate;oral dysphagia;R/O pharyngeal dysphagia  -MM      Functional Impact -- -- risk of aspiration/pneumonia  -MM      Rehab Potential/Prognosis, Swallowing -- -- good, to achieve stated therapy goals  -      Swallow Criteria for Skilled Therapeutic Interventions Met -- -- demonstrates skilled criteria  -              SLP Treatment Clinical Impressions    Daily Summary of Progress (SLP) progress toward functional goals as expected  - -- --      Plan for Continued Treatment (SLP) continue treatment per plan of care  -MM -- --      Care Plan Review care plan/treatment goals reviewed  -MM -- --      Care Plan Review, Other Participant(s) caregiver  - -- --              Recommendations    Therapy Frequency (Swallow) -- -- at least;2 days per week  -MM      Predicted Duration Therapy Intervention (Days) -- -- until discharge  -      SLP Diet Recommendation  -- -- regular textures;thin liquids  -MM      Recommended Diagnostics -- -- VFSS ([facility]);SLE/Cog/Motor Speech Evaluation  If any increased concern at bedside  -MM      Recommended Precautions and Strategies -- -- upright posture during/after eating;small bites of food and sips of liquid;check mouth frequently for oral residue/pocketing;general aspiration precautions  -MM      Oral Care Recommendations -- -- Oral Care BID/PRN;Toothbrush  -MM      SLP Rec. for Method of Medication Administration -- -- meds whole;with pudding or applesauce;as tolerated  -MM      Monitor for Signs of Aspiration -- -- yes;notify SLP if any concerns  -MM      Anticipated Discharge Disposition (SLP) -- -- unknown  -MM              Swallow Goals (SLP)    Oral Nutrition/Hydration Goal Selection (SLP) oral nutrition/hydration, SLP goal 1  -MM -- oral nutrition/hydration, SLP goal 1  -MM      Labial Strengthening Goal Selection (SLP) labial strengthening, SLP goal 1  -MM -- labial strengthening, SLP goal 1  -MM      Swallow Compensatory Strategies Goal Selection (SLP) swallow compensatory strategies, SLP goal 1  -MM -- swallow compensatory strategies, SLP goal 1  -MM      Additional Documentation swallow compensatory strategies goal selection (SLP);labial strengthening goal selection (SLP)  -MM -- swallow compensatory strategies goal selection (SLP);labial strengthening goal selection (SLP)  -MM              Oral Nutrition/Hydration Goal 1 (SLP)    Oral Nutrition/Hydration Goal 1, SLP Patient will tolerate LRD without s/s of aspiration.  -MM -- Patient will tolerate LRD without s/s of aspiration.  -MM      Time Frame (Oral Nutrition/Hydration Goal 1, SLP) by discharge  -MM -- by discharge  -MM      Barriers (Oral Nutrition/Hydration Goal 1, SLP) none  -MM -- none  -MM      Progress/Outcomes (Oral Nutrition/Hydration Goal 1, SLP) continuing progress toward goal  -MM -- goal ongoing  -MM              Labial Strengthening Goal 1 (SLP)    Activity  (Labial Strengthening Goal 1, SLP) increase labial tone  -MM -- increase labial tone  -MM      Increase Labial Tone labial resistance exercises  -MM -- labial resistance exercises  -MM      [place]/Accuracy (Labial Strengthening Goal 1, SLP) independently (over 90% accuracy)  -MM -- independently (over 90% accuracy)  -MM      Time Frame (Labial Strengthening Goal 1, SLP) by discharge  -MM -- by discharge  -MM      Barriers (Labial Strengthening Goal 1, SLP) none  -MM -- none  -MM      Progress/Outcomes (Labial Strengthening Goal 1, SLP) goal ongoing  -MM -- goal ongoing  -MM              Swallow Compensatory Strategies Goal 1 (SLP)    Activity (Swallow Compensatory Strategies/Techniques Goal 1, SLP) compensatory strategies;during meal intake;alternate food/liquid intake;other (see comments)  -MM -- compensatory strategies;during meal intake;alternate food/liquid intake;other (see comments)  lingual sweep to clear L sulci residue  -MM      [place]/Accuracy (Swallow Compensatory Strategies/Techniques Goal 1, SLP) independently (over 90% accuracy)  -MM -- independently (over 90% accuracy)  -MM      Time Frame (Swallow Compensatory Strategies/Techniques Goal 1, SLP) by discharge  -MM -- by discharge  -MM      Barriers (Swallow Compensatory Strategies/Techniques Goal 1, SLP) none  -MM -- none  -MM      Progress/Outcomes (Swallow Compensatory Strategies/Techniques Goal 1, SLP) continuing progress toward goal  -MM -- goal ongoing  -MM            User Key  (r) = Recorded By, (t) = Taken By, (c) = Cosigned By    Initials Name Effective Dates    MM Felicita Daly, MS CCC-SLP 06/16/21 -                 EDUCATION  The patient has been educated in the following areas:   Dysphagia (Swallowing Impairment) Oral Care/Hydration.        SLP GOALS     Row Name 03/16/22 1439 03/15/22 0805          Oral Nutrition/Hydration Goal 1 (SLP)    Oral Nutrition/Hydration Goal 1, SLP Patient will tolerate LRD without s/s of  aspiration.  -MM Patient will tolerate LRD without s/s of aspiration.  -MM     Time Frame (Oral Nutrition/Hydration Goal 1, SLP) by discharge  -MM by discharge  -MM     Barriers (Oral Nutrition/Hydration Goal 1, SLP) none  -MM none  -MM     Progress/Outcomes (Oral Nutrition/Hydration Goal 1, SLP) continuing progress toward goal  -MM goal ongoing  -MM            Labial Strengthening Goal 1 (SLP)    Activity (Labial Strengthening Goal 1, SLP) increase labial tone  -MM increase labial tone  -MM     Increase Labial Tone labial resistance exercises  -MM labial resistance exercises  -MM     [place]/Accuracy (Labial Strengthening Goal 1, SLP) independently (over 90% accuracy)  -MM independently (over 90% accuracy)  -MM     Time Frame (Labial Strengthening Goal 1, SLP) by discharge  -MM by discharge  -MM     Barriers (Labial Strengthening Goal 1, SLP) none  -MM none  -MM     Progress/Outcomes (Labial Strengthening Goal 1, SLP) goal ongoing  -MM goal ongoing  -MM            Swallow Compensatory Strategies Goal 1 (SLP)    Activity (Swallow Compensatory Strategies/Techniques Goal 1, SLP) compensatory strategies;during meal intake;alternate food/liquid intake;other (see comments)  -MM compensatory strategies;during meal intake;alternate food/liquid intake;other (see comments)  lingual sweep to clear L sulci residue  -MM     [place]/Accuracy (Swallow Compensatory Strategies/Techniques Goal 1, SLP) independently (over 90% accuracy)  -MM independently (over 90% accuracy)  -MM     Time Frame (Swallow Compensatory Strategies/Techniques Goal 1, SLP) by discharge  -MM by discharge  -MM     Barriers (Swallow Compensatory Strategies/Techniques Goal 1, SLP) none  -MM none  -MM     Progress/Outcomes (Swallow Compensatory Strategies/Techniques Goal 1, SLP) continuing progress toward goal  -MM goal ongoing  -MM           User Key  (r) = Recorded By, (t) = Taken By, (c) = Cosigned By    Initials Name Provider Type    ANTHONY Daly  Felicita PETERSON MS CCC-SLP Speech and Language Pathologist                   Time Calculation:    Time Calculation- SLP     Row Name 03/16/22 1456             Time Calculation- SLP    SLP Start Time 1438  -MM      SLP Stop Time 1501  -MM      SLP Time Calculation (min) 23 min  -MM      SLP Received On 03/16/22  -MM              Untimed Charges    00330-IG Treatment Swallow Minutes 23  -MM              Total Minutes    Untimed Charges Total Minutes 23  -MM       Total Minutes 23  -MM            User Key  (r) = Recorded By, (t) = Taken By, (c) = Cosigned By    Initials Name Provider Type    Felicita Delgadillo MS CCC-SLP Speech and Language Pathologist                Therapy Charges for Today     Code Description Service Date Service Provider Modifiers Qty    98422007884 HC ST EVAL ORAL PHARYNG SWALLOW 5 3/15/2022 Felicita Daly MS CCC-SLP GN 1    [id number] HC ST TREATMENT SWALLOW 2 3/16/2022 Felicita Daly MS CCC-SLP GN 1               MS YESSENIA Quintero  3/16/2022

## 2022-03-16 NOTE — PLAN OF CARE
Goal Outcome Evaluation:  Plan of Care Reviewed With: patient, daughter        Progress: no change  Outcome Evaluation: OT tx completed. Pt alert and oriented, resting in bed with daughter in room. Pt reported that she is feeling better from this morning, no c/o of dizziness or fatigue. Pt able to amb in room to BR with CGA, and trans to commode with SBA and use of grab bars. Pt able to complete toileting with SBA, but did req multiple attempts to grasp toilet paper. Back at bedside, pt able to sit EOB ~30 minutes and OT educated pt on fine motor coordination HEP. Pt req min A and vc to complete 3 exercises listed on HEP, and extra time devoted to activity with L hand. Pt able to reach with LUE in left, middle, and right environments to retrieve 5/5 large and 3/3 small items, however demo need for multiple attempts with grasping/transferring small items. During screwing/unscrewing of items, pt demo difficulty maintaining a sustained  with L hand, and was able to unscrew 2/3 items. OT educated pt and daughter on building up handles for regularly used small items, such as marion needles, to increase I and success with desired activities. Pt would benefit from continued edu with HEP to increase coordination of L hand. Continue OT POC. Recommend discharge home with 24/7 assist and HH.

## 2022-03-16 NOTE — PROGRESS NOTES
Neurology Progress Note      Chief Complaint:  F/u code stroke/stroke    Subjective     Subjective:  Patient sitting on edge of bed. Daughter at bedside. No complaints this AM. LUE weakness improving.     Transthoracic echo done this AM and results pending.     MRI brain showed Right MCA/PCA stroke with no hemorrhagic conversion. There was concern for CAA and T2* did not show signs of this.  CTA head and neck showed no LVO. Left ICA stenosis 25% and mold right ICA stenosis. CTA head showed absence of rifht MCA branches in area of infarction.         Medications:  Current Facility-Administered Medications   Medication Dose Route Frequency Provider Last Rate Last Admin   • aspirin chewable tablet 81 mg  81 mg Oral Daily Tami Sharma APRN   81 mg at 03/16/22 0907    Or   • aspirin suppository 300 mg  300 mg Rectal Daily Tami Sharma APRN       • atorvastatin (LIPITOR) tablet 80 mg  80 mg Oral Nightly Nik Goodson MD   80 mg at 03/15/22 2045   • cefTRIAXone (ROCEPHIN) in SWFI 1 gram/10ml IV PUSH syringe  1 g Intravenous Q24H Nik Goodson MD   1 g at 03/16/22 0908   • dextrose (D50W) (25 g/50 mL) IV injection 25 g  25 g Intravenous Q15 Min PRN Nik Goodson MD       • dextrose (GLUTOSE) oral gel 15 g  15 g Oral Q15 Min PRN Nik Goodson MD       • gabapentin (NEURONTIN) capsule 300 mg  300 mg Oral Q8H Nik Goodson MD   300 mg at 03/16/22 0558   • glucagon (human recombinant) (GLUCAGEN DIAGNOSTIC) injection 1 mg  1 mg Intramuscular Q15 Min PRN Nik Goodson MD       • HYDROcodone-acetaminophen (NORCO) 5-325 MG per tablet 1 tablet  1 tablet Oral Q6H PRN Nik Goodson MD   1 tablet at 03/16/22 0919   • insulin lispro (humaLOG) injection 2-9 Units  2-9 Units Subcutaneous TID AC Nik Goodson MD   2 Units at 03/16/22 0908   • linagliptin (TRADJENTA) tablet 5 mg  5 mg Oral Daily Nik Goodson MD   5 mg at 03/16/22 0908   • lisinopril (PRINIVIL,ZESTRIL) tablet 20 mg  20 mg Oral Daily Hamzah  Nik MAX MD   20 mg at 03/16/22 0908   • ondansetron (ZOFRAN) injection 4 mg  4 mg Intravenous Q6H PRN Nik Goodson MD       • sodium chloride 0.9 % flush 10 mL  10 mL Intravenous PRN Nik Goodson MD       • sodium chloride 0.9 % flush 10 mL  10 mL Intravenous Q12H Nik Goodson MD   10 mL at 03/16/22 0908   • sodium chloride 0.9 % flush 10 mL  10 mL Intravenous PRN Nik Goodson MD           Review of Systems:   -A 14 point review of systems is completed and is negative except for left sided weakness and left vision loss.       Objective      Vital Signs  Temp:  [97.5 °F (36.4 °C)-98.7 °F (37.1 °C)] 98.7 °F (37.1 °C)  Heart Rate:  [77-87] 84  Resp:  [16-18] 18  BP: (132-158)/() 145/63    Telemetry:S-ST     Physical Exam:  General Exam:  Head:  Normal cephalic, atraumatic  HEENT:  Neck supple  Fundoscopic Exam:  No signs of disc edema  CVS:  Regular rate and rhythm.  No murmurs  Carotid Examination:  No bruits  Lungs:  Clear to auscultation  Abdomen:  Non-tender, Non-distended  Extremities:  No signs of peripheral edema  Skin:  No rashes     Neurologic Exam:     Mental Status:    -Awake, Alert, Oriented to person and place.   -No word finding difficulties  -No aphasia  -No dysarthria  -Follows simple and complex commands     CN II:  Visual fields with left visual field deficit.  Pupils equally reactive to light. Left neglect.   CN III, IV, VI:  Extraocular Muscles full with no signs of nystagmus  CN V:  Facial sensory is symmetric with no asymmetries.  CN VII:  Facial motor asymmetric with left lower facial weakness.   CN VIII:  Gross hearing intact bilaterally  CN IX:  Palate elevates symmetrically  CN X:  Palate elevates symmetrically  CN XI:  Shoulder shrug symmetric  CN XII:  Tongue is midline on protrusion     Motor: (strength out of 5:  1= minimal movement, 2 = movement in plane of gravity, 3 = movement against gravity, 4 = movement against some resistance, 5 = full strength)     -Right  Upper Ext: Proximal: 5 Distal: 5  -Left Upper Ext: Proximal: 5   Distal: 5     -Right Lower Ext: Proximal: 5 Distal: 5  -Left Lower Ext: Proximal: 5   Distal: 5     DTR:  -Right              Bicep: 2+         Tricep: 2+        Brachioradialis: 2+              Patella: 2+       Ankle: 2+         Neg Babinski  -Left              Bicep: 2+         Tricep: 2+        Brachioradialis: 2+              Patella: 2+       Ankle: 2+         Neg Babinski     Sensory:  -Intact to light touch, pinprick, temperature, pain, and proprioception     Coordination:  -Finger to nose intact.   -Heel to shin intact  -No ataxia     Gait  Up with assist.         Results Review:    I reviewed the patient's new clinical results.    Results from last 7 days   Lab Units 03/16/22  0648 03/15/22  0631 03/14/22  1600   WBC 10*3/mm3 9.22 9.00 9.27   HEMOGLOBIN g/dL 11.4* 13.2 11.5*   HEMATOCRIT % 36.2 42.3 36.4   PLATELETS 10*3/mm3 343 336 350        Results from last 7 days   Lab Units 03/16/22  0648 03/15/22  0631 03/14/22  1600   SODIUM mmol/L 134* 138 139   POTASSIUM mmol/L 4.2 4.3 4.0   CHLORIDE mmol/L 101 102 102   CO2 mmol/L 22.0 23.0 25.0   BUN mg/dL 9 11 13   CREATININE mg/dL 0.83 0.86 1.06*   CALCIUM mg/dL 9.5 10.1 9.5   BILIRUBIN mg/dL  --  0.5 0.3   ALK PHOS U/L  --  117 97   ALT (SGPT) U/L  --  17 16   AST (SGOT) U/L  --  21 20   GLUCOSE mg/dL 190* 132* 166*        Lab Results   Component Value Date    MG 1.5 (L) 01/04/2021    PROTIME 13.0 03/14/2022    INR 1.02 03/14/2022     No components found for: POCGLUC  No components found for: A1C  Lab Results   Component Value Date    HDL 44 03/15/2022     (H) 03/15/2022     No components found for: B12  Lab Results   Component Value Date    TSH 1.630 03/15/2022       MRI brain personally reviewed by me showing acute/subacute right MCA/PCA stroke.       Assessment/Plan     Hospital Problem List      Cerebrovascular accident (CVA) (HCC)    PAD (peripheral artery disease) ([facility])    Mixed  hyperlipidemia    Type 2 diabetes mellitus with hyperglycemia, without long-term current use of insulin (HCC)    CAD (coronary artery disease)    Impression:  1. Acute/subacute right MCA/PCA stroke.  2. DM uncontrolled, A1C 11.  3. Dyslipidemia.   3. PAD on dual antiplatelet therapy of ASA 81 mg and Plavix 75 mg daily.  4. Hypertension.  5. Hx DVT  6. UTI per attending.     Plan:  1. ASA 81 mg daily. Hold on dual antiplatelet therapy as size and location of stroke at risk for hemorrhagic conversion. May resume dual antiplatelet therapy in 14 days on 3/28/2022 with Pletal 100 mg BID in place of Plavix.   2. Lipitor 80 mg daily.  3. DM management for A1C goal less than 7.  4. Systolic BP goal less than 160.  5. Transthoracic echo done and pending.   6. ST/PT/OT  7. No driving at discharge and no driving until seen in follow up with neurology clinic.   8. Therapy has recommended continued therapy with skilled nursing or home with home health. [facility] and [facility] pending.    9. 14 day ZIO at discharge.   10. F/u neurology 3-4 weeks.         Tami Sharma, APRN  03/16/22  09:43 CDT

## 2022-03-16 NOTE — PLAN OF CARE
Goal Outcome Evaluation:  Plan of Care Reviewed With: patient      Progress: no change  Outcome Evaluation: Ntn assessment completed. Pt wtih history of diabetes. HgbA1c 11%. Provided pt with healthy eating for diabetes. RD name and contact information provided for additional questions/concerns. Oral intake 50% of one meal. Cont to follow for plan of care.   Opt out

## 2022-03-17 NOTE — DISCHARGE PLACEMENT REQUEST
"  MELL HOLT [phone]  Evelyn Dukes (81 y.o. Female)             Date of Birth   1940    Social Security Number       Address   [address] [place] [address]    Home Phone   [phone]    MRN   [id number]       [facility]    Marital Status                               Admission Date   3/14/22    Admission Type   Emergency    Admitting Provider   Nik Goodson MD    Attending Provider   Nik Goodson MD    Department, Room/Bed   [facility] 3A, 330/1       Discharge Date       Discharge Disposition       Discharge Destination                               Attending Provider: Nik Goodson MD    Allergies: Codeine    Isolation: None   Infection: None   Code Status: CPR   Advance Care Planning Activity    Ht: 154.9 cm (61\")   Wt: 77.1 kg (170 lb)    Admission Cmt: None   Principal Problem: Cerebrovascular accident (CVA) (HCC) [I63.9]                 Active Insurance as of 3/14/2022     Primary Coverage     Payor Plan Insurance Group Employer/Plan Group    [facility] MEDICARE REPLACEMENT [facility] MEDICARE REPLACEMENT [id number]     Payor Plan Address Payor Plan Phone Number Payor Plan Fax Number Effective Dates    [address] [address] [phone]  1/1/2022 - None Entered    [facility] [id number]       Subscriber Name Subscriber Birth Date Member ID       EVELYN DUKES 1940 [id number]                 Emergency Contacts      (Rel.) Home Phone Work Phone Mobile Phone    Lashell DUKES (Daughter) -- -- [phone]    MORELupe (Grandchild) -- -- [phone]    ElverPauline (Daughter) -- -- [phone]               History & Physical      Nik Goodson MD at 03/14/22 [facility]5              [facility] Medicine Services  HISTORY AND PHYSICAL    Date of Admission: 3/14/2022  Primary Care Physician: Roberto Garcia MD    Subjective     Chief Complaint: CVA.    History of Present Illness  Patient " is a 81-year-old presented to ER for evaluation of left-sided weakness.  Patient been having this symptom for about a week now.  Patient has been admitted to [facility] 3/4/2022 to 3/6/2022 for diabetes and dehydration.  Patient's daughter stated since she came back to the hospital she has never been the same.  Patient has been progressing in a week or over the last week, patient is requiring a cane to walk around per patient.  Daughter stated that patient was extremely weak this morning at 8:30 AM this morning.  Patient has been difficulty swallowing this morning about 2 PM per daughter with drooling on the left side and lefts arm weakness.  EMS was then called patient came to the [facility] ER for code stroke.    Patient has past medical history diabetes, DVT, hypertension, peripheral vascular disease.    CT scan shows CVA . CTA of the neck and head shows no significant blockage.    Review of Systems   Constitutional: Positive for activity change, appetite change and fatigue. Negative for chills and fever.   HENT: Negative for hearing loss, nosebleeds, tinnitus and trouble swallowing.    Eyes: Negative for visual disturbance.   Respiratory: Negative for cough, chest tightness, shortness of breath and wheezing.    Cardiovascular: Negative for chest pain, palpitations and leg swelling.   Gastrointestinal: Negative for abdominal distention, abdominal pain, blood in stool, constipation, diarrhea, nausea and vomiting.   Endocrine: Negative for cold intolerance, heat intolerance, polydipsia, polyphagia and polyuria.   Genitourinary: Negative for decreased urine volume, difficulty urinating, dysuria, flank pain, frequency and hematuria.   Musculoskeletal: Positive for arthralgias, gait problem and myalgias. Negative for joint swelling.   Skin: Negative for rash.   Allergic/Immunologic: Negative for immunocompromised state.   Neurological: Positive for weakness. Negative for dizziness, syncope,  light-headedness and headaches.   Hematological: Negative for adenopathy. Does not bruise/bleed easily.   Psychiatric/Behavioral: Negative for confusion and sleep disturbance. The patient is not nervous/anxious.         Otherwise complete ROS reviewed and negative except as mentioned in the HPI.      Past Medical History:   Past Medical History:   Diagnosis Date   • Diabetes mellitus (HCC)    • DVT (deep venous thrombosis) (HCC)     RLL   • Hypertension    • PVD (peripheral vascular disease) (HCC)        Past Surgical History:  Past Surgical History:   Procedure Laterality Date   • AORTAGRAM N/A 1/6/2021    Procedure: 1.  Introduction of catheter/sheath into the aorta 2.  Aortoiliac angiogram with right lower extremity runoff 3.  Contralateral cannulation of the right common iliac artery 4.  Placement of a 4 mm spider distal embolic protection device in the below-knee popliteal artery 5.  Directional atherectomy of the proximal SFA and below-knee popliteal artery using the 6 [place] [employer] d   • CHOLECYSTECTOMY     • KNEE SURGERY     • LEG SURGERY         Family History: family history includes Cancer in her mother and sister.    Social History:  reports that she has never smoked. She has never used smokeless tobacco. She reports that she does not drink alcohol and does not use drugs.    Allergies:  Allergies   Allergen Reactions   • Codeine Other (See Comments)     Medications:  Prior to Admission medications    Medication Sig Start Date End Date Taking? Authorizing Provider   aspirin 81 MG EC tablet Take 81 mg by mouth Daily.    Ana Arce MD   atorvastatin (LIPITOR) 20 MG tablet Take 20 mg by mouth Daily. 12/3/20   Ana Arce MD   Cholecalciferol (Vitamin D3) 50 MCG (2000 UT) tablet Take 2,500 Units by mouth Daily.    Ana Arce MD   clopidogrel (PLAVIX) 75 MG tablet Take 1 tablet by mouth Daily. 1/14/22   Jossie Medina APRN   ferrous sulfate 325 (65 FE) MG tablet  "Take 1 tablet by mouth Daily With Breakfast. 1/11/21   Jorge Riley MD   gabapentin (NEURONTIN) 600 MG tablet Take 600 mg by mouth 3 (Three) Times a Day. 12/3/20   Ana Arce MD   HYDROcodone-acetaminophen (NORCO)  MG per tablet Take 1 tablet by mouth Every 6 (Six) Hours As Needed for Moderate Pain . 1/10/21   Jorge Riley MD   lisinopril (PRINIVIL,ZESTRIL) 20 MG tablet Take 20 mg by mouth Daily. 12/3/20   Ana Arce MD   meloxicam (MOBIC) 15 MG tablet Take 15 mg by mouth Daily. 6/11/21   Ana Arce MD   metFORMIN (GLUCOPHAGE) 500 MG tablet Take 500 mg by mouth Daily With Dinner. 12/3/20   Ana Arce MD   vitamin B-12 (CYANOCOBALAMIN) 1000 MCG tablet Take 1,000 mcg by mouth Daily.    Ana Arce MD       I have utilized all available immediate resources to obtain, update, and review the patient's current medications.    Objective     Vital Signs: /60   Pulse 74   Temp 97.4 °F (36.3 °C) (Oral)   Resp 16   Ht 154.9 cm (61\")   Wt 75.3 kg (165 lb 14.4 oz)   SpO2 100%   BMI 31.35 kg/m²   Physical Exam  Vitals and nursing note reviewed.   Constitutional:       Appearance: She is well-developed.      Comments: Advanced age.   HENT:      Head: Normocephalic.   Eyes:      Conjunctiva/sclera: Conjunctivae normal.      Pupils: Pupils are equal, round, and reactive to light.   Neck:      Vascular: No JVD.   Cardiovascular:      Rate and Rhythm: Normal rate and regular rhythm.      Heart sounds: Normal heart sounds. No murmur heard.    No friction rub. No gallop.   Pulmonary:      Effort: Pulmonary effort is normal. No respiratory distress.      Breath sounds: Normal breath sounds. No wheezing or rales.   Chest:      Chest wall: No tenderness.   Abdominal:      General: Bowel sounds are normal. There is no distension.      Palpations: Abdomen is soft.      Tenderness: There is no abdominal tenderness. There is no guarding or rebound.      " Comments: Obesity.   Musculoskeletal:         General: No tenderness or deformity.      Cervical back: Neck supple.      Comments: Arthritis multiple knuckle   Skin:     General: Skin is warm and dry.      Capillary Refill: Capillary refill takes 2 to 3 seconds.      Findings: No rash.   Neurological:      Mental Status: She is alert and oriented to person, place, and time.      Cranial Nerves: No cranial nerve deficit.      Motor: Weakness present. No abnormal muscle tone.      Gait: Gait abnormal.      Deep Tendon Reflexes: Reflexes normal.      Comments: Cranial keystroke grossly intact.  Negative pronator drift.  Negative finger touch.  Strength 5 out of 5 symmetrical.   Psychiatric:         Behavior: Behavior normal.         Thought Content: Thought content normal.             Results Reviewed:    Lab Results (last 24 hours)     Procedure Component Value Units Date/Time    [place] Draw [[id number]] Collected: 03/14/22 1600    Specimen: Blood Updated: 03/14/22 1703    Narrative:      The following orders were created for panel order [place] Draw.  Procedure                               Abnormality         Status                     ---------                               -----------         ------                     Green Top (Gel)[[id number]]                                  Final result               Lavender Top[[id number]]                                     Final result               Red Top[[id number]]                                          Final result               Light Blue Top[[id number]]                                   Final result                 Please view results for these tests on the individual orders.    Green Top (Gel) [[id number]] Collected: 03/14/22 1600    Specimen: Blood Updated: 03/14/22 1703     Extra Tube Hold for add-ons.     Comment: Auto resulted.       Red Top [[id number]] Collected: 03/14/22 1600    Specimen: Blood Updated: 03/14/22 1703     Extra Tube Hold for add-ons.      Comment: Auto resulted.       Light Blue Top [[id number]] Collected: 03/14/22 1600    Specimen: Blood Updated: 03/14/22 1703     Extra Tube hold for add-on     Comment: Auto resulted       Lavender Top [[id number]] Collected: 03/14/22 1600    Specimen: Blood Updated: 03/14/22 1703     Extra Tube hold for add-on     Comment: Auto resulted       Comprehensive Metabolic Panel [[id number]]  (Abnormal) Collected: 03/14/22 1600    Specimen: Blood Updated: 03/14/22 1630     Glucose 166 mg/dL      BUN 13 mg/dL      Creatinine 1.06 mg/dL      Sodium 139 mmol/L      Potassium 4.0 mmol/L      Chloride 102 mmol/L      CO2 25.0 mmol/L      Calcium 9.5 mg/dL      Total Protein 6.1 g/dL      Albumin 3.40 g/dL      ALT (SGPT) 16 U/L      AST (SGOT) 20 U/L      Alkaline Phosphatase 97 U/L      Total Bilirubin 0.3 mg/dL      Globulin 2.7 gm/dL      A/G Ratio 1.3 g/dL      BUN/Creatinine Ratio 12.3     Anion Gap 12.0 mmol/L      eGFR 53.5 mL/min/1.73      Comment: National Kidney Foundation and American Society of Nephrology (ASN) Task Force recommended calculation based on the Chronic Kidney Disease Epidemiology Collaboration (CKD-EPI) equation refit without adjustment for race.       Narrative:      GFR Normal >60  Chronic Kidney Disease <60  Kidney Failure <15      Troponin [[id number]]  (Normal) Collected: 03/14/22 1600    Specimen: Blood Updated: 03/14/22 1628     Troponin T 0.012 ng/mL     Narrative:      Troponin T Reference Range:  <= 0.03 ng/mL-   Negative for AMI  >0.03 ng/mL-     Abnormal for myocardial necrosis.  Clinicians would have to utilize clinical acumen, EKG, Troponin and serial changes to determine if it is an Acute Myocardial Infarction or myocardial injury due to an underlying chronic condition.       Results may be falsely decreased if patient taking Biotin.      Protime-INR [[id number]]  (Normal) Collected: 03/14/22 1600    Specimen: Blood Updated: 03/14/22 1622     Protime 13.0 Seconds      INR 1.02    aPTT  [[id number]]  (Normal) Collected: 03/14/22 1600    Specimen: Blood Updated: 03/14/22 1622     PTT 27.5 seconds     CBC & Differential [[id number]]  (Abnormal) Collected: 03/14/22 1600    Specimen: Blood Updated: 03/14/22 1612    Narrative:      The following orders were created for panel order CBC & Differential.  Procedure                               Abnormality         Status                     ---------                               -----------         ------                     CBC Auto Differential[[id number]]        Abnormal            Final result                 Please view results for these tests on the individual orders.    CBC Auto Differential [[id number]]  (Abnormal) Collected: 03/14/22 1600    Specimen: Blood Updated: 03/14/22 1612     WBC 9.27 10*3/mm3      RBC 3.87 10*6/mm3      Hemoglobin 11.5 g/dL      Hematocrit 36.4 %      MCV 94.1 fL      MCH 29.7 pg      MCHC 31.6 g/dL      RDW 12.5 %      RDW-SD 43.4 fl      MPV 10.0 fL      Platelets 350 10*3/mm3      Neutrophil % 64.7 %      Lymphocyte % 23.7 %      Monocyte % 9.1 %      Eosinophil % 1.1 %      Basophil % 0.3 %      Immature Grans % 1.1 %      Neutrophils, Absolute 6.00 10*3/mm3      Lymphocytes, Absolute 2.20 10*3/mm3      Monocytes, Absolute 0.84 10*3/mm3      Eosinophils, Absolute 0.10 10*3/mm3      Basophils, Absolute 0.03 10*3/mm3      Immature Grans, Absolute 0.10 10*3/mm3      nRBC 0.0 /100 WBC            Radiology Data:    Imaging Results (Last 24 Hours)     Procedure Component Value Units Date/Time    XR Chest 1 View [[id number]] Collected: 03/14/22 1614     Updated: 03/14/22 1618    Narrative:      EXAMINATION:  XR CHEST 1 VW-  3/14/2022 4:08 PM CDT     HISTORY: Acute Stroke Protocol (Onset < 12 hrs)     COMPARISON: No comparison study.     FINDINGS:  There is hypoventilation with vascular crowding. There is  minimal bronchial wall thickening. There is no focal infiltrate. The  heart is normal in size. The thoracic aorta is  atheromatous. There are  degenerative changes of the spine. Surgical anchor is noted in the right  humeral head.       Impression:      1. No focal infiltrate.  2. Mild hypoventilation with vascular crowding. Mild bronchial wall  thickening.        This report was finalized on 03/14/2022 16:15 by Dr. Tim Ozuna MD.    CT Angiogram Neck [[id number]] Collected: 03/14/22 1556     Updated: 03/14/22 1606    Narrative:      EXAMINATION: CT ANGIOGRAM NECK-      3/14/2022 3:27 PM CDT     HISTORY: Stroke, follow up     In order to have a CT radiation dose as low as reasonably achievable  Automated Exposure Control was utilized for adjustment of the mA and/or  KV according to patient size.     DLP in mGycm= 199.     CT angiogram neck.  CT angiography protocol.   CT imaging with bolus IV contrast injection.   Under concurrent supervision axial, sagittal, coronal, and  three-dimensional data sets were constructed.     Aortic arch calcification.  Patent great vessel origins though there is moderate atherosclerotic  calcification with approximately 50% narrowing of the proximal left  subclavian artery.     Patchy atherosclerotic calcification within the common carotid arteries.     Atherosclerotic calcification at the left ICA origin and within the  proximal 22 mm of the left ICA.  Left ICA origin stenosis with lumen diameter ratio = 3.4/4.5.  25% stenosis based on ACAS/NASCET criteria.  The degree of stenosis is derived by comparing the narrowest segment  with the distal luminal diameter.  2 cm distal to the left ICA origin there is calcified plaque with less  prominent stenosis with a lumen diameter ratio = 4.0/4.5.  12% stenosis based on ACAS/NASCET criteria.  The degree of stenosis is derived by comparing the narrowest segment  with the distal luminal diameter.     Atherosclerotic calcification within the proximal 15 mm of the right  ICA.  Calcified plaque at the right ICA origin with mild stenosis and lumen  diameter  ratio = 3.7/4.3.  14% stenosis based on ACAS/NASCET criteria.  The degree of stenosis is derived by comparing the narrowest segment  with the distal luminal diameter.     No intraluminal thrombus.     The right vertebral artery is dominant.  The left vertebral artery is patent though diminutive throughout its  length.     Summary:  1. Patchy atherosclerotic plaque at both carotid bifurcations with no  high-grade stenosis.  2. The greatest degree of stenosis is at the left ICA origin where there  is 25% narrowing.                                      This report was finalized on 03/14/2022 16:03 by Dr. Jorge Corrigan MD.    CT Angiogram Head w AI Analysis of LVO [[id number]] Collected: 03/14/22 1554     Updated: 03/14/22 1559    Narrative:      EXAMINATION: CT ANGIOGRAM HEAD W AI ANALYSIS OF LVO-     3/14/2022 3:27 PM CDT     HISTORY: Acute Stroke     CT angiogram head.  CT angiography protocol.   CT imaging with bolus IV contrast injection.   Under concurrent supervision axial, sagittal, coronal, and  three-dimensional data sets were constructed.     In order to have a CT radiation dose as low as reasonably achievable  Automated Exposure Control was utilized for adjustment of the mA and/or  KV according to patient size.     DLP in mGycm= 199.     Calcified though patent distal internal carotid arteries.     Intact [place].  Anterior, middle, and posterior cerebral arteries are normally patent.  No aneurysm or proximal arterial occlusion.     There is absence of opacification of distal right MCA branches in the  area of known infarction.     Summary:  1. Intact [place].  2. There is no central arterial occlusion or thrombus.   This report was finalized on 03/14/2022 15:56 by Dr. Jorge Corrigan MD.    CT Head Without Contrast Stroke Protocol [[id number]] Collected: 03/14/22 1531     Updated: 03/14/22 1536    Narrative:      EXAMINATION: CT HEAD WO CONTRAST STROKE PROTOCOL-      3/14/2022 3:26 PM CDT      HISTORY: Stroke, follow up     In order to have a CT radiation dose as low as reasonably achievable  Automated Exposure Control was utilized for adjustment of the mA and/or  KV according to patient size.     DLP in mGycm= 600.     Noncontrast head CT.     Large right MCA hypodense subacute infarct involving an area measuring  approximately 7 x 5 x 4 cm.     No hemorrhagic conversion.  No midline shift.     Ventricle size is normal.     Mild atrophy and small vessel disease.     Summary:  1. Large hypodense subacute right MCA infarct.  2. No intracranial hemorrhage.                                   This report was finalized on 03/14/2022 15:33 by Dr. Jorge Corrigan MD.          I have personally reviewed and interpreted the radiology studies and ECG obtained at time of admission.     Assessment / Plan      Assessment & Plan  Active Hospital Problems    Diagnosis    • Cerebrovascular accident (CVA) ([facility])      Plan .    CVA .  No aspirin for now per neurology.  Chest x-ray-No focal infiltrate, Mild hypoventilation with vascular crowding. Mild bronchial wall  Thickening.  CTA of the head-Intact [place], no central arterial occlusion or thrombus.   CTA of the neck-Patchy atherosclerotic plaque at both carotid bifurcations with no high-grade stenosis, greatest degree of stenosis is at the left ICA origin where there is 25% narrowing.  CT scan head-Large hypodense subacute right MCA infarct, No intracranial hemorrhage.  MRI of the brain pending.    CAD/hypertension/hyperlipidemia.  Lipitor high-dose per neurology.  Hold Plavix per neurology.  Continue lisinopril.  Echocardiogram pending.    Anemia.  Hold iron sulfate.    Chronic pain/arthritis.  Cut back Neurontin.  Cut back Norco.  Hold Mobic.    Diabetes .  Hold Glucophage.    Renal insufficiency.  We will follow.    Nausea.  Zofran as needed    Code Status/Advanced Care Plan: Full code.    The patient's surrogate decision maker is Lashell, daughter.      I  discussed the patient's findings and my recommendations with: Patient and daughter    Estimated length of stay: 1 to 3 days    Electronically signed by Nik Goodson MD, 03/14/22, 5:15 PM CDT.              Electronically signed by Nik Goodson MD at 03/14/22 1751         Current Facility-Administered Medications   Medication Dose Route Frequency Provider Last Rate Last Admin   • aspirin chewable tablet 81 mg  81 mg Oral Daily Tami Sharma APRN   81 mg at 03/17/22 0940    Or   • aspirin suppository 300 mg  300 mg Rectal Daily Tami Sharma, APRN       • atorvastatin (LIPITOR) tablet 80 mg  80 mg Oral Nightly Nik Goodson MD   80 mg at 03/16/22 2040   • cefTRIAXone (ROCEPHIN) in SWFI 1 gram/10ml IV PUSH syringe  1 g Intravenous Q24H Nik Goodson MD   1 g at 03/17/22 1010   • dextrose (D50W) (25 g/50 mL) IV injection 25 g  25 g Intravenous Q15 Min PRN Nik Goodson MD       • dextrose (GLUTOSE) oral gel 15 g  15 g Oral Q15 Min PRN Nik Goodson MD       • gabapentin (NEURONTIN) capsule 300 mg  300 mg Oral Q8H Nik Goodson MD   300 mg at 03/17/22 0526   • glucagon (human recombinant) (GLUCAGEN DIAGNOSTIC) injection 1 mg  1 mg Intramuscular Q15 Min PRN Nik Goodson MD       • HYDROcodone-acetaminophen (NORCO) 5-325 MG per tablet 1 tablet  1 tablet Oral Q6H PRN Nik Goodson MD   1 tablet at 03/16/22 0919   • insulin lispro (humaLOG) injection 2-9 Units  2-9 Units Subcutaneous TID AC Nik Goodson MD   2 Units at 03/16/22 1700   • linagliptin (TRADJENTA) tablet 5 mg  5 mg Oral Daily Nik Goodson MD   5 mg at 03/17/22 0940   • lisinopril (PRINIVIL,ZESTRIL) tablet 20 mg  20 mg Oral Daily Nik Goodson MD   20 mg at 03/17/22 0940   • ondansetron (ZOFRAN) injection 4 mg  4 mg Intravenous Q6H PRN Nik Goodson MD       • sodium chloride 0.9 % flush 10 mL  10 mL Intravenous PRN Nik Goodson MD       • sodium chloride 0.9 % flush 10 mL  10 mL Intravenous Q12H Nik Goodson MD   10 mL  at 03/17/22 0940   • sodium chloride 0.9 % flush 10 mL  10 mL Intravenous PRN Nik Goodson MD         Operative/Procedure Notes (last 24 hours)  Notes from 03/16/22 1058 through 03/17/22 1058   No notes of this type exist for this encounter.            Physician Progress Notes (last 24 hours)      Tami Sharma APRN at 03/17/22 0918            Neurology Progress Note      Chief Complaint:  F/u code stroke/stroke    Subjective     Subjective:  Patient lying in bed trying to place lower dentures and having difficulty as they are getting hung on left side of lips. No further events or syncope reported or observed. Social work noted reviewed and Sincere swing bed not in patient insurance and waiting to hear back from family.     Results for orders placed during the hospital encounter of 03/14/22    Adult Transthoracic Echo Complete W/ Cont if Necessary Per Protocol    Interpretation Summary  · Left ventricular ejection fraction appears to be 56 - 60%. Left ventricular systolic function is normal.  · Left ventricular diastolic function is consistent with (grade I) impaired relaxation.  · Mild aortic valve stenosis is present.  · Normal right ventricular cavity size and systolic function noted.  · There is no evidence of right left shunt on bubble study.  · There is no evidence of intracardiac mass or thrombus.      MRI brain showed Right MCA/PCA stroke with no hemorrhagic conversion. There was concern for CAA and T2* did not show signs of this.  CTA head and neck showed no LVO. Left ICA stenosis 25% and mold right ICA stenosis. CTA head showed absence of rifht MCA branches in area of infarction.         Medications:  Current Facility-Administered Medications   Medication Dose Route Frequency Provider Last Rate Last Admin   • aspirin chewable tablet 81 mg  81 mg Oral Daily Tami Sharma APRN   81 mg at 03/16/22 0907    Or   • aspirin suppository 300 mg  300 mg Rectal Daily Tami Sharma APRN       •  atorvastatin (LIPITOR) tablet 80 mg  80 mg Oral Nightly Nik Goodson MD   80 mg at 03/16/22 2040   • cefTRIAXone (ROCEPHIN) in FI 1 gram/10ml IV PUSH syringe  1 g Intravenous Q24H Nik Goodson MD   1 g at 03/16/22 0908   • dextrose (D50W) (25 g/50 mL) IV injection 25 g  25 g Intravenous Q15 Min PRN Nik Goodson MD       • dextrose (GLUTOSE) oral gel 15 g  15 g Oral Q15 Min PRN Nik Goodson MD       • gabapentin (NEURONTIN) capsule 300 mg  300 mg Oral Q8H Nik Goodson MD   300 mg at 03/17/22 0526   • glucagon (human recombinant) (GLUCAGEN DIAGNOSTIC) injection 1 mg  1 mg Intramuscular Q15 Min PRN Nik Goodson MD       • HYDROcodone-acetaminophen (NORCO) 5-325 MG per tablet 1 tablet  1 tablet Oral Q6H PRN Nik Goodson MD   1 tablet at 03/16/22 0919   • insulin lispro (humaLOG) injection 2-9 Units  2-9 Units Subcutaneous TID AC Nik Goodson MD   2 Units at 03/16/22 1700   • linagliptin (TRADJENTA) tablet 5 mg  5 mg Oral Daily Nik Goodson MD   5 mg at 03/16/22 0908   • lisinopril (PRINIVIL,ZESTRIL) tablet 20 mg  20 mg Oral Daily Nik Goodson MD   20 mg at 03/16/22 0908   • ondansetron (ZOFRAN) injection 4 mg  4 mg Intravenous Q6H PRN Nik Goodson MD       • sodium chloride 0.9 % flush 10 mL  10 mL Intravenous PRN Nik Goodson MD       • sodium chloride 0.9 % flush 10 mL  10 mL Intravenous Q12H Nik Goodson MD   10 mL at 03/16/22 2041   • sodium chloride 0.9 % flush 10 mL  10 mL Intravenous PRN Nik Goodson MD           Review of Systems:   -A 14 point review of systems is completed and is negative except for left sided weakness and left vision loss.       Objective      Vital Signs  Temp:  [97.6 °F (36.4 °C)-99.1 °F (37.3 °C)] 98.5 °F (36.9 °C)  Heart Rate:  [72-87] 85  Resp:  [18] 18  BP: ()/(44-73) 145/72    Telemetry:S 73-94      Physical Exam:  General Exam:  Head:  Normal cephalic, atraumatic  HEENT:  Neck supple  Fundoscopic Exam:  No signs of disc edema  CVS:   Regular rate and rhythm.  No murmurs  Carotid Examination:  No bruits  Lungs:  Clear to auscultation  Abdomen:  Non-tender, Non-distended  Extremities:  No signs of peripheral edema  Skin:  No rashes     Neurologic Exam:     Mental Status:    -Awake, Alert, Oriented to person and place.   -No word finding difficulties  -No aphasia  -No dysarthria  -Follows simple and complex commands     CN II:  Visual fields with left visual field deficit.  Pupils equally reactive to light. Left neglect.   CN III, IV, VI:  Extraocular Muscles full with no signs of nystagmus  CN V:  Facial sensory is symmetric with no asymmetries.  CN VII:  Facial motor asymmetric with left lower facial weakness.   CN VIII:  Gross hearing intact bilaterally  CN IX:  Palate elevates symmetrically  CN X:  Palate elevates symmetrically  CN XI:  Shoulder shrug symmetric  CN XII:  Tongue is midline on protrusion     Motor: (strength out of 5:  1= minimal movement, 2 = movement in plane of gravity, 3 = movement against gravity, 4 = movement against some resistance, 5 = full strength)     -Right Upper Ext: Proximal: 5 Distal: 5  -Left Upper Ext: Proximal: 5   Distal: 5     -Right Lower Ext: Proximal: 5 Distal: 5  -Left Lower Ext: Proximal: 5   Distal: 5     DTR:  -Right              Bicep: 2+         Tricep: 2+        Brachioradialis: 2+              Patella: 2+       Ankle: 2+         Neg Babinski  -Left              Bicep: 2+         Tricep: 2+        Brachioradialis: 2+              Patella: 2+       Ankle: 2+         Neg Babinski     Sensory:  -Intact to light touch, pinprick, temperature, pain, and proprioception     Coordination:  -Finger to nose intact.   -Heel to shin intact  -No ataxia     Gait  Up with assist.         Results Review:    I reviewed the patient's new clinical results.    Results from last 7 days   Lab Units 03/17/22  0602 03/16/22  0648 03/15/22  0631   WBC 10*3/mm3 8.02 9.22 9.00   HEMOGLOBIN g/dL 10.8* 11.4* 13.2   HEMATOCRIT %  34.4 36.2 42.3   PLATELETS 10*3/mm3 325 343 336        Results from last 7 days   Lab Units 03/17/22  0602 03/16/22  0648 03/15/22  0631 03/14/22  1600   SODIUM mmol/L 135* 134* 138 139   POTASSIUM mmol/L 4.1 4.2 4.3 4.0   CHLORIDE mmol/L 102 101 102 102   CO2 mmol/L 23.0 22.0 23.0 25.0   BUN mg/dL 11 9 11 13   CREATININE mg/dL 0.89 0.83 0.86 1.06*   CALCIUM mg/dL 9.0 9.5 10.1 9.5   BILIRUBIN mg/dL  --   --  0.5 0.3   ALK PHOS U/L  --   --  117 97   ALT (SGPT) U/L  --   --  17 16   AST (SGOT) U/L  --   --  21 20   GLUCOSE mg/dL 166* 190* 132* 166*        Lab Results   Component Value Date    MG 1.5 (L) 01/04/2021    PROTIME 13.0 03/14/2022    INR 1.02 03/14/2022     No components found for: POCGLUC  No components found for: A1C  Lab Results   Component Value Date    HDL 44 03/15/2022     (H) 03/15/2022     No components found for: B12  Lab Results   Component Value Date    TSH 1.630 03/15/2022       MRI brain personally reviewed by me showing acute/subacute right MCA/PCA stroke.       Assessment/Plan     Hospital Problem List      Cerebrovascular accident (CVA) ([facility])    PAD (peripheral artery disease) ([facility])    Mixed hyperlipidemia    Type 2 diabetes mellitus with hyperglycemia, without long-term current use of insulin ([facility])    CAD (coronary artery disease)    Impression:  1. Acute/subacute right MCA/PCA stroke.  2. DM uncontrolled, A1C 11.  3. Dyslipidemia.   3. PAD on dual antiplatelet therapy of ASA 81 mg and Plavix 75 mg daily.  4. Hypertension.  5. Hx DVT  6. UTI per attending.   7. Syncopal episode 3/16/2022. No further episodes reported or observed.     Plan:  1. ASA 81 mg daily. Hold on dual antiplatelet therapy as size and location of stroke at risk for hemorrhagic conversion. May resume dual antiplatelet therapy in 14 days on 3/28/2022 with Pletal 100 mg BID in place of Plavix.   2. Lipitor 80 mg daily.  3. DM management for A1C goal less than 7.  4. Systolic BP goal less than 160.  5.  Transthoracic echo done and pending.   6. ST/PT/OT  7. No driving at discharge and no driving until seen in follow up with neurology clinic.   8. Therapy has recommended continued therapy with skilled nursing or home with home health. Patient/family wanted referral to [facility] and referral pending.    9. 14 day ZIO at discharge.   10. F/u neurology 3-4 weeks.   11. Check orthostatic BP.       JAVID Mcnulty  22  09:18 CDT    Electronically signed by Tami Sharma APRN at 22 0930       Consult Notes (last 24 hours)  Notes from 22 1058 through 22 1058   No notes of this type exist for this encounter.         Nutrition Notes (last 24 hours)  Notes from 22 1058 through 22 1058   No notes exist for this encounter.         Physical Therapy Notes (last 24 hours)  Notes from 22 1058 through 22 1058   No notes exist for this encounter.         Occupational Therapy Notes (last 24 hours)  Notes from 22 1058 through 22 1058   No notes exist for this encounter.            Speech Language Pathology Notes (last 24 hours)      Felicita Daly MS CCC-SLP at 22 2868          Acute Care - Speech Language Pathology   Swallow Treatment Note [facility]     Patient Name: Evelyn Ojeda  : 1940  MRN: [id number]  Today's Date: 3/16/2022               Admit Date: 3/14/2022  Swallow treatment completed. The patient was alert and cooperative. She reports she is fatigued and has been all day. No concerns voiced by RN, nurse aid, or patient regarding toleration of current diet. Patient completed trials of thin liquids and regular solids. No overt s/s of aspiration observed. L labial residue present with minimal awareness. Discussed importance of oral care and us of lingual sweep to clear oral cavity post meals. Patient verbalized understanding.   Felicita Daly MS CCC-SLP 3/16/2022 14:56 CDT    Visit Dx:     ICD-10-CM ICD-9-CM   1.  Cerebrovascular accident (CVA), unspecified mechanism ([facility])  I63.9 434.91   2. Oral phase dysphagia  R13.11 787.21   3. Impaired functional mobility and activity tolerance  Z74.09 V49.89   4. Cerebrovascular accident (CVA) due to thrombosis of right middle cerebral artery (HCC)  I63.311 434.01     Patient Active Problem List   Diagnosis   • PAD (peripheral artery disease) ([facility])   • Essential hypertension   • Mixed hyperlipidemia   • Bilateral carotid artery stenosis   • Acute deep vein thrombosis (DVT) of distal vein of right lower extremity (HCC)   • Atherosclerosis of artery of extremity with intermittent claudication (HCC)   • Chronic osteoarthritis   • Decreased pedal pulses   • Nonhealing ulcer of right lower leg with fat layer exposed (HCC)   • Hyperglycemia   • Type 2 diabetes mellitus with hyperglycemia, without long-term current use of insulin (HCC)   • Acute kidney injury (HCC)   • Postoperative anemia due to acute blood loss   • Hematuria   • Cerebrovascular accident (CVA) ([facility])   • CAD (coronary artery disease)     Past Medical History:   Diagnosis Date   • Diabetes mellitus (HCC)    • DVT (deep venous thrombosis) ([facility])     RLL   • Hypertension    • PVD (peripheral vascular disease) ([facility])      Past Surgical History:   Procedure Laterality Date   • AORTAGRAM N/A 1/6/2021    Procedure: 1.  Introduction of catheter/sheath into the aorta 2.  Aortoiliac angiogram with right lower extremity runoff 3.  Contralateral cannulation of the right common iliac artery 4.  Placement of a 4 mm spider distal embolic protection device in the below-knee popliteal artery 5.  Directional atherectomy of the proximal SFA and below-knee popliteal artery using the 6 [place] Bannister Pantheris d   • CHOLECYSTECTOMY     • KNEE SURGERY     • LEG SURGERY         SLP Recommendation and Plan                                         Daily Summary of Progress (SLP): progress toward functional goals as expected (03/16/22 7955)                   Plan for Continued Treatment (SLP): continue treatment per plan of care (03/16/22 1439)         Plan of Care Reviewed With: patient, caregiver  Progress: improving      SWALLOW EVALUATION (last 72 hours)     SLP Adult Swallow Evaluation     Row Name 03/16/22 1439 03/16/22 1020 03/15/22 0805             Rehab Evaluation    Document Type therapy note (daily note)  -MM therapy note (daily note)  -MM evaluation  -MM      Subjective Information complains of;fatigue  -MM -- no complaints  -MM      Patient Observations alert;cooperative;agree to therapy  -MM -- alert;cooperative;agree to therapy  -MM      Patient/Family/Caregiver Comments/Observations No family present  -MM -- Daughter present at end of evaluation.  -MM      Session Not Performed -- patient unavailable for treatment  -MM --      Patient Effort good  -MM -- good  -MM      Comment -- With PTA and then RRT called  -MM --      Symptoms Noted During/After Treatment fatigue  -MM -- none  -MM              General Information    Patient Profile Reviewed -- -- yes  -MM      Pertinent History Of Current Problem -- -- Primary problem: MRI with extensive R MCA infarct, complaints of difficulty swallowing with drooling on L, and L arm weakness. Medical history: DM, DVT, HTN, PVD.  -MM      Current Method of Nutrition -- -- regular textures;thin liquids  -MM      Precautions/Limitations, Vision -- -- WFL;for purposes of eval  -MM      Precautions/Limitations, Hearing -- -- WFL;for purposes of eval  -MM      Prior Level of Function-Communication -- -- WFL  -MM      Prior Level of Function-Swallowing -- -- no diet consistency restrictions  -MM      Plans/Goals Discussed with -- -- patient and family;other (see comments);agreed upon  NERY Nieto  -MM      Barriers to Rehab -- -- none identified  -MM      Patient's Goals for Discharge -- -- patient did not state  -MM      Family Goals for Discharge -- -- patient able to eat/drink without coughing/choking  -MM               Pain    Additional Documentation Pain Scale: FACES Pre/Post-Treatment (Group)  -MM -- Pain Scale: FACES Pre/Post-Treatment (Group)  -MM              Pain Scale: FACES Pre/Post-Treatment    Pain: FACES Scale, Pretreatment 0-->no hurt  -MM -- 0-->no hurt  -MM      Posttreatment Pain Rating 0-->no hurt  -MM -- 0-->no hurt  -MM              Oral Motor Structure and Function    Dentition Assessment -- -- upper dentures/partial in place;lower dentures/partial in place  -MM      Secretion Management -- -- WNL/WFL  reported L drooling at home  -MM      Mucosal Quality -- -- moist, healthy  -MM      Volitional Swallow -- -- WFL  -MM      Volitional Cough -- -- WFL  -MM              Oral Musculature and Cranial Nerve Assessment    Oral Motor General Assessment -- -- oral labial or buccal impairment  -MM      Mandibular Impairment Detail, Cranial Nerve V (Trigeminal) -- -- CN5: sensory impairment;reduced facial sensation;reduced facial sensation on left  -MM      Oral Labial or Buccal Impairment, Detail, Cranial Nerve VII (Facial): -- -- CN7: Motor Impairment;left labial droop  -MM              General Eating/Swallowing Observations    Respiratory Support Currently in Use -- -- room air  -MM      Eating/Swallowing Skills -- -- self-fed  -MM      Positioning During Eating -- -- upright in bed  -MM      Utensils Used -- -- spoon;straw  -MM      Consistencies Trialed -- -- regular textures;thin liquids  -MM              Clinical Swallow Eval    Oral Prep Phase -- -- impaired  -MM      Oral Transit -- -- WFL  -MM      Oral Residue -- -- impaired  -MM      Pharyngeal Phase -- -- suspected pharyngeal impairment  -MM      Esophageal Phase -- -- unremarkable  -MM      Clinical Swallow Evaluation Summary -- -- See note.  -MM              Oral Prep Concerns    Oral Prep Concerns -- -- anterior loss  -MM      Anterior Loss -- -- regular consistencies  -MM              Oral Residue Concerns    Oral Residue Concerns -- -- lateral sulcus  residue, left  -MM      Lateral Sulcus Residue, Left -- -- regular consistencies  -              Pharyngeal Phase Concerns    Pharyngeal Phase Concerns -- -- throat clear  -MM      Throat Clear -- -- thin  -MM              SLP Evaluation Clinical Impression    SLP Swallowing Diagnosis -- -- mild-moderate;oral dysphagia;R/O pharyngeal dysphagia  -      Functional Impact -- -- risk of aspiration/pneumonia  -      Rehab Potential/Prognosis, Swallowing -- -- good, to achieve stated therapy goals  -      Swallow Criteria for Skilled Therapeutic Interventions Met -- -- demonstrates skilled criteria  -              SLP Treatment Clinical Impressions    Daily Summary of Progress (SLP) progress toward functional goals as expected  - -- --      Plan for Continued Treatment (SLP) continue treatment per plan of care  -MM -- --      Care Plan Review care plan/treatment goals reviewed  - -- --      Care Plan Review, Other Participant(s) caregiver  - -- --              Recommendations    Therapy Frequency (Swallow) -- -- at least;2 days per week  -      Predicted Duration Therapy Intervention (Days) -- -- until discharge  -      SLP Diet Recommendation -- -- regular textures;thin liquids  -      Recommended Diagnostics -- -- VFSS (MBS);SLE/Cog/Motor Speech Evaluation  If any increased concern at bedside  -      Recommended Precautions and Strategies -- -- upright posture during/after eating;small bites of food and sips of liquid;check mouth frequently for oral residue/pocketing;general aspiration precautions  -      Oral Care Recommendations -- -- Oral Care BID/PRN;Toothbrush  -      SLP Rec. for Method of Medication Administration -- -- meds whole;with pudding or applesauce;as tolerated  -      Monitor for Signs of Aspiration -- -- yes;notify SLP if any concerns  -      Anticipated Discharge Disposition (SLP) -- -- unknown  -              Swallow Goals (SLP)    Oral Nutrition/Hydration Goal  Selection (SLP) oral nutrition/hydration, SLP goal 1  -MM -- oral nutrition/hydration, SLP goal 1  -MM      Labial Strengthening Goal Selection (SLP) labial strengthening, SLP goal 1  -MM -- labial strengthening, SLP goal 1  -MM      Swallow Compensatory Strategies Goal Selection (SLP) swallow compensatory strategies, SLP goal 1  -MM -- swallow compensatory strategies, SLP goal 1  -MM      Additional Documentation swallow compensatory strategies goal selection (SLP);labial strengthening goal selection (SLP)  -MM -- swallow compensatory strategies goal selection (SLP);labial strengthening goal selection (SLP)  -MM              Oral Nutrition/Hydration Goal 1 (SLP)    Oral Nutrition/Hydration Goal 1, SLP Patient will tolerate LRD without s/s of aspiration.  -MM -- Patient will tolerate LRD without s/s of aspiration.  -MM      Time Frame (Oral Nutrition/Hydration Goal 1, SLP) by discharge  -MM -- by discharge  -MM      Barriers (Oral Nutrition/Hydration Goal 1, SLP) none  -MM -- none  -MM      Progress/Outcomes (Oral Nutrition/Hydration Goal 1, SLP) continuing progress toward goal  -MM -- goal ongoing  -MM              Labial Strengthening Goal 1 (SLP)    Activity (Labial Strengthening Goal 1, SLP) increase labial tone  -MM -- increase labial tone  -MM      Increase Labial Tone labial resistance exercises  -MM -- labial resistance exercises  -MM      [place]/Accuracy (Labial Strengthening Goal 1, SLP) independently (over 90% accuracy)  -MM -- independently (over 90% accuracy)  -MM      Time Frame (Labial Strengthening Goal 1, SLP) by discharge  -MM -- by discharge  -MM      Barriers (Labial Strengthening Goal 1, SLP) none  -MM -- none  -MM      Progress/Outcomes (Labial Strengthening Goal 1, SLP) goal ongoing  -MM -- goal ongoing  -MM              Swallow Compensatory Strategies Goal 1 (SLP)    Activity (Swallow Compensatory Strategies/Techniques Goal 1, SLP) compensatory strategies;during meal intake;alternate  food/liquid intake;other (see comments)  -MM -- compensatory strategies;during meal intake;alternate food/liquid intake;other (see comments)  lingual sweep to clear L sulci residue  -MM      [place]/Accuracy (Swallow Compensatory Strategies/Techniques Goal 1, SLP) independently (over 90% accuracy)  -MM -- independently (over 90% accuracy)  -MM      Time Frame (Swallow Compensatory Strategies/Techniques Goal 1, SLP) by discharge  -MM -- by discharge  -MM      Barriers (Swallow Compensatory Strategies/Techniques Goal 1, SLP) none  -MM -- none  -MM      Progress/Outcomes (Swallow Compensatory Strategies/Techniques Goal 1, SLP) continuing progress toward goal  -MM -- goal ongoing  -MM            User Key  (r) = Recorded By, (t) = Taken By, (c) = Cosigned By    Initials Name Effective Dates    Felicita Delgadillo, MS CCC-SLP 06/16/21 -                 EDUCATION  The patient has been educated in the following areas:   Dysphagia (Swallowing Impairment) Oral Care/Hydration.        SLP GOALS     Row Name 03/16/22 1439 03/15/22 0805          Oral Nutrition/Hydration Goal 1 (SLP)    Oral Nutrition/Hydration Goal 1, SLP Patient will tolerate LRD without s/s of aspiration.  -MM Patient will tolerate LRD without s/s of aspiration.  -MM     Time Frame (Oral Nutrition/Hydration Goal 1, SLP) by discharge  -MM by discharge  -MM     Barriers (Oral Nutrition/Hydration Goal 1, SLP) none  -MM none  -MM     Progress/Outcomes (Oral Nutrition/Hydration Goal 1, SLP) continuing progress toward goal  -MM goal ongoing  -MM            Labial Strengthening Goal 1 (SLP)    Activity (Labial Strengthening Goal 1, SLP) increase labial tone  -MM increase labial tone  -MM     Increase Labial Tone labial resistance exercises  -MM labial resistance exercises  -MM     [place]/Accuracy (Labial Strengthening Goal 1, SLP) independently (over 90% accuracy)  -MM independently (over 90% accuracy)  -MM     Time Frame (Labial Strengthening Goal 1,  SLP) by discharge  -MM by discharge  -MM     Barriers (Labial Strengthening Goal 1, SLP) none  -MM none  -MM     Progress/Outcomes (Labial Strengthening Goal 1, SLP) goal ongoing  -MM goal ongoing  -MM            Swallow Compensatory Strategies Goal 1 (SLP)    Activity (Swallow Compensatory Strategies/Techniques Goal 1, SLP) compensatory strategies;during meal intake;alternate food/liquid intake;other (see comments)  -MM compensatory strategies;during meal intake;alternate food/liquid intake;other (see comments)  lingual sweep to clear L sulci residue  -MM     [place]/Accuracy (Swallow Compensatory Strategies/Techniques Goal 1, SLP) independently (over 90% accuracy)  -MM independently (over 90% accuracy)  -MM     Time Frame (Swallow Compensatory Strategies/Techniques Goal 1, SLP) by discharge  -MM by discharge  -MM     Barriers (Swallow Compensatory Strategies/Techniques Goal 1, SLP) none  -MM none  -MM     Progress/Outcomes (Swallow Compensatory Strategies/Techniques Goal 1, SLP) continuing progress toward goal  -MM goal ongoing  -MM           User Key  (r) = Recorded By, (t) = Taken By, (c) = Cosigned By    Initials Name Provider Type    Felicita Delgadillo MS CCC-SLP Speech and Language Pathologist                   Time Calculation:    Time Calculation- SLP     Row Name 03/16/22 1456             Time Calculation- SLP    SLP Start Time 1438  -MM      SLP Stop Time 1501  -MM      SLP Time Calculation (min) 23 min  -MM      SLP Received On 03/16/22  -MM              Untimed Charges    09474-LW Treatment Swallow Minutes 23  -MM              Total Minutes    Untimed Charges Total Minutes 23  -MM       Total Minutes 23  -MM            User Key  (r) = Recorded By, (t) = Taken By, (c) = Cosigned By    Initials Name Provider Type    Felicita Delgadillo MS CCC-SLP Speech and Language Pathologist                Therapy Charges for Today     Code Description Service Date Service Provider Modifiers Qty     [id number]  ST EVAL ORAL PHARYNG SWALLOW 5 3/15/2022 Felicita Daly MS CCC-SLP GN 1    [id number]  ST TREATMENT SWALLOW 2 3/16/2022 Felicita Daly MS CCC-SLP GN 1               Felicita Daly MS CCC-SLP  3/16/2022    Electronically signed by Felicita Daly MS CCC-SLP at 03/16/22 1457     Felicita Daly MS CCC-SLP at 03/16/22 1453        Goal Outcome Evaluation:  Plan of Care Reviewed With: patient, caregiver        Progress: improving       Swallow treatment completed. The patient was alert and cooperative. She reports she is fatigued and has been all day. No concerns voiced by RN, nurse aid, or patient regarding toleration of current diet. Patient completed trials of thin liquids and regular solids. No overt s/s of aspiration observed. L labial residue present with minimal awareness. Discussed importance of oral care and us of lingual sweep to clear oral cavity post meals. Patient verbalized understanding.     Felicita Daly MS CCC-SLP 3/16/2022 14:55 CDT      Electronically signed by Felicita Daly MS CCC-SLP at 03/16/22 1457       Respiratory Therapy Notes (last 24 hours)  Notes from 03/16/22 1058 through 03/17/22 1058   No notes exist for this encounter.

## 2022-03-17 NOTE — PLAN OF CARE
Goal Outcome Evaluation:  Plan of Care Reviewed With: patient        Progress: no change  Outcome Evaluation: pt alert and oriented x4.  NIH 0.  VVS. RA.  Stand-by assist.  Tele on- NSR.  Pt voiding without difficulty.  Bed alarms are set.  Call light in reach.  Safety maintained

## 2022-03-17 NOTE — CASE MANAGEMENT/SOCIAL WORK
Continued Stay Note   [place]     Patient Name: Evelyn Ojeda  MRN: [id number]  Today's Date: 3/17/2022    Admit Date: 3/14/2022     Discharge Plan     Row Name 03/17/22 0834       Plan    Plan CM spoke with Lashell, sister who patient lives with, and informed that [facility] was not in network with hospital. Sister is ok with patient coming home with HH. Not sure which HH was used last time. [facility] or [facility]. If [facility] bed still needed she stated [facility] or [facility] could be checked to see if they are in network with insurance.  Pending referral and  bed offer if SWB needed. Lashell stated that grand daughter would like for her to have rehab inpatient before coming home.  She will be visiting later today here at hospital.  SW updated and to follow. Pending evals with PT/OT this AM.               Discharge Codes    No documentation.                     Hyun Rome RN

## 2022-03-17 NOTE — DISCHARGE PLACEMENT REQUEST
"  MELL HOLT [phone]  Evelyn Dukes (81 y.o. Female)             Date of Birth   1940    Social Security Number       Address   [address] [place] [address]    Home Phone   [phone]    MRN   [id number]       [facility]    Marital Status                               Admission Date   3/14/22    Admission Type   Emergency    Admitting Provider   Nik Goodson MD    Attending Provider   Nik Goodson MD    Department, Room/Bed   [facility] 3A, 330/1       Discharge Date       Discharge Disposition       Discharge Destination                               Attending Provider: Nik Goodson MD    Allergies: Codeine    Isolation: None   Infection: None   Code Status: CPR   Advance Care Planning Activity    Ht: 154.9 cm (61\")   Wt: 77.1 kg (170 lb)    Admission Cmt: None   Principal Problem: Cerebrovascular accident (CVA) (HCC) [I63.9]                 Active Insurance as of 3/14/2022     Primary Coverage     Payor Plan Insurance Group Employer/Plan Group    [facility] MEDICARE REPLACEMENT [facility] MEDICARE REPLACEMENT [id number]     Payor Plan Address Payor Plan Phone Number Payor Plan Fax Number Effective Dates    [address] [address] [phone]  1/1/2022 - None Entered    [facility] [id number]       Subscriber Name Subscriber Birth Date Member ID       EVELYN DUKES [id number]                 Emergency Contacts      (Rel.) Home Phone Work Phone Mobile Phone    Lashell DUKES (Daughter) -- -- [phone]    MORELupe (Grandchild) -- -- [phone]    ElverPauline (Daughter) -- -- [phone]               History & Physical      Nik Goodson MD at 03/14/22 [facility]              [facility] Medicine Services  HISTORY AND PHYSICAL    Date of Admission: 3/14/2022  Primary Care Physician: Roberto Garcia MD    Subjective     Chief Complaint: CVA.    History of Present Illness  Patient " is a 81-year-old presented to ER for evaluation of left-sided weakness.  Patient been having this symptom for about a week now.  Patient has been admitted to [facility] 3/4/2022 to 3/6/2022 for diabetes and dehydration.  Patient's daughter stated since she came back to the hospital she has never been the same.  Patient has been progressing in a week or over the last week, patient is requiring a cane to walk around per patient.  Daughter stated that patient was extremely weak this morning at 8:30 AM this morning.  Patient has been difficulty swallowing this morning about 2 PM per daughter with drooling on the left side and lefts arm weakness.  EMS was then called patient came to the [facility] ER for code stroke.    Patient has past medical history diabetes, DVT, hypertension, peripheral vascular disease.    CT scan shows CVA . CTA of the neck and head shows no significant blockage.    Review of Systems   Constitutional: Positive for activity change, appetite change and fatigue. Negative for chills and fever.   HENT: Negative for hearing loss, nosebleeds, tinnitus and trouble swallowing.    Eyes: Negative for visual disturbance.   Respiratory: Negative for cough, chest tightness, shortness of breath and wheezing.    Cardiovascular: Negative for chest pain, palpitations and leg swelling.   Gastrointestinal: Negative for abdominal distention, abdominal pain, blood in stool, constipation, diarrhea, nausea and vomiting.   Endocrine: Negative for cold intolerance, heat intolerance, polydipsia, polyphagia and polyuria.   Genitourinary: Negative for decreased urine volume, difficulty urinating, dysuria, flank pain, frequency and hematuria.   Musculoskeletal: Positive for arthralgias, gait problem and myalgias. Negative for joint swelling.   Skin: Negative for rash.   Allergic/Immunologic: Negative for immunocompromised state.   Neurological: Positive for weakness. Negative for dizziness, syncope,  light-headedness and headaches.   Hematological: Negative for adenopathy. Does not bruise/bleed easily.   Psychiatric/Behavioral: Negative for confusion and sleep disturbance. The patient is not nervous/anxious.         Otherwise complete ROS reviewed and negative except as mentioned in the HPI.      Past Medical History:   Past Medical History:   Diagnosis Date   • Diabetes mellitus (HCC)    • DVT (deep venous thrombosis) (HCC)     RLL   • Hypertension    • PVD (peripheral vascular disease) (HCC)        Past Surgical History:  Past Surgical History:   Procedure Laterality Date   • AORTAGRAM N/A 1/6/2021    Procedure: 1.  Introduction of catheter/sheath into the aorta 2.  Aortoiliac angiogram with right lower extremity runoff 3.  Contralateral cannulation of the right common iliac artery 4.  Placement of a 4 mm spider distal embolic protection device in the below-knee popliteal artery 5.  Directional atherectomy of the proximal SFA and below-knee popliteal artery using the 6 [place] citizen Carville Pantheris d   • CHOLECYSTECTOMY     • KNEE SURGERY     • LEG SURGERY         Family History: family history includes Cancer in her mother and sister.    Social History:  reports that she has never smoked. She has never used smokeless tobacco. She reports that she does not drink alcohol and does not use drugs.    Allergies:  Allergies   Allergen Reactions   • Codeine Other (See Comments)     Medications:  Prior to Admission medications    Medication Sig Start Date End Date Taking? Authorizing Provider   aspirin 81 MG EC tablet Take 81 mg by mouth Daily.    Ana Arce MD   atorvastatin (LIPITOR) 20 MG tablet Take 20 mg by mouth Daily. 12/3/20   Ana Arce MD   Cholecalciferol (Vitamin D3) 50 MCG (2000 UT) tablet Take 2,500 Units by mouth Daily.    Ana Arce MD   clopidogrel (PLAVIX) 75 MG tablet Take 1 tablet by mouth Daily. 1/14/22   Jossie Medina APRN   ferrous sulfate 325 (65 FE) MG tablet  "Take 1 tablet by mouth Daily With Breakfast. 1/11/21   Jorge Riley MD   gabapentin (NEURONTIN) 600 MG tablet Take 600 mg by mouth 3 (Three) Times a Day. 12/3/20   Ana Arce MD   HYDROcodone-acetaminophen (NORCO)  MG per tablet Take 1 tablet by mouth Every 6 (Six) Hours As Needed for Moderate Pain . 1/10/21   Jorge Riley MD   lisinopril (PRINIVIL,ZESTRIL) 20 MG tablet Take 20 mg by mouth Daily. 12/3/20   Ana Arce MD   meloxicam (MOBIC) 15 MG tablet Take 15 mg by mouth Daily. 6/11/21   Ana Arce MD   metFORMIN (GLUCOPHAGE) 500 MG tablet Take 500 mg by mouth Daily With Dinner. 12/3/20   Ana Arce MD   vitamin B-12 (CYANOCOBALAMIN) 1000 MCG tablet Take 1,000 mcg by mouth Daily.    Ana Arce MD       I have utilized all available immediate resources to obtain, update, and review the patient's current medications.    Objective     Vital Signs: /60   Pulse 74   Temp 97.4 °F (36.3 °C) (Oral)   Resp 16   Ht 154.9 cm (61\")   Wt 75.3 kg (165 lb 14.4 oz)   SpO2 100%   BMI 31.35 kg/m²   Physical Exam  Vitals and nursing note reviewed.   Constitutional:       Appearance: She is well-developed.      Comments: Advanced age.   HENT:      Head: Normocephalic.   Eyes:      Conjunctiva/sclera: Conjunctivae normal.      Pupils: Pupils are equal, round, and reactive to light.   Neck:      Vascular: No JVD.   Cardiovascular:      Rate and Rhythm: Normal rate and regular rhythm.      Heart sounds: Normal heart sounds. No murmur heard.    No friction rub. No gallop.   Pulmonary:      Effort: Pulmonary effort is normal. No respiratory distress.      Breath sounds: Normal breath sounds. No wheezing or rales.   Chest:      Chest wall: No tenderness.   Abdominal:      General: Bowel sounds are normal. There is no distension.      Palpations: Abdomen is soft.      Tenderness: There is no abdominal tenderness. There is no guarding or rebound.      " Comments: Obesity.   Musculoskeletal:         General: No tenderness or deformity.      Cervical back: Neck supple.      Comments: Arthritis multiple knuckle   Skin:     General: Skin is warm and dry.      Capillary Refill: Capillary refill takes 2 to 3 seconds.      Findings: No rash.   Neurological:      Mental Status: She is alert and oriented to person, place, and time.      Cranial Nerves: No cranial nerve deficit.      Motor: Weakness present. No abnormal muscle tone.      Gait: Gait abnormal.      Deep Tendon Reflexes: Reflexes normal.      Comments: Cranial keystroke grossly intact.  Negative pronator drift.  Negative finger touch.  Strength 5 out of 5 symmetrical.   Psychiatric:         Behavior: Behavior normal.         Thought Content: Thought content normal.             Results Reviewed:    Lab Results (last 24 hours)     Procedure Component Value Units Date/Time    [place] Draw [[id number]] Collected: 03/14/22 1600    Specimen: Blood Updated: 03/14/22 1703    Narrative:      The following orders were created for panel order [place] Draw.  Procedure                               Abnormality         Status                     ---------                               -----------         ------                     Green Top (Gel)[[id number]]                                  Final result               Lavender Top[[id number]]                                     Final result               Red Top[[id number]]                                          Final result               Light Blue Top[[id number]]                                   Final result                 Please view results for these tests on the individual orders.    Green Top (Gel) [[id number]] Collected: 03/14/22 1600    Specimen: Blood Updated: 03/14/22 1703     Extra Tube Hold for add-ons.     Comment: Auto resulted.       Red Top [[id number]] Collected: 03/14/22 1600    Specimen: Blood Updated: 03/14/22 1703     Extra Tube Hold for add-ons.      Comment: Auto resulted.       Light Blue Top [[id number]] Collected: 03/14/22 1600    Specimen: Blood Updated: 03/14/22 1703     Extra Tube hold for add-on     Comment: Auto resulted       Lavender Top [[id number]] Collected: 03/14/22 1600    Specimen: Blood Updated: 03/14/22 1703     Extra Tube hold for add-on     Comment: Auto resulted       Comprehensive Metabolic Panel [[id number]]  (Abnormal) Collected: 03/14/22 1600    Specimen: Blood Updated: 03/14/22 1630     Glucose 166 mg/dL      BUN 13 mg/dL      Creatinine 1.06 mg/dL      Sodium 139 mmol/L      Potassium 4.0 mmol/L      Chloride 102 mmol/L      CO2 25.0 mmol/L      Calcium 9.5 mg/dL      Total Protein 6.1 g/dL      Albumin 3.40 g/dL      ALT (SGPT) 16 U/L      AST (SGOT) 20 U/L      Alkaline Phosphatase 97 U/L      Total Bilirubin 0.3 mg/dL      Globulin 2.7 gm/dL      A/G Ratio 1.3 g/dL      BUN/Creatinine Ratio 12.3     Anion Gap 12.0 mmol/L      eGFR 53.5 mL/min/1.73      Comment: National Kidney Foundation and American Society of Nephrology (ASN) Task Force recommended calculation based on the Chronic Kidney Disease Epidemiology Collaboration (CKD-EPI) equation refit without adjustment for race.       Narrative:      GFR Normal >60  Chronic Kidney Disease <60  Kidney Failure <15      Troponin [[id number]]  (Normal) Collected: 03/14/22 1600    Specimen: Blood Updated: 03/14/22 1628     Troponin T 0.012 ng/mL     Narrative:      Troponin T Reference Range:  <= 0.03 ng/mL-   Negative for AMI  >0.03 ng/mL-     Abnormal for myocardial necrosis.  Clinicians would have to utilize clinical acumen, EKG, Troponin and serial changes to determine if it is an Acute Myocardial Infarction or myocardial injury due to an underlying chronic condition.       Results may be falsely decreased if patient taking Biotin.      Protime-INR [[id number]]  (Normal) Collected: 03/14/22 1600    Specimen: Blood Updated: 03/14/22 1622     Protime 13.0 Seconds      INR 1.02    aPTT  [[id number]]  (Normal) Collected: 03/14/22 1600    Specimen: Blood Updated: 03/14/22 1622     PTT 27.5 seconds     CBC & Differential [[id number]]  (Abnormal) Collected: 03/14/22 1600    Specimen: Blood Updated: 03/14/22 1612    Narrative:      The following orders were created for panel order CBC & Differential.  Procedure                               Abnormality         Status                     ---------                               -----------         ------                     CBC Auto Differential[[id number]]        Abnormal            Final result                 Please view results for these tests on the individual orders.    CBC Auto Differential [[id number]]  (Abnormal) Collected: 03/14/22 1600    Specimen: Blood Updated: 03/14/22 1612     WBC 9.27 10*3/mm3      RBC 3.87 10*6/mm3      Hemoglobin 11.5 g/dL      Hematocrit 36.4 %      MCV 94.1 fL      MCH 29.7 pg      MCHC 31.6 g/dL      RDW 12.5 %      RDW-SD 43.4 fl      MPV 10.0 fL      Platelets 350 10*3/mm3      Neutrophil % 64.7 %      Lymphocyte % 23.7 %      Monocyte % 9.1 %      Eosinophil % 1.1 %      Basophil % 0.3 %      Immature Grans % 1.1 %      Neutrophils, Absolute 6.00 10*3/mm3      Lymphocytes, Absolute 2.20 10*3/mm3      Monocytes, Absolute 0.84 10*3/mm3      Eosinophils, Absolute 0.10 10*3/mm3      Basophils, Absolute 0.03 10*3/mm3      Immature Grans, Absolute 0.10 10*3/mm3      nRBC 0.0 /100 WBC            Radiology Data:    Imaging Results (Last 24 Hours)     Procedure Component Value Units Date/Time    XR Chest 1 View [[id number]] Collected: 03/14/22 1614     Updated: 03/14/22 1618    Narrative:      EXAMINATION:  XR CHEST 1 VW-  3/14/2022 4:08 PM CDT     HISTORY: Acute Stroke Protocol (Onset < 12 hrs)     COMPARISON: No comparison study.     FINDINGS:  There is hypoventilation with vascular crowding. There is  minimal bronchial wall thickening. There is no focal infiltrate. The  heart is normal in size. The thoracic aorta is  atheromatous. There are  degenerative changes of the spine. Surgical anchor is noted in the right  humeral head.       Impression:      1. No focal infiltrate.  2. Mild hypoventilation with vascular crowding. Mild bronchial wall  thickening.        This report was finalized on 03/14/2022 16:15 by Dr. Tim Ozuna MD.    CT Angiogram Neck [[id number]] Collected: 03/14/22 1556     Updated: 03/14/22 1606    Narrative:      EXAMINATION: CT ANGIOGRAM NECK-      3/14/2022 3:27 PM CDT     HISTORY: Stroke, follow up     In order to have a CT radiation dose as low as reasonably achievable  Automated Exposure Control was utilized for adjustment of the mA and/or  KV according to patient size.     DLP in mGycm= 199.     CT angiogram neck.  CT angiography protocol.   CT imaging with bolus IV contrast injection.   Under concurrent supervision axial, sagittal, coronal, and  three-dimensional data sets were constructed.     Aortic arch calcification.  Patent great vessel origins though there is moderate atherosclerotic  calcification with approximately 50% narrowing of the proximal left  subclavian artery.     Patchy atherosclerotic calcification within the common carotid arteries.     Atherosclerotic calcification at the left ICA origin and within the  proximal 22 mm of the left ICA.  Left ICA origin stenosis with lumen diameter ratio = 3.4/4.5.  25% stenosis based on ACAS/NASCET criteria.  The degree of stenosis is derived by comparing the narrowest segment  with the distal luminal diameter.  2 cm distal to the left ICA origin there is calcified plaque with less  prominent stenosis with a lumen diameter ratio = 4.0/4.5.  12% stenosis based on ACAS/NASCET criteria.  The degree of stenosis is derived by comparing the narrowest segment  with the distal luminal diameter.     Atherosclerotic calcification within the proximal 15 mm of the right  ICA.  Calcified plaque at the right ICA origin with mild stenosis and lumen  diameter  ratio = 3.7/4.3.  14% stenosis based on ACAS/NASCET criteria.  The degree of stenosis is derived by comparing the narrowest segment  with the distal luminal diameter.     No intraluminal thrombus.     The right vertebral artery is dominant.  The left vertebral artery is patent though diminutive throughout its  length.     Summary:  1. Patchy atherosclerotic plaque at both carotid bifurcations with no  high-grade stenosis.  2. The greatest degree of stenosis is at the left ICA origin where there  is 25% narrowing.                                      This report was finalized on 03/14/2022 16:03 by Dr. Jorge Corrigan MD.    CT Angiogram Head w AI Analysis of LVO [[id number]] Collected: 03/14/22 1554     Updated: 03/14/22 1559    Narrative:      EXAMINATION: CT ANGIOGRAM HEAD W AI ANALYSIS OF LVO-     3/14/2022 3:27 PM CDT     HISTORY: Acute Stroke     CT angiogram head.  CT angiography protocol.   CT imaging with bolus IV contrast injection.   Under concurrent supervision axial, sagittal, coronal, and  three-dimensional data sets were constructed.     In order to have a CT radiation dose as low as reasonably achievable  Automated Exposure Control was utilized for adjustment of the mA and/or  KV according to patient size.     DLP in mGycm= 199.     Calcified though patent distal internal carotid arteries.     Intact [place].  Anterior, middle, and posterior cerebral arteries are normally patent.  No aneurysm or proximal arterial occlusion.     There is absence of opacification of distal right MCA branches in the  area of known infarction.     Summary:  1. Intact [place].  2. There is no central arterial occlusion or thrombus.   This report was finalized on 03/14/2022 15:56 by Dr. Jorge Corrigan MD.    CT Head Without Contrast Stroke Protocol [[id number]] Collected: 03/14/22 1531     Updated: 03/14/22 1536    Narrative:      EXAMINATION: CT HEAD WO CONTRAST STROKE PROTOCOL-      3/14/2022 3:26 PM CDT      HISTORY: Stroke, follow up     In order to have a CT radiation dose as low as reasonably achievable  Automated Exposure Control was utilized for adjustment of the mA and/or  KV according to patient size.     DLP in mGycm= 600.     Noncontrast head CT.     Large right MCA hypodense subacute infarct involving an area measuring  approximately 7 x 5 x 4 cm.     No hemorrhagic conversion.  No midline shift.     Ventricle size is normal.     Mild atrophy and small vessel disease.     Summary:  1. Large hypodense subacute right MCA infarct.  2. No intracranial hemorrhage.                                   This report was finalized on 03/14/2022 15:33 by Dr. Jorge Corrigan MD.          I have personally reviewed and interpreted the radiology studies and ECG obtained at time of admission.     Assessment / Plan      Assessment & Plan  Active Hospital Problems    Diagnosis    • Cerebrovascular accident (CVA) ([facility])      Plan .    CVA .  No aspirin for now per neurology.  Chest x-ray-No focal infiltrate, Mild hypoventilation with vascular crowding. Mild bronchial wall  Thickening.  CTA of the head-Intact [place], no central arterial occlusion or thrombus.   CTA of the neck-Patchy atherosclerotic plaque at both carotid bifurcations with no high-grade stenosis, greatest degree of stenosis is at the left ICA origin where there is 25% narrowing.  CT scan head-Large hypodense subacute right MCA infarct, No intracranial hemorrhage.  MRI of the brain pending.    CAD/hypertension/hyperlipidemia.  Lipitor high-dose per neurology.  Hold Plavix per neurology.  Continue lisinopril.  Echocardiogram pending.    Anemia.  Hold iron sulfate.    Chronic pain/arthritis.  Cut back Neurontin.  Cut back Norco.  Hold Mobic.    Diabetes .  Hold Glucophage.    Renal insufficiency.  We will follow.    Nausea.  Zofran as needed    Code Status/Advanced Care Plan: Full code.    The patient's surrogate decision maker is Lashell, daughter.      I  discussed the patient's findings and my recommendations with: Patient and daughter    Estimated length of stay: 1 to 3 days    Electronically signed by Nik Goodson MD, 03/14/22, 5:15 PM CDT.              Electronically signed by Nik Goodson MD at 03/14/22 1751         Current Facility-Administered Medications   Medication Dose Route Frequency Provider Last Rate Last Admin   • aspirin chewable tablet 81 mg  81 mg Oral Daily Tami Sharma APRN   81 mg at 03/17/22 0940    Or   • aspirin suppository 300 mg  300 mg Rectal Daily Tami Sharma APRN       • atorvastatin (LIPITOR) tablet 80 mg  80 mg Oral Nightly Nik Goodson MD   80 mg at 03/16/22 2040   • cefTRIAXone (ROCEPHIN) in SWFI 1 gram/10ml IV PUSH syringe  1 g Intravenous Q24H Nik Goodson MD   1 g at 03/17/22 1010   • dextrose (D50W) (25 g/50 mL) IV injection 25 g  25 g Intravenous Q15 Min PRN Nik Goodson MD       • dextrose (GLUTOSE) oral gel 15 g  15 g Oral Q15 Min PRN Nik Goodson MD       • gabapentin (NEURONTIN) capsule 100 mg  100 mg Oral Q8H Nik Goodson MD       • glucagon (human recombinant) (GLUCAGEN DIAGNOSTIC) injection 1 mg  1 mg Intramuscular Q15 Min PRN Nik Goodson MD       • HYDROcodone-acetaminophen (NORCO) 5-325 MG per tablet 1 tablet  1 tablet Oral Q6H PRN Nik Goodson MD   1 tablet at 03/16/22 0919   • insulin lispro (humaLOG) injection 2-9 Units  2-9 Units Subcutaneous TID AC Nik Goodson MD   2 Units at 03/17/22 1225   • linagliptin (TRADJENTA) tablet 5 mg  5 mg Oral Daily Nik Goodson MD   5 mg at 03/17/22 0940   • [START ON 3/18/2022] lisinopril (PRINIVIL,ZESTRIL) tablet 10 mg  10 mg Oral Daily Nik Goodson MD       • ondansetron (ZOFRAN) injection 4 mg  4 mg Intravenous Q6H PRN Nik Goodson MD       • sodium chloride 0.9 % flush 10 mL  10 mL Intravenous PRN Nik Goodson MD       • sodium chloride 0.9 % flush 10 mL  10 mL Intravenous Q12H Nik Goodson MD   10 mL at 03/17/22 0940   •  sodium chloride 0.9 % flush 10 mL  10 mL Intravenous PRN Nik Goodson MD         Operative/Procedure Notes (last 24 hours)  Notes from 03/16/22 1400 through 03/17/22 1400   No notes of this type exist for this encounter.            Physician Progress Notes (last 24 hours)      Nik Goodson MD at 03/17/22 1250              [facility] Medicine Services  INPATIENT PROGRESS NOTE    Length of Stay: 3  Date of Admission: 3/14/2022  Primary Care Physician: Roberto Garcia MD    Subjective   Chief Complaint: CVA/orthostatic hypotension.    HPI   Due to orthostatic hypotension, cut back Neurontin, cut back lisinopril.  Blood pressure stable.  T-max 99.1.  T-current 98.6.  Heart rate stable.    Review of Systems   Constitutional: Positive for activity change, appetite change and fatigue. Negative for chills and fever.   HENT: Negative for hearing loss, nosebleeds, tinnitus and trouble swallowing.    Eyes: Negative for visual disturbance.   Respiratory: Negative for cough, chest tightness, shortness of breath and wheezing.    Cardiovascular: Negative for chest pain, palpitations and leg swelling.   Gastrointestinal: Negative for abdominal distention, abdominal pain, blood in stool, constipation, diarrhea, nausea and vomiting.   Endocrine: Negative for cold intolerance, heat intolerance, polydipsia, polyphagia and polyuria.   Genitourinary: Negative for decreased urine volume, difficulty urinating, dysuria, flank pain, frequency and hematuria.   Musculoskeletal: Positive for arthralgias, gait problem and myalgias. Negative for joint swelling.   Skin: Negative for rash.   Allergic/Immunologic: Negative for immunocompromised state.   Neurological: Positive for weakness. Negative for dizziness, syncope, light-headedness and headaches.   Hematological: Negative for adenopathy. Does not bruise/bleed easily.   Psychiatric/Behavioral: Negative for confusion and sleep disturbance. The  patient is not nervous/anxious.         All pertinent negatives and positives are as above. All other systems have been reviewed and are negative unless otherwise stated.     Objective    Temp:  [97.6 °F (36.4 °C)-99.1 °F (37.3 °C)] 98.6 °F (37 °C)  Heart Rate:  [75-92] 75  Resp:  [16-18] 16  BP: (121-160)/(48-84) 126/84    Intake/Output Summary (Last 24 hours) at 3/17/2022 1250  Last data filed at 3/17/2022 0925  Gross per 24 hour   Intake 720 ml   Output --   Net 720 ml     Physical Exam  Vitals and nursing note reviewed.   Constitutional:       Appearance: She is well-developed.      Comments: Advanced age.   HENT:      Head: Normocephalic.   Eyes:      Conjunctiva/sclera: Conjunctivae normal.      Pupils: Pupils are equal, round, and reactive to light.   Neck:      Vascular: No JVD.   Cardiovascular:      Rate and Rhythm: Normal rate and regular rhythm.      Heart sounds: Normal heart sounds. No murmur heard.    No friction rub. No gallop.   Pulmonary:      Effort: Pulmonary effort is normal. No respiratory distress.      Breath sounds: Normal breath sounds. No wheezing or rales.   Chest:      Chest wall: No tenderness.   Abdominal:      General: Bowel sounds are normal. There is no distension.      Palpations: Abdomen is soft.      Tenderness: There is no abdominal tenderness. There is no guarding or rebound.      Comments: Obesity.   Musculoskeletal:         General: No tenderness or deformity.      Cervical back: Neck supple.      Comments: Arthritis multiple knuckle   Skin:     General: Skin is warm and dry.      Capillary Refill: Capillary refill takes 2 to 3 seconds.      Findings: No rash.   Neurological:      Mental Status: She is alert and oriented to person, place, and time.      Cranial Nerves: No cranial nerve deficit.      Motor: Weakness present. No abnormal muscle tone.      Gait: Gait abnormal.      Deep Tendon Reflexes: Reflexes normal.      Comments: Cranial keystroke grossly intact.  Negative  pronator drift.  Negative finger touch.  Strength 5 out of 5 symmetrical.   Psychiatric:         Behavior: Behavior normal.         Thought Content: Thought content normal.   Results Review:  Lab Results (last 24 hours)     Procedure Component Value Units Date/Time    POC Glucose Once [[id number]]  (Abnormal) Collected: 03/17/22 1154    Specimen: Blood Updated: 03/17/22 1209     Glucose 171 mg/dL      Comment: : [id number] Gaby MeganMeter ID: [id number]       POC Glucose Once [[id number]]  (Abnormal) Collected: 03/17/22 0800    Specimen: Blood Updated: 03/17/22 0812     Glucose 149 mg/dL      Comment: : [id number] Gaby MeganMeter ID: [id number]       Basic Metabolic Panel [[id number]]  (Abnormal) Collected: 03/17/22 0602    Specimen: Blood Updated: 03/17/22 0649     Glucose 166 mg/dL      BUN 11 mg/dL      Creatinine 0.89 mg/dL      Sodium 135 mmol/L      Potassium 4.1 mmol/L      Chloride 102 mmol/L      CO2 23.0 mmol/L      Calcium 9.0 mg/dL      BUN/Creatinine Ratio 12.4     Anion Gap 10.0 mmol/L      eGFR 66.0 mL/min/1.73      Comment: National Kidney Foundation and American Society of Nephrology (ASN) Task Force recommended calculation based on the Chronic Kidney Disease Epidemiology Collaboration (CKD-EPI) equation refit without adjustment for race.       Narrative:      GFR Normal >60  Chronic Kidney Disease <60  Kidney Failure <15      CBC (No Diff) [[id number]]  (Abnormal) Collected: 03/17/22 0602    Specimen: Blood Updated: 03/17/22 0631     WBC 8.02 10*3/mm3      RBC 3.69 10*6/mm3      Hemoglobin 10.8 g/dL      Hematocrit 34.4 %      MCV 93.2 fL      MCH 29.3 pg      MCHC 31.4 g/dL      RDW 12.7 %      RDW-SD 43.6 fl      MPV 10.1 fL      Platelets 325 10*3/mm3     POC Glucose Once [[id number]]  (Abnormal) Collected: 03/16/22 1618    Specimen: Blood Updated: 03/16/22 1630     Glucose 163 mg/dL      Comment: : FELISA Hernandez LaDonnaMeter ID: [id number]              Cultures:  Urine Culture    Date Value Ref Range Status   03/15/2022 25,000 CFU/mL Normal Urogenital Lauren  Final       Radiology Data:    Imaging Results (Last 24 Hours)     ** No results found for the last 24 hours. **          Allergies   Allergen Reactions   • Codeine Other (See Comments)       Scheduled meds:   aspirin, 81 mg, Oral, Daily   Or  aspirin, 300 mg, Rectal, Daily  atorvastatin, 80 mg, Oral, Nightly  cefTRIAXone, 1 g, Intravenous, Q24H  gabapentin, 300 mg, Oral, Q8H  insulin lispro, 2-9 Units, Subcutaneous, TID AC  linagliptin, 5 mg, Oral, Daily  lisinopril, 20 mg, Oral, Daily  sodium chloride, 10 mL, Intravenous, Q12H        PRN meds:  dextrose  •  dextrose  •  glucagon (human recombinant)  •  HYDROcodone-acetaminophen  •  ondansetron  •  sodium chloride  •  sodium chloride    Assessment/Plan       Cerebrovascular accident (CVA) ([facility])    PAD (peripheral artery disease) ([facility])    Mixed hyperlipidemia    Type 2 diabetes mellitus with hyperglycemia, without long-term current use of insulin ([facility])    CAD (coronary artery disease)      Plan:  CVA-extensive right MCA territory infarct.  No aspirin for now per neurology.  Aspirin daily.  Chest x-ray-No focal infiltrate, Mild hypoventilation with vascular crowding. Mild bronchial wall  Thickening.  CTA of the head-Intact [place], no central arterial occlusion or thrombus.   CTA of the neck-Patchy atherosclerotic plaque at both carotid bifurcations with no high-grade stenosis, greatest degree of stenosis is at the left ICA origin where there is 25% narrowing.  CT scan head-Large hypodense subacute right MCA infarct, No intracranial hemorrhage.  MRI of the brain-Extensive right MCA territory infarct with diffusion restriction and ADC mapping abnormality- associated sulcal effacement and edema, No evidence of hemorrhagic conversion or mass effect, Atrophy with small vessel ischemic changes, Remote infarcts involving the right thalamus and  left basal ganglia and right cerebellar  hemisphere with focal encephalomalacia and gliosis.  Recommendation from neurologist-  1. ASA 81 mg daily. Hold on dual antiplatelet therapy as size and location of stroke at risk for hemorrhagic conversion. May resume dual antiplatelet therapy in 14 days on 3/28/2022 with Pletal 100 mg BID in place of Plavix.   2. Lipitor 80 mg daily.  3. DM management for A1C goal less than 7.  4. Systolic BP goal less than 160.  5. Transthoracic echo done and pending.   6. ST/PT/OT  7. No driving at discharge and no driving until seen in follow up with neurology clinic.   8. Therapy has recommended continued therapy with skilled nursing or home with home health. Patient/family wanted referral to [facility] Swing bed and referral pending.    9. 14 day ZIO at discharge.   10. F/u neurology 3-4 weeks.      CAD/hypertension/hyperlipidemia.  Lipitor high-dose per neurology.  Hold Plavix per neurology.  Decrease lisinopril.  Echocardiogram-ejection fraction 56 to 60%, diastolic dysfunction grade 1, mild mitral valve stenosis, no evidence of right to left shunt.    UTI.  Rocephin.     Anemia.  Hold iron sulfate.     Chronic pain/arthritis.  Cut back Neurontin.  Cut back Norco.  Hold Mobic.     Diabetes .  Hold Glucophage.  Hemoglobin A1 C 11.  Discussed patient will need better control of her diabetes.    Continue Tradjenta.  Continue with low sliding scale.     Renal insufficiency.    Resolved.     Nausea.  Zofran as needed     Urine culture pending.  Covid-19-negative.     Discharge Planning: Plan for rehab placement.  Plan for swing bed placement.    Electronically signed by Nik Goodson MD, 03/17/22, 12:50 PM CDT.                    Electronically signed by Nik Goodson MD at 03/17/22 8379     Tami Sharma APRN at 03/17/22 0918            Neurology Progress Note      Chief Complaint:  F/u code stroke/stroke    Subjective     Subjective:  Patient lying in bed trying to place lower dentures and having difficulty as they  are getting hung on left side of lips. No further events or syncope reported or observed. Social work noted reviewed and [facility] swing bed not in patient insurance and waiting to hear back from family.     Results for orders placed during the hospital encounter of 03/14/22    Adult Transthoracic Echo Complete W/ Cont if Necessary Per Protocol    Interpretation Summary  · Left ventricular ejection fraction appears to be 56 - 60%. Left ventricular systolic function is normal.  · Left ventricular diastolic function is consistent with (grade I) impaired relaxation.  · Mild aortic valve stenosis is present.  · Normal right ventricular cavity size and systolic function noted.  · There is no evidence of right left shunt on bubble study.  · There is no evidence of intracardiac mass or thrombus.      MRI brain showed Right MCA/PCA stroke with no hemorrhagic conversion. There was concern for CAA and T2* did not show signs of this.  CTA head and neck showed no LVO. Left ICA stenosis 25% and mold right ICA stenosis. CTA head showed absence of rifht MCA branches in area of infarction.         Medications:  Current Facility-Administered Medications   Medication Dose Route Frequency Provider Last Rate Last Admin   • aspirin chewable tablet 81 mg  81 mg Oral Daily Tami Sharma APRN   81 mg at 03/16/22 0907    Or   • aspirin suppository 300 mg  300 mg Rectal Daily Tami Sharma, APRN       • atorvastatin (LIPITOR) tablet 80 mg  80 mg Oral Nightly Nik Goodson MD   80 mg at 03/16/22 2040   • cefTRIAXone (ROCEPHIN) in SWFI 1 gram/10ml IV PUSH syringe  1 g Intravenous Q24H Nik Goodson MD   1 g at 03/16/22 0908   • dextrose (D50W) (25 g/50 mL) IV injection 25 g  25 g Intravenous Q15 Min PRN Nik Goodson MD       • dextrose (GLUTOSE) oral gel 15 g  15 g Oral Q15 Min PRN Nik Goodson MD       • gabapentin (NEURONTIN) capsule 300 mg  300 mg Oral Q8H Nik Goodson MD   300 mg at 03/17/22 0526   • glucagon (human  recombinant) (GLUCAGEN DIAGNOSTIC) injection 1 mg  1 mg Intramuscular Q15 Min PRN Nik Goodson MD       • HYDROcodone-acetaminophen (NORCO) 5-325 MG per tablet 1 tablet  1 tablet Oral Q6H PRN Nik Goodson MD   1 tablet at 03/16/22 0919   • insulin lispro (humaLOG) injection 2-9 Units  2-9 Units Subcutaneous TID AC Nik Goodson MD   2 Units at 03/16/22 1700   • linagliptin (TRADJENTA) tablet 5 mg  5 mg Oral Daily Nik Goodson MD   5 mg at 03/16/22 0908   • lisinopril (PRINIVIL,ZESTRIL) tablet 20 mg  20 mg Oral Daily Nik Goodson MD   20 mg at 03/16/22 0908   • ondansetron (ZOFRAN) injection 4 mg  4 mg Intravenous Q6H PRN Nik Goodson MD       • sodium chloride 0.9 % flush 10 mL  10 mL Intravenous PRN Nik Goodson MD       • sodium chloride 0.9 % flush 10 mL  10 mL Intravenous Q12H Nik Goodson MD   10 mL at 03/16/22 2041   • sodium chloride 0.9 % flush 10 mL  10 mL Intravenous PRN Nik Goodson MD           Review of Systems:   -A 14 point review of systems is completed and is negative except for left sided weakness and left vision loss.       Objective      Vital Signs  Temp:  [97.6 °F (36.4 °C)-99.1 °F (37.3 °C)] 98.5 °F (36.9 °C)  Heart Rate:  [72-87] 85  Resp:  [18] 18  BP: ()/(44-73) 145/72    Telemetry:S 73-94      Physical Exam:  General Exam:  Head:  Normal cephalic, atraumatic  HEENT:  Neck supple  Fundoscopic Exam:  No signs of disc edema  CVS:  Regular rate and rhythm.  No murmurs  Carotid Examination:  No bruits  Lungs:  Clear to auscultation  Abdomen:  Non-tender, Non-distended  Extremities:  No signs of peripheral edema  Skin:  No rashes     Neurologic Exam:     Mental Status:    -Awake, Alert, Oriented to person and place.   -No word finding difficulties  -No aphasia  -No dysarthria  -Follows simple and complex commands     CN II:  Visual fields with left visual field deficit.  Pupils equally reactive to light. Left neglect.   CN III, IV, VI:  Extraocular Muscles full  with no signs of nystagmus  CN V:  Facial sensory is symmetric with no asymmetries.  CN VII:  Facial motor asymmetric with left lower facial weakness.   CN VIII:  Gross hearing intact bilaterally  CN IX:  Palate elevates symmetrically  CN X:  Palate elevates symmetrically  CN XI:  Shoulder shrug symmetric  CN XII:  Tongue is midline on protrusion     Motor: (strength out of 5:  1= minimal movement, 2 = movement in plane of gravity, 3 = movement against gravity, 4 = movement against some resistance, 5 = full strength)     -Right Upper Ext: Proximal: 5 Distal: 5  -Left Upper Ext: Proximal: 5   Distal: 5     -Right Lower Ext: Proximal: 5 Distal: 5  -Left Lower Ext: Proximal: 5   Distal: 5     DTR:  -Right              Bicep: 2+         Tricep: 2+        Brachioradialis: 2+              Patella: 2+       Ankle: 2+         Neg Babinski  -Left              Bicep: 2+         Tricep: 2+        Brachioradialis: 2+              Patella: 2+       Ankle: 2+         Neg Babinski     Sensory:  -Intact to light touch, pinprick, temperature, pain, and proprioception     Coordination:  -Finger to nose intact.   -Heel to shin intact  -No ataxia     Gait  Up with assist.         Results Review:    I reviewed the patient's new clinical results.    Results from last 7 days   Lab Units 03/17/22  0602 03/16/22  0648 03/15/22  0631   WBC 10*3/mm3 8.02 9.22 9.00   HEMOGLOBIN g/dL 10.8* 11.4* 13.2   HEMATOCRIT % 34.4 36.2 42.3   PLATELETS 10*3/mm3 325 343 336        Results from last 7 days   Lab Units 03/17/22  0602 03/16/22  0648 03/15/22  0631 03/14/22  1600   SODIUM mmol/L 135* 134* 138 139   POTASSIUM mmol/L 4.1 4.2 4.3 4.0   CHLORIDE mmol/L 102 101 102 102   CO2 mmol/L 23.0 22.0 23.0 25.0   BUN mg/dL 11 9 11 13   CREATININE mg/dL 0.89 0.83 0.86 1.06*   CALCIUM mg/dL 9.0 9.5 10.1 9.5   BILIRUBIN mg/dL  --   --  0.5 0.3   ALK PHOS U/L  --   --  117 97   ALT (SGPT) U/L  --   --  17 16   AST (SGOT) U/L  --   --  21 20   GLUCOSE mg/dL 166*  190* 132* 166*        Lab Results   Component Value Date    MG 1.5 (L) 01/04/2021    PROTIME 13.0 03/14/2022    INR 1.02 03/14/2022     No components found for: POCGLUC  No components found for: A1C  Lab Results   Component Value Date    HDL 44 03/15/2022     (H) 03/15/2022     No components found for: B12  Lab Results   Component Value Date    TSH 1.630 03/15/2022       MRI brain personally reviewed by me showing acute/subacute right MCA/PCA stroke.       Assessment/Plan     Hospital Problem List      Cerebrovascular accident (CVA) ([facility])    PAD (peripheral artery disease) ([facility])    Mixed hyperlipidemia    Type 2 diabetes mellitus with hyperglycemia, without long-term current use of insulin ([facility])    CAD (coronary artery disease)    Impression:  1. Acute/subacute right MCA/PCA stroke.  2. DM uncontrolled, A1C 11.  3. Dyslipidemia.   3. PAD on dual antiplatelet therapy of ASA 81 mg and Plavix 75 mg daily.  4. Hypertension.  5. Hx DVT  6. UTI per attending.   7. Syncopal episode 3/16/2022. No further episodes reported or observed.   8. Orthostatic hypotension.      Plan:  1. ASA 81 mg daily. Hold on dual antiplatelet therapy as size and location of stroke at risk for hemorrhagic conversion. May resume dual antiplatelet therapy in 14 days on 3/28/2022 with Pletal 100 mg BID in place of Plavix.   2. Lipitor 80 mg daily.  3. DM management for A1C goal less than 7.  4. Systolic BP goal less than 160.  5. Transthoracic echo done and pending.   6. ST/PT/OT  7. No driving at discharge and no driving until seen in follow up with neurology clinic.   8. Therapy has recommended continued therapy with skilled nursing or home with home health. Patient/family wanted [facility] to [facility]. Swing bed and referral pending.    9. 14 day ZIO at discharge.   10. F/u neurology 3-4 weeks.   11. Check orthostatic BP. Orthostatic BP were positive for orthostasis. Recommend compression socks at discharge. Patient to avoid  sudden position changes, take warm, not hot, showers, remain hydrated. Will recommend decrease Neurontin to see if this will help as well. Monitor systolic BP.       JAVID Mcnulty  22  09:18 CDT    Electronically signed by Tami Sharma APRN at 22 1256       Consult Notes (last 24 hours)  Notes from 22 1400 through 22 1400   No notes of this type exist for this encounter.         Nutrition Notes (last 24 hours)  Notes from 22 1400 through 22 1400   No notes exist for this encounter.            Physical Therapy Notes (last 24 hours)      Daxa Rudolph PTA at 22 1133  Version 1 of 1       Goal Outcome Evaluation:  Plan of Care Reviewed With: patient, family        Progress: improving  Outcome Evaluation: Pt lying in bed agreeable to therapy. Bed mob Mod Ind to get to EOB w/ use of bed rails. Sat EOB and performed AROM BLE x15. Pt stood and amb 120'x2 w/ CGA. Pt used the wall at times for support.    Electronically signed by Daxa Rudolph PTA at 22 1324     Daxa Rudolph PTA at 22 1325  Version 1 of 1         Acute Care - Physical Therapy Treatment Note   [place]     Patient Name: Evelyn Ojeda  : 1940  MRN: [id number]  Today's Date: 3/17/2022      Visit Dx:     ICD-10-CM ICD-9-CM   1. Cerebrovascular accident (CVA), unspecified mechanism ([facility])  I63.9 434.91   2. Oral phase dysphagia  R13.11 787.21   3. Impaired functional mobility and activity tolerance  Z74.09 V49.89   4. Cerebrovascular accident (CVA) due to thrombosis of right middle cerebral artery ([facility])  I63.311 434.01     Patient Active Problem List   Diagnosis   • PAD (peripheral artery disease) ([facility])   • Essential hypertension   • Mixed hyperlipidemia   • Bilateral carotid artery stenosis   • Acute deep vein thrombosis (DVT) of distal vein of right lower extremity ([facility])   • Atherosclerosis of artery of extremity with intermittent claudication ([facility])   •  Chronic osteoarthritis   • Decreased pedal pulses   • Nonhealing ulcer of right lower leg with fat layer exposed ([facility])   • Hyperglycemia   • Type 2 diabetes mellitus with hyperglycemia, without long-term current use of insulin ([facility])   • Acute kidney injury ([facility])   • Postoperative anemia due to acute blood loss   • Hematuria   • Cerebrovascular accident (CVA) ([facility])   • CAD (coronary artery disease)     Past Medical History:   Diagnosis Date   • Diabetes mellitus (HCC)    • DVT (deep venous thrombosis) ([facility])     RLL   • Hypertension    • PVD (peripheral vascular disease) ([facility])      Past Surgical History:   Procedure Laterality Date   • AORTAGRAM N/A 1/6/2021    Procedure: 1.  Introduction of catheter/sheath into the aorta 2.  Aortoiliac angiogram with right lower extremity runoff 3.  Contralateral cannulation of the right common iliac artery 4.  Placement of a 4 mm spider distal embolic protection device in the below-knee popliteal artery 5.  Directional atherectomy of the proximal SFA and below-knee popliteal artery using the 6 [place] Pantheris d   • CHOLECYSTECTOMY     • KNEE SURGERY     • LEG SURGERY       PT Assessment (last 12 hours)     PT Evaluation and Treatment     Row Name 03/17/22 1110          Physical Therapy Time and Intention    Subjective Information no complaints  -TB     Document Type therapy note (daily note)  -TB     Mode of Treatment physical therapy  -TB     Patient Effort good  -TB     Row Name 03/17/22 1110          General Information    Existing Precautions/Restrictions fall  -TB     Row Name 03/17/22 1110          Pain    Pretreatment Pain Rating 0/10 - no pain  -TB     Posttreatment Pain Rating 0/10 - no pain  -TB     Row Name 03/17/22 1110          Bed Mobility    Bed Mobility scooting/bridging;supine-sit;sit-supine  -TB     Scooting/Bridging [place] (Bed Mobility) modified independence  -TB     Supine-Sit [place] (Bed Mobility) modified independence  -TB     Sit-Supine  [place] (Bed Mobility) modified independence  -TB     Assistive Device (Bed Mobility) bed rails  -TB     Row Name 03/17/22 1110          Transfers    Transfers sit-stand transfer;stand-sit transfer  -TB     Sit-Stand [place] (Transfers) independent  -TB     Stand-Sit [place] (Transfers) independent  -TB     Row Name 03/17/22 1110          Gait/Stairs (Locomotion)    [place] Level (Gait) contact guard;verbal cues  -TB     Distance in Feet (Gait) 120'x2  -TB     Deviations/Abnormal Patterns (Gait) gait speed decreased;stride length decreased  -TB     Bilateral Gait Deviations forward flexed posture;heel strike decreased  -TB     Row Name 03/17/22 1110          Motor Skills    Therapeutic Exercise --  AROM BLE x15  -TB     Row Name 03/17/22 1110          Plan of Care Review    Plan of Care Reviewed With patient;family  -TB     Progress improving  -TB     Outcome Evaluation Pt lying in bed agreeable to therapy. Bed mob Mod Ind to get to EOB w/ use of bed rails. Sat EOB and performed AROM BLE x15. Pt stood and amb 120'x2 w/ CGA. Pt used the wall at times for support.  -TB     Row Name 03/17/22 1110          Positioning and Restraints    Pre-Treatment Position in bed  -TB     Post Treatment Position bed  -TB     In Bed fowlers;call light within reach;encouraged to call for assist;side rails up x2;with family/caregiver  -TB           User Key  (r) = Recorded By, (t) = Taken By, (c) = Cosigned By    Initials Name Provider Type    TB Daxa Rudolph, PTA Physical Therapy Assistant                Physical Therapy Education                 Title: PT OT SLP Therapies (In Progress)     Topic: Physical Therapy (In Progress)     Point: Mobility training (Done)     Learning Progress Summary           Patient Acceptance, DARIUS KRAMER D, BRANDON,MISTY,NR by NATALY at 3/15/2022 6997    Comment: Education re: purpose of PT/importance of activity, for safety/falls prevention, to have assist when up   Family Acceptance, DARIUS KRAMER D,  BRANDON,MISTY,NR by  at 3/15/2022 1237    Comment: Education re: purpose of PT/importance of activity, for safety/falls prevention, to have assist when up                   Point: Home exercise program (Not Started)     Learner Progress:  Not documented in this visit.          Point: Precautions (Done)     Learning Progress Summary           Patient Acceptance, E,TB,D, BRANDON,MISTY,NR by  at 3/15/2022 1237    Comment: Education re: purpose of PT/importance of activity, for safety/falls prevention, to have assist when up   Family Acceptance, E,TB,D, BRANDON,MISTY,NR by  at 3/15/2022 1237    Comment: Education re: purpose of PT/importance of activity, for safety/falls prevention, to have assist when up                               User Key     Initials Effective Dates Name Provider Type Discipline     08/02/18 -  Nereida Faust, PT Physical Therapist PT              PT Recommendation and Plan     Plan of Care Reviewed With: patient, family  Progress: improving  Outcome Evaluation: Pt lying in bed agreeable to therapy. Bed mob Mod Ind to get to EOB w/ use of bed rails. Sat EOB and performed AROM BLE x15. Pt stood and amb 120'x2 w/ CGA. Pt used the wall at times for support.       Time Calculation:    PT Charges     Row Name 03/17/22 1324             Time Calculation    Start Time 1110  -TB      Stop Time 1133  -TB      Time Calculation (min) 23 min  -TB      PT Received On 03/17/22  -TB              Time Calculation- PT    Total Timed Code Minutes- PT 23 minute(s)  -TB            User Key  (r) = Recorded By, (t) = Taken By, (c) = Cosigned By    Initials Name Provider Type    TB Daxa Rudolph PTA Physical Therapy Assistant              Therapy Charges for Today     Code Description Service Date Service Provider Modifiers Qty    21311570162 HC GAIT TRAINING EA 15 MIN 3/16/2022 Daxa Rudolph, PTA GP 1    07931370931 HC PT THERAPEUTIC ACT EA 15 MIN 3/16/2022 Daxa Rudolph, PTA GP 1    59252547444 HC GAIT  TRAINING EA 15 MIN 3/17/2022 Daxa Rudolph PTA GP 2          PT G-Codes  Outcome Measure Options: AM-PAC 6 Clicks Daily Activity (OT)  AM-PAC 6 Clicks Score (PT): 18  AM-PAC 6 Clicks Score (OT): 18  Modified Darrin Scale: 3 - Moderate disability.  Requiring some help, but able to walk without assistance.    Daxa Rudolph PTA  3/17/2022      Electronically signed by Daxa Rudolph PTA at 03/17/22 1325       Occupational Therapy Notes (last 24 hours)  Notes from 03/16/22 1400 through 03/17/22 1400   No notes exist for this encounter.

## 2022-03-17 NOTE — PLAN OF CARE
Goal Outcome Evaluation:  Plan of Care Reviewed With: patient, family, caregiver        Progress: improving       Swallow treatment completed. The patient was alert and cooperative. Sitting on the edge of the bed when SLP entered room. Poor intake noted. Patient reports she does not typically eat a lot. Her family reported broccoli residue in oral cavity that she had to remove manually and that the patient was not aware of. Patient re-educated on the importance of lingual or finger sweep at end of meals to clear pocketed residue. Patient has decreased awareness and sensation on the left. At a higher risk for aspiration/choking secondary to decreased sensation and awareness.     Continue regular diet with thin liquids with use of compensatory strategies.     Felicita Daly, MS CCC-SLP 3/17/2022 14:51 CDT

## 2022-03-17 NOTE — THERAPY TREATMENT NOTE
Acute Care - Physical Therapy Treatment Note  [facility]     Patient Name: Evelyn Ojeda  : 1940  MRN: [id number]  Today's Date: 3/17/2022      Visit Dx:     ICD-10-CM ICD-9-CM   1. Cerebrovascular accident (CVA), unspecified mechanism ([facility])  I63.9 434.91   2. Oral phase dysphagia  R13.11 787.21   3. Impaired functional mobility and activity tolerance  Z74.09 V49.89   4. Cerebrovascular accident (CVA) due to thrombosis of right middle cerebral artery (HCC)  I63.311 434.01     Patient Active Problem List   Diagnosis   • PAD (peripheral artery disease) ([facility])   • Essential hypertension   • Mixed hyperlipidemia   • Bilateral carotid artery stenosis   • Acute deep vein thrombosis (DVT) of distal vein of right lower extremity ([facility])   • Atherosclerosis of artery of extremity with intermittent claudication ([facility])   • Chronic osteoarthritis   • Decreased pedal pulses   • Nonhealing ulcer of right lower leg with fat layer exposed ([facility])   • Hyperglycemia   • Type 2 diabetes mellitus with hyperglycemia, without long-term current use of insulin ([facility])   • Acute kidney injury ([facility])   • Postoperative anemia due to acute blood loss   • Hematuria   • Cerebrovascular accident (CVA) ([facility])   • CAD (coronary artery disease)     Past Medical History:   Diagnosis Date   • Diabetes mellitus (HCC)    • DVT (deep venous thrombosis) ([facility])     RLL   • Hypertension    • PVD (peripheral vascular disease) ([facility])      Past Surgical History:   Procedure Laterality Date   • AORTAGRAM N/A 2021    Procedure: 1.  Introduction of catheter/sheath into the aorta 2.  Aortoiliac angiogram with right lower extremity runoff 3.  Contralateral cannulation of the right common iliac artery 4.  Placement of a 4 mm spider distal embolic protection device in the below-knee popliteal artery 5.  Directional atherectomy of the proximal SFA and below-knee popliteal artery using the 6 [place] Pantheris d   • CHOLECYSTECTOMY     • KNEE SURGERY     • LEG  SURGERY       PT Assessment (last 12 hours)     PT Evaluation and Treatment     Row Name 03/17/22 1110          Physical Therapy Time and Intention    Subjective Information no complaints  -TB     Document Type therapy note (daily note)  -TB     Mode of Treatment physical therapy  -TB     Patient Effort good  -TB     Row Name 03/17/22 1110          General Information    Existing Precautions/Restrictions fall  -TB     Row Name 03/17/22 1110          Pain    Pretreatment Pain Rating 0/10 - no pain  -TB     Posttreatment Pain Rating 0/10 - no pain  -TB     Row Name 03/17/22 1110          Bed Mobility    Bed Mobility scooting/bridging;supine-sit;sit-supine  -TB     Scooting/Bridging [place] (Bed Mobility) modified independence  -TB     Supine-Sit [place] (Bed Mobility) modified independence  -TB     Sit-Supine [place] (Bed Mobility) modified independence  -TB     Assistive Device (Bed Mobility) bed rails  -TB     Row Name 03/17/22 1110          Transfers    Transfers sit-stand transfer;stand-sit transfer  -TB     Sit-Stand [place] (Transfers) independent  -TB     Stand-Sit [place] (Transfers) independent  -TB     Row Name 03/17/22 1110          Gait/Stairs (Locomotion)    [place] Level (Gait) contact guard;verbal cues  -TB     Distance in Feet (Gait) 120'x2  -TB     Deviations/Abnormal Patterns (Gait) gait speed decreased;stride length decreased  -TB     Bilateral Gait Deviations forward flexed posture;heel strike decreased  -TB     Row Name 03/17/22 1110          Motor Skills    Therapeutic Exercise --  AROM BLE x15  -TB     Row Name 03/17/22 1110          Plan of Care Review    Plan of Care Reviewed With patient;family  -TB     Progress improving  -TB     Outcome Evaluation Pt lying in bed agreeable to therapy. Bed mob Mod Ind to get to EOB w/ use of bed rails. Sat EOB and performed AROM BLE x15. Pt stood and amb 120'x2 w/ CGA. Pt used the wall at times for support.  -TB     Row Name  03/17/22 1110          Positioning and Restraints    Pre-Treatment Position in bed  -TB     Post Treatment Position bed  -TB     In Bed fowlers;call light within reach;encouraged to call for assist;side rails up x2;with family/caregiver  -TB           User Key  (r) = Recorded By, (t) = Taken By, (c) = Cosigned By    Initials Name Provider Type    TB Daxa Rudolph, PTA Physical Therapy Assistant                Physical Therapy Education                 Title: PT OT SLP Therapies (In Progress)     Topic: Physical Therapy (In Progress)     Point: Mobility training (Done)     Learning Progress Summary           Patient Acceptance, E,TB,D, VU,DU,NR by  at 3/15/2022 1237    Comment: Education re: purpose of PT/importance of activity, for safety/falls prevention, to have assist when up   Family Acceptance, E,TB,D, VU,DU,NR by  at 3/15/2022 1237    Comment: Education re: purpose of PT/importance of activity, for safety/falls prevention, to have assist when up                   Point: Home exercise program (Not Started)     Learner Progress:  Not documented in this visit.          Point: Precautions (Done)     Learning Progress Summary           Patient Acceptance, E,TB,D, VU,DU,NR by  at 3/15/2022 1237    Comment: Education re: purpose of PT/importance of activity, for safety/falls prevention, to have assist when up   Family Acceptance, E,TB,D, VU,DU,NR by  at 3/15/2022 1237    Comment: Education re: purpose of PT/importance of activity, for safety/falls prevention, to have assist when up                               User Key     Initials Effective Dates Name Provider Type Discipline     08/02/18 -  Nereida Faust, PT Physical Therapist PT              PT Recommendation and Plan     Plan of Care Reviewed With: patient, family  Progress: improving  Outcome Evaluation: Pt lying in bed agreeable to therapy. Bed mob Mod Ind to get to EOB w/ use of bed rails. Sat EOB and performed AROM BLE x15. Pt stood and  amb 120'x2 w/ CGA. Pt used the wall at times for support.       Time Calculation:    PT Charges     Row Name 03/17/22 1324             Time Calculation    Start Time 1110  -TB      Stop Time 1133  -TB      Time Calculation (min) 23 min  -TB      PT Received On 03/17/22  -TB              Time Calculation- PT    Total Timed Code Minutes- PT 23 minute(s)  -TB            User Key  (r) = Recorded By, (t) = Taken By, (c) = Cosigned By    Initials Name Provider Type    TB Daxa Rudolph, SOFIA Physical Therapy Assistant              Therapy Charges for Today     Code Description Service Date Service Provider Modifiers Qty    23336490212 HC GAIT TRAINING EA 15 MIN 3/16/2022 Daxa Rudolph, PTA GP 1    94736740071 HC PT THERAPEUTIC ACT EA 15 MIN 3/16/2022 Daxa Rudolph, PTA GP 1    84497996206 HC GAIT TRAINING EA 15 MIN 3/17/2022 Daxa Rudolph, PTA GP 2          PT G-Codes  Outcome Measure Options: AM-PAC 6 Clicks Daily Activity (OT)  AM-PAC 6 Clicks Score (PT): 18  AM-PAC 6 Clicks Score (OT): 18  Modified [place] Scale: 3 - Moderate disability.  Requiring some help, but able to walk without assistance.    Daxa Rudolph PTA  3/17/2022

## 2022-03-17 NOTE — THERAPY TREATMENT NOTE
Patient Name: Evelyn Ojeda  : 1940    MRN: [id number]                              Today's Date: 3/17/2022       Admit Date: 3/14/2022    Visit Dx:     ICD-10-CM ICD-9-CM   1. Cerebrovascular accident (CVA), unspecified mechanism (HCC)  I63.9 434.91   2. Oral phase dysphagia  R13.11 787.21   3. Impaired functional mobility and activity tolerance  Z74.09 V49.89   4. Cerebrovascular accident (CVA) due to thrombosis of right middle cerebral artery (HCC)  I63.311 434.01     Patient Active Problem List   Diagnosis   • PAD (peripheral artery disease) ([facility])   • Essential hypertension   • Mixed hyperlipidemia   • Bilateral carotid artery stenosis   • Acute deep vein thrombosis (DVT) of distal vein of right lower extremity ([facility])   • Atherosclerosis of artery of extremity with intermittent claudication (HCC)   • Chronic osteoarthritis   • Decreased pedal pulses   • Nonhealing ulcer of right lower leg with fat layer exposed ([facility])   • Hyperglycemia   • Type 2 diabetes mellitus with hyperglycemia, without long-term current use of insulin (HCC)   • Acute kidney injury (HCC)   • Postoperative anemia due to acute blood loss   • Hematuria   • Cerebrovascular accident (CVA) (HCC)   • CAD (coronary artery disease)     Past Medical History:   Diagnosis Date   • Diabetes mellitus (HCC)    • DVT (deep venous thrombosis) ([facility])     RLL   • Hypertension    • PVD (peripheral vascular disease) ([facility])      Past Surgical History:   Procedure Laterality Date   • AORTAGRAM N/A 2021    Procedure: 1.  Introduction of catheter/sheath into the aorta 2.  Aortoiliac angiogram with right lower extremity runoff 3.  Contralateral cannulation of the right common iliac artery 4.  Placement of a 4 mm spider distal embolic protection device in the below-knee popliteal artery 5.  Directional atherectomy of the proximal SFA and below-knee popliteal artery using the 6 [place] Pantheris d   • CHOLECYSTECTOMY     • KNEE SURGERY     • LEG  SURGERY        General Information     Row Name 03/17/22 1330 03/17/22 1119       OT Time and Intention    Document Type therapy note (daily note)  - therapy note (daily note)  -    Mode of Treatment occupational therapy  - --    Row Name 03/17/22 1330          General Information    Patient Profile Reviewed yes  -     Existing Precautions/Restrictions fall  -     Barriers to Rehab medically complex  -     Row Name 03/17/22 1330          Living Environment    People in Home child(anselmo), adult  -     Row Name 03/17/22 1330          Cognition    Orientation Status (Cognition) oriented x 4  -     Row Name 03/17/22 1330          Safety Issues, Functional Mobility    Impairments Affecting Function (Mobility) balance;endurance/activity tolerance;strength;coordination;cognition  -           User Key  (r) = Recorded By, (t) = Taken By, (c) = Cosigned By    Initials Name Provider Type     Chana Alexander, OTR/L Occupational Therapist                 Mobility/ADL's     Row Name 03/17/22 1330          Bed Mobility    Bed Mobility supine-sit;sit-supine;scooting/bridging  -     Scooting/Bridging [place] (Bed Mobility) modified independence  -     Supine-Sit [place] (Bed Mobility) supervision  -     Sit-Supine [place] (Bed Mobility) supervision  -     Assistive Device (Bed Mobility) head of bed elevated;bed rails  -     Row Name 03/17/22 1330          Transfers    Transfers sit-stand transfer;stand-sit transfer;toilet transfer  -     Sit-Stand [place] (Transfers) contact guard  -     Stand-Sit [place] (Transfers) contact guard;standby assist;supervision;verbal cues  -     [place] Level (Toilet Transfer) contact guard;verbal cues  -     Assistive Device (Toilet Transfer) grab bars/safety frame  -     Row Name 03/17/22 1330          Sit-Stand Transfer    Comment, (Sit-Stand Transfer) HHA  -     Row Name 03/17/22 1330          Toilet Transfer    Type (Toilet  Transfer) sit-stand;stand-sit  -[facility] Name 03/17/22 1330          Functional Mobility    Functional Mobility- Ind. Level contact guard assist  -     Functional Mobility- Comment HHA and CGA to ambulate in room  -[facility] Name 03/17/22 1330          Activities of Daily Living    BADL Assessment/Intervention toileting;lower body dressing  -[facility] Name 03/17/22 1330          Lower Body Dressing Assessment/Training    [place] Level (Lower Body Dressing) moderate assist (50% patient effort);don;socks;pants/bottoms  -     Position (Lower Body Dressing) edge of bed sitting  -[facility] Name 03/17/22 1330          Toileting Assessment/Training    [place] Level (Toileting) adjust/manage clothing;minimum assist (75% patient effort);contact guard assist;perform perineal hygiene  -     Assistive Devices (Toileting) commode  -     Position (Toileting) unsupported sitting;unsupported standing  -[facility] Name 03/17/22 1330          Upper Body Dressing Assessment/Training    [place] Level (Upper Body Dressing) doff;don;minimum assist (75% patient effort)  -     Position (Upper Body Dressing) unsupported sitting  -     Comment, (Upper Body Dressing) hospital gown  -           User Key  (r) = Recorded By, (t) = Taken By, (c) = Cosigned By    Initials Name Provider Type    Chana Maciel, OTR/L Occupational Therapist               Obj/Interventions     [facility] Name 03/17/22 1330          Vision Assessment/Intervention    Visual Impairment/Limitations peripheral vision impaired left  -     Visual Processing Deficit eddie-inattention/neglect, left  -     Vision Assessment Comment Pt. demo's mild L neglect  -[facility] Name 03/17/22 1330          Balance    Balance Interventions sitting;standing;sit to stand;supported;static;dynamic  -           User Key  (r) = Recorded By, (t) = Taken By, (c) = Cosigned By    Initials Name Provider Type    Chana Maciel, OTR/L Occupational Therapist                Goals/Plan    No documentation.                Clinical Impression     Row Name 03/17/22 1330          Pain Assessment    Pretreatment Pain Rating 0/10 - no pain  -     Posttreatment Pain Rating 0/10 - no pain  -     Row Name 03/17/22 1330          Plan of Care Review    Outcome Evaluation OT tx completed.  Pt. reports no pain.  She demo's mild L neglect, L sided weakness, imbalance, & generalized fatigue.  Per family the pt.'s level of support at home is as helpful as compared to pt.'s report upon eval.  Pt. requires Mod A for LBD, CGA with HHA for ambulation, & is not safe to care for self without assist.  She demo's L neglect, decreased safety, difficulty sequencing tasks.  She would benefit from 24/7 able bodied assist vs. placement.  At this time there is much concern for between family members regarding familial ability to care for self.  It is apparent that pt. over estimates family ability to care for & assist her.  -     Row Name 03/17/22 1330          Therapy Assessment/Plan (OT)    Rehab Potential (OT) good, to achieve stated therapy goals  -     Criteria for Skilled Therapeutic Interventions Met (OT) yes;skilled treatment is necessary  -     Therapy Frequency (OT) 5 times/wk  -     Row Name 03/17/22 1330          Therapy Plan Review/Discharge Plan (OT)    Anticipated Discharge Disposition (OT) sub acute care setting;home with 24/7 care;skilled nursing facility  -     Row Name 03/17/22 1330          Positioning and Restraints    Pre-Treatment Position in bed  -     Post Treatment Position bed  -     In Bed fowlers;call light within reach;encouraged to call for assist;side rails up x2;exit alarm on  -           User Key  (r) = Recorded By, (t) = Taken By, (c) = Cosigned By    Initials Name Provider Type     Chana Alexander, OTR/L Occupational Therapist               Outcome Measures     Row Name 03/17/22 1330          How much help from another is currently needed...     Putting on and taking off regular lower body clothing? 2  -CH     Bathing (including washing, rinsing, and drying) 3  -CH     Toileting (which includes using toilet bed pan or urinal) 3  -CH     Putting on and taking off regular upper body clothing 3  -CH     Taking care of personal grooming (such as brushing teeth) 3  -CH     Eating meals 3  -CH     AM-PAC 6 Clicks Score (OT) 17  -     Row Name 03/17/22 1330          Functional Assessment    Outcome Measure Options AM-PAC 6 Clicks Daily Activity (OT)  -           User Key  (r) = Recorded By, (t) = Taken By, (c) = Cosigned By    Initials Name Provider Type     Chana Alexander, OTR/L Occupational Therapist                Occupational Therapy Education                 Title: PT OT SLP Therapies (In Progress)     Topic: Occupational Therapy (Done)     Point: ADL training (Done)     Description:   Instruct learner(s) on proper safety adaptation and remediation techniques during self care or transfers.   Instruct in proper use of assistive devices.              Learning Progress Summary           Patient Acceptance, E,D, VU,NR by  at 3/17/2022 1445    Acceptance, E, VU by KG at 3/16/2022 1416    Acceptance, E, VU by KG at 3/15/2022 1320                   Point: Home exercise program (Done)     Description:   Instruct learner(s) on appropriate technique for monitoring, assisting and/or progressing therapeutic exercises/activities.              Learning Progress Summary           Patient Acceptance, E, VU by KG at 3/16/2022 1416    Acceptance, E, VU by KG at 3/15/2022 1320                   Point: Precautions (Done)     Description:   Instruct learner(s) on prescribed precautions during self-care and functional transfers.              Learning Progress Summary           Patient Acceptance, E, VU by KG at 3/16/2022 1416    Acceptance, E, VU by KG at 3/15/2022 1320                   Point: Body mechanics (Done)     Description:   Instruct learner(s) on proper  positioning and spine alignment during self-care, functional mobility activities and/or exercises.              Learning Progress Summary           Patient Acceptance, E, VU by KG at 3/16/2022 1416    Acceptance, E, VU by KG at 3/15/2022 1320                               User Key     Initials Effective Dates Name Provider Type [facility] 06/16/21 -  Chana Alexander OTR/L Occupational Therapist OT    [facility] 12/27/21 -  Hermelinda Townsend OT Student OT Student OT              OT Recommendation and Plan  Therapy Frequency (OT): 5 times/wk  Plan of Care Review  Outcome Evaluation: OT tx completed.  Pt. reports no pain.  She demo's mild L neglect, L sided weakness, imbalance, & generalized fatigue.  Per family the pt.'s level of support at home is as helpful as compared to pt.'s report upon eval.  Pt. requires Mod A for LBD, CGA with HHA for ambulation, & is not safe to care for self without assist.  She demo's L neglect, decreased safety, difficulty sequencing tasks.  She would benefit from 24/7 able bodied assist vs. placement.  At this time there is much concern for between family members regarding familial ability to care for self.  It is apparent that pt. over estimates family ability to care for & assist her.     Time Calculation:    Time Calculation- OT     Row Name 03/17/22 1330             Time Calculation- OT    OT Start Time 1330  -CH      OT Stop Time 1405  -CH      OT Time Calculation (min) 35 min  -CH      Total Timed Code Minutes- OT 35 minute(s)  -CH      OT Received On 03/17/22  -CH              Timed Charges    00878 - OT Self Care/Mgmt Minutes 35  -CH              Total Minutes    Timed Charges Total Minutes 35  -CH       Total Minutes 35  -CH            User Key  (r) = Recorded By, (t) = Taken By, (c) = Cosigned By    Initials Name Provider Type     Chana Alexander, OTR/L Occupational Therapist              Therapy Charges for Today     Code Description Service Date Service Provider Modifiers Qty     65404218991 HC OT SELF CARE/MGMT/TRAIN EA 15 MIN 3/17/2022 Chana Alexander, OTR/L GO 2               Chana Alexander OTR/L  3/17/2022

## 2022-03-17 NOTE — DISCHARGE PLACEMENT REQUEST
"  MELL HOLT [phone]  Evelyn Dukes (81 y.o. Female)             Date of Birth   1940    Social Security Number       Address   [address] [place] [address]    Home Phone   [phone]    MRN   [id number]       [facility]    Marital Status                               Admission Date   3/14/22    Admission Type   Emergency    Admitting Provider   Nik Goodson MD    Attending Provider   Nik Goodson MD    Department, Room/Bed   [facility] 3A, 330/1       Discharge Date       Discharge Disposition       Discharge Destination                               Attending Provider: Nik Goodson MD    Allergies: Codeine    Isolation: None   Infection: None   Code Status: CPR   Advance Care Planning Activity    Ht: 154.9 cm (61\")   Wt: 77.1 kg (170 lb)    Admission Cmt: None   Principal Problem: Cerebrovascular accident (CVA) (HCC) [I63.9]                 Active Insurance as of 3/14/2022     Primary Coverage     Payor Plan Insurance Group Employer/Plan Group    [facility] MEDICARE REPLACEMENT [facility] MEDICARE REPLACEMENT [id number]     Payor Plan Address Payor Plan Phone Number Payor Plan Fax Number Effective Dates    [address] [address] [phone]  1/1/2022 - None Entered    [facility] [id number]       Subscriber Name Subscriber Birth Date Member ID       EVELYN DUKES 1940 [id number]                 Emergency Contacts      (Rel.) Home Phone Work Phone Mobile Phone    Lashell DUKES (Daughter) -- -- [phone]    MORELupe (Grandchild) -- -- [phone]    ElverPauline (Daughter) -- -- [phone]               History & Physical      Nik Goodson MD at 03/14/22 [facility]5              [facility] Medicine Services  HISTORY AND PHYSICAL    Date of Admission: 3/14/2022  Primary Care Physician: Roberto Garcia MD    Subjective     Chief Complaint: CVA.    History of Present Illness  Patient " is a 81-year-old presented to ER for evaluation of left-sided weakness.  Patient been having this symptom for about a week now.  [facility] to 3/6/2022 for diabetes and dehydration.  Patient's daughter stated since she came back to the hospital she has never been the same.  Patient has been progressing in a week or over the last week, patient is requiring a cane to walk around per patient.  Daughter stated that patient was extremely weak this morning at 8:30 AM this morning.  Patient has been difficulty swallowing this morning about 2 PM per daughter with drooling on the left side and lefts arm weakness.  EMS was then called patient came to the [facility] ER for code stroke.    Patient has past medical history diabetes, DVT, hypertension, peripheral vascular disease.    CT scan shows CVA . CTA of the neck and head shows no significant blockage.    Review of Systems   Constitutional: Positive for activity change, appetite change and fatigue. Negative for chills and fever.   HENT: Negative for hearing loss, nosebleeds, tinnitus and trouble swallowing.    Eyes: Negative for visual disturbance.   Respiratory: Negative for cough, chest tightness, shortness of breath and wheezing.    Cardiovascular: Negative for chest pain, palpitations and leg swelling.   Gastrointestinal: Negative for abdominal distention, abdominal pain, blood in stool, constipation, diarrhea, nausea and vomiting.   Endocrine: Negative for cold intolerance, heat intolerance, polydipsia, polyphagia and polyuria.   Genitourinary: Negative for decreased urine volume, difficulty urinating, dysuria, flank pain, frequency and hematuria.   Musculoskeletal: Positive for arthralgias, gait problem and myalgias. Negative for joint swelling.   Skin: Negative for rash.   Allergic/Immunologic: Negative for immunocompromised state.   Neurological: Positive for weakness. Negative for dizziness, syncope,  light-headedness and headaches.   Hematological: Negative for adenopathy. Does not bruise/bleed easily.   Psychiatric/Behavioral: Negative for confusion and sleep disturbance. The patient is not nervous/anxious.         Otherwise complete ROS reviewed and negative except as mentioned in the HPI.      Past Medical History:   Past Medical History:   Diagnosis Date   • Diabetes mellitus (HCC)    • DVT (deep venous thrombosis) (HCC)     RLL   • Hypertension    • PVD (peripheral vascular disease) (HCC)        Past Surgical History:  Past Surgical History:   Procedure Laterality Date   • AORTAGRAM N/A 1/6/2021    Procedure: 1.  Introduction of catheter/sheath into the aorta 2.  Aortoiliac angiogram with right lower extremity runoff 3.  Contralateral cannulation of the right common iliac artery 4.  Placement of a 4 mm spider distal embolic protection device in the below-knee popliteal artery 5.  Directional atherectomy of the proximal SFA and below-knee popliteal artery using the 6 [place] [employer] Pantheris d   • CHOLECYSTECTOMY     • KNEE SURGERY     • LEG SURGERY         Family History: family history includes Cancer in her mother and sister.    Social History:  reports that she has never smoked. She has never used smokeless tobacco. She reports that she does not drink alcohol and does not use drugs.    Allergies:  Allergies   Allergen Reactions   • Codeine Other (See Comments)     Medications:  Prior to Admission medications    Medication Sig Start Date End Date Taking? Authorizing Provider   aspirin 81 MG EC tablet Take 81 mg by mouth Daily.    Ana Arce MD   atorvastatin (LIPITOR) 20 MG tablet Take 20 mg by mouth Daily. 12/3/20   Ana Arce MD   Cholecalciferol (Vitamin D3) 50 MCG (2000 UT) tablet Take 2,500 Units by mouth Daily.    Ana Arce MD   clopidogrel (PLAVIX) 75 MG tablet Take 1 tablet by mouth Daily. 1/14/22   Jossie Medina APRN   ferrous sulfate 325 (65 FE) MG tablet  "Take 1 tablet by mouth Daily With Breakfast. 1/11/21   Jorge Riley MD   gabapentin (NEURONTIN) 600 MG tablet Take 600 mg by mouth 3 (Three) Times a Day. 12/3/20   Ana Arce MD   HYDROcodone-acetaminophen (NORCO)  MG per tablet Take 1 tablet by mouth Every 6 (Six) Hours As Needed for Moderate Pain . 1/10/21   Jorge Riley MD   lisinopril (PRINIVIL,ZESTRIL) 20 MG tablet Take 20 mg by mouth Daily. 12/3/20   Ana Arce MD   meloxicam (MOBIC) 15 MG tablet Take 15 mg by mouth Daily. 6/11/21   Ana Arce MD   metFORMIN (GLUCOPHAGE) 500 MG tablet Take 500 mg by mouth Daily With Dinner. 12/3/20   Ana Arce MD   vitamin B-12 (CYANOCOBALAMIN) 1000 MCG tablet Take 1,000 mcg by mouth Daily.    Ana Arce MD       I have utilized all available immediate resources to obtain, update, and review the patient's current medications.    Objective     Vital Signs: /60   Pulse 74   Temp 97.4 °F (36.3 °C) (Oral)   Resp 16   Ht 154.9 cm (61\")   Wt 75.3 kg (165 lb 14.4 oz)   SpO2 100%   BMI 31.35 kg/m²   Physical Exam  Vitals and nursing note reviewed.   Constitutional:       Appearance: She is well-developed.      Comments: Advanced age.   HENT:      Head: Normocephalic.   Eyes:      Conjunctiva/sclera: Conjunctivae normal.      Pupils: Pupils are equal, round, and reactive to light.   Neck:      Vascular: No JVD.   Cardiovascular:      Rate and Rhythm: Normal rate and regular rhythm.      Heart sounds: Normal heart sounds. No murmur heard.    No friction rub. No gallop.   Pulmonary:      Effort: Pulmonary effort is normal. No respiratory distress.      Breath sounds: Normal breath sounds. No wheezing or rales.   Chest:      Chest wall: No tenderness.   Abdominal:      General: Bowel sounds are normal. There is no distension.      Palpations: Abdomen is soft.      Tenderness: There is no abdominal tenderness. There is no guarding or rebound.      " Comments: Obesity.   Musculoskeletal:         General: No tenderness or deformity.      Cervical back: Neck supple.      Comments: Arthritis multiple knuckle   Skin:     General: Skin is warm and dry.      Capillary Refill: Capillary refill takes 2 to 3 seconds.      Findings: No rash.   Neurological:      Mental Status: She is alert and oriented to person, place, and time.      Cranial Nerves: No cranial nerve deficit.      Motor: Weakness present. No abnormal muscle tone.      Gait: Gait abnormal.      Deep Tendon Reflexes: Reflexes normal.      Comments: Cranial keystroke grossly intact.  Negative pronator drift.  Negative finger touch.  Strength 5 out of 5 symmetrical.   Psychiatric:         Behavior: Behavior normal.         Thought Content: Thought content normal.             Results Reviewed:    Lab Results (last 24 hours)     Procedure Component Value Units Date/Time    [place] Draw [[id number]] Collected: 03/14/22 1600    Specimen: Blood Updated: 03/14/22 1703    Narrative:      The following orders were created for panel order [place] Draw.  Procedure                               Abnormality         Status                     ---------                               -----------         ------                     Green Top (Gel)[[id number]]                                  Final result               Lavender Top[[id number]]                                     Final result               Red Top[[id number]]                                          Final result               Light Blue Top[[id number]]                                   Final result                 Please view results for these tests on the individual orders.    Green Top (Gel) [[id number]] Collected: 03/14/22 1600    Specimen: Blood Updated: 03/14/22 1703     Extra Tube Hold for add-ons.     Comment: Auto resulted.       Red Top [[id number]] Collected: 03/14/22 1600    Specimen: Blood Updated: 03/14/22 1703     Extra Tube Hold for add-ons.      Comment: Auto resulted.       Light Blue Top [[id number]] Collected: 03/14/22 1600    Specimen: Blood Updated: 03/14/22 1703     Extra Tube hold for add-on     Comment: Auto resulted       Lavender Top [[id number]] Collected: 03/14/22 1600    Specimen: Blood Updated: 03/14/22 1703     Extra Tube hold for add-on     Comment: Auto resulted       Comprehensive Metabolic Panel [[id number]]  (Abnormal) Collected: 03/14/22 1600    Specimen: Blood Updated: 03/14/22 1630     Glucose 166 mg/dL      BUN 13 mg/dL      Creatinine 1.06 mg/dL      Sodium 139 mmol/L      Potassium 4.0 mmol/L      Chloride 102 mmol/L      CO2 25.0 mmol/L      Calcium 9.5 mg/dL      Total Protein 6.1 g/dL      Albumin 3.40 g/dL      ALT (SGPT) 16 U/L      AST (SGOT) 20 U/L      Alkaline Phosphatase 97 U/L      Total Bilirubin 0.3 mg/dL      Globulin 2.7 gm/dL      A/G Ratio 1.3 g/dL      BUN/Creatinine Ratio 12.3     Anion Gap 12.0 mmol/L      eGFR 53.5 mL/min/1.73      Comment: National Kidney Foundation and American Society of Nephrology (ASN) Task Force recommended calculation based on the Chronic Kidney Disease Epidemiology Collaboration (CKD-EPI) equation refit without adjustment for race.       Narrative:      GFR Normal >60  Chronic Kidney Disease <60  Kidney Failure <15      Troponin [[id number]]  (Normal) Collected: 03/14/22 1600    Specimen: Blood Updated: 03/14/22 1628     Troponin T 0.012 ng/mL     Narrative:      Troponin T Reference Range:  <= 0.03 ng/mL-   Negative for AMI  >0.03 ng/mL-     Abnormal for myocardial necrosis.  Clinicians would have to utilize clinical acumen, EKG, Troponin and serial changes to determine if it is an Acute Myocardial Infarction or myocardial injury due to an underlying chronic condition.       Results may be falsely decreased if patient taking Biotin.      Protime-INR [[id number]]  (Normal) Collected: 03/14/22 1600    Specimen: Blood Updated: 03/14/22 1622     Protime 13.0 Seconds      INR 1.02    aPTT  [[id number]]  (Normal) Collected: 03/14/22 1600    Specimen: Blood Updated: 03/14/22 1622     PTT 27.5 seconds     CBC & Differential [[id number]]  (Abnormal) Collected: 03/14/22 1600    Specimen: Blood Updated: 03/14/22 1612    Narrative:      The following orders were created for panel order CBC & Differential.  Procedure                               Abnormality         Status                     ---------                               -----------         ------                     CBC Auto Differential[[id number]]        Abnormal            Final result                 Please view results for these tests on the individual orders.    CBC Auto Differential [[id number]]  (Abnormal) Collected: 03/14/22 1600    Specimen: Blood Updated: 03/14/22 1612     WBC 9.27 10*3/mm3      RBC 3.87 10*6/mm3      Hemoglobin 11.5 g/dL      Hematocrit 36.4 %      MCV 94.1 fL      MCH 29.7 pg      MCHC 31.6 g/dL      RDW 12.5 %      RDW-SD 43.4 fl      MPV 10.0 fL      Platelets 350 10*3/mm3      Neutrophil % 64.7 %      Lymphocyte % 23.7 %      Monocyte % 9.1 %      Eosinophil % 1.1 %      Basophil % 0.3 %      Immature Grans % 1.1 %      Neutrophils, Absolute 6.00 10*3/mm3      Lymphocytes, Absolute 2.20 10*3/mm3      Monocytes, Absolute 0.84 10*3/mm3      Eosinophils, Absolute 0.10 10*3/mm3      Basophils, Absolute 0.03 10*3/mm3      Immature Grans, Absolute 0.10 10*3/mm3      nRBC 0.0 /100 WBC            Radiology Data:    Imaging Results (Last 24 Hours)     Procedure Component Value Units Date/Time    XR Chest 1 View [[id number]] Collected: 03/14/22 1614     Updated: 03/14/22 1618    Narrative:      EXAMINATION:  XR CHEST 1 VW-  3/14/2022 4:08 PM CDT     HISTORY: Acute Stroke Protocol (Onset < 12 hrs)     COMPARISON: No comparison study.     FINDINGS:  There is hypoventilation with vascular crowding. There is  minimal bronchial wall thickening. There is no focal infiltrate. The  heart is normal in size. The thoracic aorta is  atheromatous. There are  degenerative changes of the spine. Surgical anchor is noted in the right  humeral head.       Impression:      1. No focal infiltrate.  2. Mild hypoventilation with vascular crowding. Mild bronchial wall  thickening.        This report was finalized on 03/14/2022 16:15 by Dr. Tim Ozuna MD.    CT Angiogram Neck [[id number]] Collected: 03/14/22 1556     Updated: 03/14/22 1606    Narrative:      EXAMINATION: CT ANGIOGRAM NECK-      3/14/2022 3:27 PM CDT     HISTORY: Stroke, follow up     In order to have a CT radiation dose as low as reasonably achievable  Automated Exposure Control was utilized for adjustment of the mA and/or  KV according to patient size.     DLP in mGycm= 199.     CT angiogram neck.  CT angiography protocol.   CT imaging with bolus IV contrast injection.   Under concurrent supervision axial, sagittal, coronal, and  three-dimensional data sets were constructed.     Aortic arch calcification.  Patent great vessel origins though there is moderate atherosclerotic  calcification with approximately 50% narrowing of the proximal left  subclavian artery.     Patchy atherosclerotic calcification within the common carotid arteries.     Atherosclerotic calcification at the left ICA origin and within the  proximal 22 mm of the left ICA.  Left ICA origin stenosis with lumen diameter ratio = 3.4/4.5.  25% stenosis based on ACAS/NASCET criteria.  The degree of stenosis is derived by comparing the narrowest segment  with the distal luminal diameter.  2 cm distal to the left ICA origin there is calcified plaque with less  prominent stenosis with a lumen diameter ratio = 4.0/4.5.  12% stenosis based on ACAS/NASCET criteria.  The degree of stenosis is derived by comparing the narrowest segment  with the distal luminal diameter.     Atherosclerotic calcification within the proximal 15 mm of the right  ICA.  Calcified plaque at the right ICA origin with mild stenosis and lumen  diameter  ratio = 3.7/4.3.  14% stenosis based on ACAS/NASCET criteria.  The degree of stenosis is derived by comparing the narrowest segment  with the distal luminal diameter.     No intraluminal thrombus.     The right vertebral artery is dominant.  The left vertebral artery is patent though diminutive throughout its  length.     Summary:  1. Patchy atherosclerotic plaque at both carotid bifurcations with no  high-grade stenosis.  2. The greatest degree of stenosis is at the left ICA origin where there  is 25% narrowing.                                      This report was finalized on 03/14/2022 16:03 by Dr. Jorge Corrigan MD.    CT Angiogram Head w AI Analysis of LVO [[id number]] Collected: 03/14/22 1554     Updated: 03/14/22 1559    Narrative:      EXAMINATION: CT ANGIOGRAM HEAD W AI ANALYSIS OF LVO-     3/14/2022 3:27 PM CDT     HISTORY: Acute Stroke     CT angiogram head.  CT angiography protocol.   CT imaging with bolus IV contrast injection.   Under concurrent supervision axial, sagittal, coronal, and  three-dimensional data sets were constructed.     In order to have a CT radiation dose as low as reasonably achievable  Automated Exposure Control was utilized for adjustment of the mA and/or  KV according to patient size.     DLP in mGycm= 199.     Calcified though patent distal internal carotid arteries.     Intact [place].  Anterior, middle, and posterior cerebral arteries are normally patent.  No aneurysm or proximal arterial occlusion.     There is absence of opacification of distal right MCA branches in the  area of known infarction.     Summary:  1. Intact [place].  2. There is no central arterial occlusion or thrombus.   This report was finalized on 03/14/2022 15:56 by Dr. Jorge Corrigan MD.    CT Head Without Contrast Stroke Protocol [[id number]] Collected: 03/14/22 1531     Updated: 03/14/22 1536    Narrative:      EXAMINATION: CT HEAD WO CONTRAST STROKE PROTOCOL-      3/14/2022 3:26 PM CDT      HISTORY: Stroke, follow up     In order to have a CT radiation dose as low as reasonably achievable  Automated Exposure Control was utilized for adjustment of the mA and/or  KV according to patient size.     DLP in mGycm= 600.     Noncontrast head CT.     Large right MCA hypodense subacute infarct involving an area measuring  approximately 7 x 5 x 4 cm.     No hemorrhagic conversion.  No midline shift.     Ventricle size is normal.     Mild atrophy and small vessel disease.     Summary:  1. Large hypodense subacute right MCA infarct.  2. No intracranial hemorrhage.                                   This report was finalized on 03/14/2022 15:33 by Dr. Jorge Corrigan MD.          I have personally reviewed and interpreted the radiology studies and ECG obtained at time of admission.     Assessment / Plan      Assessment & Plan  Active Hospital Problems    Diagnosis    • Cerebrovascular accident (CVA) ([facility])      Plan .    CVA .  No aspirin for now per neurology.  Chest x-ray-No focal infiltrate, Mild hypoventilation with vascular crowding. Mild bronchial wall  Thickening.  CTA of the head-Intact [place], no central arterial occlusion or thrombus.   CTA of the neck-Patchy atherosclerotic plaque at both carotid bifurcations with no high-grade stenosis, greatest degree of stenosis is at the left ICA origin where there is 25% narrowing.  CT scan head-Large hypodense subacute right MCA infarct, No intracranial hemorrhage.  MRI of the brain pending.    CAD/hypertension/hyperlipidemia.  Lipitor high-dose per neurology.  Hold Plavix per neurology.  Continue lisinopril.  Echocardiogram pending.    Anemia.  Hold iron sulfate.    Chronic pain/arthritis.  Cut back Neurontin.  Cut back Norco.  Hold Mobic.    Diabetes .  Hold Glucophage.    Renal insufficiency.  We will follow.    Nausea.  Zofran as needed    Code Status/Advanced Care Plan: Full code.    The patient's surrogate decision maker is Lashell, daughter.      I  discussed the patient's findings and my recommendations with: Patient and daughter    Estimated length of stay: 1 to 3 days    Electronically signed by Nik Goodson MD, 03/14/22, 5:15 PM CDT.              Electronically signed by Nik Goodson MD at 03/14/22 1751       Operative/Procedure Notes (last 24 hours)  Notes from 03/16/22 1101 through 03/17/22 1101   No notes of this type exist for this encounter.            Physician Progress Notes (last 24 hours)      Tami Sharma APRN at 03/17/22 0918            Neurology Progress Note      Chief Complaint:  F/u code stroke/stroke    Subjective     Subjective:  Patient lying in bed trying to place lower dentures and having difficulty as they are getting hung on left side of lips. No further events or syncope reported or observed. Social work noted reviewed and [place] swing bed not in patient insurance and waiting to hear back from family.     Results for orders placed during the hospital encounter of 03/14/22    Adult Transthoracic Echo Complete W/ Cont if Necessary Per Protocol    Interpretation Summary  · Left ventricular ejection fraction appears to be 56 - 60%. Left ventricular systolic function is normal.  · Left ventricular diastolic function is consistent with (grade I) impaired relaxation.  · Mild aortic valve stenosis is present.  · Normal right ventricular cavity size and systolic function noted.  · There is no evidence of right left shunt on bubble study.  · There is no evidence of intracardiac mass or thrombus.      MRI brain showed Right MCA/PCA stroke with no hemorrhagic conversion. There was concern for CAA and T2* did not show signs of this.  CTA head and neck showed no LVO. Left ICA stenosis 25% and mold right ICA stenosis. CTA head showed absence of rifht MCA branches in area of infarction.         Medications:  Current Facility-Administered Medications   Medication Dose Route Frequency Provider Last Rate Last Admin   • aspirin chewable  tablet 81 mg  81 mg Oral Daily Tami Sharma, APRSOLANGE   81 mg at 03/16/22 0907    Or   • aspirin suppository 300 mg  300 mg Rectal Daily Tami Sharma, APRN       • atorvastatin (LIPITOR) tablet 80 mg  80 mg Oral Nightly Nik Goodson MD   80 mg at 03/16/22 2040   • cefTRIAXone (ROCEPHIN) in [facility] 1 gram/10ml IV PUSH syringe  1 g Intravenous Q24H Nik Goodson MD   1 g at 03/16/22 0908   • dextrose (D50W) (25 g/50 mL) IV injection 25 g  25 g Intravenous Q15 Min PRN Nik Goodson MD       • dextrose (GLUTOSE) oral gel 15 g  15 g Oral Q15 Min PRN Nik Goodson MD       • gabapentin (NEURONTIN) capsule 300 mg  300 mg Oral Q8H Nik Goodson MD   300 mg at 03/17/22 0526   • glucagon (human recombinant) (GLUCAGEN DIAGNOSTIC) injection 1 mg  1 mg Intramuscular Q15 Min PRN Nik Goodson MD       • HYDROcodone-acetaminophen (NORCO) 5-325 MG per tablet 1 tablet  1 tablet Oral Q6H PRN Nik Goodson MD   1 tablet at 03/16/22 0919   • insulin lispro (humaLOG) injection 2-9 Units  2-9 Units Subcutaneous TID AC Nik Goodson MD   2 Units at 03/16/22 1700   • linagliptin (TRADJENTA) tablet 5 mg  5 mg Oral Daily Nik Goodson MD   5 mg at 03/16/22 0908   • lisinopril (PRINIVIL,ZESTRIL) tablet 20 mg  20 mg Oral Daily Nik Goodson MD   20 mg at 03/16/22 0908   • ondansetron (ZOFRAN) injection 4 mg  4 mg Intravenous Q6H PRN Nik Goodson MD       • sodium chloride 0.9 % flush 10 mL  10 mL Intravenous PRN Nik Goodson MD       • sodium chloride 0.9 % flush 10 mL  10 mL Intravenous Q12H Nik Godoson MD   10 mL at 03/16/22 2041   • sodium chloride 0.9 % flush 10 mL  10 mL Intravenous PRN Nik Goodson MD           Review of Systems:   -A 14 point review of systems is completed and is negative except for left sided weakness and left vision loss.       Objective      Vital Signs  Temp:  [97.6 °F (36.4 °C)-99.1 °F (37.3 °C)] 98.5 °F (36.9 °C)  Heart Rate:  [72-87] 85  Resp:  [18] 18  BP: ()/(44-73)  145/72    Telemetry:S 73-94      Physical Exam:  General Exam:  Head:  Normal cephalic, atraumatic  HEENT:  Neck supple  Fundoscopic Exam:  No signs of disc edema  CVS:  Regular rate and rhythm.  No murmurs  Carotid Examination:  No bruits  Lungs:  Clear to auscultation  Abdomen:  Non-tender, Non-distended  Extremities:  No signs of peripheral edema  Skin:  No rashes     Neurologic Exam:     Mental Status:    -Awake, Alert, Oriented to person and place.   -No word finding difficulties  -No aphasia  -No dysarthria  -Follows simple and complex commands     CN II:  Visual fields with left visual field deficit.  Pupils equally reactive to light. Left neglect.   CN III, IV, VI:  Extraocular Muscles full with no signs of nystagmus  CN V:  Facial sensory is symmetric with no asymmetries.  CN VII:  Facial motor asymmetric with left lower facial weakness.   CN VIII:  Gross hearing intact bilaterally  CN IX:  Palate elevates symmetrically  CN X:  Palate elevates symmetrically  CN XI:  Shoulder shrug symmetric  CN XII:  Tongue is midline on protrusion     Motor: (strength out of 5:  1= minimal movement, 2 = movement in plane of gravity, 3 = movement against gravity, 4 = movement against some resistance, 5 = full strength)     -Right Upper Ext: Proximal: 5 Distal: 5  -Left Upper Ext: Proximal: 5   Distal: 5     -Right Lower Ext: Proximal: 5 Distal: 5  -Left Lower Ext: Proximal: 5   Distal: 5     DTR:  -Right              Bicep: 2+         Tricep: 2+        Brachioradialis: 2+              Patella: 2+       Ankle: 2+         Neg Babinski  -Left              Bicep: 2+         Tricep: 2+        Brachioradialis: 2+              Patella: 2+       Ankle: 2+         Neg Babinski     Sensory:  -Intact to light touch, pinprick, temperature, pain, and proprioception     Coordination:  -Finger to nose intact.   -Heel to shin intact  -No ataxia     Gait  Up with assist.         Results Review:    I reviewed the patient's new clinical  results.    Results from last 7 days   Lab Units 03/17/22  0602 03/16/22  0648 03/15/22  0631   WBC 10*3/mm3 8.02 9.22 9.00   HEMOGLOBIN g/dL 10.8* 11.4* 13.2   HEMATOCRIT % 34.4 36.2 42.3   PLATELETS 10*3/mm3 325 343 336        Results from last 7 days   Lab Units 03/17/22  0602 03/16/22  0648 03/15/22  0631 03/14/22  1600   SODIUM mmol/L 135* 134* 138 139   POTASSIUM mmol/L 4.1 4.2 4.3 4.0   CHLORIDE mmol/L 102 101 102 102   CO2 mmol/L 23.0 22.0 23.0 25.0   BUN mg/dL 11 9 11 13   CREATININE mg/dL 0.89 0.83 0.86 1.06*   CALCIUM mg/dL 9.0 9.5 10.1 9.5   BILIRUBIN mg/dL  --   --  0.5 0.3   ALK PHOS U/L  --   --  117 97   ALT (SGPT) U/L  --   --  17 16   AST (SGOT) U/L  --   --  21 20   GLUCOSE mg/dL 166* 190* 132* 166*        Lab Results   Component Value Date    MG 1.5 (L) 01/04/2021    PROTIME 13.0 03/14/2022    INR 1.02 03/14/2022     No components found for: POCGLUC  No components found for: A1C  Lab Results   Component Value Date    HDL 44 03/15/2022     (H) 03/15/2022     No components found for: B12  Lab Results   Component Value Date    TSH 1.630 03/15/2022       MRI brain personally reviewed by me showing acute/subacute right MCA/PCA stroke.       Assessment/Plan     Hospital Problem List      Cerebrovascular accident (CVA) ([facility])    PAD (peripheral artery disease) ([facility])    Mixed hyperlipidemia    Type 2 diabetes mellitus with hyperglycemia, without long-term current use of insulin ([facility])    CAD (coronary artery disease)    Impression:  1. Acute/subacute right MCA/PCA stroke.  2. DM uncontrolled, A1C 11.  3. Dyslipidemia.   3. PAD on dual antiplatelet therapy of ASA 81 mg and Plavix 75 mg daily.  4. Hypertension.  5. Hx DVT  6. UTI per attending.   7. Syncopal episode 3/16/2022. No further episodes reported or observed.     Plan:  1. ASA 81 mg daily. Hold on dual antiplatelet therapy as size and location of stroke at risk for hemorrhagic conversion. May resume dual antiplatelet therapy in 14 days  on 3/28/2022 with Pletal 100 mg BID in place of Plavix.   2. Lipitor 80 mg daily.  3. DM management for A1C goal less than 7.  4. Systolic BP goal less than 160.  5. Transthoracic echo done and pending.   6. ST/PT/OT  7. No driving at discharge and no driving until seen in follow up with neurology clinic.   8. Therapy has recommended continued therapy with skilled nursing or home with home health. [facility] and [facility] pending.    9. 14 day ZIO at discharge.   10. F/u neurology 3-4 weeks.   11. Check orthostatic BP.       JAVID Mcnulty  03/17/22  09:18 CDT    Electronically signed by Tami Sharma APRN at 03/17/22 0930       Consult Notes (last 24 hours)  Notes from 03/16/22 1101 through 03/17/22 1101   No notes of this type exist for this encounter.

## 2022-03-17 NOTE — PROGRESS NOTES
Neurology Progress Note      Chief Complaint:  F/u code stroke/stroke    Subjective     Subjective:  Patient lying in bed trying to place lower dentures and having difficulty as they are getting hung on left side of lips. No further events or syncope reported or observed. Social work noted reviewed and [facility] not in patient insurance and waiting to hear back from family.     Results for orders placed during the hospital encounter of 03/14/22    Adult Transthoracic Echo Complete W/ Cont if Necessary Per Protocol    Interpretation Summary  · Left ventricular ejection fraction appears to be 56 - 60%. Left ventricular systolic function is normal.  · Left ventricular diastolic function is consistent with (grade I) impaired relaxation.  · Mild aortic valve stenosis is present.  · Normal right ventricular cavity size and systolic function noted.  · There is no evidence of right left shunt on bubble study.  · There is no evidence of intracardiac mass or thrombus.      MRI brain showed Right MCA/PCA stroke with no hemorrhagic conversion. There was concern for CAA and T2* did not show signs of this.  CTA head and neck showed no LVO. Left ICA stenosis 25% and mold right ICA stenosis. CTA head showed absence of rifht MCA branches in area of infarction.         Medications:  Current Facility-Administered Medications   Medication Dose Route Frequency Provider Last Rate Last Admin   • aspirin chewable tablet 81 mg  81 mg Oral Daily Tami Sharma APRN   81 mg at 03/16/22 0907    Or   • aspirin suppository 300 mg  300 mg Rectal Daily Tami Sharma APRN       • atorvastatin (LIPITOR) tablet 80 mg  80 mg Oral Nightly Nik Goodson MD   80 mg at 03/16/22 2040   • cefTRIAXone (ROCEPHIN) in FI 1 gram/10ml IV PUSH syringe  1 g Intravenous Q24H Nik Goodson MD   1 g at 03/16/22 0908   • dextrose (D50W) (25 g/50 mL) IV injection 25 g  25 g Intravenous Q15 Min PRN Nik Goodson MD       • dextrose (GLUTOSE)  oral gel 15 g  15 g Oral Q15 Min PRN Nik Goodson MD       • gabapentin (NEURONTIN) capsule 300 mg  300 mg Oral Q8H Nik Goodson MD   300 mg at 03/17/22 0526   • glucagon (human recombinant) (GLUCAGEN DIAGNOSTIC) injection 1 mg  1 mg Intramuscular Q15 Min PRN Nik Goodson MD       • HYDROcodone-acetaminophen (NORCO) 5-325 MG per tablet 1 tablet  1 tablet Oral Q6H PRN Nik Goodson MD   1 tablet at 03/16/22 0919   • insulin lispro (humaLOG) injection 2-9 Units  2-9 Units Subcutaneous TID AC Nik Goodson MD   2 Units at 03/16/22 1700   • linagliptin (TRADJENTA) tablet 5 mg  5 mg Oral Daily Nik Goodson MD   5 mg at 03/16/22 0908   • lisinopril (PRINIVIL,ZESTRIL) tablet 20 mg  20 mg Oral Daily Nik Goodson MD   20 mg at 03/16/22 0908   • ondansetron (ZOFRAN) injection 4 mg  4 mg Intravenous Q6H PRN Nik Goodson MD       • sodium chloride 0.9 % flush 10 mL  10 mL Intravenous PRN Nik Goodson MD       • sodium chloride 0.9 % flush 10 mL  10 mL Intravenous Q12H Nik Goodson MD   10 mL at 03/16/22 2041   • sodium chloride 0.9 % flush 10 mL  10 mL Intravenous PRN Nik Goodson MD           Review of Systems:   -A 14 point review of systems is completed and is negative except for left sided weakness and left vision loss.       Objective      Vital Signs  Temp:  [97.6 °F (36.4 °C)-99.1 °F (37.3 °C)] 98.5 °F (36.9 °C)  Heart Rate:  [72-87] 85  Resp:  [18] 18  BP: ()/(44-73) 145/72    Telemetry:S 73-94      Physical Exam:  General Exam:  Head:  Normal cephalic, atraumatic  HEENT:  Neck supple  Fundoscopic Exam:  No signs of disc edema  CVS:  Regular rate and rhythm.  No murmurs  Carotid Examination:  No bruits  Lungs:  Clear to auscultation  Abdomen:  Non-tender, Non-distended  Extremities:  No signs of peripheral edema  Skin:  No rashes     Neurologic Exam:     Mental Status:    -Awake, Alert, Oriented to person and place.   -No word finding difficulties  -No aphasia  -No  dysarthria  -Follows simple and complex commands     CN II:  Visual fields with left visual field deficit.  Pupils equally reactive to light. Left neglect.   CN III, IV, VI:  Extraocular Muscles full with no signs of nystagmus  CN V:  Facial sensory is symmetric with no asymmetries.  CN VII:  Facial motor asymmetric with left lower facial weakness.   CN VIII:  Gross hearing intact bilaterally  CN IX:  Palate elevates symmetrically  CN X:  Palate elevates symmetrically  CN XI:  Shoulder shrug symmetric  CN XII:  Tongue is midline on protrusion     Motor: (strength out of 5:  1= minimal movement, 2 = movement in plane of gravity, 3 = movement against gravity, 4 = movement against some resistance, 5 = full strength)     -Right Upper Ext: Proximal: 5 Distal: 5  -Left Upper Ext: Proximal: 5   Distal: 5     -Right Lower Ext: Proximal: 5 Distal: 5  -Left Lower Ext: Proximal: 5   Distal: 5     DTR:  -Right              Bicep: 2+         Tricep: 2+        Brachioradialis: 2+              Patella: 2+       Ankle: 2+         Neg Babinski  -Left              Bicep: 2+         Tricep: 2+        Brachioradialis: 2+              Patella: 2+       Ankle: 2+         Neg Babinski     Sensory:  -Intact to light touch, pinprick, temperature, pain, and proprioception     Coordination:  -Finger to nose intact.   -Heel to shin intact  -No ataxia     Gait  Up with assist.         Results Review:    I reviewed the patient's new clinical results.    Results from last 7 days   Lab Units 03/17/22  0602 03/16/22  0648 03/15/22  0631   WBC 10*3/mm3 8.02 9.22 9.00   HEMOGLOBIN g/dL 10.8* 11.4* 13.2   HEMATOCRIT % 34.4 36.2 42.3   PLATELETS 10*3/mm3 325 343 336        Results from last 7 days   Lab Units 03/17/22  0602 03/16/22  0648 03/15/22  0631 03/14/22  1600   SODIUM mmol/L 135* 134* 138 139   POTASSIUM mmol/L 4.1 4.2 4.3 4.0   CHLORIDE mmol/L 102 101 102 102   CO2 mmol/L 23.0 22.0 23.0 25.0   BUN mg/dL 11 9 11 13   CREATININE mg/dL 0.89 0.83  0.86 1.06*   CALCIUM mg/dL 9.0 9.5 10.1 9.5   BILIRUBIN mg/dL  --   --  0.5 0.3   ALK PHOS U/L  --   --  117 97   ALT (SGPT) U/L  --   --  17 16   AST (SGOT) U/L  --   --  21 20   GLUCOSE mg/dL 166* 190* 132* 166*        Lab Results   Component Value Date    MG 1.5 (L) 01/04/2021    PROTIME 13.0 03/14/2022    INR 1.02 03/14/2022     No components found for: POCGLUC  No components found for: A1C  Lab Results   Component Value Date    HDL 44 03/15/2022     (H) 03/15/2022     No components found for: B12  Lab Results   Component Value Date    TSH 1.630 03/15/2022       MRI brain personally reviewed by me showing acute/subacute right MCA/PCA stroke.       Assessment/Plan     Hospital Problem List      Cerebrovascular accident (CVA) ([facility])    PAD (peripheral artery disease) ([facility])    Mixed hyperlipidemia    Type 2 diabetes mellitus with hyperglycemia, without long-term current use of insulin ([facility])    CAD (coronary artery disease)    Impression:  1. Acute/subacute right MCA/PCA stroke.  2. DM uncontrolled, A1C 11.  3. Dyslipidemia.   3. PAD on dual antiplatelet therapy of ASA 81 mg and Plavix 75 mg daily.  4. Hypertension.  5. Hx DVT  6. UTI per attending.   7. Syncopal episode 3/16/2022. No further episodes reported or observed.   8. Orthostatic hypotension.      Plan:  1. ASA 81 mg daily. Hold on dual antiplatelet therapy as size and location of stroke at risk for hemorrhagic conversion. May resume dual antiplatelet therapy in 14 days on 3/28/2022 with Pletal 100 mg BID in place of Plavix.   2. Lipitor 80 mg daily.  3. DM management for A1C goal less than 7.  4. Systolic BP goal less than 160.  5. Transthoracic echo done and pending.   6. ST/PT/OT  7. No driving at discharge and no driving until seen in follow up with neurology clinic.   8. Therapy has recommended continued therapy with skilled nursing or home with home health. [facility] and [facility].     9. 14 day ZIO at discharge.   10. F/u neurology 3-4 weeks.   11. Check orthostatic BP. Orthostatic BP were positive for orthostasis. Recommend compression socks at discharge. Patient to avoid sudden position changes, take warm, not hot, showers, remain hydrated. Will recommend decrease Neurontin to see if this will help as well. Monitor systolic BP.       Tami Sharma, JAVID  03/17/22  09:18 CDT

## 2022-03-17 NOTE — CASE MANAGEMENT/SOCIAL WORK
Continued Stay Note   Clarita     Patient Name: Evelyn Ojeda  MRN: [id number]  Today's Date: 3/17/2022    Admit Date: 3/14/2022     Discharge Plan     Row Name 03/17/22 1415       Plan    Plan CM spoke with grand daughter, [place], who is . She has great concerns about her grandmother going home at this time. Requesting referral to [facility] and [facility]. Acute rehab is first choice if possible. Wants to use time in rehab to make positive changes in current home environment. Pending bed offer [facility] acute/[facility]'s.  Precert will be required.               Discharge Codes    No documentation.                     Hyun Rome RN

## 2022-03-17 NOTE — PLAN OF CARE
Goal Outcome Evaluation:  Plan of Care Reviewed With: patient, family        Progress: improving  Outcome Evaluation: Pt lying in bed agreeable to therapy. Bed mob Mod Ind to get to EOB w/ use of bed rails. Sat EOB and performed AROM BLE x15. Pt stood and amb 120'x2 w/ CGA. Pt used the wall at times for support.

## 2022-03-17 NOTE — PLAN OF CARE
Goal Outcome Evaluation:  Plan of Care Reviewed With: patient, family           Outcome Evaluation: Alert and oriented x4.VSS. Denies pain. No repeat episodes. NIH 1. Speech following. Working with therapy. OOB to BR with assist x1. Social work working with family for discharge planning. Fair appetite. All needs met at this time. Plan of care continued.

## 2022-03-17 NOTE — PROGRESS NOTES
[facility] Medicine Services  INPATIENT PROGRESS NOTE    Length of Stay: 3  Date of Admission: 3/14/2022  Primary Care Physician: Roberto Garcia MD    Subjective   Chief Complaint: CVA/orthostatic hypotension.    HPI   Due to orthostatic hypotension, cut back Neurontin, cut back lisinopril.  Blood pressure stable.  T-max 99.1.  T-current 98.6.  Heart rate stable.    Review of Systems   Constitutional: Positive for activity change, appetite change and fatigue. Negative for chills and fever.   HENT: Negative for hearing loss, nosebleeds, tinnitus and trouble swallowing.    Eyes: Negative for visual disturbance.   Respiratory: Negative for cough, chest tightness, shortness of breath and wheezing.    Cardiovascular: Negative for chest pain, palpitations and leg swelling.   Gastrointestinal: Negative for abdominal distention, abdominal pain, blood in stool, constipation, diarrhea, nausea and vomiting.   Endocrine: Negative for cold intolerance, heat intolerance, polydipsia, polyphagia and polyuria.   Genitourinary: Negative for decreased urine volume, difficulty urinating, dysuria, flank pain, frequency and hematuria.   Musculoskeletal: Positive for arthralgias, gait problem and myalgias. Negative for joint swelling.   Skin: Negative for rash.   Allergic/Immunologic: Negative for immunocompromised state.   Neurological: Positive for weakness. Negative for dizziness, syncope, light-headedness and headaches.   Hematological: Negative for adenopathy. Does not bruise/bleed easily.   Psychiatric/Behavioral: Negative for confusion and sleep disturbance. The patient is not nervous/anxious.         All pertinent negatives and positives are as above. All other systems have been reviewed and are negative unless otherwise stated.     Objective    Temp:  [97.6 °F (36.4 °C)-99.1 °F (37.3 °C)] 98.6 °F (37 °C)  Heart Rate:  [75-92] 75  Resp:  [16-18] 16  BP: (121-160)/(48-84)  126/84    Intake/Output Summary (Last 24 hours) at 3/17/2022 1250  Last data filed at 3/17/2022 0925  Gross per 24 hour   Intake 720 ml   Output --   Net 720 ml     Physical Exam  Vitals and nursing note reviewed.   Constitutional:       Appearance: She is well-developed.      Comments: Advanced age.   HENT:      Head: Normocephalic.   Eyes:      Conjunctiva/sclera: Conjunctivae normal.      Pupils: Pupils are equal, round, and reactive to light.   Neck:      Vascular: No JVD.   Cardiovascular:      Rate and Rhythm: Normal rate and regular rhythm.      Heart sounds: Normal heart sounds. No murmur heard.    No friction rub. No gallop.   Pulmonary:      Effort: Pulmonary effort is normal. No respiratory distress.      Breath sounds: Normal breath sounds. No wheezing or rales.   Chest:      Chest wall: No tenderness.   Abdominal:      General: Bowel sounds are normal. There is no distension.      Palpations: Abdomen is soft.      Tenderness: There is no abdominal tenderness. There is no guarding or rebound.      Comments: Obesity.   Musculoskeletal:         General: No tenderness or deformity.      Cervical back: Neck supple.      Comments: Arthritis multiple knuckle   Skin:     General: Skin is warm and dry.      Capillary Refill: Capillary refill takes 2 to 3 seconds.      Findings: No rash.   Neurological:      Mental Status: She is alert and oriented to person, place, and time.      Cranial Nerves: No cranial nerve deficit.      Motor: Weakness present. No abnormal muscle tone.      Gait: Gait abnormal.      Deep Tendon Reflexes: Reflexes normal.      Comments: Cranial keystroke grossly intact.  Negative pronator drift.  Negative finger touch.  Strength 5 out of 5 symmetrical.   Psychiatric:         Behavior: Behavior normal.         Thought Content: Thought content normal.   Results Review:  Lab Results (last 24 hours)     Procedure Component Value Units Date/Time    POC Glucose Once [[id number]]  (Abnormal)  Collected: 03/17/22 1154    Specimen: Blood Updated: 03/17/22 1209     Glucose 171 mg/dL      Comment: : [id number] Gaby MeganMeter ID: [id number]       POC Glucose Once [[id number]]  (Abnormal) Collected: 03/17/22 0800    Specimen: Blood Updated: 03/17/22 0812     Glucose 149 mg/dL      Comment: : [id number] Gaby MeganMeter ID: [id number]       Basic Metabolic Panel [[id number]]  (Abnormal) Collected: 03/17/22 0602    Specimen: Blood Updated: 03/17/22 0649     Glucose 166 mg/dL      BUN 11 mg/dL      Creatinine 0.89 mg/dL      Sodium 135 mmol/L      Potassium 4.1 mmol/L      Chloride 102 mmol/L      CO2 23.0 mmol/L      Calcium 9.0 mg/dL      BUN/Creatinine Ratio 12.4     Anion Gap 10.0 mmol/L      eGFR 66.0 mL/min/1.73      Comment: National Kidney Foundation and American Society of Nephrology (ASN) Task Force recommended calculation based on the Chronic Kidney Disease Epidemiology Collaboration (CKD-EPI) equation refit without adjustment for race.       Narrative:      GFR Normal >60  Chronic Kidney Disease <60  Kidney Failure <15      CBC (No Diff) [[id number]]  (Abnormal) Collected: 03/17/22 0602    Specimen: Blood Updated: 03/17/22 0631     WBC 8.02 10*3/mm3      RBC 3.69 10*6/mm3      Hemoglobin 10.8 g/dL      Hematocrit 34.4 %      MCV 93.2 fL      MCH 29.3 pg      MCHC 31.4 g/dL      RDW 12.7 %      RDW-SD 43.6 fl      MPV 10.1 fL      Platelets 325 10*3/mm3     POC Glucose Once [[id number]]  (Abnormal) Collected: 03/16/22 1618    Specimen: Blood Updated: 03/16/22 1630     Glucose 163 mg/dL      Comment: : FELISA Talamantester ID: [id number]              Cultures:  Urine Culture   Date Value Ref Range Status   03/15/2022 25,000 CFU/mL Normal Urogenital Lauren  Final       Radiology Data:    Imaging Results (Last 24 Hours)     ** No results found for the last 24 hours. **          Allergies   Allergen Reactions   • Codeine Other (See Comments)       Scheduled meds:   aspirin, 81 mg, Oral,  Daily   Or  aspirin, 300 mg, Rectal, Daily  atorvastatin, 80 mg, Oral, Nightly  cefTRIAXone, 1 g, Intravenous, Q24H  gabapentin, 300 mg, Oral, Q8H  insulin lispro, 2-9 Units, Subcutaneous, TID AC  linagliptin, 5 mg, Oral, Daily  lisinopril, 20 mg, Oral, Daily  sodium chloride, 10 mL, Intravenous, Q12H        PRN meds:  dextrose  •  dextrose  •  glucagon (human recombinant)  •  HYDROcodone-acetaminophen  •  ondansetron  •  sodium chloride  •  sodium chloride    Assessment/Plan       Cerebrovascular accident (CVA) ([facility])    PAD (peripheral artery disease) ([facility])    Mixed hyperlipidemia    Type 2 diabetes mellitus with hyperglycemia, without long-term current use of insulin ([facility])    CAD (coronary artery disease)      Plan:  CVA-extensive right MCA territory infarct.  No aspirin for now per neurology.  Aspirin daily.  Chest x-ray-No focal infiltrate, Mild hypoventilation with vascular crowding. Mild bronchial wall  Thickening.  CTA of the head-Intact [place], no central arterial occlusion or thrombus.   CTA of the neck-Patchy atherosclerotic plaque at both carotid bifurcations with no high-grade stenosis, greatest degree of stenosis is at the left ICA origin where there is 25% narrowing.  CT scan head-Large hypodense subacute right MCA infarct, No intracranial hemorrhage.  MRI of the brain-Extensive right MCA territory infarct with diffusion restriction and ADC mapping abnormality- associated sulcal effacement and edema, No evidence of hemorrhagic conversion or mass effect, Atrophy with small vessel ischemic changes, Remote infarcts involving the right thalamus and  left basal ganglia and right cerebellar hemisphere with focal encephalomalacia and gliosis.  Recommendation from neurologist-  1. ASA 81 mg daily. Hold on dual antiplatelet therapy as size and location of stroke at risk for hemorrhagic conversion. May resume dual antiplatelet therapy in 14 days on 3/28/2022 with Pletal 100 mg BID in place of  Plavix.   2. Lipitor 80 mg daily.  3. DM management for A1C goal less than 7.  4. Systolic BP goal less than 160.  5. Transthoracic echo done and pending.   6. ST/PT/OT  7. No driving at discharge and no driving until seen in follow up with neurology clinic.   8. Therapy has recommended continued therapy with skilled nursing or home with home health. [facility] and [facility] pending.    9. 14 day ZIO at discharge.   10. F/u neurology 3-4 weeks.      CAD/hypertension/hyperlipidemia.  Lipitor high-dose per neurology.  Hold Plavix per neurology.  Decrease lisinopril.  Echocardiogram-ejection fraction 56 to 60%, diastolic dysfunction grade 1, mild mitral valve stenosis, no evidence of right to left shunt.    UTI.  Rocephin.     Anemia.  Hold iron sulfate.     Chronic pain/arthritis.  Cut back Neurontin.  Cut back Norco.  Hold Mobic.     Diabetes .  Hold Glucophage.  Hemoglobin A1 C 11.  Discussed patient will need better control of her diabetes.    Continue Tradjenta.  Continue with low sliding scale.     Renal insufficiency.    Resolved.     Nausea.  Zofran as needed     Urine culture pending.  Covid-19-negative.     Discharge Planning: Plan for rehab placement.  Plan for swing bed placement.    Electronically signed by Nik Goodson MD, 03/17/22, 12:50 PM CDT.

## 2022-03-17 NOTE — PLAN OF CARE
Goal Outcome Evaluation:              Outcome Evaluation: OT tx completed.  Pt. reports no pain.  She demo's mild L neglect, L sided weakness, imbalance, & generalized fatigue.  Per family the pt.'s level of support at home is as helpful as compared to pt.'s report upon eval.  Pt. requires Mod A for LBD, CGA with HHA for ambulation, & is not safe to care for self without assist.  She demo's L neglect, decreased safety, difficulty sequencing tasks.  She would benefit from 24/7 able bodied assist vs. placement.  At this time there is much concern for between family members regarding familial ability to care for self.  It is apparent that pt. over estimates family ability to care for & assist her.

## 2022-03-17 NOTE — DISCHARGE PLACEMENT REQUEST
"  MELL HOLT [phone]  Evelyn Dukes (81 y.o. Female)             Date of Birth   1940    Social Security Number       Address   [address] [place] [address]    Home Phone   [phone]    MRN   [id number]       [facility]    Marital Status                               Admission Date   3/14/22    Admission Type   Emergency    Admitting Provider   Nik Goodson MD    Attending Provider   Nik Goodson MD    Department, Room/Bed   [facility] 3A, 330/1       Discharge Date       Discharge Disposition       Discharge Destination                               Attending Provider: Nik Goodson MD    Allergies: Codeine    Isolation: None   Infection: None   Code Status: CPR   Advance Care Planning Activity    Ht: 154.9 cm (61\")   Wt: 77.1 kg (170 lb)    Admission Cmt: None   Principal Problem: Cerebrovascular accident (CVA) (HCC) [I63.9]                 Active Insurance as of 3/14/2022     Primary Coverage     Payor Plan Insurance Group Employer/Plan Group    [facility] MEDICARE REPLACEMENT [facility] MEDICARE REPLACEMENT [id number]     Payor Plan Address Payor Plan Phone Number Payor Plan Fax Number Effective Dates    [address] [address] [phone]  2022 - None Entered    [facility] [id number]       Subscriber Name Subscriber Birth Date Member ID       EVELYN DUKES 1940 [id number]                 Emergency Contacts      (Rel.) Home Phone Work Phone Mobile Phone    Lashell DUKES (Daughter) -- -- [phone]    Lupe AGUERO (Grandchild) -- -- [phone]    ElverPauline (Daughter) -- -- [phone]               Physical Therapy Notes (last 48 hours)      Daxa Rudolph PTA at 03/15/22 1343  Version 1 of 1         Acute Care - Physical Therapy Treatment Note  [facility]     Patient Name: Evelyn Dukes  : 1940  MRN: [id number]  Today's Date: 3/15/2022      Visit Dx:     ICD-10-CM ICD-9-CM   1. Cerebrovascular " accident (CVA), unspecified mechanism ([facility])  I63.9 434.91   2. Oral phase dysphagia  R13.11 787.21   3. Impaired functional mobility and activity tolerance  Z74.09 V49.89     Patient Active Problem List   Diagnosis   • PAD (peripheral artery disease) ([facility])   • Essential hypertension   • Mixed hyperlipidemia   • Bilateral carotid artery stenosis   • Acute deep vein thrombosis (DVT) of distal vein of right lower extremity ([facility])   • Atherosclerosis of artery of extremity with intermittent claudication ([facility])   • Chronic osteoarthritis   • Decreased pedal pulses   • Nonhealing ulcer of right lower leg with fat layer exposed ([facility])   • Hyperglycemia   • Type 2 diabetes mellitus with hyperglycemia, without long-term current use of insulin (HCC)   • Acute kidney injury ([facility])   • Postoperative anemia due to acute blood loss   • Hematuria   • Cerebrovascular accident (CVA) ([facility])   • CAD (coronary artery disease)     Past Medical History:   Diagnosis Date   • Diabetes mellitus ([facility])    • DVT (deep venous thrombosis) ([facility])     RLL   • Hypertension    • PVD (peripheral vascular disease) ([facility])      Past Surgical History:   Procedure Laterality Date   • AORTAGRAM N/A 1/6/2021    Procedure: 1.  Introduction of catheter/sheath into the aorta 2.  Aortoiliac angiogram with right lower extremity runoff 3.  Contralateral cannulation of the right common iliac artery 4.  Placement of a 4 mm spider distal embolic protection device in the below-knee popliteal artery 5.  Directional atherectomy of the proximal SFA and below-knee popliteal artery using the 6 [place] Pantheris d   • CHOLECYSTECTOMY     • KNEE SURGERY     • LEG SURGERY       PT Assessment (last 12 hours)     PT Evaluation and Treatment     Row Name 03/15/22 1320          Physical Therapy Time and Intention    Subjective Information no complaints  -TB     Document Type therapy note (daily note)  -TB     Mode of Treatment physical therapy  -TB     Patient Effort good  -TB      Row Name 03/15/22 1320          General Information    Existing Precautions/Restrictions fall  -TB     Row Name 03/15/22 1320          Pain    Pretreatment Pain Rating 0/10 - no pain  -TB     Posttreatment Pain Rating 0/10 - no pain  -TB     Row Name 03/15/22 1320          Bed Mobility    Bed Mobility rolling left;rolling right;scooting/bridging;supine-sit;sit-supine  -TB     Rolling Left [place] (Bed Mobility) modified independence  -TB     Rolling Right [place] (Bed Mobility) modified independence  -TB     Scooting/Bridging [place] (Bed Mobility) modified independence  -TB     Supine-Sit [place] (Bed Mobility) modified independence  -TB     Sit-Supine [place] (Bed Mobility) modified independence  -TB     Assistive Device (Bed Mobility) bed rails  -TB     Row Name 03/15/22 1320          Transfers    Transfers sit-stand transfer;stand-sit transfer;toilet transfer  -TB     Sit-Stand [place] (Transfers) standby assist  -TB     Stand-Sit [place] (Transfers) standby assist  -TB     [place] Level (Toilet Transfer) contact guard;verbal cues  -TB     Assistive Device (Toilet Transfer) grab bars/safety frame  -TB     Row Name 03/15/22 1320          Toilet Transfer    Type (Toilet Transfer) sit-stand;stand-sit  -TB     Row Name 03/15/22 1320          Gait/Stairs (Locomotion)    [place] Level (Gait) contact guard;standby assist  -TB     Distance in Feet (Gait) 150'x2  -TB     Deviations/Abnormal Patterns (Gait) gait speed decreased;stride length decreased  -TB     Bilateral Gait Deviations forward flexed posture;heel strike decreased  -TB     Row Name 03/15/22 1320          Positioning and Restraints    Pre-Treatment Position in bed  -TB     Post Treatment Position bed  -TB     In Bed fowlers;call light within reach;encouraged to call for assist;exit alarm on;side rails up x2  -TB           User Key  (r) = Recorded By, (t) = Taken By, (c) = Cosigned By    Initials Name Provider  Type    Daxa Fields PTA Physical Therapy Assistant                Physical Therapy Education                 Title: PT OT SLP Therapies (In Progress)     Topic: Physical Therapy (In Progress)     Point: Mobility training (Done)     Learning Progress Summary           Patient Acceptance, E,TB,D, VU,DU,NR by  at 3/15/2022 1237    Comment: Education re: purpose of PT/importance of activity, for safety/falls prevention, to have assist when up   Family Acceptance, E,TB,D, VU,DU,NR by  at 3/15/2022 1237    Comment: Education re: purpose of PT/importance of activity, for safety/falls prevention, to have assist when up                   Point: Home exercise program (Not Started)     Learner Progress:  Not documented in this visit.          Point: Precautions (Done)     Learning Progress Summary           Patient Acceptance, E,TB,D, VU,DU,NR by  at 3/15/2022 1237    Comment: Education re: purpose of PT/importance of activity, for safety/falls prevention, to have assist when up   Family Acceptance, E,TB,D, VU,DU,NR by  at 3/15/2022 1237    Comment: Education re: purpose of PT/importance of activity, for safety/falls prevention, to have assist when up                               User Key     Initials Effective Dates Name Provider Type Discipline     08/02/18 -  Nereida Faust, PT Physical Therapist PT              PT Recommendation and Plan             Time Calculation:    PT Charges     Row Name 03/15/22 1420 03/15/22 1113          Time Calculation    Start Time 1320  - 0950  -     Stop Time 1343  - 1100  -     Time Calculation (min) 23 min  - 70 min  -     PT Received On 03/15/22  - 03/15/22  -     PT Goal Re-Cert Due Date -- 03/25/22  -            Time Calculation- PT    Total Timed Code Minutes- PT 23 minute(s)  - --           User Key  (r) = Recorded By, (t) = Taken By, (c) = Cosigned By    Initials Name Provider Type    Daxa Fields PTA Physical Therapy  Assistant    Nereida rOozco, PT Physical Therapist              Therapy Charges for Today     Code Description Service Date Service Provider Modifiers Qty    [id number] HC GAIT TRAINING EA 15 MIN 3/15/2022 Daxa Rudolph PTA GP 2          PT G-Codes  Outcome Measure Options: AM-PAC 6 Clicks Basic Mobility (PT)  AM-PAC 6 Clicks Score (PT): 18    Daxa Rudolph PTA  3/15/2022      Electronically signed by Daxa Rudolph PTA at 03/15/22 1420     Nereida Faust PT at 03/15/22 1614  Version 1        Goal Outcome Evaluation:  Plan of Care Reviewed With: patient, daughter        Progress: improving  Outcome Evaluation: PT eval completed.  Pt motivated and agreeable to therapy.  Pt w/ decrease coordination in the L UE, decrease strength B shlds and L hip flexor, decrease dynamic sitting balance, and decrease I w/ tfers and gait requiring CGA and HHA for ambulation.  Pt w/ decrease gait speed, step length and heel strike.  Pt required HHA X 1.  Pt reports she has a cane and rwx and usually carries the cane w/ her not always using it, but has it.  Pt will benefit from continued PT services for improved strength, balance, activity tolerance, coordination, and to improve I w/ tfers/gait w/ the most appropriate AD.  Pt reports she already has HH and has help from family  and would like to return home.  If pt truly does have 24/7 assist, feel pt can return home at discharge, however recommed continued skilled care.  Would recommend HH therapy.  Will follow for progress and needs.  May benefit from use of rwx to improve stability and reduce fall risk.    Electronically signed by Nereida Faust PT at 03/15/22 161     Nereida Faust PT at 03/15/22 1614  Version 1 of          Patient Name: Evelyn Ojeda  : 1940    MRN: [id number]                              Today's Date: 3/15/2022       Admit Date: 3/14/2022    Visit Dx:     ICD-10-CM ICD-9-CM   1. Cerebrovascular accident  (CVA), unspecified mechanism (HCC)  I63.9 434.91   2. Oral phase dysphagia  R13.11 787.21   3. Impaired functional mobility and activity tolerance  Z74.09 V49.89     Patient Active Problem List   Diagnosis   • PAD (peripheral artery disease) ([facility])   • Essential hypertension   • Mixed hyperlipidemia   • Bilateral carotid artery stenosis   • Acute deep vein thrombosis (DVT) of distal vein of right lower extremity (HCC)   • Atherosclerosis of artery of extremity with intermittent claudication (HCC)   • Chronic osteoarthritis   • Decreased pedal pulses   • Nonhealing ulcer of right lower leg with fat layer exposed (HCC)   • Hyperglycemia   • Type 2 diabetes mellitus with hyperglycemia, without long-term current use of insulin (HCC)   • Acute kidney injury (HCC)   • Postoperative anemia due to acute blood loss   • Hematuria   • Cerebrovascular accident (CVA) ([facility])   • CAD (coronary artery disease)     Past Medical History:   Diagnosis Date   • Diabetes mellitus (HCC)    • DVT (deep venous thrombosis) ([facility])     RLL   • Hypertension    • PVD (peripheral vascular disease) ([facility])      Past Surgical History:   Procedure Laterality Date   • AORTAGRAM N/A 1/6/2021    Procedure: 1.  Introduction of catheter/sheath into the aorta 2.  Aortoiliac angiogram with right lower extremity runoff 3.  Contralateral cannulation of the right common iliac artery 4.  Placement of a 4 mm spider distal embolic protection device in the below-knee popliteal artery 5.  Directional atherectomy of the proximal SFA and below-knee popliteal artery using the 6 [place] Pantheris d   • CHOLECYSTECTOMY     • KNEE SURGERY     • LEG SURGERY        General Information     Row Name 03/15/22 0950          Physical Therapy Time and Intention    Document Type evaluation;other (see comments)  see MAR  -JE     Mode of Treatment physical therapy  -JE     Row Name 03/15/22 0950          General Information    Patient Profile Reviewed yes  -JE     Prior Level of  Function independent:;all household mobility;community mobility;ADL's;home management;driving;shopping  -     Existing Precautions/Restrictions fall  -     Barriers to Rehab previous functional deficit  -     Row Name 03/15/22 0950          Living Environment    People in Home child(anselmo), adult  -     Name(s) of People in Home 2 dtrs w/ pt in the home, a 3 rd daughter right up the road, but there every day  -     Row Name 03/15/22 0950          Home Main Entrance    Number of Stairs, Main Entrance other (see comments)  ramp  -     Row Name 03/15/22 0950          Stairs Within Home, Primary    Number of Stairs, Within Home, Primary none  -     Row Name 03/15/22 0950          Cognition    Orientation Status (Cognition) oriented x 4;other (see comments)  pt initially stated it was the 2nd month, however was able to correct that answer and say it was March  -     Row Name 03/15/22 0950          Safety Issues, Functional Mobility    Safety Issues Affecting Function (Mobility) safety precaution awareness  -     Impairments Affecting Function (Mobility) balance;endurance/activity tolerance;coordination;postural/trunk control;strength  -           User Key  (r) = Recorded By, (t) = Taken By, (c) = Cosigned By    Initials Name Provider Type    Nereida Orozco, PT Physical Therapist               Mobility     Row Name 03/15/22 0950          Bed Mobility    Bed Mobility rolling left;rolling right;scooting/bridging;supine-sit;sit-supine  -     Rolling Left [place] (Bed Mobility) standby assist;modified independence  -     Rolling Right [place] (Bed Mobility) standby assist;modified independence  -     Scooting/Bridging [place] (Bed Mobility) standby assist;independent  -     Supine-Sit [place] (Bed Mobility) standby assist  -     Sit-Supine [place] (Bed Mobility) standby assist  -     Assistive Device (Bed Mobility) bed rails  -     Row Name 03/15/22 0950           Transfers    Comment, (Transfers) on/off toilet w/ CGA  -     Row Name 03/15/22 0950          Sit-Stand Transfer    Sit-Stand [place] (Transfers) contact guard;verbal cues  -     Row Name 03/15/22 0950          Gait/Stairs (Locomotion)    [place] Level (Gait) contact guard  -     Assistive Device (Gait) other (see comments)  HHA X 1  -     Distance in Feet (Gait) 150 ft  -     Deviations/Abnormal Patterns (Gait) gait speed decreased;stride length decreased;base of support, wide  -     Bilateral Gait Deviations forward flexed posture;heel strike decreased  -           User Key  (r) = Recorded By, (t) = Taken By, (c) = Cosigned By    Initials Name Provider Type     Nereida Faust, PT Physical Therapist               Obj/Interventions     Row Name 03/15/22 0950          Range of Motion Comprehensive    General Range of Motion no range of motion deficits identified  -     Comment, General Range of Motion except noted arthritic deformity in B hands  -     Row Name 03/15/22 0950          Strength Comprehensive (MMT)    Comment, General Manual Muscle Testing (MMT) Assessment B shld flex 4-/5, L hip flexor 3+ to 4-/5; all other ms groups 4+ to 5/5  -     Row Name 03/15/22 0950          Motor Skills    Motor Skills other (see comments)  decrease accuracy w/ FTN on L, finger opposition intact, heel to shin intact, difficulty w/ LEANDRO in UEs noted deficits on L  -     Row Name 03/15/22 0950          Balance    Balance Assessment sitting static balance;sitting dynamic balance;sit to stand dynamic balance;standing static balance;standing dynamic balance  -     Static Sitting Balance supervision  -     Dynamic Sitting Balance contact guard;supervision  posterior lean w/ dynamic activity  -     Position, Sitting Balance supported;unsupported;sitting edge of bed  -     Sit to Stand Dynamic Balance contact guard  -     Static Standing Balance contact guard  -     Dynamic Standing Balance  contact guard  -JE     Position/Device Used, Standing Balance supported;unsupported;other (see comments)  HHA X 1  -JE     Row Name 03/15/22 0950          Sensory Assessment (Somatosensory)    Sensory Assessment (Somatosensory) other (see comments)  reports no c/os N/T  -JE           User Key  (r) = Recorded By, (t) = Taken By, (c) = Cosigned By    Initials Name Provider Type    Nereida Orozco, PT Physical Therapist               Goals/Plan     Row Name 03/15/22 0950          Bed Mobility Goal 1 (PT)    Activity/Assistive Device (Bed Mobility Goal 1, PT) bed mobility activities, all  -JE     [place] Level/Cues Needed (Bed Mobility Goal 1, PT) independent  -JE     Time Frame (Bed Mobility Goal 1, PT) long term goal (LTG);10 days  -JE     Progress/Outcomes (Bed Mobility Goal 1, PT) goal ongoing  -     Row Name 03/15/22 0950          Transfer Goal 1 (PT)    Activity/Assistive Device (Transfer Goal 1, PT) sit-to-stand/stand-to-sit;bed-to-chair/chair-to-bed  -JE     [place] Level/Cues Needed (Transfer Goal 1, PT) standby assist;independent  -JE     Time Frame (Transfer Goal 1, PT) long term goal (LTG);10 days  -JE     Progress/Outcome (Transfer Goal 1, PT) goal ongoing  -     Row Name 03/15/22 0950          Gait Training Goal 1 (PT)    Activity/Assistive Device (Gait Training Goal 1, PT) gait (walking locomotion);decrease fall risk;diminish gait deviation;assistive device use;improve balance and speed;increase endurance/gait distance;other (see comments)  cane vs rwx  -JE     [place] Level (Gait Training Goal 1, PT) standby assist;modified independence  -JE     Distance (Gait Training Goal 1, PT) 200 ft+  -JE     Time Frame (Gait Training Goal 1, PT) long term goal (LTG);10 days  -JE     Progress/Outcome (Gait Training Goal 1, PT) goal ongoing  -     Row Name 03/15/22 0950          Problem Specific Goal 1 (PT)    Problem Specific Goal 1 (PT) dynamic sitting balance w/out posterior lean or need  for assist to maintain  -JE     Time Frame (Problem Specific Goal 1, PT) long-term goal (LTG);other (see comments)  10 days  -JE     Progress/Outcome (Problem Specific Goal 1, PT) goal ongoing  -JE     Row Name 03/15/22 0950          Patient Education Goal (PT)    Activity (Patient Education Goal, PT) I w/ HEP for strengthening/coordination  -     [place]/Cues/Accuracy (Memory Goal 2, PT) demonstrates adequately;independent;verbalizes understanding  -JE     Time Frame (Patient Education Goal, PT) long term goal (LTG);10 days  -JE     Progress/Outcome (Patient Education Goal, PT) goal ongoing  -JE           User Key  (r) = Recorded By, (t) = Taken By, (c) = Cosigned By    Initials Name Provider Type    Nereida Orozco, PT Physical Therapist               Clinical Impression     Row Name 03/15/22 0950          Pain    Pretreatment Pain Rating 0/10 - no pain  -     Posttreatment Pain Rating 0/10 - no pain  -     Row Name 03/15/22 0950          Plan of Care Review    Plan of Care Reviewed With patient;daughter  -     Progress improving  -     Outcome Evaluation PT eval completed.  Pt motivated and agreeable to therapy.  Pt w/ decrease coordination in the L UE, decrease strength B shlds and L hip flexor, decrease dynamic sitting balance, and decrease I w/ tfers and gait requiring CGA and HHA for ambulation.  Pt w/ decrease gait speed, step length and heel strike.  Pt required HHA X 1.  Pt reports she has a cane and rwx and usually carries the cane w/ her not always using it, but has it.  Pt will benefit from continued PT services for improved strength, balance, activity tolerance, coordination, and to improve I w/ tfers/gait w/ the most appropriate AD.  Pt reports she already has HH and has help from family 24/7 and would like to return home.  If pt truly does have 24/7 assist, feel pt can return home at discharge, however recommed continued skilled care.  Would recommend HH therapy.  Will follow for  progress and needs.  May benefit from use of rwx to improve stability and reduce fall risk.  -     Row Name 03/15/22 0950          Therapy Assessment/Plan (PT)    Patient/Family Therapy Goals Statement (PT) return home  -JE     Rehab Potential (PT) good, to achieve stated therapy goals  -     Criteria for Skilled Interventions Met (PT) yes;meets criteria;skilled treatment is necessary  -     Predicted Duration of Therapy Intervention (PT) until discharge or goals achieved  -     Row Name 03/15/22 0950          Vital Signs    Pretreatment Heart Rate (beats/min) 82  -JE     Posttreatment Heart Rate (beats/min) 83  -JE     Pre SpO2 (%) 95  -JE     O2 Delivery Pre Treatment room air  -JE     O2 Delivery Intra Treatment room air  -JE     Post SpO2 (%) 95  -JE     O2 Delivery Post Treatment room air  -JE     Pre Patient Position Supine  -JE     Intra Patient Position Standing  -JE     Post Patient Position Supine  -JE     Row Name 03/15/22 0950          Positioning and Restraints    Pre-Treatment Position in bed  -JE     Post Treatment Position bed  -JE     In Bed fowlers;call light within reach;encouraged to call for assist;with family/caregiver;side rails up x2;SCD pump applied;exit alarm on  -           User Key  (r) = Recorded By, (t) = Taken By, (c) = Cosigned By    Initials Name Provider Type    Nereida Orozco, PT Physical Therapist               Outcome Measures     Row Name 03/15/22 0950          How much help from another person do you currently need...    Turning from your back to your side while in flat bed without using bedrails? 3  -JE     Moving from lying on back to sitting on the side of a flat bed without bedrails? 3  -JE     Moving to and from a bed to a chair (including a wheelchair)? 3  -JE     Standing up from a chair using your arms (e.g., wheelchair, bedside chair)? 3  -JE     Climbing 3-5 steps with a railing? 3  -JE     To walk in hospital room? 3  -JE     AM-PAC 6 Clicks Score (PT)  18  -JE     Row Name 03/15/22 0951          Modified [place] Scale    Pre-Stroke Modified [place] Scale 0 - No Symptoms at all.  -CS (r) KG (t) CS (c)     Modified [place] Scale 3 - Moderate disability.  Requiring some help, but able to walk without assistance.  -CS (r) KG (t) CS (c)     Row Name 03/15/22 0951 03/15/22 0950       Functional Assessment    Outcome Measure Options AM-PAC 6 Clicks Daily Activity (OT);Modified [place]  -CS (r) KG (t) CS (c) AM-PAC 6 Clicks Basic Mobility (PT)  -          User Key  (r) = Recorded By, (t) = Taken By, (c) = Cosigned By    Initials Name Provider Type    CS Rosalva Augustin, OTR/L, CNT Occupational Therapist    Nereida Orozco, PT Physical Therapist    Hermelinda Olivo, OT Student OT Student                             Physical Therapy Education                 Title: PT OT SLP Therapies (In Progress)     Topic: Physical Therapy (In Progress)     Point: Mobility training (Done)     Learning Progress Summary           Patient Acceptance, E,TB,D, VU,DU,NR by  at 3/15/2022 1237    Comment: Education re: purpose of PT/importance of activity, for safety/falls prevention, to have assist when up   Family Acceptance, E,TB,D, VU,DU,NR by  at 3/15/2022 1237    Comment: Education re: purpose of PT/importance of activity, for safety/falls prevention, to have assist when up                   Point: Home exercise program (Not Started)     Learner Progress:  Not documented in this visit.          Point: Precautions (Done)     Learning Progress Summary           Patient Acceptance, E,TB,D, VU,DU,NR by  at 3/15/2022 1237    Comment: Education re: purpose of PT/importance of activity, for safety/falls prevention, to have assist when up   Family Acceptance, E,TB,D, VU,DU,NR by  at 3/15/2022 1237    Comment: Education re: purpose of PT/importance of activity, for safety/falls prevention, to have assist when up                               User Key     Initials Effective Dates Name  Provider Type Discipline     08/02/18 -  Nereida Faust, PT Physical Therapist PT              PT Recommendation and Plan  Planned Therapy Interventions (PT): balance training, bed mobility training, gait training, home exercise program, motor coordination training, patient/family education, postural re-education, ROM (range of motion), strengthening, transfer training, other (see comments) (safety/falls prevention)  Plan of Care Reviewed With: patient, daughter  Progress: improving  Outcome Evaluation: PT eval completed.  Pt motivated and agreeable to therapy.  Pt w/ decrease coordination in the L UE, decrease strength B shlds and L hip flexor, decrease dynamic sitting balance, and decrease I w/ tfers and gait requiring CGA and HHA for ambulation.  Pt w/ decrease gait speed, step length and heel strike.  Pt required HHA X 1.  Pt reports she has a cane and rwx and usually carries the cane w/ her not always using it, but has it.  Pt will benefit from continued PT services for improved strength, balance, activity tolerance, coordination, and to improve I w/ tfers/gait w/ the most appropriate AD.  Pt reports she already has HH and has help from family 24/7 and would like to return home.  If pt truly does have 24/7 assist, feel pt can return home at discharge, however recommed continued skilled care.  Would recommend HH therapy.  Will follow for progress and needs.  May benefit from use of rwx to improve stability and reduce fall risk.     Time Calculation:    PT Charges     Row Name 03/15/22 1420 03/15/22 1113          Time Calculation    Start Time 1320  - 0950  -     Stop Time 1343  -TB 1100  -     Time Calculation (min) 23 min  -TB 70 min  -     PT Received On 03/15/22  -TB 03/15/22  -     PT Goal Re-Cert Due Date -- 03/25/22  -            Time Calculation- PT    Total Timed Code Minutes- PT 23 minute(s)  -TB --           User Key  (r) = Recorded By, (t) = Taken By, (c) = Cosigned By    Initials Name  Provider Type    TB Daxa Rudolph PTA Physical Therapy Assistant    Nereida Orozco, PT Physical Therapist              Therapy Charges for Today     Code Description Service Date Service Provider Modifiers Qty    85327841174 HC PT EVAL MOD COMPLEXITY 4 3/15/2022 Nereida Faust, PT GP 1    90413334673 HC GAIT TRAINING EA 15 MIN 3/15/2022 Nereida Faust, PT GP 1          PT G-Codes  Outcome Measure Options: AM-PAC 6 Clicks Daily Activity (OT), Modified Laura  AM-PAC 6 Clicks Score (PT): 18  AM-PAC 6 Clicks Score (OT): 18  Modified Laura Scale: 3 - Moderate disability.  Requiring some help, but able to walk without assistance.    Nereida Faust PT  3/15/2022      Electronically signed by Nereida Faust PT at 03/15/22 6827     Daxa Rudolph PTA at 03/16/22 1028  Version 1 of 1       Goal Outcome Evaluation:  Plan of Care Reviewed With: patient, daughter           Outcome Evaluation: Pt up sitting in chair. Pt had finished taking a shower w/ nurse aide a few mins ago. Pt had no complaints except that she was ready to take a nap. Pt stood SBA and amb 15' to the hallway. Dr. Rodriguez and Katia Sharma came and spoke w/ pt while in the hallway. Pt was leaning up against the wall. When they walked away to see another pt the therapist asked the pt if she was ready to walk. The pt didn't answer right away and had a blank look on her face and wasnt quite right. Therapist requested a chair for the pt. Got pt safely to the chair and pt was unresponsive for a few seconds. Dr. Rodriguez came back out in the swain from a pts room and therapist called for help. Dr and NP came to pts side. RRT was called. Therapist checked BP and first reading was 154/137. Rechecked again and it was much lower. Lifted pt back to bed w/ assit fro lift team and another RN. Will check w/ RN later to see if pt is appropiate to see.    Electronically signed by Daxa Rudolph PTA at 03/16/22 3292     Daxa Rudolph  PTA at 22 1028  Version 1 of 1         Acute Care - Physical Therapy Treatment Note  [facility]     Patient Name: Evelyn Ojeda  : 1940  MRN: [id number]  Today's Date: 3/16/2022      Visit Dx:     ICD-10-CM ICD-9-CM   1. Cerebrovascular accident (CVA), unspecified mechanism (HCC)  I63.9 434.91   2. Oral phase dysphagia  R13.11 787.21   3. Impaired functional mobility and activity tolerance  Z74.09 V49.89   4. Cerebrovascular accident (CVA) due to thrombosis of right middle cerebral artery (HCC)  I63.311 434.01     Patient Active Problem List   Diagnosis   • PAD (peripheral artery disease) (HCC)   • Essential hypertension   • Mixed hyperlipidemia   • Bilateral carotid artery stenosis   • Acute deep vein thrombosis (DVT) of distal vein of right lower extremity (HCC)   • Atherosclerosis of artery of extremity with intermittent claudication (HCC)   • Chronic osteoarthritis   • Decreased pedal pulses   • Nonhealing ulcer of right lower leg with fat layer exposed ([facility])   • Hyperglycemia   • Type 2 diabetes mellitus with hyperglycemia, without long-term current use of insulin (HCC)   • Acute kidney injury (HCC)   • Postoperative anemia due to acute blood loss   • Hematuria   • Cerebrovascular accident (CVA) (HCC)   • CAD (coronary artery disease)     Past Medical History:   Diagnosis Date   • Diabetes mellitus (HCC)    • DVT (deep venous thrombosis) ([facility])     RLL   • Hypertension    • PVD (peripheral vascular disease) ([facility])      Past Surgical History:   Procedure Laterality Date   • AORTAGRAM N/A 2021    Procedure: 1.  Introduction of catheter/sheath into the aorta 2.  Aortoiliac angiogram with right lower extremity runoff 3.  Contralateral cannulation of the right common iliac artery 4.  Placement of a 4 mm spider distal embolic protection device in the below-knee popliteal artery 5.  Directional atherectomy of the proximal SFA and below-knee popliteal artery using the 6 [place] Panther[employer] d   •  CHOLECYSTECTOMY     • KNEE SURGERY     • LEG SURGERY       PT Assessment (last 12 hours)     PT Evaluation and Treatment     Row Name 03/16/22 1000          Physical Therapy Time and Intention    Subjective Information complains of;fatigue  -TB     Document Type therapy note (daily note)  -TB     Mode of Treatment physical therapy  -TB     Row Name 03/16/22 1000          General Information    Existing Precautions/Restrictions fall  -TB     Row Name 03/16/22 1000          Pain    Pretreatment Pain Rating 0/10 - no pain  -TB     Posttreatment Pain Rating 0/10 - no pain  -TB     Row Name 03/16/22 1000          Bed Mobility    Comment, (Bed Mobility) Up in chair  -TB     Row Name 03/16/22 1000          Transfers    Transfers sit-stand transfer;stand-sit transfer  -TB     Sit-Stand [place] (Transfers) standby assist  -TB     Stand-Sit [place] (Transfers) standby assist  -TB     Row Name 03/16/22 1000          Gait/Stairs (Locomotion)    [place] Level (Gait) contact guard;verbal cues  -TB     Assistive Device (Gait) other (see comments)  HHA  -TB     Distance in Feet (Gait) 15'  -TB     Row Name 03/16/22 1000          Plan of Care Review    Plan of Care Reviewed With patient;daughter  -TB     Outcome Evaluation Pt up sitting in chair. Pt had finished taking a shower w/ nurse aide a few mins ago. Pt had no complaints except that she was ready to take a nap. Pt stood SBA and amb 15' to the hallway. Dr. Rodriguez and Katia Sharma came and spoke w/ pt while in the hallway. Pt was leaning up against the wall. When they walked away to see another pt the therapist asked the pt if she was ready to walk. The pt didn't answer right away and had a blank look on her face and wasnt quite right. Therapist requested a chair for the pt. Got pt safely to the chair and pt was unresponsive for a few seconds. Dr. Rodriguez came back out in the swain from a pts room and therapist called for help. Dr and NP came to pts side. RRT was  called. Therapist checked BP and first reading was 154/137. Rechecked again and it was much lower. Lifted pt back to bed w/ assit fro lift team and another RN. Will check w/ RN later to see if pt is appropiate to see.  -TB     Row Name 03/16/22 1000          Positioning and Restraints    Pre-Treatment Position sitting in chair/recliner  -TB     Post Treatment Position bed  -TB     In Bed with nsg  -TB           User Key  (r) = Recorded By, (t) = Taken By, (c) = Cosigned By    Initials Name Provider Type    TB Daxa Rudolph, PTA Physical Therapy Assistant                Physical Therapy Education                 Title: PT OT SLP Therapies (In Progress)     Topic: Physical Therapy (In Progress)     Point: Mobility training (Done)     Learning Progress Summary           Patient Acceptance, E,TB,D, VU,DU,NR by  at 3/15/2022 1237    Comment: Education re: purpose of PT/importance of activity, for safety/falls prevention, to have assist when up   Family Acceptance, E,TB,D, VU,DU,NR by  at 3/15/2022 1237    Comment: Education re: purpose of PT/importance of activity, for safety/falls prevention, to have assist when up                   Point: Home exercise program (Not Started)     Learner Progress:  Not documented in this visit.          Point: Precautions (Done)     Learning Progress Summary           Patient Acceptance, E,TB,D, VU,DU,NR by  at 3/15/2022 1237    Comment: Education re: purpose of PT/importance of activity, for safety/falls prevention, to have assist when up   Family Acceptance, E,TB,D, VU,DU,NR by  at 3/15/2022 1237    Comment: Education re: purpose of PT/importance of activity, for safety/falls prevention, to have assist when up                               User Key     Initials Effective Dates Name Provider Type Discipline     08/02/18 -  Nereida Faust, PT Physical Therapist PT              PT Recommendation and Plan     Plan of Care Reviewed With: patient, daughter  Outcome  Evaluation: Pt up sitting in chair. Pt had finished taking a shower w/ nurse aide a few mins ago. Pt had no complaints except that she was ready to take a nap. Pt stood SBA and amb 15' to the hallway. Dr. Rodriguez and Katia Sharma came and spoke w/ pt while in the hallway. Pt was leaning up against the wall. When they walked away to see another pt the therapist asked the pt if she was ready to walk. The pt didn't answer right away and had a blank look on her face and wasnt quite right. Therapist requested a chair for the pt. Got pt safely to the chair and pt was unresponsive for a few seconds. Dr. Rodriguez came back out in the swain from a pts room and therapist called for help. Dr and NP came to pts side. RRT was called. Therapist checked BP and first reading was 154/137. Rechecked again and it was much lower. Lifted pt back to bed w/ assit fro lift team and another RN. Will check w/ RN later to see if pt is appropiate to see.       Time Calculation:    PT Charges     Row Name 03/16/22 1325             Time Calculation    Start Time 1000  -TB      Stop Time 1028  -TB      Time Calculation (min) 28 min  -TB      PT Received On 03/16/22  -TB              Time Calculation- PT    Total Timed Code Minutes- PT 28 minute(s)  -TB            User Key  (r) = Recorded By, (t) = Taken By, (c) = Cosigned By    Initials Name Provider Type    TB Daxa Rudolph, PTA Physical Therapy Assistant              Therapy Charges for Today     Code Description Service Date Service Provider Modifiers Qty    52704355486 HC GAIT TRAINING EA 15 MIN 3/15/2022 Daxa Rudolph, PTA GP 2    24688187453 HC GAIT TRAINING EA 15 MIN 3/16/2022 Daxa Rudolph, PTA GP 1    10634023502 HC PT THERAPEUTIC ACT EA 15 MIN 3/16/2022 Daxa Rudolph, PTA GP 1          PT G-Codes  Outcome Measure Options: AM-PAC 6 Clicks Daily Activity (OT), Modified [place]  AM-PAC 6 Clicks Score (PT): 18  AM-PAC 6 Clicks Score (OT): 18  Modified [place] Scale:  3 - Moderate disability.  Requiring some help, but able to walk without assistance.    Daxa Rudolph PTA  3/16/2022      Electronically signed by Daxa Rudolph PTA at 22 1326       Occupational Therapy Notes (last 24 hours)  Notes from 22 1104 through 22 1104   No notes exist for this encounter.            Speech Language Pathology Notes (last 24 hours)      Felicita Daly, MS CCC-SLP at 22 4436          Acute Care - Speech Language Pathology   Swallow Treatment Note [facility]     Patient Name: Evelyn Ojeda  : 1940  MRN: [id number]  Today's Date: 3/16/2022               Admit Date: 3/14/2022  Swallow treatment completed. The patient was alert and cooperative. She reports she is fatigued and has been all day. No concerns voiced by RN, nurse aid, or patient regarding toleration of current diet. Patient completed trials of thin liquids and regular solids. No overt s/s of aspiration observed. L labial residue present with minimal awareness. Discussed importance of oral care and us of lingual sweep to clear oral cavity post meals. Patient verbalized understanding.   Felicita Daly, MS CCC-SLP 3/16/2022 14:56 CDT    Visit Dx:     ICD-10-CM ICD-9-CM   1. Cerebrovascular accident (CVA), unspecified mechanism (HCC)  I63.9 434.91   2. Oral phase dysphagia  R13.11 787.21   3. Impaired functional mobility and activity tolerance  Z74.09 V49.89   4. Cerebrovascular accident (CVA) due to thrombosis of right middle cerebral artery (HCC)  I63.311 434.01     Patient Active Problem List   Diagnosis   • PAD (peripheral artery disease) ([facility])   • Essential hypertension   • Mixed hyperlipidemia   • Bilateral carotid artery stenosis   • Acute deep vein thrombosis (DVT) of distal vein of right lower extremity ([facility])   • Atherosclerosis of artery of extremity with intermittent claudication ([facility])   • Chronic osteoarthritis   • Decreased pedal pulses   • Nonhealing ulcer of right  lower leg with fat layer exposed (HCC)   • Hyperglycemia   • Type 2 diabetes mellitus with hyperglycemia, without long-term current use of insulin (HCC)   • Acute kidney injury (HCC)   • Postoperative anemia due to acute blood loss   • Hematuria   • Cerebrovascular accident (CVA) (HCC)   • CAD (coronary artery disease)     Past Medical History:   Diagnosis Date   • Diabetes mellitus (HCC)    • DVT (deep venous thrombosis) (HCC)     RLL   • Hypertension    • PVD (peripheral vascular disease) (HCC)      Past Surgical History:   Procedure Laterality Date   • AORTAGRAM N/A 1/6/2021    Procedure: 1.  Introduction of catheter/sheath into the aorta 2.  Aortoiliac angiogram with right lower extremity runoff 3.  Contralateral cannulation of the right common iliac artery 4.  Placement of a 4 mm spider distal embolic protection device in the below-knee popliteal artery 5.  Directional atherectomy of the proximal SFA and below-knee popliteal artery using the 6 [place] Pantheris d   • CHOLECYSTECTOMY     • KNEE SURGERY     • LEG SURGERY         SLP Recommendation and Plan                                         Daily Summary of Progress (SLP): progress toward functional goals as expected (03/16/22 1439)                  Plan for Continued Treatment (SLP): continue treatment per plan of care (03/16/22 1439)         Plan of Care Reviewed With: patient, caregiver  Progress: improving      SWALLOW EVALUATION (last 72 hours)     SLP Adult Swallow Evaluation     Row Name 03/16/22 1439 03/16/22 1020 03/15/22 0805             Rehab Evaluation    Document Type therapy note (daily note)  -MM therapy note (daily note)  -MM evaluation  -MM      Subjective Information complains of;fatigue  -MM -- no complaints  -MM      Patient Observations alert;cooperative;agree to therapy  -MM -- alert;cooperative;agree to therapy  -MM      Patient/Family/Caregiver Comments/Observations No family present  -MM -- Daughter present at end of  evaluation.  -MM      Session Not Performed -- patient unavailable for treatment  -MM --      Patient Effort good  -MM -- good  -MM      Comment -- With PTA and then RRT called  -MM --      Symptoms Noted During/After Treatment fatigue  -MM -- none  -MM              General Information    Patient Profile Reviewed -- -- yes  -MM      Pertinent History Of Current Problem -- -- Primary problem: MRI with extensive R MCA infarct, complaints of difficulty swallowing with drooling on L, and L arm weakness. Medical history: DM, DVT, HTN, PVD.  -MM      Current Method of Nutrition -- -- regular textures;thin liquids  -MM      Precautions/Limitations, Vision -- -- WFL;for purposes of eval  -MM      Precautions/Limitations, Hearing -- -- WFL;for purposes of eval  -MM      Prior Level of Function-Communication -- -- WFL  -MM      Prior Level of Function-Swallowing -- -- no diet consistency restrictions  -MM      Plans/Goals Discussed with -- -- patient and family;other (see comments);agreed upon  NERY Nieto  -MM      Barriers to Rehab -- -- none identified  -MM      Patient's Goals for Discharge -- -- patient did not state  -MM      Family Goals for Discharge -- -- patient able to eat/drink without coughing/choking  -MM              Pain    Additional Documentation Pain Scale: FACES Pre/Post-Treatment (Group)  -MM -- Pain Scale: FACES Pre/Post-Treatment (Group)  -MM              Pain Scale: FACES Pre/Post-Treatment    Pain: FACES Scale, Pretreatment 0-->no hurt  -MM -- 0-->no hurt  -MM      Posttreatment Pain Rating 0-->no hurt  -MM -- 0-->no hurt  -MM              Oral Motor Structure and Function    Dentition Assessment -- -- upper dentures/partial in place;lower dentures/partial in place  -MM      Secretion Management -- -- WNL/WFL  reported L drooling at home  -MM      Mucosal Quality -- -- moist, healthy  -MM      Volitional Swallow -- -- WFL  -MM      Volitional Cough -- -- WFL  -MM              Oral Musculature and  Cranial Nerve Assessment    Oral Motor General Assessment -- -- oral labial or buccal impairment  -MM      Mandibular Impairment Detail, Cranial Nerve V (Trigeminal) -- -- CN5: sensory impairment;reduced facial sensation;reduced facial sensation on left  -MM      Oral Labial or Buccal Impairment, Detail, Cranial Nerve VII (Facial): -- -- CN7: Motor Impairment;left labial droop  -MM              General Eating/Swallowing Observations    Respiratory Support Currently in Use -- -- room air  -MM      Eating/Swallowing Skills -- -- self-fed  -MM      Positioning During Eating -- -- upright in bed  -MM      Utensils Used -- -- spoon;straw  -MM      Consistencies Trialed -- -- regular textures;thin liquids  -MM              Clinical Swallow Eval    Oral Prep Phase -- -- impaired  -MM      Oral Transit -- -- WFL  -MM      Oral Residue -- -- impaired  -MM      Pharyngeal Phase -- -- suspected pharyngeal impairment  -MM      Esophageal Phase -- -- unremarkable  -MM      Clinical Swallow Evaluation Summary -- -- See note.  -MM              Oral Prep Concerns    Oral Prep Concerns -- -- anterior loss  -MM      Anterior Loss -- -- regular consistencies  -MM              Oral Residue Concerns    Oral Residue Concerns -- -- lateral sulcus residue, left  -MM      Lateral Sulcus Residue, Left -- -- regular consistencies  -MM              Pharyngeal Phase Concerns    Pharyngeal Phase Concerns -- -- throat clear  -MM      Throat Clear -- -- thin  -MM              SLP Evaluation Clinical Impression    SLP Swallowing Diagnosis -- -- mild-moderate;oral dysphagia;R/O pharyngeal dysphagia  -MM      Functional Impact -- -- risk of aspiration/pneumonia  -MM      Rehab Potential/Prognosis, Swallowing -- -- good, to achieve stated therapy goals  -MM      Swallow Criteria for Skilled Therapeutic Interventions Met -- -- demonstrates skilled criteria  -MM              SLP Treatment Clinical Impressions    Daily Summary of Progress (SLP) progress  toward functional goals as expected  - -- --      Plan for Continued Treatment (SLP) continue treatment per plan of care  -MM -- --      Care Plan Review care plan/treatment goals reviewed  -MM -- --      Care Plan Review, Other Participant(s) caregiver  -MM -- --              Recommendations    Therapy Frequency (Swallow) -- -- at least;2 days per week  -MM      Predicted Duration Therapy Intervention (Days) -- -- until discharge  -      SLP Diet Recommendation -- -- regular textures;thin liquids  -MM      Recommended Diagnostics -- -- VFSS (MBS);SLE/Cog/Motor Speech Evaluation  If any increased concern at bedside  -MM      Recommended Precautions and Strategies -- -- upright posture during/after eating;small bites of food and sips of liquid;check mouth frequently for oral residue/pocketing;general aspiration precautions  -      Oral Care Recommendations -- -- Oral Care BID/PRN;Toothbrush  -      SLP Rec. for Method of Medication Administration -- -- meds whole;with pudding or applesauce;as tolerated  -MM      Monitor for Signs of Aspiration -- -- yes;notify SLP if any concerns  -MM      Anticipated Discharge Disposition (SLP) -- -- unknown  -MM              Swallow Goals (SLP)    Oral Nutrition/Hydration Goal Selection (SLP) oral nutrition/hydration, SLP goal 1  -MM -- oral nutrition/hydration, SLP goal 1  -MM      Labial Strengthening Goal Selection (SLP) labial strengthening, SLP goal 1  -MM -- labial strengthening, SLP goal 1  -MM      Swallow Compensatory Strategies Goal Selection (SLP) swallow compensatory strategies, SLP goal 1  -MM -- swallow compensatory strategies, SLP goal 1  -MM      Additional Documentation swallow compensatory strategies goal selection (SLP);labial strengthening goal selection (SLP)  -MM -- swallow compensatory strategies goal selection (SLP);labial strengthening goal selection (SLP)  -MM              Oral Nutrition/Hydration Goal 1 (SLP)    Oral Nutrition/Hydration Goal 1,  SLP Patient will tolerate LRD without s/s of aspiration.  -MM -- Patient will tolerate LRD without s/s of aspiration.  -MM      Time Frame (Oral Nutrition/Hydration Goal 1, SLP) by discharge  -MM -- by discharge  -MM      Barriers (Oral Nutrition/Hydration Goal 1, SLP) none  -MM -- none  -MM      Progress/Outcomes (Oral Nutrition/Hydration Goal 1, SLP) continuing progress toward goal  -MM -- goal ongoing  -MM              Labial Strengthening Goal 1 (SLP)    Activity (Labial Strengthening Goal 1, SLP) increase labial tone  -MM -- increase labial tone  -MM      Increase Labial Tone labial resistance exercises  -MM -- labial resistance exercises  -MM      [place]/Accuracy (Labial Strengthening Goal 1, SLP) independently (over 90% accuracy)  -MM -- independently (over 90% accuracy)  -MM      Time Frame (Labial Strengthening Goal 1, SLP) by discharge  -MM -- by discharge  -MM      Barriers (Labial Strengthening Goal 1, SLP) none  -MM -- none  -MM      Progress/Outcomes (Labial Strengthening Goal 1, SLP) goal ongoing  -MM -- goal ongoing  -MM              Swallow Compensatory Strategies Goal 1 (SLP)    Activity (Swallow Compensatory Strategies/Techniques Goal 1, SLP) compensatory strategies;during meal intake;alternate food/liquid intake;other (see comments)  -MM -- compensatory strategies;during meal intake;alternate food/liquid intake;other (see comments)  lingual sweep to clear L sulci residue  -MM      [place]/Accuracy (Swallow Compensatory Strategies/Techniques Goal 1, SLP) independently (over 90% accuracy)  -MM -- independently (over 90% accuracy)  -MM      Time Frame (Swallow Compensatory Strategies/Techniques Goal 1, SLP) by discharge  -MM -- by discharge  -MM      Barriers (Swallow Compensatory Strategies/Techniques Goal 1, SLP) none  -MM -- none  -MM      Progress/Outcomes (Swallow Compensatory Strategies/Techniques Goal 1, SLP) continuing progress toward goal  -MM -- goal ongoing  -MM            User  Key  (r) = Recorded By, (t) = Taken By, (c) = Cosigned By    Initials Name Effective Dates    MM Felicita Daly, MS CCC-SLP 06/16/21 -                 EDUCATION  The patient has been educated in the following areas:   Dysphagia (Swallowing Impairment) Oral Care/Hydration.        SLP GOALS     Row Name 03/16/22 1439 03/15/22 0805          Oral Nutrition/Hydration Goal 1 (SLP)    Oral Nutrition/Hydration Goal 1, SLP Patient will tolerate LRD without s/s of aspiration.  -MM Patient will tolerate LRD without s/s of aspiration.  -MM     Time Frame (Oral Nutrition/Hydration Goal 1, SLP) by discharge  -MM by discharge  -MM     Barriers (Oral Nutrition/Hydration Goal 1, SLP) none  -MM none  -MM     Progress/Outcomes (Oral Nutrition/Hydration Goal 1, SLP) continuing progress toward goal  -MM goal ongoing  -MM            Labial Strengthening Goal 1 (SLP)    Activity (Labial Strengthening Goal 1, SLP) increase labial tone  -MM increase labial tone  -MM     Increase Labial Tone labial resistance exercises  -MM labial resistance exercises  -MM     [place]/Accuracy (Labial Strengthening Goal 1, SLP) independently (over 90% accuracy)  -MM independently (over 90% accuracy)  -MM     Time Frame (Labial Strengthening Goal 1, SLP) by discharge  -MM by discharge  -MM     Barriers (Labial Strengthening Goal 1, SLP) none  -MM none  -MM     Progress/Outcomes (Labial Strengthening Goal 1, SLP) goal ongoing  -MM goal ongoing  -MM            Swallow Compensatory Strategies Goal 1 (SLP)    Activity (Swallow Compensatory Strategies/Techniques Goal 1, SLP) compensatory strategies;during meal intake;alternate food/liquid intake;other (see comments)  -MM compensatory strategies;during meal intake;alternate food/liquid intake;other (see comments)  lingual sweep to clear L sulci residue  -MM     [place]/Accuracy (Swallow Compensatory Strategies/Techniques Goal 1, SLP) independently (over 90% accuracy)  -MM independently (over 90%  accuracy)  -MM     Time Frame (Swallow Compensatory Strategies/Techniques Goal 1, SLP) by discharge  -MM by discharge  -MM     Barriers (Swallow Compensatory Strategies/Techniques Goal 1, SLP) none  -MM none  -MM     Progress/Outcomes (Swallow Compensatory Strategies/Techniques Goal 1, SLP) continuing progress toward goal  -MM goal ongoing  -MM           User Key  (r) = Recorded By, (t) = Taken By, (c) = Cosigned By    Initials Name Provider Type    Felicita Delgadillo MS CCC-SLP Speech and Language Pathologist                   Time Calculation:    Time Calculation- SLP     Row Name 03/16/22 1456             Time Calculation- SLP    SLP Start Time 1438  -MM      SLP Stop Time 1501  -MM      SLP Time Calculation (min) 23 min  -MM      SLP Received On 03/16/22  -MM              Untimed Charges    75936-MS Treatment Swallow Minutes 23  -MM              Total Minutes    Untimed Charges Total Minutes 23  -MM       Total Minutes 23  -MM            User Key  (r) = Recorded By, (t) = Taken By, (c) = Cosigned By    Initials Name Provider Type    Felicita Delgadillo MS CCC-SLP Speech and Language Pathologist                Therapy Charges for Today     Code Description Service Date Service Provider Modifiers Qty    64225610720 HC ST EVAL ORAL PHARYNG SWALLOW 5 3/15/2022 Felicita Daly MS CCC-SLP GN 1    22628966623 HC ST TREATMENT SWALLOW 2 3/16/2022 Felicita Daly MS CCC-SLP GN 1               Felicita Daly MS CCC-SLP  3/16/2022    Electronically signed by Felicita Daly MS CCC-SLP at 03/16/22 1457     Felicita Daly MS CCC-SLP at 03/16/22 1453        Goal Outcome Evaluation:  Plan of Care Reviewed With: patient, caregiver        Progress: improving       Swallow treatment completed. The patient was alert and cooperative. She reports she is fatigued and has been all day. No concerns voiced by RN, nurse aid, or patient regarding toleration of current diet. Patient completed trials of  thin liquids and regular solids. No overt s/s of aspiration observed. L labial residue present with minimal awareness. Discussed importance of oral care and us of lingual sweep to clear oral cavity post meals. Patient verbalized understanding.     Felicita Daly, MS CCC-SLP 3/16/2022 14:55 CDT      Electronically signed by Felicita Daly, MS CCC-SLP at 03/16/22 9872

## 2022-03-17 NOTE — THERAPY TREATMENT NOTE
Acute Care - Speech Language Pathology   Swallow Treatment Note [facility]     Patient Name: Evelyn Ojeda  : 1940  MRN: [id number]  Today's Date: 3/17/2022               Admit Date: 3/14/2022  Swallow treatment completed. The patient was alert and cooperative. Sitting on the edge of the bed when SLP entered room. Poor intake noted. Patient reports she does not typically eat a lot. Her family reported broccoli residue in oral cavity that she had to remove manually and that the patient was not aware of. Patient re-educated on the importance of lingual or finger sweep at end of meals to clear pocketed residue. Patient has decreased awareness and sensation on the left. At a higher risk for aspiration/choking secondary to decreased sensation and awareness.     Continue regular diet with thin liquids with use of compensatory strategies.   Felicita Daly, MS CCC-SLP 3/17/2022 14:54 CDT    Visit Dx:     ICD-10-CM ICD-9-CM   1. Cerebrovascular accident (CVA), unspecified mechanism ([facility])  I63.9 434.91   2. Oral phase dysphagia  R13.11 787.21   3. Impaired functional mobility and activity tolerance  Z74.09 V49.89   4. Cerebrovascular accident (CVA) due to thrombosis of right middle cerebral artery ([facility])  I63.311 434.01     Patient Active Problem List   Diagnosis   • PAD (peripheral artery disease) ([facility])   • Essential hypertension   • Mixed hyperlipidemia   • Bilateral carotid artery stenosis   • Acute deep vein thrombosis (DVT) of distal vein of right lower extremity ([facility])   • Atherosclerosis of artery of extremity with intermittent claudication ([facility])   • Chronic osteoarthritis   • Decreased pedal pulses   • Nonhealing ulcer of right lower leg with fat layer exposed ([facility])   • Hyperglycemia   • Type 2 diabetes mellitus with hyperglycemia, without long-term current use of insulin ([facility])   • Acute kidney injury ([facility])   • Postoperative anemia due to acute blood loss   • Hematuria   • Cerebrovascular accident (CVA)  (HCC)   • CAD (coronary artery disease)     Past Medical History:   Diagnosis Date   • Diabetes mellitus (HCC)    • DVT (deep venous thrombosis) (HCC)     RLL   • Hypertension    • PVD (peripheral vascular disease) (HCC)      Past Surgical History:   Procedure Laterality Date   • AORTAGRAM N/A 1/6/2021    Procedure: 1.  Introduction of catheter/sheath into the aorta 2.  Aortoiliac angiogram with right lower extremity runoff 3.  Contralateral cannulation of the right common iliac artery 4.  Placement of a 4 mm spider distal embolic protection device in the below-knee popliteal artery 5.  Directional atherectomy of the proximal SFA and below-knee popliteal artery using the 6 [place] d   • CHOLECYSTECTOMY     • KNEE SURGERY     • LEG SURGERY         SLP Recommendation and Plan                                         Daily Summary of Progress (SLP): progress toward functional goals as expected (03/17/22 1434)                  Plan for Continued Treatment (SLP): continue treatment per plan of care (03/17/22 1434)         Plan of Care Reviewed With: patient, family, caregiver  Progress: improving      SWALLOW EVALUATION (last 72 hours)     SLP Adult Swallow Evaluation     Row Name 03/17/22 1434 03/16/22 1439 03/16/22 1020 03/15/22 0805          Rehab Evaluation    Document Type therapy note (daily note)  -MM therapy note (daily note)  -MM therapy note (daily note)  -MM evaluation  -MM     Subjective Information no complaints  -MM complains of;fatigue  -MM -- no complaints  -MM     Patient Observations alert;cooperative;agree to therapy  -MM alert;cooperative;agree to therapy  -MM -- alert;cooperative;agree to therapy  -MM     Patient/Family/Caregiver Comments/Observations Grand daughter-in-law present.  -MM No family present  -MM -- Daughter present at end of evaluation.  -MM     Session Not Performed -- -- patient unavailable for treatment  -MM --     Patient Effort good  -MM good  -MM -- good  -MM      Comment -- -- With PTA and then RRT called  -MM --     Symptoms Noted During/After Treatment -- fatigue  -MM -- none  -MM            General Information    Patient Profile Reviewed -- -- -- yes  -MM     Pertinent History Of Current Problem -- -- -- Primary problem: MRI with extensive R MCA infarct, complaints of difficulty swallowing with drooling on L, and L arm weakness. Medical history: DM, DVT, HTN, PVD.  -MM     Current Method of Nutrition -- -- -- regular textures;thin liquids  -MM     Precautions/Limitations, Vision -- -- -- WFL;for purposes of eval  -MM     Precautions/Limitations, Hearing -- -- -- WFL;for purposes of eval  -MM     Prior Level of Function-Communication -- -- -- WFL  -MM     Prior Level of Function-Swallowing -- -- -- no diet consistency restrictions  -MM     Plans/Goals Discussed with -- -- -- patient and family;other (see comments);agreed upon  NERY Nieto  -MM     Barriers to Rehab -- -- -- none identified  -MM     Patient's Goals for Discharge -- -- -- patient did not state  -MM     Family Goals for Discharge -- -- -- patient able to eat/drink without coughing/choking  -MM            Pain    Additional Documentation Pain Scale: FACES Pre/Post-Treatment (Group)  -MM Pain Scale: FACES Pre/Post-Treatment (Group)  -MM -- Pain Scale: FACES Pre/Post-Treatment (Group)  -MM            Pain Scale: FACES Pre/Post-Treatment    Pain: FACES Scale, Pretreatment 0-->no hurt  -MM 0-->no hurt  -MM -- 0-->no hurt  -MM     Posttreatment Pain Rating 0-->no hurt  -MM 0-->no hurt  -MM -- 0-->no hurt  -MM            Oral Motor Structure and Function    Dentition Assessment -- -- -- upper dentures/partial in place;lower dentures/partial in place  -MM     Secretion Management -- -- -- WNL/WFL  reported L drooling at home  -MM     Mucosal Quality -- -- -- moist, healthy  -MM     Volitional Swallow -- -- -- WFL  -MM     Volitional Cough -- -- -- WFL  -MM            Oral Musculature and Cranial Nerve Assessment     Oral Motor General Assessment -- -- -- oral labial or buccal impairment  -MM     Mandibular Impairment Detail, Cranial Nerve V (Trigeminal) -- -- -- CN5: sensory impairment;reduced facial sensation;reduced facial sensation on left  -MM     Oral Labial or Buccal Impairment, Detail, Cranial Nerve VII (Facial): -- -- -- CN7: Motor Impairment;left labial droop  -MM            General Eating/Swallowing Observations    Respiratory Support Currently in Use -- -- -- room air  -MM     Eating/Swallowing Skills -- -- -- self-fed  -MM     Positioning During Eating -- -- -- upright in bed  -MM     Utensils Used -- -- -- spoon;straw  -MM     Consistencies Trialed -- -- -- regular textures;thin liquids  -MM            Clinical Swallow Eval    Oral Prep Phase -- -- -- impaired  -MM     Oral Transit -- -- -- WFL  -MM     Oral Residue -- -- -- impaired  -MM     Pharyngeal Phase -- -- -- suspected pharyngeal impairment  -MM     Esophageal Phase -- -- -- unremarkable  -MM     Clinical Swallow Evaluation Summary -- -- -- See note.  -MM            Oral Prep Concerns    Oral Prep Concerns -- -- -- anterior loss  -MM     Anterior Loss -- -- -- regular consistencies  -MM            Oral Residue Concerns    Oral Residue Concerns -- -- -- lateral sulcus residue, left  -MM     Lateral Sulcus Residue, Left -- -- -- regular consistencies  -MM            Pharyngeal Phase Concerns    Pharyngeal Phase Concerns -- -- -- throat clear  -MM     Throat Clear -- -- -- thin  -MM            SLP Evaluation Clinical Impression    SLP Swallowing Diagnosis -- -- -- mild-moderate;oral dysphagia;R/O pharyngeal dysphagia  -MM     Functional Impact -- -- -- risk of aspiration/pneumonia  -MM     Rehab Potential/Prognosis, Swallowing -- -- -- good, to achieve stated therapy goals  -MM     Swallow Criteria for Skilled Therapeutic Interventions Met -- -- -- demonstrates skilled criteria  -            SLP Treatment Clinical Impressions    Daily Summary of Progress  (SLP) progress toward functional goals as expected  -MM progress toward functional goals as expected  -MM -- --     Plan for Continued Treatment (SLP) continue treatment per plan of care  -MM continue treatment per plan of care  -MM -- --     Care Plan Review care plan/treatment goals reviewed;risks/benefits reviewed;current/potential barriers reviewed;patient/other agree to care plan  -MM care plan/treatment goals reviewed  -MM -- --     Care Plan Review, Other Participant(s) family  -MM caregiver  -MM -- --            Recommendations    Therapy Frequency (Swallow) -- -- -- at least;2 days per week  -MM     Predicted Duration Therapy Intervention (Days) -- -- -- until discharge  -     SLP Diet Recommendation -- -- -- regular textures;thin liquids  -     Recommended Diagnostics -- -- -- VFSS (MBS);SLE/Cog/Motor Speech Evaluation  If any increased concern at bedside  -MM     Recommended Precautions and Strategies -- -- -- upright posture during/after eating;small bites of food and sips of liquid;check mouth frequently for oral residue/pocketing;general aspiration precautions  -     Oral Care Recommendations -- -- -- Oral Care BID/PRN;Toothbrush  -     SLP Rec. for Method of Medication Administration -- -- -- meds whole;with pudding or applesauce;as tolerated  -     Monitor for Signs of Aspiration -- -- -- yes;notify SLP if any concerns  -MM     Anticipated Discharge Disposition (SLP) -- -- -- unknown  -MM            Swallow Goals (SLP)    Oral Nutrition/Hydration Goal Selection (SLP) oral nutrition/hydration, SLP goal 1  -MM oral nutrition/hydration, SLP goal 1  -MM -- oral nutrition/hydration, SLP goal 1  -MM     Labial Strengthening Goal Selection (SLP) labial strengthening, SLP goal 1  -MM labial strengthening, SLP goal 1  -MM -- labial strengthening, SLP goal 1  -MM     Swallow Compensatory Strategies Goal Selection (SLP) swallow compensatory strategies, SLP goal 1  -MM swallow compensatory strategies,  SLP goal 1  -MM -- swallow compensatory strategies, SLP goal 1  -MM     Additional Documentation swallow compensatory strategies goal selection (SLP);labial strengthening goal selection (SLP)  -MM swallow compensatory strategies goal selection (SLP);labial strengthening goal selection (SLP)  -MM -- swallow compensatory strategies goal selection (SLP);labial strengthening goal selection (SLP)  -MM            Oral Nutrition/Hydration Goal 1 (SLP)    Oral Nutrition/Hydration Goal 1, SLP Patient will tolerate LRD without s/s of aspiration.  -MM Patient will tolerate LRD without s/s of aspiration.  -MM -- Patient will tolerate LRD without s/s of aspiration.  -MM     Time Frame (Oral Nutrition/Hydration Goal 1, SLP) by discharge  -MM by discharge  -MM -- by discharge  -MM     Barriers (Oral Nutrition/Hydration Goal 1, SLP) none  -MM none  -MM -- none  -MM     Progress/Outcomes (Oral Nutrition/Hydration Goal 1, SLP) continuing progress toward goal  -MM continuing progress toward goal  -MM -- goal ongoing  -MM            Labial Strengthening Goal 1 (SLP)    Activity (Labial Strengthening Goal 1, SLP) increase labial tone  -MM increase labial tone  -MM -- increase labial tone  -MM     Increase Labial Tone labial resistance exercises  -MM labial resistance exercises  -MM -- labial resistance exercises  -MM     [place]/Accuracy (Labial Strengthening Goal 1, SLP) independently (over 90% accuracy)  -MM independently (over 90% accuracy)  -MM -- independently (over 90% accuracy)  -MM     Time Frame (Labial Strengthening Goal 1, SLP) by discharge  -MM by discharge  -MM -- by discharge  -MM     Barriers (Labial Strengthening Goal 1, SLP) none  -MM none  -MM -- none  -MM     Progress/Outcomes (Labial Strengthening Goal 1, SLP) continuing progress toward goal  -MM goal ongoing  -MM -- goal ongoing  -MM            Swallow Compensatory Strategies Goal 1 (SLP)    Activity (Swallow Compensatory Strategies/Techniques Goal 1, SLP)  compensatory strategies;during meal intake;alternate food/liquid intake;other (see comments)  -MM compensatory strategies;during meal intake;alternate food/liquid intake;other (see comments)  -MM -- compensatory strategies;during meal intake;alternate food/liquid intake;other (see comments)  lingual sweep to clear L sulci residue  -MM     [place]/Accuracy (Swallow Compensatory Strategies/Techniques Goal 1, SLP) independently (over 90% accuracy)  -MM independently (over 90% accuracy)  -MM -- independently (over 90% accuracy)  -MM     Time Frame (Swallow Compensatory Strategies/Techniques Goal 1, SLP) by discharge  -MM by discharge  -MM -- by discharge  -MM     Barriers (Swallow Compensatory Strategies/Techniques Goal 1, SLP) none  -MM none  -MM -- none  -MM     Progress/Outcomes (Swallow Compensatory Strategies/Techniques Goal 1, SLP) continuing progress toward goal  -MM continuing progress toward goal  -MM -- goal ongoing  -MM           User Key  (r) = Recorded By, (t) = Taken By, (c) = Cosigned By    Initials Name Effective Dates    Felicita Delgadillo MS CCC-SLP 06/16/21 -                 EDUCATION  The patient has been educated in the following areas:   Dysphagia (Swallowing Impairment) Oral Care/Hydration.        SLP GOALS     Row Name 03/17/22 1434 03/16/22 1439 03/15/22 0805       Oral Nutrition/Hydration Goal 1 (SLP)    Oral Nutrition/Hydration Goal 1, SLP Patient will tolerate LRD without s/s of aspiration.  -MM Patient will tolerate LRD without s/s of aspiration.  -MM Patient will tolerate LRD without s/s of aspiration.  -MM    Time Frame (Oral Nutrition/Hydration Goal 1, SLP) by discharge  -MM by discharge  -MM by discharge  -MM    Barriers (Oral Nutrition/Hydration Goal 1, SLP) none  -MM none  -MM none  -MM    Progress/Outcomes (Oral Nutrition/Hydration Goal 1, SLP) continuing progress toward goal  -MM continuing progress toward goal  -MM goal ongoing  -MM       Labial Strengthening Goal 1 (SLP)     Activity (Labial Strengthening Goal 1, SLP) increase labial tone  -MM increase labial tone  -MM increase labial tone  -MM    Increase Labial Tone labial resistance exercises  -MM labial resistance exercises  -MM labial resistance exercises  -MM    [place]/Accuracy (Labial Strengthening Goal 1, SLP) independently (over 90% accuracy)  -MM independently (over 90% accuracy)  -MM independently (over 90% accuracy)  -MM    Time Frame (Labial Strengthening Goal 1, SLP) by discharge  -MM by discharge  -MM by discharge  -MM    Barriers (Labial Strengthening Goal 1, SLP) none  -MM none  -MM none  -MM    Progress/Outcomes (Labial Strengthening Goal 1, SLP) continuing progress toward goal  -MM goal ongoing  -MM goal ongoing  -MM       Swallow Compensatory Strategies Goal 1 (SLP)    Activity (Swallow Compensatory Strategies/Techniques Goal 1, SLP) compensatory strategies;during meal intake;alternate food/liquid intake;other (see comments)  -MM compensatory strategies;during meal intake;alternate food/liquid intake;other (see comments)  -MM compensatory strategies;during meal intake;alternate food/liquid intake;other (see comments)  lingual sweep to clear L sulci residue  -MM    [place]/Accuracy (Swallow Compensatory Strategies/Techniques Goal 1, SLP) independently (over 90% accuracy)  -MM independently (over 90% accuracy)  -MM independently (over 90% accuracy)  -MM    Time Frame (Swallow Compensatory Strategies/Techniques Goal 1, SLP) by discharge  -MM by discharge  -MM by discharge  -MM    Barriers (Swallow Compensatory Strategies/Techniques Goal 1, SLP) none  -MM none  -MM none  -MM    Progress/Outcomes (Swallow Compensatory Strategies/Techniques Goal 1, SLP) continuing progress toward goal  -MM continuing progress toward goal  -MM goal ongoing  -MM          User Key  (r) = Recorded By, (t) = Taken By, (c) = Cosigned By    Initials Name Provider Type    Felicita Delgadillo, MS CCC-SLP Speech and Language  Pathologist                   Time Calculation:    Time Calculation- SLP     Row Name 03/17/22 1451             Time Calculation- SLP    SLP Start Time 1428  -MM      SLP Stop Time 1451  -MM      SLP Time Calculation (min) 23 min  -MM      SLP Received On 03/17/22  -MM              Untimed Charges    58375-BT Treatment Swallow Minutes 23  -MM              Total Minutes    Untimed Charges Total Minutes 23  -MM       Total Minutes 23  -MM            User Key  (r) = Recorded By, (t) = Taken By, (c) = Cosigned By    Initials Name Provider Type    Felicita Delgadillo MS CCC-SLP Speech and Language Pathologist                Therapy Charges for Today     Code Description Service Date Service Provider Modifiers Qty    49978887163 HC ST TREATMENT SWALLOW 2 3/16/2022 Felicita Daly MS CCC-SLP GN 1    [id number] HC ST TREATMENT SWALLOW 2 3/17/2022 Felicita Daly MS CCC-SLP GN 1               MS YESSENIA Quintero  3/17/2022

## 2022-03-17 NOTE — CASE MANAGEMENT/SOCIAL WORK
Continued Stay Note  [facility]     Patient Name: Evelyn Ojeda  MRN: [id number]  Today's Date: 3/17/2022    Admit Date: 3/14/2022     Discharge Plan     Row Name 03/17/22 1019       Plan    Plan [facility](will need new orders)vs Swing bed    Patient/Family in Agreement with Plan yes    Plan Comments Spoke with pt/dtr in room to reassess d/c needs. They are aware that [facility] Swing bed is not an option.  They prefer going home, pt saying she has 2 dtr's that live with her and one close to assist.  Pt is already followed by [facility] and they will need new orders at d/c phone [phone] fax [phone].    Granddaughter- Lupe called RN and then this SW spoke with her.  She is very concerned with pt going home and wants her looking at other swing beds in area, [facility] or [facility] Swing bed.  She is agreeable for referral being given. Will follow.     Admissions at [facility] Swing bed called back saying they do not have any openings at present time.  Now they are requesting referral to [facility] and [facility] Swing bed, referrals have been sent. [facility] is too high functioning and they recommend home with  care.  Called Cris at [facility] Swing bed and she will review referral, told her to call back decision.     Row Name 03/17/22 0834       Plan    Plan [place] spoke with Lashell, sister who patient lives with, and informed that [facility] was not in network with hospital. Sister is ok with patient coming home with HH. Not sure which HH was used last time. [place] or [facility]. If swing [facility] still needed [facility] could be checked to see if they are in network with insurance.  Pending referral and  bed offer if SWB needed. Lashell stated that grand daughter would like for her to have rehab inpatient before coming home.  She will be visiting later today here at hospital.  JERO  updated and to follow. Pending evals with PT/OT this AM.               Discharge Codes    No documentation.                     SUSHANT White

## 2022-03-17 NOTE — DISCHARGE PLACEMENT REQUEST
"  MELL HOLT [phone]  Evelyn Dukes (81 y.o. Female)             Date of Birth   1940    Social Security Number       Address   [address] [id number]    Home Phone   [phone]    MRN   [id number]       [facility]    Marital Status                               Admission Date   3/14/22    Admission Type   Emergency    Admitting Provider   Nik Goodson MD    Attending Provider   Nik Goodson MD    Department, Room/Bed   [facility] 3A, 330/1       Discharge Date       Discharge Disposition       Discharge Destination                               Attending Provider: Nik Goodson MD    Allergies: Codeine    Isolation: None   Infection: None   Code Status: CPR   Advance Care Planning Activity    Ht: 154.9 cm (61\")   Wt: 77.1 kg (170 lb)    Admission Cmt: None   Principal Problem: Cerebrovascular accident (CVA) (HCC) [I63.9]                 Active Insurance as of 3/14/2022     Primary Coverage     Payor Plan Insurance Group Employer/Plan Group    [facility] MEDICARE REPLACEMENT [facility] MEDICARE REPLACEMENT [id number]     Payor Plan Address Payor Plan Phone Number Payor Plan Fax Number Effective Dates    [address] [address] [phone]  1/1/2022 - None Entered    [facility] [address]       Subscriber Name Subscriber Birth Date Member ID       EVELYN DUKES 1940 [id number]                 Emergency Contacts      (Rel.) Home Phone Work Phone Mobile Phone    Lashell DUKES (Daughter) -- -- [phone]    Lupe AGUERO (Grandchild) -- -- [phone]    Pauline Raya (Daughter) -- -- [phone]              "

## 2022-03-18 NOTE — THERAPY TREATMENT NOTE
Acute Care - Physical Therapy Treatment Note  [facility]     Patient Name: Evelyn Ojeda  : 1940  MRN: [id number]  Today's Date: 3/18/2022      Visit Dx:     ICD-10-CM ICD-9-CM   1. Cerebrovascular accident (CVA), unspecified mechanism (Former[facility])  I63.9 434.91   2. Oral phase dysphagia  R13.11 787.21   3. Impaired functional mobility and activity tolerance  Z74.09 V49.89   4. Cerebrovascular accident (CVA) due to thrombosis of right middle cerebral artery (HCC)  I63.311 434.01     Patient Active Problem List   Diagnosis   • PAD (peripheral artery disease) ([facility])   • Essential hypertension   • Mixed hyperlipidemia   • Bilateral carotid artery stenosis   • Acute deep vein thrombosis (DVT) of distal vein of right lower extremity ([facility])   • Atherosclerosis of artery of extremity with intermittent claudication ([facility]ly [facility])   • Chronic osteoarthritis   • Decreased pedal pulses   • Nonhealing ulcer of right lower leg with fat layer exposed ([facility])   • Hyperglycemia   • Type 2 diabetes mellitus with hyperglycemia, without long-term current use of insulin ([facility])   • Acute kidney injury ([facility])   • Postoperative anemia due to acute blood loss   • Hematuria   • Cerebrovascular accident (CVA) ([facility])   • CAD (coronary artery disease)     Past Medical History:   Diagnosis Date   • Diabetes mellitus (HCC)    • DVT (deep venous thrombosis) ([facility])     RLL   • Hypertension    • PVD (peripheral vascular disease) ([facility])      Past Surgical History:   Procedure Laterality Date   • AORTAGRAM N/A 2021    Procedure: 1.  Introduction of catheter/sheath into the aorta 2.  Aortoiliac angiogram with right lower extremity runoff 3.  Contralateral cannulation of the right common iliac artery 4.  Placement of a 4 mm spider distal embolic protection device in the below-knee popliteal artery 5.  Directional atherectomy of the proximal SFA and below-knee popliteal artery using the 6 [place] [employer] d   • CHOLECYSTECTOMY     • KNEE SURGERY     • LEG  SURGERY       PT Assessment (last 12 hours)     PT Evaluation and Treatment     Row Name 03/18/22 0850          Physical Therapy Time and Intention    Subjective Information no complaints  -KJ     Document Type therapy note (daily note)  -KJ     Mode of Treatment physical therapy  -KJ     Patient Effort good  -KJ     Row Name 03/18/22 0850          General Information    Existing Precautions/Restrictions fall  -KJ     Row Name 03/18/22 0850          Pain    Pretreatment Pain Rating 0/10 - no pain  -KJ     Posttreatment Pain Rating 0/10 - no pain  -KJ     Row Name 03/18/22 0850          Bed Mobility    Sit-Supine [place] (Bed Mobility) supervision  -KJ     Row Name 03/18/22 0850          Transfers    Sit-Stand [place] (Transfers) verbal cues;contact guard;minimum assist (75% patient effort)  -KJ     Stand-Sit [place] (Transfers) contact guard;verbal cues  -KJ     [place] Level (Toilet Transfer) verbal cues;contact guard  -KJ     Row Name 03/18/22 0850          Toilet Transfer    Type (Toilet Transfer) sit-stand;stand-sit  -KJ     Row Name 03/18/22 0850          Gait/Stairs (Locomotion)    [place] Level (Gait) verbal cues;contact guard;minimum assist (75% patient effort)  -KJ     Assistive Device (Gait) walker, front-wheeled  -KJ     Distance in Feet (Gait) 50' x 2; siting rest  -KJ     Deviations/Abnormal Patterns (Gait) gait speed decreased;stride length decreased  -KJ     Bilateral Gait Deviations forward flexed posture  -KJ     Comment, (Gait/Stairs) gave pt a walker for assistance during walk. She needed some assistance for guidance  -KJ     Row Name 03/18/22 0850          Motor Skills    Therapeutic Exercise aerobic  -KJ     Row Name 03/18/22 0850          Aerobic Exercise    Comment, Aerobic Exercise (Therapeutic Exercise) AROM x 20 reps  -KJ     Row Name 03/18/22 0850          Positioning and Restraints    Pre-Treatment Position sitting in chair/recliner  -KJ     Post Treatment  Position bed  -KJ     In Bed supine;call light within reach;exit alarm on  -KJ           User Key  (r) = Recorded By, (t) = Taken By, (c) = Cosigned By    Initials Name Provider Type    Malorie Gallardo, PTA Physical Therapy Assistant                Physical Therapy Education                 Title: PT OT SLP Therapies (In Progress)     Topic: Physical Therapy (In Progress)     Point: Mobility training (Done)     Learning Progress Summary           Patient Acceptance, E,TB,D, VU,DU,NR by  at 3/15/2022 1237    Comment: Education re: purpose of PT/importance of activity, for safety/falls prevention, to have assist when up   Family Acceptance, E,TB,D, VU,DU,NR by  at 3/15/2022 1237    Comment: Education re: purpose of PT/importance of activity, for safety/falls prevention, to have assist when up                   Point: Home exercise program (Not Started)     Learner Progress:  Not documented in this visit.          Point: Precautions (Done)     Learning Progress Summary           Patient Acceptance, E,TB,D, VU,DU,NR by  at 3/15/2022 1237    Comment: Education re: purpose of PT/importance of activity, for safety/falls prevention, to have assist when up   Family Acceptance, E,TB,D, VU,DU,NR by  at 3/15/2022 1237    Comment: Education re: purpose of PT/importance of activity, for safety/falls prevention, to have assist when up                               User Key     Initials Effective Dates Name Provider Type Discipline     08/02/18 -  Nereida Faust, PT Physical Therapist PT              PT Recommendation and Plan     Plan of Care Reviewed With: patient  Progress: improving  Outcome Evaluation: PT tx completed. Pt has no c/o. Sitting in chair. Transfers CG, amb 50' x 2 with r wx Cg. Pt gait is slow, decreased stride length, and forward flexed posture. Required sitting rest. Increased cues for following directions. Recommend contd PT/OT for strengthening and endurance       Time Calculation:    PT Charges      Row Name 03/18/22 0926             Time Calculation    Start Time 0850  -KJ      Stop Time 0929  -KJ      Time Calculation (min) 39 min  -KJ      PT Received On 03/18/22  -KJ      PT Goal Re-Cert Due Date 03/25/22  -KJ              Time Calculation- PT    Total Timed Code Minutes- PT 39 minute(s)  -KJ            User Key  (r) = Recorded By, (t) = Taken By, (c) = Cosigned By    Initials Name Provider Type    Malorie Gallardo, PTA Physical Therapy Assistant              Therapy Charges for Today     Code Description Service Date Service Provider Modifiers Qty    48326503734 HC GAIT TRAINING EA 15 MIN 3/18/2022 Malorie Edouard, PTA GP 1    15215309250 HC PT THER PROC EA 15 MIN 3/18/2022 Malorie Edouard, PTA GP 1    61042135614 HC PT THERAPEUTIC ACT EA 15 MIN 3/18/2022 Malorie Edouard, PTA GP 1          PT G-Codes  Outcome Measure Options: AM-PAC 6 Clicks Daily Activity (OT)  AM-PAC 6 Clicks Score (PT): 18  AM-PAC 6 Clicks Score (OT): 17  Modified [place] Scale: 3 - Moderate disability.  Requiring some help, but able to walk without assistance.    Malorie Edouard PTA  3/18/2022

## 2022-03-18 NOTE — PLAN OF CARE
Goal Outcome Evaluation:   Patient alert and oriented. Standby assist to transfer. No difficulty taking medications with water this pm. Fall precautions in place with bed alarm

## 2022-03-18 NOTE — CASE MANAGEMENT/SOCIAL WORK
Spoke with Cris from [facility] and she is saying she still has referral on fax machine and has not had time to review yet. She is saying she will review today and will call back. She is aware that Dr. Goodson saying he will accept to admit there.  They are aware she is ready for d/c and has ins. That will need a precert approval.  Called and left message for Thania at [facility] [phone] to call back decision either way. Will follow.     Granddaughter saying she reached out to pt's insurance and [facility] bed should be able to take pending ins. Approval.  Left message for swing bed admissions to call this SW back [phone].    Thania called back from [facility] and they are out of network with ins. Therefore will not be offering pt a bed there.  [facility] called back (Tarissa) saying insurance still telling them they are out of network.  Unless family pays privately or gets letter from ins. Saying they will get paid they are not offering a bed.

## 2022-03-18 NOTE — NURSING NOTE
Pt sleeping at this time. I left stroke booklet at bedside with printed information on pt risk factors for stroke, signs of stroke, when to call 9-1-1, medications, and follow up.

## 2022-03-18 NOTE — PLAN OF CARE
Goal Outcome Evaluation:  Plan of Care Reviewed With: patient        Progress: improving  Outcome Evaluation: (P) Pt was alert and oriented x4. Laying supine in bed when OTAS arrived. Her friend was present for a visit. Completed toileting task with verbal cues. She was unaware she had dropped paper due to her left side visual neglect. She completed all transfers with verbal cues and stand by assist. Pt completed grooming task with independence. OTAS handed hair brush to pt on left side and required cue to look to that side.  Pt would benefit from continuing OT for increase left side visual field awareness, strength,balance, and activity tolerance.

## 2022-03-18 NOTE — PLAN OF CARE
Goal Outcome Evaluation:  Plan of Care Reviewed With: patient        Progress: improving  Outcome Evaluation: PT tx completed. Pt has no c/o. Sitting in chair. Transfers CG, amb 50' x 2 with r wx Cg. Pt gait is slow, decreased stride length, and forward flexed posture. Required sitting rest. Increased cues for following directions. Recommend contd PT/OT for strengthening and endurance

## 2022-03-18 NOTE — PLAN OF CARE
Goal Outcome Evaluation:  Plan of Care Reviewed With: patient           Outcome Evaluation: Alert and oriented x4. VSS. Blood glucose monitoring. NIH 1. Fair appetite with adequate intake. Voiding without difficulty. OOB with SBA to BR. Discharge planning ongoing with social work, insurance and family. Potential for swing bed dc. All needs met at this time. Plan of care continued.

## 2022-03-18 NOTE — PROGRESS NOTES
[facility] Medicine Services  INPATIENT PROGRESS NOTE    Length of Stay: 4  Date of Admission: 3/14/2022  Primary Care Physician: Roberto Garcia MD    Subjective   Chief Complaint: CVA/orthostatic hypotension.    HPI   Dizziness is much improved.  Blood pressure stable, afebrile.  Patient states she is feeling better.  Waiting for rehab placement discussed with patient.    Review of Systems   Constitutional: Positive for activity change, appetite change and fatigue. Negative for chills and fever.   HENT: Negative for hearing loss, nosebleeds, tinnitus and trouble swallowing.    Eyes: Negative for visual disturbance.   Respiratory: Negative for cough, chest tightness, shortness of breath and wheezing.    Cardiovascular: Negative for chest pain, palpitations and leg swelling.   Gastrointestinal: Negative for abdominal distention, abdominal pain, blood in stool, constipation, diarrhea, nausea and vomiting.   Endocrine: Negative for cold intolerance, heat intolerance, polydipsia, polyphagia and polyuria.   Genitourinary: Negative for decreased urine volume, difficulty urinating, dysuria, flank pain, frequency and hematuria.   Musculoskeletal: Positive for arthralgias, gait problem and myalgias. Negative for joint swelling.   Skin: Negative for rash.   Allergic/Immunologic: Negative for immunocompromised state.   Neurological: Positive for weakness. Negative for dizziness, syncope, light-headedness and headaches.   Hematological: Negative for adenopathy. Does not bruise/bleed easily.   Psychiatric/Behavioral: Negative for confusion and sleep disturbance. The patient is not nervous/anxious.         All pertinent negatives and positives are as above. All other systems have been reviewed and are negative unless otherwise stated.     Objective    Temp:  [98.1 °F (36.7 °C)-98.6 °F (37 °C)] 98.3 °F (36.8 °C)  Heart Rate:  [75-99] 76  Resp:  [16-18] 18  BP: (126-167)/(49-84)  150/60    Intake/Output Summary (Last 24 hours) at 3/18/2022 1230  Last data filed at 3/18/2022 1126  Gross per 24 hour   Intake 600 ml   Output 850 ml   Net -250 ml     Physical Exam  Vitals and nursing note reviewed.   Constitutional:       Appearance: She is well-developed.      Comments: Advanced age.   HENT:      Head: Normocephalic.   Eyes:      Conjunctiva/sclera: Conjunctivae normal.      Pupils: Pupils are equal, round, and reactive to light.   Neck:      Vascular: No JVD.   Cardiovascular:      Rate and Rhythm: Normal rate and regular rhythm.      Heart sounds: Normal heart sounds. No murmur heard.    No friction rub. No gallop.   Pulmonary:      Effort: Pulmonary effort is normal. No respiratory distress.      Breath sounds: Normal breath sounds. No wheezing or rales.   Chest:      Chest wall: No tenderness.   Abdominal:      General: Bowel sounds are normal. There is no distension.      Palpations: Abdomen is soft.      Tenderness: There is no abdominal tenderness. There is no guarding or rebound.      Comments: Obesity.   Musculoskeletal:         General: No tenderness or deformity.      Cervical back: Neck supple.      Comments: Arthritis multiple knuckle   Skin:     General: Skin is warm and dry.      Capillary Refill: Capillary refill takes 2 to 3 seconds.      Findings: No rash.   Neurological:      Mental Status: She is alert and oriented to person, place, and time.      Cranial Nerves: No cranial nerve deficit.      Motor: Weakness present. No abnormal muscle tone.      Gait: Gait abnormal.      Deep Tendon Reflexes: Reflexes normal.      Comments: Cranial keystroke grossly intact.  Negative pronator drift.  Negative finger touch.  Strength 5 out of 5 symmetrical.   Psychiatric:         Behavior: Behavior normal.         Thought Content: Thought content normal.   Results Review:  Lab Results (last 24 hours)     Procedure Component Value Units Date/Time    POC Glucose Once [[id number]]  (Abnormal)  Collected: 03/18/22 1210    Specimen: Blood Updated: 03/18/22 1221     Glucose 148 mg/dL      Comment: : [id number] Gaby MeganMeter ID: [id number]       POC Glucose Once [[id number]]  (Abnormal) Collected: 03/18/22 0819    Specimen: Blood Updated: 03/18/22 0831     Glucose 171 mg/dL      Comment: : [id number] Gaby MeganMeter ID: [id number]       Basic Metabolic Panel [[id number]]  (Abnormal) Collected: 03/18/22 0502    Specimen: Blood Updated: 03/18/22 0537     Glucose 178 mg/dL      BUN 11 mg/dL      Creatinine 0.81 mg/dL      Sodium 137 mmol/L      Potassium 4.1 mmol/L      Chloride 102 mmol/L      CO2 24.0 mmol/L      Calcium 9.2 mg/dL      BUN/Creatinine Ratio 13.6     Anion Gap 11.0 mmol/L      eGFR 73.9 mL/min/1.73      Comment: National Kidney Foundation and American Society of Nephrology (ASN) Task Force recommended calculation based on the Chronic Kidney Disease Epidemiology Collaboration (CKD-EPI) equation refit without adjustment for race.       Narrative:      GFR Normal >60  Chronic Kidney Disease <60  Kidney Failure <15      CBC (No Diff) [[id number]]  (Abnormal) Collected: 03/18/22 0502    Specimen: Blood Updated: 03/18/22 0512     WBC 6.99 10*3/mm3      RBC 3.75 10*6/mm3      Hemoglobin 11.0 g/dL      Hematocrit 35.0 %      MCV 93.3 fL      MCH 29.3 pg      MCHC 31.4 g/dL      RDW 12.6 %      RDW-SD 43.4 fl      MPV 9.9 fL      Platelets 311 10*3/mm3     Vitamin B12 [[id number]]  (Abnormal) Collected: 03/17/22 1451    Specimen: Blood Updated: 03/18/22 0128     Vitamin B-12 1,212 pg/mL     Narrative:      Results may be falsely increased if patient taking Biotin.      Folate [[id number]]  (Normal) Collected: 03/17/22 1451    Specimen: Blood Updated: 03/18/22 0128     Folate >20.00 ng/mL     Narrative:      Results may be falsely increased if patient taking Biotin.      POC Glucose Once [[id number]]  (Abnormal) Collected: 03/17/22 1715    Specimen: Blood Updated: 03/17/22 1726     Glucose 136 mg/dL       Comment: : [id number] Gaby MeganMeter ID: [id number]       Ferritin [[id number]]  (Abnormal) Collected: 03/17/22 1451    Specimen: Blood Updated: 03/17/22 1605     Ferritin 290.50 ng/mL     Narrative:      Results may be falsely decreased if patient taking Biotin.      Iron Profile [[id number]]  (Abnormal) Collected: 03/17/22 1451    Specimen: Blood Updated: 03/17/22 1601     Iron 27 mcg/dL      Iron Saturation 9 %      Transferrin 199 mg/dL      TIBC 297 mcg/dL            Cultures:  Urine Culture   Date Value Ref Range Status   03/15/2022 25,000 CFU/mL Normal Urogenital Lauren  Final       Radiology Data:    Imaging Results (Last 24 Hours)     ** No results found for the last 24 hours. **          Allergies   Allergen Reactions   • Codeine Other (See Comments)       Scheduled meds:   aspirin, 81 mg, Oral, Daily   Or  aspirin, 300 mg, Rectal, Daily  atorvastatin, 80 mg, Oral, Nightly  cefTRIAXone, 1 g, Intravenous, Q24H  gabapentin, 100 mg, Oral, Q8H  insulin lispro, 2-9 Units, Subcutaneous, TID AC  linagliptin, 5 mg, Oral, Daily  lisinopril, 10 mg, Oral, Daily  sodium chloride, 10 mL, Intravenous, Q12H        PRN meds:  dextrose  •  dextrose  •  glucagon (human recombinant)  •  HYDROcodone-acetaminophen  •  ondansetron  •  sodium chloride  •  sodium chloride    Assessment/Plan       Cerebrovascular accident (CVA) ([facility])    PAD (peripheral artery disease) ([facility])    Mixed hyperlipidemia    Type 2 diabetes mellitus with hyperglycemia, without long-term current use of insulin ([facility])    CAD (coronary artery disease)      Plan:  CVA-extensive right MCA territory infarct.  No aspirin for now per neurology.  Aspirin daily.  Chest x-ray-No focal infiltrate, Mild hypoventilation with vascular crowding. Mild bronchial wall  Thickening.  CTA of the head-Intact [place], no central arterial occlusion or thrombus.   CTA of the neck-Patchy atherosclerotic plaque at both carotid bifurcations with no high-grade stenosis,  greatest degree of stenosis is at the left ICA origin where there is 25% narrowing.  CT scan head-Large hypodense subacute right MCA infarct, No intracranial hemorrhage.  MRI of the brain-Extensive right MCA territory infarct with diffusion restriction and ADC mapping abnormality- associated sulcal effacement and edema, No evidence of hemorrhagic conversion or mass effect, Atrophy with small vessel ischemic changes, Remote infarcts involving the right thalamus and  left basal ganglia and right cerebellar hemisphere with focal encephalomalacia and gliosis.  Recommendation from neurologist-  1. ASA 81 mg daily. Hold on dual antiplatelet therapy as size and location of stroke at risk for hemorrhagic conversion. May resume dual antiplatelet therapy in 14 days on 3/28/2022 with Pletal 100 mg BID in place of Plavix.   2. Lipitor 80 mg daily.  3. DM management for A1C goal less than 7.  4. Systolic BP goal less than 160.  5. Transthoracic echo done and pending.   6. ST/PT/OT  7. No driving at discharge and no driving until seen in follow up with neurology clinic.   8. Therapy has recommended continued therapy with skilled nursing or home with home health. Patient/family wanted referral to [facility] bed and referral pending.    9. 14 day ZIO at discharge.   10. F/u neurology 3-4 weeks.      CAD/hypertension/hyperlipidemia.  Lipitor high-dose per neurology.  Hold Plavix per neurology.  Decrease lisinopril.  Echocardiogram-ejection fraction 56 to 60%, diastolic dysfunction grade 1, mild mitral valve stenosis, no evidence of right to left shunt.     UTI.  Rocephin.     Anemia.  Cont  iron sulfate.     Chronic pain/arthritis.  Cut back Neurontin.  Cut back Norco.  Hold Mobic.     Diabetes .  Hold Glucophage.  Hemoglobin A1 C 11.  Discussed patient will need better control of her diabetes.    Continue Tradjenta.  Continue with low sliding scale.     Renal insufficiency.    Resolved.     Nausea.  Zofran as  needed     Urine culture pending.  Covid-19-negative.     Discharge Planning: Plan for rehab placement.  Plan for swing bed placement.    Electronically signed by Nik Goodson MD, 03/18/22, 12:30 PM CDT.

## 2022-03-18 NOTE — THERAPY TREATMENT NOTE
Acute Care - Physical Therapy Treatment Note  [facility]     Patient Name: Evelyn Ojeda  : 1940  MRN: [id number]  Today's Date: 3/18/2022      Visit Dx:     ICD-10-CM ICD-9-CM   1. Cerebrovascular accident (CVA), unspecified mechanism (Formerly [facility])  I63.9 434.91   2. Oral phase dysphagia  R13.11 787.21   3. Impaired functional mobility and activity tolerance  Z74.09 V49.89   4. Cerebrovascular accident (CVA) due to thrombosis of right middle cerebral artery (HCC)  I63.311 434.01     Patient Active Problem List   Diagnosis   • PAD (peripheral artery disease) (Formerly [facility])   • Essential hypertension   • Mixed hyperlipidemia   • Bilateral carotid artery stenosis   • Acute deep vein thrombosis (DVT) of distal vein of right lower extremity (Formerly [facility])   • Atherosclerosis of artery of extremity with intermittent claudication (Formerly [facility])   • Chronic osteoarthritis   • Decreased pedal pulses   • Nonhealing ulcer of right lower leg with fat layer exposed (Formerly [facility])   • Hyperglycemia   • Type 2 diabetes mellitus with hyperglycemia, without long-term current use of insulin (Formerly [facility])   • Acute kidney injury (Formerly [facility])   • Postoperative anemia due to acute blood loss   • Hematuria   • Cerebrovascular accident (CVA) (Formerly [facility])   • CAD (coronary artery disease)     Past Medical History:   Diagnosis Date   • Diabetes mellitus (HCC)    • DVT (deep venous thrombosis) (Formerly [facility])     RLL   • Hypertension    • PVD (peripheral vascular disease) (Formerly [facility])      Past Surgical History:   Procedure Laterality Date   • AORTAGRAM N/A 2021    Procedure: 1.  Introduction of catheter/sheath into the aorta 2.  Aortoiliac angiogram with right lower extremity runoff 3.  Contralateral cannulation of the right common iliac artery 4.  Placement of a 4 mm spider distal embolic protection device in the below-knee popliteal artery 5.  Directional atherectomy of the proximal SFA and below-knee popliteal artery using the 6 Amharic [place] Pantheris d   • CHOLECYSTECTOMY     • KNEE SURGERY     • LEG  SURGERY       PT Assessment (last 12 hours)     PT Evaluation and Treatment     Row Name 03/18/22 1447 03/18/22 1330       Physical Therapy Time and Intention    Subjective Information no complaints  -KJ --    Document Type therapy note (daily note)  -KJ --    Mode of Treatment physical therapy  -KJ physical therapy  -KJ    Patient Effort good  -KJ --    Comment -- wants to finish watching her soap opera  -KJ    Row Name 03/18/22 0850          Physical Therapy Time and Intention    Subjective Information no complaints  -KJ     Document Type therapy note (daily note)  -KJ     Mode of Treatment physical therapy  -KJ     Patient Effort good  -KJ     Row Name 03/18/22 1447 03/18/22 0850       General Information    Existing Precautions/Restrictions fall  -KJ fall  -KJ    Row Name 03/18/22 1447 03/18/22 0850       Pain    Pretreatment Pain Rating 0/10 - no pain  -KJ 0/10 - no pain  -KJ    Posttreatment Pain Rating 0/10 - no pain  -KJ 0/10 - no pain  -KJ    Row Name 03/18/22 1447 03/18/22 0850       Bed Mobility    Sit-Supine [place] (Bed Mobility) supervision  -KJ supervision  -KJ    Row Name 03/18/22 1447 03/18/22 0850       Transfers    Sit-Stand [place] (Transfers) verbal cues;contact guard;minimum assist (75% patient effort)  -KJ verbal cues;contact guard;minimum assist (75% patient effort)  -KJ    Stand-Sit [place] (Transfers) contact guard;verbal cues  -KJ contact guard;verbal cues  -KJ    [place] Level (Toilet Transfer) verbal cues;contact guard  -KJ verbal cues;contact guard  -KJ    Row Name 03/18/22 1447 03/18/22 0850       Toilet Transfer    Type (Toilet Transfer) sit-stand;stand-sit  -KJ sit-stand;stand-sit  -KJ    Row Name 03/18/22 1447 03/18/22 0850       Gait/Stairs (Locomotion)    [place] Level (Gait) verbal cues;contact guard;minimum assist (75% patient effort)  -KJ verbal cues;contact guard;minimum assist (75% patient effort)  -KJ    Assistive Device (Gait) walker, front-wheeled   -KJ walker, front-wheeled  -KJ    Distance in Feet (Gait) 75' x 2  -KJ 50' x 2; siting rest  -KJ    Deviations/Abnormal Patterns (Gait) gait speed decreased;stride length decreased  -KJ gait speed decreased;stride length decreased  -KJ    Bilateral Gait Deviations forward flexed posture  -KJ forward flexed posture  -KJ    Comment, (Gait/Stairs) -- gave pt a walker for assistance during walk. She needed some assistance for guidance  -KJ    Row Name 03/18/22 0850          Motor Skills    Therapeutic Exercise aerobic  -KJ     Row Name 03/18/22 0850          Aerobic Exercise    Comment, Aerobic Exercise (Therapeutic Exercise) AROM x 20 reps  -KJ     Row Name 03/18/22 1447 03/18/22 0850       Positioning and Restraints    Pre-Treatment Position in bed  -KJ sitting in chair/recliner  -KJ    Post Treatment Position bed  -KJ bed  -KJ    In Bed call light within reach  -KJ supine;call light within reach;exit alarm on  -KJ          User Key  (r) = Recorded By, (t) = Taken By, (c) = Cosigned By    Initials Name Provider Type    Malorie Gallardo, PTA Physical Therapy Assistant                Physical Therapy Education                 Title: PT OT SLP Therapies (In Progress)     Topic: Physical Therapy (In Progress)     Point: Mobility training (Done)     Learning Progress Summary           Patient Acceptance, E,TB,D, VU,DU,NR by NATALY at 3/15/2022 1237    Comment: Education re: purpose of PT/importance of activity, for safety/falls prevention, to have assist when up   Family Acceptance, E,TB,D, VU,DU,NR by NATALY at 3/15/2022 1237    Comment: Education re: purpose of PT/importance of activity, for safety/falls prevention, to have assist when up                   Point: Home exercise program (Not Started)     Learner Progress:  Not documented in this visit.          Point: Precautions (Done)     Learning Progress Summary           Patient Acceptance, E,TB,D, VU,DU,NR by NATALY at 3/15/2022 1237    Comment: Education re: purpose of  PT/importance of activity, for safety/falls prevention, to have assist when up   Family Acceptance, E,TB,D, VU,DU,NR by  at 3/15/2022 1237    Comment: Education re: purpose of PT/importance of activity, for safety/falls prevention, to have assist when up                               User Key     Initials Effective Dates Name Provider Type Discipline     08/02/18 -  Nereida Faust, PT Physical Therapist PT              PT Recommendation and Plan     Plan of Care Reviewed With: patient  Progress: improving  Outcome Evaluation: PT tx completed. Pt has no c/o. Sitting in chair. Transfers CG, amb 50' x 2 with r wx Cg. Pt gait is slow, decreased stride length, and forward flexed posture. Required sitting rest. Increased cues for following directions. Recommend contd PT/OT for strengthening and endurance       Time Calculation:    PT Charges     Row Name 03/18/22 1508 03/18/22 0926          Time Calculation    Start Time 1447  -KJ 0850  -KJ     Stop Time 1518  -KJ 0929  -KJ     Time Calculation (min) 31 min  -KJ 39 min  -KJ     PT Received On -- 03/18/22  -KJ     PT Goal Re-Cert Due Date -- 03/25/22  -KJ            Time Calculation- PT    Total Timed Code Minutes- PT 31 minute(s)  -KJ 39 minute(s)  -KJ           User Key  (r) = Recorded By, (t) = Taken By, (c) = Cosigned By    Initials Name Provider Type    Malorie Gallardo, PTA Physical Therapy Assistant              Therapy Charges for Today     Code Description Service Date Service Provider Modifiers Qty    78207240810 HC GAIT TRAINING EA 15 MIN 3/18/2022 Malorie Edouard, PTA GP 1    47001325635 HC PT THER PROC EA 15 MIN 3/18/2022 Malorie Edouard, PTA GP 1    59955548736 HC PT THERAPEUTIC ACT EA 15 MIN 3/18/2022 Malorie Edouard, PTA GP 1    45817927045 HC GAIT TRAINING EA 15 MIN 3/18/2022 Malorie Edouard, PTA GP 1    29748309802 HC PT THER PROC EA 15 MIN 3/18/2022 Malorie Edouard, PTA GP 1          PT G-Codes  Outcome Measure Options: AM-PAC 6 Clicks Daily  Activity (OT)  AM-PAC 6 Clicks Score (PT): 18  AM-PAC 6 Clicks Score (OT): 17  Modified Loving Scale: 3 - Moderate disability.  Requiring some help, but able to walk without assistance.    Malorie Edouard, PTA  3/18/2022

## 2022-03-18 NOTE — PROGRESS NOTES
Neurology Progress Note      Chief Complaint:    Stroke    Subjective     Subjective:  Patient is doing well today without any new complaints.  She is working with occupational therapy and has demonstrable left-sided neglect and left visual field defect as detailed below.      Past Medical History:   Diagnosis Date   • Diabetes mellitus (HCC)    • DVT (deep venous thrombosis) (HCC)     RLL   • Hypertension    • PVD (peripheral vascular disease) (HCC)      Past Surgical History:   Procedure Laterality Date   • AORTAGRAM N/A 1/6/2021    Procedure: 1.  Introduction of catheter/sheath into the aorta 2.  Aortoiliac angiogram with right lower extremity runoff 3.  Contralateral cannulation of the right common iliac artery 4.  Placement of a 4 mm spider distal embolic protection device in the below-knee popliteal artery 5.  Directional atherectomy of the proximal SFA and below-knee popliteal artery using the 6 [place] Pantheris d   • CHOLECYSTECTOMY     • KNEE SURGERY     • LEG SURGERY       Family History   Problem Relation Age of Onset   • Cancer Mother    • Cancer Sister      Social History     Tobacco Use   • Smoking status: Never Smoker   • Smokeless tobacco: Never Used   Substance Use Topics   • Alcohol use: Never   • Drug use: Never       Medications:  Current Facility-Administered Medications   Medication Dose Route Frequency Provider Last Rate Last Admin   • aspirin chewable tablet 81 mg  81 mg Oral Daily Tami Sharma APRN   81 mg at 03/18/22 0825    Or   • aspirin suppository 300 mg  300 mg Rectal Daily Tami Sharma APRN       • atorvastatin (LIPITOR) tablet 80 mg  80 mg Oral Nightly Nik Goodson MD   80 mg at 03/17/22 2112   • cefTRIAXone (ROCEPHIN) in SWFI 1 gram/10ml IV PUSH syringe  1 g Intravenous Q24H Nik Goodson MD   1 g at 03/18/22 0827   • dextrose (D50W) (25 g/50 mL) IV injection 25 g  25 g Intravenous Q15 Min PRN Nik Goodson MD       • dextrose (GLUTOSE) oral gel 15 g  15 g  Oral Q15 Min PRN Nik Goodson MD       • ferrous sulfate tablet 325 mg  325 mg Oral Daily With Breakfast Nik Goodson MD   325 mg at 03/18/22 1534   • gabapentin (NEURONTIN) capsule 100 mg  100 mg Oral Q8H Nik Goodson MD   100 mg at 03/18/22 1534   • glucagon (human recombinant) (GLUCAGEN DIAGNOSTIC) injection 1 mg  1 mg Intramuscular Q15 Min PRN Nik Goodson MD       • HYDROcodone-acetaminophen (NORCO) 5-325 MG per tablet 1 tablet  1 tablet Oral Q6H PRN Nik Goodson MD   1 tablet at 03/16/22 0919   • insulin lispro (humaLOG) injection 2-9 Units  2-9 Units Subcutaneous TID AC Nik Goodson MD   2 Units at 03/18/22 0825   • linagliptin (TRADJENTA) tablet 5 mg  5 mg Oral Daily Nik Goodson MD   5 mg at 03/18/22 0825   • lisinopril (PRINIVIL,ZESTRIL) tablet 10 mg  10 mg Oral Daily Nik Goodson MD   10 mg at 03/18/22 0825   • ondansetron (ZOFRAN) injection 4 mg  4 mg Intravenous Q6H PRN Nik Goodson MD   4 mg at 03/18/22 0825   • sodium chloride 0.9 % flush 10 mL  10 mL Intravenous PRN Nik Goodson MD       • sodium chloride 0.9 % flush 10 mL  10 mL Intravenous Q12H Nik Goodson MD   10 mL at 03/18/22 0827   • sodium chloride 0.9 % flush 10 mL  10 mL Intravenous PRN Nik Goodson MD           Allergies:    Codeine    Review of Systems:   -A 14 point review of systems is completed and is negative.      Objective      Vital Signs  Temp:  [98.1 °F (36.7 °C)-98.4 °F (36.9 °C)] 98.3 °F (36.8 °C)  Heart Rate:  [76-99] 80  Resp:  [16-18] 16  BP: (135-167)/(49-60) 153/52    Physical Exam:    General Exam:  Head:  Normal cephalic, atraumatic  HEENT:  Neck supple  Fundoscopic Exam:  No signs of disc edema  CVS:  Regular rate and rhythm.  No murmurs  Carotid Examination:  No bruits  Lungs:  Clear to auscultation  Abdomen:  Non-tender, Non-distended  Extremities:  No signs of peripheral edema  Skin:  No rashes     Neurologic Exam:     Mental Status:    -Awake, Alert, Oriented to person and place.    -No word finding difficulties  -No aphasia  -No dysarthria  -Follows simple and complex commands     CN II:  Visual fields with left visual field deficit.  Pupils equally reactive to light. Left neglect.   CN III, IV, VI:  Extraocular Muscles full with no signs of nystagmus  CN V:  Facial sensory is symmetric with no asymmetries.  CN VII:  Facial motor asymmetric with left lower facial weakness.   CN VIII:  Gross hearing intact bilaterally  CN IX:  Palate elevates symmetrically  CN X:  Palate elevates symmetrically  CN XI:  Shoulder shrug symmetric  CN XII:  Tongue is midline on protrusion     Motor: (strength out of 5:  1= minimal movement, 2 = movement in plane of gravity, 3 = movement against gravity, 4 = movement against some resistance, 5 = full strength)     -Right Upper Ext: Proximal: 5 Distal: 5  -Left Upper Ext: Proximal: 5   Distal: 5     -Right Lower Ext: Proximal: 5 Distal: 5  -Left Lower Ext: Proximal: 5   Distal: 5     DTR:  -Right              Bicep: 2+         Tricep: 2+        Brachioradialis: 2+              Patella: 2+       Ankle: 2+         Neg Babinski  -Left              Bicep: 2+         Tricep: 2+        Brachioradialis: 2+              Patella: 2+       Ankle: 2+         Neg Babinski     Sensory:  -Intact to light touch, pinprick, temperature, pain, and proprioception     Coordination:  -Finger to nose intact.   -Heel to shin intact  -No ataxia     Gait  Up with assist.      Results Review:    I reviewed the patient's new clinical results and findings.    Lab Results (last 24 hours)     Procedure Component Value Units Date/Time    POC Glucose Once [[id number]]  (Abnormal) Collected: 03/18/22 1210    Specimen: Blood Updated: 03/18/22 1221     Glucose 148 mg/dL      Comment: : [id number] Gaby MeganMeter ID: [id number]       POC Glucose Once [[id number]]  (Abnormal) Collected: 03/18/22 0819    Specimen: Blood Updated: 03/18/22 0831     Glucose 171 mg/dL      Comment: : [id number] Gaby  MeganMeter ID: [id number]       Basic Metabolic Panel [[id number]]  (Abnormal) Collected: 03/18/22 0502    Specimen: Blood Updated: 03/18/22 0537     Glucose 178 mg/dL      BUN 11 mg/dL      Creatinine 0.81 mg/dL      Sodium 137 mmol/L      Potassium 4.1 mmol/L      Chloride 102 mmol/L      CO2 24.0 mmol/L      Calcium 9.2 mg/dL      BUN/Creatinine Ratio 13.6     Anion Gap 11.0 mmol/L      eGFR 73.9 mL/min/1.73      Comment: National Kidney Foundation and American Society of Nephrology (ASN) Task Force recommended calculation based on the Chronic Kidney Disease Epidemiology Collaboration (CKD-EPI) equation refit without adjustment for race.       Narrative:      GFR Normal >60  Chronic Kidney Disease <60  Kidney Failure <15      CBC (No Diff) [[id number]]  (Abnormal) Collected: 03/18/22 0502    Specimen: Blood Updated: 03/18/22 0512     WBC 6.99 10*3/mm3      RBC 3.75 10*6/mm3      Hemoglobin 11.0 g/dL      Hematocrit 35.0 %      MCV 93.3 fL      MCH 29.3 pg      MCHC 31.4 g/dL      RDW 12.6 %      RDW-SD 43.4 fl      MPV 9.9 fL      Platelets 311 10*3/mm3     Vitamin B12 [[id number]]  (Abnormal) Collected: 03/17/22 1451    Specimen: Blood Updated: 03/18/22 0128     Vitamin B-12 1,212 pg/mL     Narrative:      Results may be falsely increased if patient taking Biotin.      Folate [[id number]]  (Normal) Collected: 03/17/22 1451    Specimen: Blood Updated: 03/18/22 0128     Folate >20.00 ng/mL     Narrative:      Results may be falsely increased if patient taking Biotin.      POC Glucose Once [[id number]]  (Abnormal) Collected: 03/17/22 1715    Specimen: Blood Updated: 03/17/22 1726     Glucose 136 mg/dL      Comment: : [id number] Gaby MeganMeter ID: [id number]       Ferritin [[id number]]  (Abnormal) Collected: 03/17/22 1451    Specimen: Blood Updated: 03/17/22 1605     Ferritin 290.50 ng/mL     Narrative:      Results may be falsely decreased if patient taking Biotin.      Iron Profile [[id number]]  (Abnormal)  Collected: 03/17/22 1451    Specimen: Blood Updated: 03/17/22 1601     Iron 27 mcg/dL      Iron Saturation 9 %      Transferrin 199 mg/dL      TIBC 297 mcg/dL           Imaging Results (Last 24 Hours)     ** No results found for the last 24 hours. **          Assessment/Plan     Impression:  1. Acute/subacute right MCA/PCA stroke.  2. DM uncontrolled, A1C 11.  3. Dyslipidemia.   3. PAD on dual antiplatelet therapy of ASA 81 mg and Plavix 75 mg daily.  4. Hypertension.  5. Hx DVT  6. UTI per attending.   7. Syncopal episode 3/16/2022. No further episodes reported or observed.   8. Orthostatic hypotension      Plan:  1.  Aspirin 81 mg daily.  Resume Pletal 100 g twice daily on 3/20/2022.  2.  Lipitor 80 mg daily.  3.  Plan for discharge to swing bed versus acute rehab when bed available.  At this time, patient is anticipating discharge to [facility] swing bed with Dr. Goodson to continue to follow.  4.  Follow-up with neurology in 3-4 weeks.  5.  14-day Zio patch at discharge.  6.  Patient needs continued cueing and safety awareness given her left hemianopsia.  She has no awareness and even had difficulty ambulating with the assistance of Occupational Therapy currently on the restroom holding her walker 45 degrees diagonal in a manner that would trip her and was catching the left wheel on the doorway and unaware of what was occurring because of her visual field deficit.        Reji Oropeza PA-C  03/18/22  15:40 CDT

## 2022-03-18 NOTE — PLAN OF CARE
Goal Outcome Evaluation:  Plan of Care Reviewed With: patient, family        Progress: improving  Outcome Evaluation: Ntn follow up. Pt reports she doesn't get very hungry. Encouraged oral intake. Advised alternate menu options and snacks. Reveiwed diabetic diet and significance of HgbA1c of 11%. Pt agreeable to oral supplements. Cardiac/CCHO. Boost glucose control added BID. Cont to follow for plan of care.   Procedure To Be Performed At Next Visit: PDT Blue Light Detail Level: Zone

## 2022-03-18 NOTE — THERAPY TREATMENT NOTE
Acute Care - Occupational Therapy Treatment Note  [facility]     Patient Name: Evelyn Ojeda  : 1940  MRN: [id number]  Today's Date: 3/18/2022             Admit Date: 3/14/2022       ICD-10-CM ICD-9-CM   1. Cerebrovascular accident (CVA), unspecified mechanism ([facility])  I63.9 434.91   2. Oral phase dysphagia  R13.11 787.21   3. Impaired functional mobility and activity tolerance  Z74.09 V49.89   4. Cerebrovascular accident (CVA) due to thrombosis of right middle cerebral artery (HCC)  I63.311 434.01     Patient Active Problem List   Diagnosis   • PAD (peripheral artery disease) ([facility])   • Essential hypertension   • Mixed hyperlipidemia   • Bilateral carotid artery stenosis   • Acute deep vein thrombosis (DVT) of distal vein of right lower extremity ([facility])   • Atherosclerosis of artery of extremity with intermittent claudication (HCC)   • Chronic osteoarthritis   • Decreased pedal pulses   • Nonhealing ulcer of right lower leg with fat layer exposed ([facility])   • Hyperglycemia   • Type 2 diabetes mellitus with hyperglycemia, without long-term current use of insulin ([facility])   • Acute kidney injury ([facility])   • Postoperative anemia due to acute blood loss   • Hematuria   • Cerebrovascular accident (CVA) ([facility])   • CAD (coronary artery disease)     Past Medical History:   Diagnosis Date   • Diabetes mellitus (HCC)    • DVT (deep venous thrombosis) ([facility])     RLL   • Hypertension    • PVD (peripheral vascular disease) ([facility])      Past Surgical History:   Procedure Laterality Date   • AORTAGRAM N/A 2021    Procedure: 1.  Introduction of catheter/sheath into the aorta 2.  Aortoiliac angiogram with right lower extremity runoff 3.  Contralateral cannulation of the right common iliac artery 4.  Placement of a 4 mm spider distal embolic protection device in the below-knee popliteal artery 5.  Directional atherectomy of the proximal SFA and below-knee popliteal artery using the 6 [place] Perris Pantheris d   • CHOLECYSTECTOMY      • KNEE SURGERY     • LEG SURGERY           OT ASSESSMENT FLOWSHEET (last 12 hours)     OT Evaluation and Treatment     Row Name 03/18/22 1420                   OT Time and Intention    Subjective Information no complaints  -TS (r) CC (t) TS (c)        Document Type therapy note (daily note)  -TS (r) CC (t) TS (c)        Mode of Treatment occupational therapy  -TS        Patient Effort good  -TS (r) CC (t) TS (c)                  General Information    Patient Profile Reviewed yes  -TS (r) CC (t) TS (c)        Patient/Family/Caregiver Comments/Observations Friend present  -TS (r) CC (t) TS (c)        Existing Precautions/Restrictions fall  -TS (r) CC (t) TS (c)        Barriers to Rehab cognitive status  -TS (r) CC (t) TS (c)                  Pain Assessment    Pretreatment Pain Rating 0/10 - no pain  -TS (r) CC (t) TS (c)        Posttreatment Pain Rating 0/10 - no pain  -TS (r) CC (t) TS (c)                  Cognition    Orientation Status (Cognition) oriented x 4  -TS (r) CC (t) TS (c)                  Grooming Assessment/Training    [place] Level (Grooming) hair care, combing/brushing;independent (P)   -CC        Position (Grooming) edge of bed sitting (P)   -CC                  Toileting Assessment/Training    [place] Level (Toileting) toileting skills  -TS (r) CC (t) TS (c)        Assistive Devices (Toileting) grab bar/safety frame  -TS (r) CC (t) TS (c)        Position (Toileting) unsupported sitting  -TS (r) CC (t) TS (c)                  Functional Mobility    Functional Mobility- Ind. Level verbal cues required;contact guard assist  -TS (r) CC (t) TS (c)        Functional Mobility- Device rolling walker  -TS (r) CC (t) TS (c)        Functional Mobility- Safety Issues other (see comments)  -TS (r) CC (t) TS (c)        Functional Mobility- Comment Pt has left sided neglect of her visual field she needs cued to remember to turn towards the left for safety.  -TS (r) CC (t) TS (c)                   Transfers    [place] Level (Toilet Transfer) verbal cues;standby assist  -TS (r) CC (t) TS (c)        Assistive Device (Toilet Transfer) walker, front-wheeled;grab bars/safety frame  -TS (r) CC (t) TS (c)                  Toilet Transfer    Type (Toilet Transfer) sit-stand;stand-sit  -TS (r) CC (t) TS (c)                  Safety Issues, Functional Mobility    Safety Issues Affecting Function (Mobility) awareness of need for assistance;impulsivity;insight into deficits/self-awareness;positioning of assistive device;safety precautions follow-through/compliance  -TS (r) CC (t) TS (c)        Impairments Affecting Function (Mobility) cognition;balance;endurance/activity tolerance;visual/perceptual  -TS (r) CC (t) TS (c)        Comment, Safety Issues/Impairments (Mobility) Pt needs cued to remember to look towards her left side to check for items to be in her way and hand placement on walker.  -TS (r) CC (t) TS (c)                  Plan of Care Review    Plan of Care Reviewed With patient  -TS (r) CC (t) TS (c)        Progress improving  -TS (r) CC (t) TS (c)        Outcome Evaluation Pt was alert and oriented x4. Laying supine in bed when OTAS arrived. Her friend was present for a visit. Completed toileting task with verbal cues. She was unaware she had dropped paper due to her left side visual neglect. She completed all transfers with verbal cues and stand by assist. Pt completed grooming task with independence. OTAS handed hair brush to pt on left side and required cue to look to that side.  Pt would benefit from continuing OT for increase left side visual field awareness, strength,balance, and activity tolerance. (P)   -CC                  Positioning and Restraints    Pre-Treatment Position in bed  -TS (r) CC (t) TS (c)        Post Treatment Position bed  -TS (r) CC (t) TS (c)        In Bed sitting EOB;with PT  -TS (r) CC (t) TS (c)              User Key  (r) = Recorded By, (t) = Taken By, (c) = Cosigned By     Initials Name Effective Dates     Jewell Butcher RUDDY NARVAEZ 06/16/21 -     CC ErmiasJeanna PETER Student 03/16/22 -                  Occupational Therapy Education                 Title: PT OT SLP Therapies (In Progress)     Topic: Occupational Therapy (Done)     Point: ADL training (Done)     Description:   Instruct learner(s) on proper safety adaptation and remediation techniques during self care or transfers.   Instruct in proper use of assistive devices.              Learning Progress Summary           Patient Acceptance, E,D, VU,NR by  at 3/17/2022 1445    Acceptance, E, VU by KG at 3/16/2022 1416    Acceptance, E, VU by KG at 3/15/2022 1320                   Point: Home exercise program (Done)     Description:   Instruct learner(s) on appropriate technique for monitoring, assisting and/or progressing therapeutic exercises/activities.              Learning Progress Summary           Patient Acceptance, E, VU by KG at 3/16/2022 1416    Acceptance, E, VU by KG at 3/15/2022 1320                   Point: Precautions (Done)     Description:   Instruct learner(s) on prescribed precautions during self-care and functional transfers.              Learning Progress Summary           Patient Acceptance, E, VU by KG at 3/16/2022 1416    Acceptance, E, VU by KG at 3/15/2022 1320                   Point: Body mechanics (Done)     Description:   Instruct learner(s) on proper positioning and spine alignment during self-care, functional mobility activities and/or exercises.              Learning Progress Summary           Patient Acceptance, E, VU by KG at 3/16/2022 1416    Acceptance, E, VU by KG at 3/15/2022 1320                               User Key     Initials Effective Dates Name Provider Type Discipline     06/16/21 -  Chana Alexander, OTR/L Occupational Therapist OT    KG 12/27/21 -  Hermelinda Townsend OT Student OT Student OT                  OT Recommendation and Plan     Plan of Care Review  Plan of Care  Reviewed With: patient  Progress: improving  Outcome Evaluation: (P) Pt was alert and oriented x4. Laying supine in bed when OTAS arrived. Her friend was present for a visit. Completed toileting task with verbal cues. She was unaware she had dropped paper due to her left side visual neglect. She completed all transfers with verbal cues and stand by assist. Pt completed grooming task with independence. OTAS handed hair brush to pt on left side and required cue to look to that side.  Pt would benefit from continuing OT for increase left side visual field awareness, strength,balance, and activity tolerance.  Plan of Care Reviewed With: patient  Outcome Evaluation: (P) Pt was alert and oriented x4. Laying supine in bed when OTAS arrived. Her friend was present for a visit. Completed toileting task with verbal cues. She was unaware she had dropped paper due to her left side visual neglect. She completed all transfers with verbal cues and stand by assist. Pt completed grooming task with independence. OTAS handed hair brush to pt on left side and required cue to look to that side.  Pt would benefit from continuing OT for increase left side visual field awareness, strength,balance, and activity tolerance.        Time Calculation:    Time Calculation- OT     Row Name 03/18/22 1512             Time Calculation- OT    OT Start Time 1426 (P)   -CC      OT Stop Time 1446 (P)   -CC      OT Time Calculation (min) 20 min (P)   -CC      Total Timed Code Minutes- OT 20 minute(s) (P)   -CC              Timed Charges    57336 - OT Self Care/Mgmt Minutes 20 (P)   -CC              Total Minutes    Timed Charges Total Minutes 20 (P)   -CC       Total Minutes 20 (P)   -CC            User Key  (r) = Recorded By, (t) = Taken By, (c) = Cosigned By    Initials Name Provider Type    CC Jeanna Monson OTA Student OT Student                       PETER Casiano  3/18/2022

## 2022-03-19 NOTE — PLAN OF CARE
Goal Outcome Evaluation:   Patient is alert and oriented. Standby assist to transfer using walker. VSS. Voiding without difficulty. Room air. Fall precautions with bed alarm in place.

## 2022-03-19 NOTE — THERAPY TREATMENT NOTE
Acute Care - Physical Therapy Treatment Note  [facility]     Patient Name: Evelyn Ojeda  : 1940  MRN: [id number]  Today's Date: 3/19/2022      Visit Dx:     ICD-10-CM ICD-9-CM   1. Cerebrovascular accident (CVA), unspecified mechanism ([facility])  I63.9 434.91   2. Oral phase dysphagia  R13.11 787.21   3. Impaired functional mobility and activity tolerance  Z74.09 V49.89   4. Cerebrovascular accident (CVA) due to thrombosis of right middle cerebral artery (HCC)  I63.311 434.01   5. Decreased activities of daily living (ADL)  Z78.9 V49.89     Patient Active Problem List   Diagnosis   • PAD (peripheral artery disease) ([facility])   • Essential hypertension   • Mixed hyperlipidemia   • Bilateral carotid artery stenosis   • Acute deep vein thrombosis (DVT) of distal vein of right lower extremity ([facility])   • Atherosclerosis of artery of extremity with intermittent claudication ([facility])   • Chronic osteoarthritis   • Decreased pedal pulses   • Nonhealing ulcer of right lower leg with fat layer exposed ([facility])   • Hyperglycemia   • Type 2 diabetes mellitus with hyperglycemia, without long-term current use of insulin ([facility])   • Acute kidney injury ([facility])   • Postoperative anemia due to acute blood loss   • Hematuria   • Cerebrovascular accident (CVA) ([facility])   • CAD (coronary artery disease)     Past Medical History:   Diagnosis Date   • Diabetes mellitus ([facility])    • DVT (deep venous thrombosis) ([facility])     RLL   • Hypertension    • PVD (peripheral vascular disease) ([facility])      Past Surgical History:   Procedure Laterality Date   • AORTAGRAM N/A 2021    Procedure: 1.  Introduction of catheter/sheath into the aorta 2.  Aortoiliac angiogram with right lower extremity runoff 3.  Contralateral cannulation of the right common iliac artery 4.  Placement of a 4 mm spider distal embolic protection device in the below-knee popliteal artery 5.  Directional atherectomy of the proximal SFA and below-knee popliteal artery using the 6 [place]  Rene paris   • CHOLECYSTECTOMY     • KNEE SURGERY     • LEG SURGERY       PT Assessment (last 12 hours)     PT Evaluation and Treatment     Row Name 03/19/22 0930          Physical Therapy Time and Intention    Subjective Information no complaints  -NW     Document Type therapy note (daily note)  -NW     Mode of Treatment physical therapy  -NW     Patient Effort good  -NW     Row Name 03/19/22 0930          General Information    Existing Precautions/Restrictions fall  -NW     Row Name 03/19/22 0930          Pain    Pretreatment Pain Rating 0/10 - no pain  -NW     Posttreatment Pain Rating 0/10 - no pain  -NW     Row Name 03/19/22 0930          Bed Mobility    Supine-Sit [place] (Bed Mobility) supervision  -NW     Assistive Device (Bed Mobility) bed rails;head of bed elevated  -NW     Row Name 03/19/22 0930          Transfers    Sit-Stand [place] (Transfers) verbal cues;contact guard  -NW     Stand-Sit [place] (Transfers) standby assist  -NW     [place] Level (Toilet Transfer) verbal cues;contact guard;standby assist  -NW     Row Name 03/19/22 0930          Toilet Transfer    Type (Toilet Transfer) sit-stand;stand-sit  -NW     Row Name 03/19/22 0930          Gait/Stairs (Locomotion)    [place] Level (Gait) verbal cues;contact guard  -NW     Assistive Device (Gait) walker, front-wheeled  -NW     Distance in Feet (Gait) 100x2  -NW     Deviations/Abnormal Patterns (Gait) gait speed decreased;stride length decreased  -NW     Bilateral Gait Deviations forward flexed posture  -NW     Comment, (Gait/Stairs) pt does much better w/ rwx. reviewed home safety and POC  -NW     Row Name 03/19/22 0930          Safety Issues, Functional Mobility    Safety Issues Affecting Function (Mobility) awareness of need for assistance  -NW     Impairments Affecting Function (Mobility) cognition;balance  -NW     Row Name 03/19/22 0930          Positioning and Restraints    Pre-Treatment Position in bed  -NW      Post Treatment Position chair  -NW     In Chair reclined;call light within reach;encouraged to call for assist;notified nsg  -NW           User Key  (r) = Recorded By, (t) = Taken By, (c) = Cosigned By    Initials Name Provider Type    NW Candy Barney, PTA Physical Therapy Assistant                Physical Therapy Education                 Title: PT OT SLP Therapies (In Progress)     Topic: Physical Therapy (In Progress)     Point: Mobility training (Done)     Learning Progress Summary           Patient Acceptance, E,TB,D, VU,DU,NR by  at 3/15/2022 1237    Comment: Education re: purpose of PT/importance of activity, for safety/falls prevention, to have assist when up   Family Acceptance, E,TB,D, VU,DU,NR by  at 3/15/2022 1237    Comment: Education re: purpose of PT/importance of activity, for safety/falls prevention, to have assist when up                   Point: Home exercise program (Not Started)     Learner Progress:  Not documented in this visit.          Point: Precautions (Done)     Learning Progress Summary           Patient Acceptance, E,TB,D, VU,DU,NR by  at 3/15/2022 1237    Comment: Education re: purpose of PT/importance of activity, for safety/falls prevention, to have assist when up   Family Acceptance, E,TB,D, VU,DU,NR by  at 3/15/2022 1237    Comment: Education re: purpose of PT/importance of activity, for safety/falls prevention, to have assist when up                               User Key     Initials Effective Dates Name Provider Type [facility] 08/02/18 -  Nereida Faust, PT Physical Therapist PT              PT Recommendation and Plan     Plan of Care Reviewed With: patient  Progress: improving  Outcome Evaluation: Bed mobility w/ extra time sba/cga. sit-stand cga/sba. Pt amb to BR sba/cga. Pt then amb 100'x2 rwx cues for safety sba/cga. reviewed home safety and POC. Pt reports has daughters that live with her and will have plenty of help. Feel pt will be safe to d/c home  w/ HH when medically stable.       Time Calculation:    PT Charges     Row Name 03/19/22 1003             Time Calculation    Start Time 0930  -NW      Stop Time 0953  -NW      Time Calculation (min) 23 min  -NW      PT Received On 03/19/22  -NW      PT Goal Re-Cert Due Date 03/25/22  -NW              Time Calculation- PT    Total Timed Code Minutes- PT 23 minute(s)  -NW            User Key  (r) = Recorded By, (t) = Taken By, (c) = Cosigned By    Initials Name Provider Type    NW Candy Barney PTA Physical Therapy Assistant              Therapy Charges for Today     Code Description Service Date Service Provider Modifiers Qty    [id number] HC GAIT TRAINING EA 15 MIN 3/19/2022 Candy Barney PTA GP 2          PT G-Codes  Outcome Measure Options: AM-PAC 6 Clicks Daily Activity (OT)  AM-PAC 6 Clicks Score (PT): 18  AM-PAC 6 Clicks Score (OT): 17  Modified [place] Scale: 3 - Moderate disability.  Requiring some help, but able to walk without assistance.    Candy Barney PTA  3/19/2022

## 2022-03-19 NOTE — DISCHARGE SUMMARY
[facility] Medicine Services  DISCHARGE SUMMARY       Date of Admission: 3/14/2022  Date of Discharge:  3/19/2022  Primary Care Physician: Roberto Garcia MD    Presenting Problem/History of Present Illness:  Cerebrovascular accident (CVA), unspecified mechanism (HCC) [I63.9]     Final Discharge Diagnoses:  Active Hospital Problems    Diagnosis    • **Cerebrovascular accident (CVA) (HCC)    • CAD (coronary artery disease)    • Type 2 diabetes mellitus with hyperglycemia, without long-term current use of insulin (HCC)    • PAD (peripheral artery disease) (HCC)    • Mixed hyperlipidemia        Consults: Neurology.    Pertinent Test Results:   Results for orders placed during the hospital encounter of 03/14/22    Adult Transthoracic Echo Complete W/ Cont if Necessary Per Protocol    Interpretation Summary  · Left ventricular ejection fraction appears to be 56 - 60%. Left ventricular systolic function is normal.  · Left ventricular diastolic function is consistent with (grade I) impaired relaxation.  · Mild aortic valve stenosis is present.  · Normal right ventricular cavity size and systolic function noted.  · There is no evidence of right left shunt on bubble study.  · There is no evidence of intracardiac mass or thrombus.    .  Imaging Results (All)     Procedure Component Value Units Date/Time    CT Head Without Contrast [[id number]] Collected: 03/16/22 1236     Updated: 03/16/22 1240    Narrative:      EXAMINATION: CT HEAD WO CONTRAST-      3/16/2022 12:22 PM CDT     HISTORY: presyncope/syncope; I63.9-Cerebral infarction, unspecified;  R13.11-Dysphagia, oral phase; Z74.09-Other reduced mobility;  I63.311-Cerebral infarction due to thrombosis of right middle cerebral  artery     In order to have a CT radiation dose as low as reasonably achievable  Automated Exposure Control was utilized for adjustment of the mA and/or  KV according to patient size.     DLP in mGycm=  588.     Noncontrast head CT.     Comparison is made with March 14, 2022.     Hypodense subacute right MCA infarct.     Unchanged appearance.  No hemorrhagic conversion.     Ventricle size remains normal.     Paranasal sinuses remain clear.     Summary:  1. No significant change from 2 days ago.                                   This report was finalized on 03/16/2022 12:37 by Dr. Jorge Corrigan MD.    MRI Brain Without Contrast [[id number]] Collected: 03/14/22 1838     Updated: 03/14/22 1846    Narrative:      EXAMINATION: MRI the brain without contrast 3/14/2022     HISTORY: Stroke     FINDINGS: Multiplanar fast spin echo imaging sequences were obtained of  the brain on a high-field magnet without gadolinium enhancement.     There is diffusion restriction within the right posterior division MCA  territory with involvement of the temporal and parietal lobe with  associated ADC mapping abnormality. FINDINGS are consistent with an  acute MCA territory infarct. There is associated sulcal effacement and  edema. No evidence of hemorrhagic conversion.     No additional sites of diffusion restriction are present. There is  atrophy of the brain with prominence of the subarachnoid spaces and  ventricular enlargement. There is evidence of previous lacunar  infarctions involving the right cerebellar hemisphere, left basal  ganglia and right thalamus with focal encephalomalacia and gliosis. Mild  to moderate small vessel ischemic changes are noted involving the  periventricular and higher white matter tracts.     Normal flow voids are noted within the [place]. The visualized  paranasal sinuses and mastoid air cells demonstrate normal aeration.  There are normal flow-voids within the [place].       Impression:      1.. Extensive right MCA territory infarct with diffusion restriction and  ADC mapping abnormality. There is associated sulcal effacement and  edema. No evidence of hemorrhagic conversion or mass  effect.  2. Atrophy with small vessel ischemic changes.  3. Remote infarcts involving the right thalamus, left basal ganglia and  right cerebellar hemisphere with focal encephalomalacia and gliosis.  This report was finalized on 03/14/2022 18:43 by Dr. Marco A Vasquez MD.    XR Chest 1 View [[id number]] Collected: 03/14/22 1614     Updated: 03/14/22 1618    Narrative:      EXAMINATION:  XR CHEST 1 VW-  3/14/2022 4:08 PM CDT     HISTORY: Acute Stroke Protocol (Onset < 12 hrs)     COMPARISON: No comparison study.     FINDINGS:  There is hypoventilation with vascular crowding. There is  minimal bronchial wall thickening. There is no focal infiltrate. The  heart is normal in size. The thoracic aorta is atheromatous. There are  degenerative changes of the spine. Surgical anchor is noted in the right  humeral head.       Impression:      1. No focal infiltrate.  2. Mild hypoventilation with vascular crowding. Mild bronchial wall  thickening.        This report was finalized on 03/14/2022 16:15 by Dr. Tim Ozuna MD.    CT Angiogram Neck [[id number]] Collected: 03/14/22 1556     Updated: 03/14/22 1606    Narrative:      EXAMINATION: CT ANGIOGRAM NECK-      3/14/2022 3:27 PM CDT     HISTORY: Stroke, follow up     In order to have a CT radiation dose as low as reasonably achievable  Automated Exposure Control was utilized for adjustment of the mA and/or  KV according to patient size.     DLP in mGycm= 199.     CT angiogram neck.  CT angiography protocol.   CT imaging with bolus IV contrast injection.   Under concurrent supervision axial, sagittal, coronal, and  three-dimensional data sets were constructed.     Aortic arch calcification.  Patent great vessel origins though there is moderate atherosclerotic  calcification with approximately 50% narrowing of the proximal left  subclavian artery.     Patchy atherosclerotic calcification within the common carotid arteries.     Atherosclerotic calcification at the left ICA  origin and within the  proximal 22 mm of the left ICA.  Left ICA origin stenosis with lumen diameter ratio = 3.4/4.5.  25% stenosis based on ACAS/NASCET criteria.  The degree of stenosis is derived by comparing the narrowest segment  with the distal luminal diameter.  2 cm distal to the left ICA origin there is calcified plaque with less  prominent stenosis with a lumen diameter ratio = 4.0/4.5.  12% stenosis based on ACAS/NASCET criteria.  The degree of stenosis is derived by comparing the narrowest segment  with the distal luminal diameter.     Atherosclerotic calcification within the proximal 15 mm of the right  ICA.  Calcified plaque at the right ICA origin with mild stenosis and lumen  diameter ratio = 3.7/4.3.  14% stenosis based on ACAS/NASCET criteria.  The degree of stenosis is derived by comparing the narrowest segment  with the distal luminal diameter.     No intraluminal thrombus.     The right vertebral artery is dominant.  The left vertebral artery is patent though diminutive throughout its  length.     Summary:  1. Patchy atherosclerotic plaque at both carotid bifurcations with no  high-grade stenosis.  2. The greatest degree of stenosis is at the left ICA origin where there  is 25% narrowing.                                      This report was finalized on 03/14/2022 16:03 by Dr. Jorge Corrigan MD.    CT Angiogram Head w AI Analysis of LVO [[id number]] Collected: 03/14/22 1554     Updated: 03/14/22 1559    Narrative:      EXAMINATION: CT ANGIOGRAM HEAD W AI ANALYSIS OF LVO-     3/14/2022 3:27 PM CDT     HISTORY: Acute Stroke     CT angiogram head.  CT angiography protocol.   CT imaging with bolus IV contrast injection.   Under concurrent supervision axial, sagittal, coronal, and  three-dimensional data sets were constructed.     In order to have a CT radiation dose as low as reasonably achievable  Automated Exposure Control was utilized for adjustment of the mA and/or  KV according to patient size.      DLP in mGycm= 199.     Calcified though patent distal internal carotid arteries.     Intact [place].  Anterior, middle, and posterior cerebral arteries are normally patent.  No aneurysm or proximal arterial occlusion.     There is absence of opacification of distal right MCA branches in the  area of known infarction.     Summary:  1. Intact [place].  2. There is no central arterial occlusion or thrombus.   This report was finalized on 03/14/2022 15:56 by Dr. Jorge Corrigan MD.    CT Head Without Contrast Stroke Protocol [[id number]] Collected: 03/14/22 1531     Updated: 03/14/22 1536    Narrative:      EXAMINATION: CT HEAD WO CONTRAST STROKE PROTOCOL-      3/14/2022 3:26 PM CDT     HISTORY: Stroke, follow up     In order to have a CT radiation dose as low as reasonably achievable  Automated Exposure Control was utilized for adjustment of the mA and/or  KV according to patient size.     DLP in mGycm= 600.     Noncontrast head CT.     Large right MCA hypodense subacute infarct involving an area measuring  approximately 7 x 5 x 4 cm.     No hemorrhagic conversion.  No midline shift.     Ventricle size is normal.     Mild atrophy and small vessel disease.     Summary:  1. Large hypodense subacute right MCA infarct.  2. No intracranial hemorrhage.                                   This report was finalized on 03/14/2022 15:33 by Dr. Jorge Corrigan MD.        Chief Complaint on Day of Discharge: None.    History of Present Illness on Day of Discharge:   Patient is a 81-year-old presented to ER for evaluation of left-sided weakness.  Patient been having this symptom for about a week now.  Patient has been admitted to [facility] 3/4/2022 to 3/6/2022 for diabetes and dehydration.  Patient's daughter stated since she came back to the hospital she has never been the same.  Patient has been progressing in a week or over the last week, patient is requiring a cane to walk around per patient.   Daughter stated that patient was extremely weak this morning at 8:30 AM this morning.  Patient has been difficulty swallowing this morning about 2 PM per daughter with drooling on the left side and lefts arm weakness.  EMS was then called patient came to the [facility] ER for code stroke.     Patient has past medical history diabetes, DVT, hypertension, peripheral vascular disease.     CT scan shows CVA . CTA of the neck and head shows no significant blockage.    Hospital Course:  The patient is a 81 y.o. female who presented to [facility] with CVA.      CVA-extensive right MCA territory infarct.  No aspirin for now per neurology.  Aspirin daily.  Chest x-ray-No focal infiltrate, Mild hypoventilation with vascular crowding. Mild bronchial wall  Thickening.  CTA of the head-Intact [place], no central arterial occlusion or thrombus.   CTA of the neck-Patchy atherosclerotic plaque at both carotid bifurcations with no high-grade stenosis, greatest degree of stenosis is at the left ICA origin where there is 25% narrowing.  CT scan head-Large hypodense subacute right MCA infarct, No intracranial hemorrhage.  MRI of the brain-Extensive right MCA territory infarct with diffusion restriction and ADC mapping abnormality- associated sulcal effacement and edema, No evidence of hemorrhagic conversion or mass effect, Atrophy with small vessel ischemic changes, Remote infarcts involving the right thalamus and  left basal ganglia and right cerebellar hemisphere with focal encephalomalacia and gliosis.  Recommendation from neurologist-   ASA 81 mg daily. Hold on dual antiplatelet therapy as size and location of stroke at risk for hemorrhagic conversion. May resume dual antiplatelet therapy in 14 days on 3/28/2022 with Pletal 100 mg BID in place of Plavix-Lipitor, diabetic management, systolic blood pressure less than 160, PT and OT, no driving, Zio patch 14 days, follow-up with neurology 3 to 4  "weeks.       CAD/hypertension/hyperlipidemia.  Lipitor high-dose per neurology.  Hold Plavix per neurology.  Decrease lisinopril.  Echocardiogram-ejection fraction 56 to 60%, diastolic dysfunction grade 1, mild mitral valve stenosis, no evidence of right to left shunt.     UTI.  Rocephin.     Anemia.  Cont  iron sulfate.     Chronic pain/arthritis.  Cut back Neurontin.  Cut back Norco.  Hold Mobic.     Diabetes .  Hold Glucophage.  Hemoglobin A1 C 11.  Discussed patient will need better control of her diabetes.    Continue Tradjenta.  Continue with low sliding scale.     Renal insufficiency.    Resolved.     Nausea.  Zofran as needed     Urine culture pending.  Covid-19-negative.     Vital signs stable, afebrile.  Patient family wants to take her home.  Follow-up with neurology in 3 to 4 weeks.  Unable to place patient in rehab because the only place except as far away, family wants to take the patient home.  Patient go home with home health.    Condition on Discharge: Stable.    Physical Exam on Discharge:  /50 (BP Location: Left arm, Patient Position: Sitting)   Pulse 69   Temp 97.8 °F (36.6 °C) (Oral)   Resp 16   Ht 154.9 cm (61\")   Wt 77.1 kg (170 lb)   SpO2 97%   BMI 32.12 kg/m²   Physical Exam  Vitals and nursing note reviewed.   Constitutional:       Appearance: She is well-developed.      Comments: Advanced age.   HENT:      Head: Normocephalic.   Eyes:      Conjunctiva/sclera: Conjunctivae normal.      Pupils: Pupils are equal, round, and reactive to light.   Neck:      Vascular: No JVD.   Cardiovascular:      Rate and Rhythm: Normal rate and regular rhythm.      Heart sounds: Normal heart sounds. No murmur heard.    No friction rub. No gallop.   Pulmonary:      Effort: Pulmonary effort is normal. No respiratory distress.      Breath sounds: Normal breath sounds. No wheezing or rales.   Chest:      Chest wall: No tenderness.   Abdominal:      General: Bowel sounds are normal. There is " no distension.      Palpations: Abdomen is soft.      Tenderness: There is no abdominal tenderness. There is no guarding or rebound.      Comments: Obesity.   Musculoskeletal:         General: No tenderness or deformity.      Cervical back: Neck supple.      Comments: Arthritis multiple knuckle   Skin:     General: Skin is warm and dry.      Capillary Refill: Capillary refill takes 2 to 3 seconds.      Findings: No rash.   Neurological:      Mental Status: She is alert and oriented to person, place, and time.      Cranial Nerves: No cranial nerve deficit.      Motor: Weakness present. No abnormal muscle tone.      Gait: Gait abnormal.      Deep Tendon Reflexes: Reflexes normal.      Comments: Cranial keystroke grossly intact.  Negative pronator drift.  Negative finger touch.  Strength 5 out of 5 symmetrical.   Psychiatric:         Behavior: Behavior normal.         Thought Content: Thought content normal.     Discharge Disposition:  Home or Self Care    Discharge Medications:     Discharge Medications      New Medications      Instructions Start Date   cilostazol 100 MG tablet  Commonly known as: PLETAL   100 mg, Oral, 2 Times Daily   Start Date: March 28, 2022     famotidine 20 MG tablet  Commonly known as: Pepcid   20 mg, Oral, 2 Times Daily      ondansetron ODT 4 MG disintegrating tablet  Commonly known as: Zofran ODT   4 mg, Translingual, Every 8 Hours PRN      SITagliptin 100 MG tablet  Commonly known as: Januvia   100 mg, Oral, Daily         Changes to Medications      Instructions Start Date   atorvastatin 80 MG tablet  Commonly known as: LIPITOR  What changed:   · medication strength  · how much to take  · when to take this   80 mg, Oral, Nightly      gabapentin 600 MG tablet  Commonly known as: NEURONTIN  What changed:   · how much to take  · when to take this   300 mg, Oral, Nightly      HYDROcodone-acetaminophen  MG per tablet  Commonly known as: NORCO  What changed: how much to take   0.5 tablets,  Oral, Every 12 Hours PRN      lisinopril 20 MG tablet  Commonly known as: PRINIVIL,ZESTRIL  What changed: how much to take   10 mg, Oral, Daily         Continue These Medications      Instructions Start Date   aspirin 81 MG EC tablet   81 mg, Oral, Daily      ferrous sulfate 325 (65 FE) MG tablet   325 mg, Oral, Daily With Breakfast      insulin glargine 100 UNIT/ML injection  Commonly known as: LANTUS, SEMGLEE   20 Units, Subcutaneous, Nightly      vitamin B-12 1000 MCG tablet  Commonly known as: CYANOCOBALAMIN   1,000 mcg, Oral, Daily      Vitamin D3 50 MCG (2000 UT) tablet   2,000 Units, Oral, Daily         Stop These Medications    cetirizine 10 MG tablet  Commonly known as: zyrTEC     clopidogrel 75 MG tablet  Commonly known as: PLAVIX     meloxicam 15 MG tablet  Commonly known as: MOBIC            Discharge Diet:   Diet Instructions     Advance Diet As Tolerated            Activity at Discharge:   Activity Instructions     Activity as Tolerated            Discharge Care Plan/Instructions: Discharge home with family.    Follow-up Appointments:   Future Appointments   Date Time Provider Department Center   7/20/2022 11:00 AM PAD US [facility] CART 2  PAD US PAD   7/20/2022  1:00 PM Jossie Medina APRN [facility]AD PAD   Follow with PCP 1 week.  Follow-up with neurology 3 to 4 weeks.    Electronically signed by Nik Goodson MD, 03/19/22, 15:03 CDT.    Time: Greater than 30 minutes.

## 2022-03-19 NOTE — NURSING NOTE
Pt dc'd to home with dtr via private vehicle after verbalizing understanding of dc instructions, follow up appts and medication changes. IVs dc'd with catheter tip intact.

## 2022-03-19 NOTE — PROGRESS NOTES
"Pharmacy Renal Dose Adjustment     Assessment/Action/Plan:  Based on prescribing information provided by the drug , famotidine 20 mg IV every 12 hours, has been changed to 20 mg IV every 24 hours. Pharmacy will continue to monitor daily and make further adjustment(s) accordingly.     Subjective:  Evelyn Ojeda is a 81 y.o. female     Additional Factors Considered:  Patient disposition per documentation  Disease state or condition being treated    Objective:  Ht: 154.9 cm (61\"); Wt: 77.1 kg (170 lb)  Estimated Creatinine Clearance: 46.6 mL/min (by C-G formula based on SCr of 0.92 mg/dL).   Creatinine   Date Value Ref Range Status   03/19/2022 0.92 0.57 - 1.00 mg/dL Final   03/18/2022 0.81 0.57 - 1.00 mg/dL Final   03/17/2022 0.89 0.57 - 1.00 mg/dL Final   12/08/2019 0.8 0.5 - 0.9 mg/dL Final   12/07/2019 0.8 0.5 - 0.9 mg/dL Final       Eder Wright, PharmD  03/19/22 14:40 CDT      "

## 2022-03-20 NOTE — OUTREACH NOTE
Prep Survey    Flowsheet Row Responses   [facility] facility patient discharged from? [facility]   Is LACE score < 7 ? No   Emergency Room discharge w/ pulse ox? No   Eligibility Not Eligible   What are the reasons patient is not eligible? [facility]   Does the patient have one of the following disease processes/diagnoses(primary or secondary)? Stroke (TIA)   Prep survey completed? Yes          JARED PAN - Registered Nurse

## 2022-03-20 NOTE — THERAPY DISCHARGE NOTE
Acute Care - Occupational Therapy Discharge Summary  [facility]     Patient Name: Evelyn Ojeda  : 1940  MRN: [id number]    Today's Date: 3/20/2022                 Admit Date: 3/14/2022        OT Recommendation and Plan    Visit Dx:    ICD-10-CM ICD-9-CM   1. Cerebrovascular accident (CVA), unspecified mechanism ([facility])  I63.9 434.91   2. Oral phase dysphagia  R13.11 787.21   3. Impaired functional mobility and activity tolerance  Z74.09 V49.89   4. Cerebrovascular accident (CVA) due to thrombosis of right middle cerebral artery ([facility])  I63.311 434.01   5. Decreased activities of daily living (ADL)  Z78.9 V49.89   6. Coronary artery disease involving native heart with other form of angina pectoris, unspecified vessel or lesion type ([facility])  I25.118 414.01     413.9   7. Hematuria, unspecified type  R31.9 599.70   8. Postoperative anemia due to acute blood loss  D62 285.1   9. Acute kidney injury ([facility])  N17.9 584.9   10. Type 2 diabetes mellitus with hyperglycemia, without long-term current use of insulin ([facility])  E11.65 250.00     790.29   11. Hyperglycemia  R73.9 790.29   12. Nonhealing ulcer of right lower leg with fat layer exposed ([facility])  L97.912 707.19   13. Decreased pedal pulses  R09.89 785.9   14. Chronic osteoarthritis  M19.90 715.90   15. Atherosclerosis of artery of extremity with intermittent claudication ([facility])  I70.219 440.21   16. Acute deep vein thrombosis (DVT) of distal vein of right lower extremity ([facility])  I82.4Z1 453.42   17. Bilateral carotid artery stenosis  I65.23 433.10     433.30   18. Mixed hyperlipidemia  E78.2 272.2   19. Essential hypertension  I10 401.9   20. PAD (peripheral artery disease) ([facility])  I73.9 443.9   21. Cerebrovascular accident (CVA) due to thrombosis of middle cerebral artery, unspecified blood vessel laterality ([facility])  I63.319 434.01   22. Coronary artery disease due to lipid rich plaque  I25.10 414.00    I25.83 414.3                OT Rehab Goals     Row Name 22  0700             Transfer Goal 1 (OT)    Activity/Assistive Device (Transfer Goal 1, OT) shower chair;tub  -CS      [place] Level/Cues Needed (Transfer Goal 1, OT) standby assist  -CS      Time Frame (Transfer Goal 1, OT) long term goal (LTG)  -CS      Progress/Outcome (Transfer Goal 1, OT) goal not met  -CS              Dressing Goal 1 (OT)    Activity/Device (Dressing Goal 1, OT) dressing skills, all  -CS      [place]/Cues Needed (Dressing Goal 1, OT) standby assist  -CS      Time Frame (Dressing Goal 1, OT) long term goal (LTG)  -CS      Progress/Outcome (Dressing Goal 1, OT) goal not met  -CS              Problem Specific Goal 1 (OT)    Problem Specific Goal 1 (OT) Pt will be I with fine motor HEP to increase I with ADLs/IADLs.  -CS      Time Frame (Problem Specific Goal 1, OT) long term goal (LTG)  -CS      Progress/Outcome (Problem Specific Goal 1, OT) goal not met  -CS            User Key  (r) = Recorded By, (t) = Taken By, (c) = Cosigned By    Initials Name Provider Type Discipline    CS Rosalva Augustin OTR/L, SHANNON Occupational Therapist OT                    Timed Therapy Charges  Total Units: 1    Charges  Total Units: 1    Procedure Name Documented Minutes Units Code    HC OT SELF CARE/MGMT/TRAIN EA 15 MIN 20  1    86811 (CPT®)               Documented Minutes  Total Minutes: 20    Therapy Provided Minutes    97309 - OT Self Care/Mgmt Minutes 20                    OT Discharge Summary  Anticipated Discharge Disposition (OT): sub acute care setting, home with 24/7 care, skilled nursing facility  Reason for Discharge: Discharge from facility  Outcomes Achieved: Refer to plan of care for updates on goals achieved  Discharge Destination: Home with assist      ANGELY Reyes/L, CNT  3/20/2022

## 2022-03-20 NOTE — THERAPY DISCHARGE NOTE
Acute Care - Speech Language Pathology Discharge Summary  [facility]       Patient Name: Evelyn Ojeda  : 1940  MRN: [id number]    Today's Date: 3/20/2022                   Admit Date: 3/14/2022      SLP Recommendation and Plan  Regular diet with thin liquids. Compensatory strategies required for L pocketing.     Visit Dx:    ICD-10-CM ICD-9-CM   1. Cerebrovascular accident (CVA), unspecified mechanism ([facility])  I63.9 434.91   2. Oral phase dysphagia  R13.11 787.21   3. Impaired functional mobility and activity tolerance  Z74.09 V49.89   4. Cerebrovascular accident (CVA) due to thrombosis of right middle cerebral artery ([facility])  I63.311 434.01   5. Decreased activities of daily living (ADL)  Z78.9 V49.89   6. 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 Hematuria, unspecified type  R31.9 599.70   8. Postoperative anemia due to acute blood loss  D62 285.1   9. Acute kidney injury ([facility])  N17.9 584.9   10. Type 2 diabetes mellitus with hyperglycemia, without long-term current use of insulin ([facility])  E11.65 250.00     790.29   11. Hyperglycemia  R73.9 790.29   12. Nonhealing ulcer of right lower leg with fat layer exposed ([facility])  L97.912 707.19   13. Decreased pedal pulses  R09.89 785.9   14. Chronic osteoarthritis  M19.90 715.90   15. Atherosclerosis of artery of extremity with intermittent claudication ([facility])  I70.219 440.21   16. Acute deep vein thrombosis (DVT) of distal vein of right lower extremity ([facility])  I82.4Z1 453.42   17. Bilateral carotid artery stenosis  I65.23 433.10     433.30   18. Mixed hyperlipidemia  E78.2 272.2   19. Essential hypertension  I10 401.9   20. PAD (peripheral artery disease) ([facility])  I73.9 443.9   21. Cerebrovascular accident (CVA) due to thrombosis of middle cerebral artery, unspecified blood vessel laterality ([facility])  I63.319 434.01   22. Coronary artery disease due to lipid rich  plaque  I25.10 414.00    I25.83 414.3                SLP GOALS     Row Name 03/20/22 0900 03/17/22 1434          Oral Nutrition/Hydration Goal 1 (SLP)    Oral Nutrition/Hydration Goal 1, SLP Patient will tolerate LRD without s/s of aspiration.  -MM Patient will tolerate LRD without s/s of aspiration.  -MM     Time Frame (Oral Nutrition/Hydration Goal 1, SLP) by discharge  -MM by discharge  -MM     Barriers (Oral Nutrition/Hydration Goal 1, SLP) none  -MM none  -MM     Progress/Outcomes (Oral Nutrition/Hydration Goal 1, SLP) discharged from facility;goal partially met  -MM continuing progress toward goal  -MM            Labial Strengthening Goal 1 (SLP)    Activity (Labial Strengthening Goal 1, SLP) increase labial tone  -MM increase labial tone  -MM     Increase Labial Tone labial resistance exercises  -MM labial resistance exercises  -MM     [place]/Accuracy (Labial Strengthening Goal 1, SLP) independently (over 90% accuracy)  -MM independently (over 90% accuracy)  -MM     Time Frame (Labial Strengthening Goal 1, SLP) by discharge  -MM by discharge  -MM     Barriers (Labial Strengthening Goal 1, SLP) none  -MM none  -MM     Progress/Outcomes (Labial Strengthening Goal 1, SLP) discharged from facility;goal partially met  -MM continuing progress toward goal  -MM            Swallow Compensatory Strategies Goal 1 (SLP)    Activity (Swallow Compensatory Strategies/Techniques Goal 1, SLP) compensatory strategies;during meal intake;alternate food/liquid intake;other (see comments)  -MM compensatory strategies;during meal intake;alternate food/liquid intake;other (see comments)  -MM     [place]/Accuracy (Swallow Compensatory Strategies/Techniques Goal 1, SLP) independently (over 90% accuracy)  -MM independently (over 90% accuracy)  -MM     Time Frame (Swallow Compensatory Strategies/Techniques Goal 1, SLP) by discharge  -MM by discharge  -MM     Barriers (Swallow Compensatory Strategies/Techniques Goal 1, SLP)  none  -MM none  -MM     Progress/Outcomes (Swallow Compensatory Strategies/Techniques Goal 1, SLP) discharged from facility;goal partially met  -MM continuing progress toward goal  -MM           User Key  (r) = Recorded By, (t) = Taken By, (c) = Cosigned By    Initials Name Provider Type    Felicita Delgadillo MS CCC-SLP Speech and Language Pathologist                        SLP Discharge Summary  Anticipated Discharge Disposition (SLP): unknown  Reason for Discharge: discharge from this facility  Progress Toward Achieving Short/long Term Goals: goals partially met within established timelines  Discharge Destination: home w/ home health      Felicita Daly MS CCC-SLP  3/20/2022

## 2022-03-20 NOTE — THERAPY DISCHARGE NOTE
Acute Care - Physical Therapy Discharge Summary  [facility]       Patient Name: Evelyn Ojeda  : 1940  MRN: [id number]    Today's Date: 3/20/2022                 Admit Date: 3/14/2022      PT Recommendation and Plan    Visit Dx:    ICD-10-CM ICD-9-CM   1. Cerebrovascular accident (CVA), unspecified mechanism ([facility])  I63.9 434.91   2. Oral phase dysphagia  R13.11 787.21   3. Impaired functional mobility and activity tolerance  Z74.09 V49.89   4. Cerebrovascular accident (CVA) due to thrombosis of right middle cerebral artery ([facility])  I63.311 434.01   5. Decreased activities of daily living (ADL)  Z78.9 V49.89   6. 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 Hematuria, unspecified type  R31.9 599.70   8. Postoperative anemia due to acute blood loss  D62 285.1   9. Acute kidney injury ([facility])  N17.9 584.9   10. Type 2 diabetes mellitus with hyperglycemia, without long-term current use of insulin ([facility])  E11.65 250.00     790.29   11. Hyperglycemia  R73.9 790.29   12. Nonhealing ulcer of right lower leg with fat layer exposed ([facility])  L97.912 707.19   13. Decreased pedal pulses  R09.89 785.9   14. Chronic osteoarthritis  M19.90 715.90   15. Atherosclerosis of artery of extremity with intermittent claudication ([facility])  I70.219 440.21   16. Acute deep vein thrombosis (DVT) of distal vein of right lower extremity ([facility])  I82.4Z1 453.42   17. Bilateral carotid artery stenosis  I65.23 433.10     433.30   18. Mixed hyperlipidemia  E78.2 272.2   19. Essential hypertension  I10 401.9   20. PAD (peripheral artery disease) ([facility])  I73.9 443.9   21. Cerebrovascular accident (CVA) due to thrombosis of middle cerebral artery, unspecified blood vessel laterality ([facility])  I63.319 434.01   22. Coronary artery disease due to lipid rich plaque  I25.10 414.00    I25.83 414.3                PT Rehab Goals     Row Name 22 1146              Bed Mobility Goal 1 (PT)    Activity/Assistive Device (Bed Mobility Goal 1, PT) bed mobility activities, all  -NW      [place] Level/Cues Needed (Bed Mobility Goal 1, PT) independent  -NW      Time Frame (Bed Mobility Goal 1, PT) long term goal (LTG);10 days  -NW      Progress/Outcomes (Bed Mobility Goal 1, PT) goal not met  -NW              Transfer Goal 1 (PT)    Activity/Assistive Device (Transfer Goal 1, PT) sit-to-stand/stand-to-sit;bed-to-chair/chair-to-bed  -NW      [place] Level/Cues Needed (Transfer Goal 1, PT) standby assist;independent  -NW      Time Frame (Transfer Goal 1, PT) long term goal (LTG);10 days  -NW      Progress/Outcome (Transfer Goal 1, PT) goal not met  -NW              Gait Training Goal 1 (PT)    Activity/Assistive Device (Gait Training Goal 1, PT) gait (walking locomotion);decrease fall risk;diminish gait deviation;assistive device use;improve balance and speed;increase endurance/gait distance;other (see comments)  cane vs rwx  -NW      [place] Level (Gait Training Goal 1, PT) standby assist;modified independence  -NW      Distance (Gait Training Goal 1, PT) 200 ft+  -NW      Time Frame (Gait Training Goal 1, PT) long term goal (LTG);10 days  -NW      Progress/Outcome (Gait Training Goal 1, PT) goal not met  -NW              Problem Specific Goal 1 (PT)    Problem Specific Goal 1 (PT) dynamic sitting balance w/out posterior lean or need for assist to maintain  -NW      Time Frame (Problem Specific Goal 1, PT) long-term goal (LTG);other (see comments)  10 days  -NW      Progress/Outcome (Problem Specific Goal 1, PT) goal met  -NW              Patient Education Goal (PT)    Activity (Patient Education Goal, PT) I w/ HEP for strengthening/coordination  -NW      [place]/Cues/Accuracy (Memory Goal 2, PT) demonstrates adequately;independent;verbalizes understanding  -NW      Time Frame (Patient Education Goal, PT) long term goal (LTG);10 days  -NW       Progress/Outcome (Patient Education Goal, PT) goal not met  -NW            User Key  (r) = Recorded By, (t) = Taken By, (c) = Cosigned By    Initials Name Provider Type Discipline    NW Candy Barney PTA Physical Therapy Assistant PT                Therapy Charges for Today     Code Description Service Date Service Provider Modifiers Qty    [id number] HC GAIT TRAINING EA 15 MIN 3/19/2022 Candy Barney PTA GP 2          PT Discharge Summary  Anticipated Discharge Disposition (PT): home  Reason for Discharge: Discharge from facility  Outcomes Achieved: Refer to plan of care for updates on goals achieved  Discharge Destination: Home      Candy Barney PTA   3/20/2022

## 2022-09-26 ENCOUNTER — TELEPHONE (OUTPATIENT)
Dept: VASCULAR SURGERY | Facility: CLINIC | Age: 82
End: 2022-09-26

## 2022-09-26 NOTE — TELEPHONE ENCOUNTER
Pt's phone number is no longer a working number.  Attempted to call pt's daughter's phone and VM was full.

## 2023-07-22 NOTE — ANESTHESIA POSTPROCEDURE EVALUATION
"Patient: Evelyn Ojeda    Procedure Summary     Date: 01/06/21 Room / Location:  [facility] OR 12 /  [facility] HYBRID OR 12    Anesthesia Start: 1505 Anesthesia Stop: 1657    Procedure: 1.  Introduction of catheter/sheath into the aorta 2.  Aortoiliac angiogram with right lower extremity runoff 3.  Contralateral cannulation of the right common iliac artery 4.  Placement of a 4 mm spider distal embolic protection device in the below-knee popliteal artery 5.  Directional atherectomy of the proximal SFA and below-knee popliteal artery using the 6 [place] Pantheris device 6.  Balloon angioplasty of the proximal SFA using a 5 x 80 mm Medtronic drug-coated balloon. 7.  B (N/A Groin) Diagnosis:       PAD (peripheral artery disease) (CMS/HCC)      (PAD (peripheral artery disease) (CMS/HCC) [I73.9])    Surgeon: Morgan Owens DO Provider: Jason Sylvester CRNA    Anesthesia Type: MAC ASA Status: 3          Anesthesia Type: MAC    Vitals  Vitals Value Taken Time   /57 01/06/21 1817   Temp 98.2 °F (36.8 °C) 01/06/21 1815   Pulse 85 01/06/21 1829   Resp 16 01/06/21 1815   SpO2 99 % 01/06/21 1829   Vitals shown include unvalidated device data.        Post Anesthesia Care and Evaluation    Patient location during evaluation: PACU  Patient participation: complete - patient participated  Level of consciousness: awake and alert  Pain management: adequate  Airway patency: patent  Anesthetic complications: No anesthetic complications    Cardiovascular status: acceptable  Respiratory status: acceptable  Hydration status: acceptable    Comments: Blood pressure 149/40, pulse 77, temperature 98.3 °F (36.8 °C), temperature source Oral, resp. rate 18, height 154.9 cm (61\"), weight 80.6 kg (177 lb 12.8 oz), SpO2 96 %, not currently breastfeeding.    Pt discharged from PACU based on denise score >8      " [place] License Number  [place] [id number]

## 2023-11-27 NOTE — THERAPY TREATMENT NOTE
Acute Care - Physical Therapy Treatment Note  [facility]     Patient Name: Evelyn Ojeda  : 1940  MRN: [id number]  Today's Date: 3/15/2022      Visit Dx:     ICD-10-CM ICD-9-CM   1. Cerebrovascular accident (CVA), unspecified mechanism ([facility])  I63.9 434.91   2. Oral phase dysphagia  R13.11 787.21   3. Impaired functional mobility and activity tolerance  Z74.09 V49.89     Patient Active Problem List   Diagnosis   • PAD (peripheral artery disease) ([facility])   • Essential hypertension   • Mixed hyperlipidemia   • Bilateral carotid artery stenosis   • Acute deep vein thrombosis (DVT) of distal vein of right lower extremity ([facility])   • Atherosclerosis of artery of extremity with intermittent claudication ([facility])   • Chronic osteoarthritis   • Decreased pedal pulses   • Nonhealing ulcer of right lower leg with fat layer exposed ([facility])   • Hyperglycemia   • Type 2 diabetes mellitus with hyperglycemia, without long-term current use of insulin ([facility])   • Acute kidney injury ([facility])   • Postoperative anemia due to acute blood loss   • Hematuria   • Cerebrovascular accident (CVA) ([facility])   • CAD (coronary artery disease)     Past Medical History:   Diagnosis Date   • Diabetes mellitus ([facility])    • DVT (deep venous thrombosis) ([facility])     RLL   • Hypertension    • PVD (peripheral vascular disease) ([facility])      Past Surgical History:   Procedure Laterality Date   • AORTAGRAM N/A 2021    Procedure: 1.  Introduction of catheter/sheath into the aorta 2.  Aortoiliac angiogram with right lower extremity runoff 3.  Contralateral cannulation of the right common iliac artery 4.  Placement of a 4 mm spider distal embolic protection device in the below-knee popliteal artery 5.  Directional atherectomy of the proximal SFA and below-knee popliteal artery using the 6 [place] Pantheris d   • CHOLECYSTECTOMY     • KNEE SURGERY     • LEG SURGERY       PT Assessment (last 12 hours)     PT Evaluation and Treatment     Row Name 03/15/22 2329     LMOM with results.       Physical Therapy Time and Intention    Subjective Information no complaints  -TB     Document Type therapy note (daily note)  -TB     Mode of Treatment physical therapy  -TB     Patient Effort good  -TB     Row Name 03/15/22 1320          General Information    Existing Precautions/Restrictions fall  -TB     Row Name 03/15/22 1320          Pain    Pretreatment Pain Rating 0/10 - no pain  -TB     Posttreatment Pain Rating 0/10 - no pain  -TB     Row Name 03/15/22 1320          Bed Mobility    Bed Mobility rolling left;rolling right;scooting/bridging;supine-sit;sit-supine  -TB     Rolling Left [place] (Bed Mobility) modified independence  -TB     Rolling Right [place] (Bed Mobility) modified independence  -TB     Scooting/Bridging [place] (Bed Mobility) modified independence  -TB     Supine-Sit [place] (Bed Mobility) modified independence  -TB     Sit-Supine [place] (Bed Mobility) modified independence  -TB     Assistive Device (Bed Mobility) bed rails  -TB     Row Name 03/15/22 1320          Transfers    Transfers sit-stand transfer;stand-sit transfer;toilet transfer  -TB     Sit-Stand [place] (Transfers) standby assist  -TB     Stand-Sit [place] (Transfers) standby assist  -TB     [place] Level (Toilet Transfer) contact guard;verbal cues  -TB     Assistive Device (Toilet Transfer) grab bars/safety frame  -TB     Row Name 03/15/22 1320          Toilet Transfer    Type (Toilet Transfer) sit-stand;stand-sit  -TB     Row Name 03/15/22 1320          Gait/Stairs (Locomotion)    [place] Level (Gait) contact guard;standby assist  -TB     Distance in Feet (Gait) 150'x2  -TB     Deviations/Abnormal Patterns (Gait) gait speed decreased;stride length decreased  -TB     Bilateral Gait Deviations forward flexed posture;heel strike decreased  -TB     Row Name 03/15/22 1320          Positioning and Restraints    Pre-Treatment Position in bed  -TB     Post Treatment Position bed   -TB     In Bed fowlers;call light within reach;encouraged to call for assist;exit alarm on;side rails up x2  -TB           User Key  (r) = Recorded By, (t) = Taken By, (c) = Cosigned By    Initials Name Provider Type    TB Daxa Rudolph, PTA Physical Therapy Assistant                Physical Therapy Education                 Title: PT OT SLP Therapies (In Progress)     Topic: Physical Therapy (In Progress)     Point: Mobility training (Done)     Learning Progress Summary           Patient Acceptance, E,TB,D, VU,DU,NR by  at 3/15/2022 1237    Comment: Education re: purpose of PT/importance of activity, for safety/falls prevention, to have assist when up   Family Acceptance, E,TB,D, VU,DU,NR by  at 3/15/2022 1237    Comment: Education re: purpose of PT/importance of activity, for safety/falls prevention, to have assist when up                   Point: Home exercise program (Not Started)     Learner Progress:  Not documented in this visit.          Point: Precautions (Done)     Learning Progress Summary           Patient Acceptance, E,TB,D, VU,DU,NR by  at 3/15/2022 1237    Comment: Education re: purpose of PT/importance of activity, for safety/falls prevention, to have assist when up   Family Acceptance, E,TB,D, VU,DU,NR by  at 3/15/2022 1237    Comment: Education re: purpose of PT/importance of activity, for safety/falls prevention, to have assist when up                               User Key     Initials Effective Dates Name Provider Type Discipline     08/02/18 -  eNreida Faust, PT Physical Therapist PT              PT Recommendation and Plan             Time Calculation:    PT Charges     Row Name 03/15/22 1420 03/15/22 1113          Time Calculation    Start Time 1320  - 0950  -     Stop Time 1343  -TB 1100  -     Time Calculation (min) 23 min  - 70 min  -     PT Received On 03/15/22  -TB 03/15/22  -     PT Goal Re-Cert Due Date -- 03/25/22  -            Time Calculation- PT     Total Timed Code Minutes- PT 23 minute(s)  -TB --           User Key  (r) = Recorded By, (t) = Taken By, (c) = Cosigned By    Initials Name Provider Type    TB Daxa Rudolph, SOFIA Physical Therapy Assistant    Nereida Orozco, PT Physical Therapist              Therapy Charges for Today     Code Description Service Date Service Provider Modifiers Qty    [id number] HC GAIT TRAINING EA 15 MIN 3/15/2022 Daxa Rudolph PTA GP 2          PT G-Codes  Outcome Measure Options: AM-PAC 6 Clicks Basic Mobility (PT)  AM-PAC 6 Clicks Score (PT): 18    Daxa Rudolph PTA  3/15/2022

## (undated) DEVICE — MODEL BT2000 P/N 700287-012KIT CONTENTS: MANIFOLD WITH SALINE AND CONTRAST PORTS, SALINE TUBING WITH SPIKE AND HAND SYRINGE, TRANSDUCER: Brand: BT2000 AUTOMATED MANIFOLD KIT

## (undated) DEVICE — Device

## (undated) DEVICE — PACK,UNIVERSAL,NO GOWNS: Brand: MEDLINE

## (undated) DEVICE — GLV SURG BIOGEL LTX PF 7 1/2

## (undated) DEVICE — SOLUTION IV IRRIG POUR BRL 0.9% SODIUM CHL 2F7124

## (undated) DEVICE — ROYAL SILK SURGICAL GOWN, XXL: Brand: CONVERTORS

## (undated) DEVICE — ST MIC/INTRO ACC SHRP/NDL TUNG/TP NITNL 5F 45CM 7CM

## (undated) DEVICE — DEV EPS SPIDERFX 4MM OTW320/RX190CM

## (undated) DEVICE — BANDAGE,GAUZE,4.5"X4.1YD,STERILE,LF: Brand: MEDLINE

## (undated) DEVICE — MINOR CDS: Brand: MEDLINE INDUSTRIES, INC.

## (undated) DEVICE — CATH FLSH OMNI SFT 5F 90CM

## (undated) DEVICE — PAD ENDOVASCULAR: Brand: MEDLINE INDUSTRIES, INC.

## (undated) DEVICE — DRSNG SURESITE WNDW 4X4.5

## (undated) DEVICE — DESTINATION RENAL GUIDING SHEATH: Brand: DESTINATION

## (undated) DEVICE — A2000 MULTI-USE SYRINGE KIT, P/N 701277-003KIT CONTENTS: 100ML CONTRAST RESERVOIR AND TUBING WITH CONTRAST SPIKE AND CLAMP: Brand: A2000 MULTI-USE SYRINGE KIT

## (undated) DEVICE — SUTURE PERMAHAND SZ 3-0 L30IN NONABSORBABLE BLK SILK BRAID A304H

## (undated) DEVICE — TOWEL,OR,DSP,ST,BLUE,DLX,4/PK,20PK/CS: Brand: MEDLINE

## (undated) DEVICE — GAUZE,SPONGE,FLUFF,6"X6.75",STRL,10/TRAY: Brand: MEDLINE

## (undated) DEVICE — PROXIMATE RH ROTATING HEAD SKIN STAPLERS (35 WIDE) CONTAINS 35 STAINLESS STEEL STAPLES: Brand: PROXIMATE

## (undated) DEVICE — INFLATION DEVICE: Brand: ENCORE™ 26

## (undated) DEVICE — DRESSING TRNSPAR W2XL2.75IN FLM SHT SEMIPERMEABLE WIND

## (undated) DEVICE — NAVICROSS SUPPORT CATHETER: Brand: NAVICROSS

## (undated) DEVICE — HI-TORQUE COMMAND ES GUIDE WIRE .014" 300 CM: Brand: HI-TORQUE COMMAND

## (undated) DEVICE — DEV CLS VASC MYNXCONTROL 6FTO7F

## (undated) DEVICE — PK TURNOVER RM ADV

## (undated) DEVICE — BG BANDED WRUBBER BAND AND TP 36X54IN

## (undated) DEVICE — MODEL AT P65, P/N 701554-001KIT CONTENTS: HAND CONTROLLER, 3-WAY HIGH-PRESSURE STOPCOCK WITH ROTATING END AND PREMIUM HIGH-PRESSURE TUBING: Brand: ANGIOTOUCH® KIT

## (undated) DEVICE — PINNACLE R/O II INTRODUCER SHEATH WITH RADIOPAQUE MARKER: Brand: PINNACLE

## (undated) DEVICE — GLOVE SURG SZ 7 L12IN FNGR THK79MIL GRN LTX FREE

## (undated) DEVICE — QUICK-CROSS™ SUPPORT CATHETER: Brand: QUICK-CROSS™

## (undated) DEVICE — BALN NANOCROSS OTW .014 4F 3X210 150CM

## (undated) DEVICE — RADIFOCUS GLIDEWIRE ADVANTAGE GUIDEWIRE: Brand: GLIDEWIRE ADVANTAGE

## (undated) DEVICE — PAD,ABDOMINAL,8"X10",ST,LF: Brand: MEDLINE